# Patient Record
Sex: MALE | Race: BLACK OR AFRICAN AMERICAN | NOT HISPANIC OR LATINO | Employment: UNEMPLOYED | URBAN - METROPOLITAN AREA
[De-identification: names, ages, dates, MRNs, and addresses within clinical notes are randomized per-mention and may not be internally consistent; named-entity substitution may affect disease eponyms.]

---

## 2017-05-01 ENCOUNTER — HOSPITAL ENCOUNTER (EMERGENCY)
Facility: HOSPITAL | Age: 27
Discharge: HOME | End: 2017-05-01
Attending: FAMILY MEDICINE
Payer: MEDICAID

## 2017-05-01 VITALS
SYSTOLIC BLOOD PRESSURE: 135 MMHG | WEIGHT: 150 LBS | TEMPERATURE: 98 F | HEIGHT: 71 IN | RESPIRATION RATE: 18 BRPM | BODY MASS INDEX: 21 KG/M2 | DIASTOLIC BLOOD PRESSURE: 59 MMHG | HEART RATE: 71 BPM

## 2017-05-01 DIAGNOSIS — K64.5 HEMORRHOID THROMBOSIS: Primary | ICD-10-CM

## 2017-05-01 PROCEDURE — 99283 EMERGENCY DEPT VISIT LOW MDM: CPT

## 2017-05-01 PROCEDURE — 25000003 PHARM REV CODE 250: Performed by: FAMILY MEDICINE

## 2017-05-01 RX ORDER — DOCUSATE SODIUM 100 MG/1
100 CAPSULE, LIQUID FILLED ORAL 2 TIMES DAILY
Qty: 60 CAPSULE | Refills: 1 | Status: ON HOLD | OUTPATIENT
Start: 2017-05-01 | End: 2020-10-29 | Stop reason: HOSPADM

## 2017-05-01 RX ORDER — NAPROXEN 500 MG/1
500 TABLET ORAL 2 TIMES DAILY
Qty: 60 TABLET | Refills: 0 | Status: ON HOLD | OUTPATIENT
Start: 2017-05-01 | End: 2020-10-29 | Stop reason: HOSPADM

## 2017-05-01 RX ORDER — KETOROLAC TROMETHAMINE 10 MG/1
10 TABLET, FILM COATED ORAL
Status: COMPLETED | OUTPATIENT
Start: 2017-05-01 | End: 2017-05-01

## 2017-05-01 RX ADMIN — KETOROLAC TROMETHAMINE 10 MG: 10 TABLET, FILM COATED ORAL at 04:05

## 2017-05-01 NOTE — ED PROVIDER NOTES
"Encounter Date: 5/1/2017       History     Chief Complaint   Patient presents with    Hemorrhoids     pt reports painful bowel movements x 1 week. Pt states "I was using the bathroom like a week ago and there was regular looking blood."     Review of patient's allergies indicates:  No Known Allergies  HPI Comments: Patient complains of painful bowel movements since about a week.  He says he could have had a hemorrhoid.  And also noticed blood.  There is no fever.  Occasional constipation.  No nausea no vomiting no abdominal pain.    The history is provided by the patient.     History reviewed. No pertinent past medical history.  History reviewed. No pertinent surgical history.  No family history on file.  Social History   Substance Use Topics    Smoking status: Light Tobacco Smoker    Smokeless tobacco: None    Alcohol use No     Review of Systems   Constitutional: Negative for activity change, appetite change, chills, diaphoresis, fatigue and fever.   HENT: Negative for congestion, ear pain and hearing loss.    Eyes: Negative for photophobia, pain, discharge, redness and itching.   Respiratory: Negative for cough, choking, chest tightness, shortness of breath, wheezing and stridor.    Cardiovascular: Negative for chest pain.   Gastrointestinal: Positive for blood in stool and constipation. Negative for abdominal distention, abdominal pain, diarrhea, nausea and vomiting.   Endocrine: Negative for polydipsia, polyphagia and polyuria.   Genitourinary: Negative for dysuria and frequency.   Musculoskeletal: Negative for back pain, gait problem, neck pain and neck stiffness.   Skin: Negative for rash.   Neurological: Negative for dizziness, tremors, facial asymmetry, light-headedness, numbness and headaches.   Psychiatric/Behavioral: Negative for behavioral problems and confusion. The patient is not nervous/anxious.    All other systems reviewed and are negative.      Physical Exam   Initial Vitals   BP Pulse Resp " Temp SpO2   05/01/17 1547 05/01/17 1547 05/01/17 1547 05/01/17 1547 --   135/59 71 18 97.8 °F (36.6 °C)      Physical Exam    Nursing note and vitals reviewed.  Constitutional: He appears well-developed and well-nourished.   HENT:   Head: Normocephalic.   Right Ear: External ear normal.   Left Ear: External ear normal.   Nose: Nose normal.   Mouth/Throat: Oropharynx is clear and moist.   Eyes: Conjunctivae and EOM are normal. Pupils are equal, round, and reactive to light.   Cardiovascular: Normal rate, regular rhythm, normal heart sounds and intact distal pulses. Exam reveals no gallop and no friction rub.    No murmur heard.  Pulmonary/Chest: Breath sounds normal. No respiratory distress. He has no wheezes. He has no rhonchi.   Abdominal: Soft. Bowel sounds are normal. He exhibits no distension and no mass. There is no tenderness. There is no guarding.   Genitourinary: Rectal exam shows external hemorrhoid.         Musculoskeletal: Normal range of motion.   Neurological: He is alert and oriented to person, place, and time. He has normal strength and normal reflexes. He displays normal reflexes. No cranial nerve deficit or sensory deficit.         ED Course   Procedures  Labs Reviewed - No data to display                            ED Course     Clinical Impression:   The encounter diagnosis was Hemorrhoid thrombosis.    Disposition:   Disposition: Discharged  Condition: Fair  As to follow up primary care physician if symptoms persist.       Fabian Daley MD  05/02/17 4152

## 2017-05-01 NOTE — ED AVS SNAPSHOT
[facility]  [address]  [place] [address]               Chon Parham   2017  3:42 PM   ED    Description:  Male : 1990   Department:  [facility]           Your Care was Coordinated By:     Provider Role From To    Fabian Daley MD Attending Provider 17 1558 --      Reason for Visit     Hemorrhoids           Diagnoses this Visit        Comments    Hemorrhoid thrombosis    -  Primary       ED Disposition     None           To Do List           Follow-up Information     Follow up In 1 week.    Contact information:    physician       These Medications        Disp Refills Start End    hydrocortisone-pramoxine (PROCTOFOAM-HS) rectal foam 10 g 2 2017     Place 1 applicator rectally 2 (two) times daily. For 4 weeks - Rectal    docusate sodium (COLACE) 100 MG capsule 60 capsule 1 2017     Take 1 capsule (100 mg total) by mouth 2 (two) times daily. - Oral    naproxen (NAPROSYN) 500 MG tablet 60 tablet 0 2017     Take 1 tablet (500 mg total) by mouth 2 (two) times daily. - Oral      [facility]     [facility] Care [facility] -  Assistance  Unless otherwise directed by your provider, please contact [facility], our nurse care line that is available for  assistance.     Registered nurses in the [facility] provide: appointment scheduling, clinical advisement, health education, and other advisory services.  Call: [phone] (toll free)               Medications           Message regarding Medications     Verify the changes and/or additions to your medication regime listed below are the same as discussed with your clinician today.  If any of these changes or additions are incorrect, please notify your healthcare provider.        START taking these NEW medications        Refills    hydrocortisone-pramoxine (PROCTOFOAM-HS) rectal foam 2    Sig: Place 1 applicator rectally 2 (two) times daily. For  "4 weeks    Class: Print    Route: Rectal    docusate sodium (COLACE) 100 MG capsule 1    Sig: Take 1 capsule (100 mg total) by mouth 2 (two) times daily.    Class: Print    Route: Oral    naproxen (NAPROSYN) 500 MG tablet 0    Sig: Take 1 tablet (500 mg total) by mouth 2 (two) times daily.    Class: Print    Route: Oral      These medications were administered today        Dose Freq    ketorolac tablet 10 mg 10 mg ED 1 Time    Sig: Take 1 tablet (10 mg total) by mouth ED 1 Time.    Class: Normal    Route: Oral           Verify that the below list of medications is an accurate representation of the medications you are currently taking.  If none reported, the list may be blank. If incorrect, please contact your healthcare provider. Carry this list with you in case of emergency.           Current Medications     docusate sodium (COLACE) 100 MG capsule Take 1 capsule (100 mg total) by mouth 2 (two) times daily.    hydrocortisone-pramoxine (PROCTOFOAM-HS) rectal foam Place 1 applicator rectally 2 (two) times daily. For 4 weeks    naproxen (NAPROSYN) 500 MG tablet Take 1 tablet (500 mg total) by mouth 2 (two) times daily.           Clinical Reference Information           Your Vitals Were     BP Pulse Temp Resp Height Weight    135/59 71 97.8 °F (36.6 °C) (Oral) 18 5' 11" (1.803 m) 68 kg (150 lb)    BMI                20.92 kg/m2          Allergies as of 5/1/2017     No Known Allergies      Immunizations Administered on Date of Encounter - 5/1/2017     None      ED Micro, Lab, POCT     None      ED Imaging Orders     None        Discharge Instructions         Thrombosed Hemorrhoids    Hemorrhoids are swollen veins in the lower rectum and anus. They're similar to varicose veins that form in the legs. Hemorrhoids can happen inside the rectum (internal hemorrhoids). Or one may form at the anal opening (external hemorrhoids). Although they may bleed, most hemorrhoids aren't cause for concern. But a small blood clot (thrombus) " can develop in an external hemorrhoid. This may lead to severe pain and sometimes bleeding.  When to go to the emergency room (ER)  If you have severe pain or excessive bleeding, seek immediate medical care.  What to expect in the ER  A healthcare provider is likely to check your anus and rectum using a slender, lighted tube (anoscope or proctoscope). A local anesthetic is given to ease any discomfort.  Treatment  Treatment recommendations include the following:  · If the blood clot has formed within the past 48 to 72 hours, your healthcare provider may remove it from within the hemorrhoid. This is a simple procedure that can relieve pain. You will have a local anesthetic to keep you pain-free during the procedure. A small incision is made in the skin, and the blood clot is removed. Stitches are generally not needed.  · If more than 72 hours have passed, your healthcare provider will suggest home treatments. Simple home treatments can relieve your pain. These may include warm baths, ointments, suppositories, and witch hazel compresses. Many thrombosed hemorrhoids go away on their own in a few weeks.  · If you have persistent bleeding or painful hemorrhoids, talk to your healthcare provider about possible treatment with banding, ligation, or removal (hemorrhoidectomy).  Tips for preventing hemorrhoids  Tips include the following:  · Eat foods that are high in fiber and use fiber supplements to help prevent constipation.  · Drink plenty of liquids.  · Get regular exercise to help prevent constipation and promote good bowel function.   Date Last Reviewed: 6/1/2016  © 6036-6435 [employer]. [address] [place] [address], [place] [address]. All rights reserved. This information is not intended as a substitute for professional medical care. Always follow your healthcare professional's instructions.          [facility] Sign-Up     Activating your [facility] account is as easy as 1-2-3!     1) Visit [email or web address],  select Sign Up Now, enter this activation code and your date of birth, then select Next.  [email or web address]  Expires: 6/15/2017  4:36 PM      2) Create a username and password to use when you visit [facility] in the future and select a security question in case you lose your password and select Next.    3) Enter your e-mail address and click Sign Up!    Additional Information  If you have questions, please e-mail [email or web address] or call [phone] to talk to our [facility] staff. Remember, [facility] is NOT to be used for urgent needs. For medical emergencies, dial 911.         Smoking Cessation     If you would like to quit smoking:   You may be eligible for free services if you are a [place] resident and started smoking cigarettes before September 1, 1988.  Call the Smoking Cessation Trust ([facility]) toll free at [phone] or [phone].   Call [phone]-QUIT-NOW if you do not meet the above criteria.   Contact us via email: [email or web address]   View our website for more information: [email or web address]         [facility] complies with applicable Federal civil rights laws and does not discriminate on the basis of race, color, national origin, age, disability, or sex.        Language Assistance Services     ATTENTION: Language assistance services are available, free of charge. Please call [phone].      ATENCIÓN: Si habla español, tiene a brandon disposición servicios gratuitos de asistencia lingüística. Llame al [phone].     [facility] Ý: N?u b?n nói Ti?ng Vi?t, có các d?ch v? h? tr? ngôn ng? mi?n phí dành cho b?n. G?i s? [phone].

## 2017-05-01 NOTE — DISCHARGE INSTRUCTIONS
Thrombosed Hemorrhoids    Hemorrhoids are swollen veins in the lower rectum and anus. They're similar to varicose veins that form in the legs. Hemorrhoids can happen inside the rectum (internal hemorrhoids). Or one may form at the anal opening (external hemorrhoids). Although they may bleed, most hemorrhoids aren't cause for concern. But a small blood clot (thrombus) can develop in an external hemorrhoid. This may lead to severe pain and sometimes bleeding.  When to go to the emergency room (ER)  If you have severe pain or excessive bleeding, seek immediate medical care.  What to expect in the ER  A healthcare provider is likely to check your anus and rectum using a slender, lighted tube (anoscope or proctoscope). A local anesthetic is given to ease any discomfort.  Treatment  Treatment recommendations include the following:  · If the blood clot has formed within the past 48 to 72 hours, your healthcare provider may remove it from within the hemorrhoid. This is a simple procedure that can relieve pain. You will have a local anesthetic to keep you pain-free during the procedure. A small incision is made in the skin, and the blood clot is removed. Stitches are generally not needed.  · If more than 72 hours have passed, your healthcare provider will suggest home treatments. Simple home treatments can relieve your pain. These may include warm baths, ointments, suppositories, and witch hazel compresses. Many thrombosed hemorrhoids go away on their own in a few weeks.  · If you have persistent bleeding or painful hemorrhoids, talk to your healthcare provider about possible treatment with banding, ligation, or removal (hemorrhoidectomy).  Tips for preventing hemorrhoids  Tips include the following:  · Eat foods that are high in fiber and use fiber supplements to help prevent constipation.  · Drink plenty of liquids.  · Get regular exercise to help prevent constipation and promote good bowel function.   Date Last  Reviewed: 6/1/2016  © 0990-1394 [employer]. [address] [place] [address], [place] [address]. All rights reserved. This information is not intended as a substitute for professional medical care. Always follow your healthcare professional's instructions.

## 2017-05-01 NOTE — ED TRIAGE NOTES
"pt reports painful bowel movements x 1 week. Pt states "I was using the bathroom like a week ago and there was regular looking blood."  "

## 2018-05-14 ENCOUNTER — HOSPITAL ENCOUNTER (INPATIENT)
Facility: HOSPITAL | Age: 28
LOS: 3 days | Discharge: HOME | DRG: 977 | End: 2018-05-17
Attending: EMERGENCY MEDICINE | Admitting: HOSPITALIST
Payer: MEDICAID

## 2018-05-14 DIAGNOSIS — N48.5 PENILE ULCER: ICD-10-CM

## 2018-05-14 DIAGNOSIS — K12.1 ORAL ULCER: ICD-10-CM

## 2018-05-14 DIAGNOSIS — B20 HIV (HUMAN IMMUNODEFICIENCY VIRUS INFECTION): Primary | ICD-10-CM

## 2018-05-14 DIAGNOSIS — D69.6 THROMBOCYTOPENIA: ICD-10-CM

## 2018-05-14 DIAGNOSIS — D70.9 NEUTROPENIA, UNSPECIFIED TYPE: ICD-10-CM

## 2018-05-14 PROBLEM — R50.9 FEVER: Status: ACTIVE | Noted: 2018-05-14

## 2018-05-14 PROBLEM — D61.818 PANCYTOPENIA: Status: ACTIVE | Noted: 2018-05-14

## 2018-05-14 PROBLEM — Z21 HIV (HUMAN IMMUNODEFICIENCY VIRUS INFECTION): Status: ACTIVE | Noted: 2018-05-14

## 2018-05-14 PROBLEM — R50.81 NEUTROPENIC FEVER: Status: ACTIVE | Noted: 2018-05-14

## 2018-05-14 LAB
ALBUMIN SERPL BCP-MCNC: 3.2 G/DL
ALBUMIN SERPL BCP-MCNC: 3.3 G/DL
ALP SERPL-CCNC: 48 U/L
ALP SERPL-CCNC: 48 U/L
ALT SERPL W/O P-5'-P-CCNC: 18 U/L
ALT SERPL W/O P-5'-P-CCNC: 21 U/L
AMPHET+METHAMPHET UR QL: NEGATIVE
ANION GAP SERPL CALC-SCNC: 10 MMOL/L
ANION GAP SERPL CALC-SCNC: 11 MMOL/L
AST SERPL-CCNC: 48 U/L
AST SERPL-CCNC: 49 U/L
BACTERIA #/AREA URNS HPF: NORMAL /HPF
BARBITURATES UR QL SCN>200 NG/ML: NEGATIVE
BASOPHILS # BLD AUTO: 0.01 K/UL
BASOPHILS NFR BLD: 0.4 %
BENZODIAZ UR QL SCN>200 NG/ML: NEGATIVE
BILIRUB SERPL-MCNC: 0.5 MG/DL
BILIRUB SERPL-MCNC: 0.8 MG/DL
BILIRUB UR QL STRIP: NEGATIVE
BUN SERPL-MCNC: 13 MG/DL
BUN SERPL-MCNC: 14 MG/DL
BZE UR QL SCN: NEGATIVE
CALCIUM SERPL-MCNC: 7.8 MG/DL
CALCIUM SERPL-MCNC: 8.1 MG/DL
CANNABINOIDS UR QL SCN: NEGATIVE
CD3+CD4+ CELLS # BLD: 308 CELLS/UL (ref 300–1400)
CD3+CD4+ CELLS NFR BLD: 40.6 % (ref 28–57)
CHLORIDE SERPL-SCNC: 102 MMOL/L
CHLORIDE SERPL-SCNC: 98 MMOL/L
CLARITY UR: CLEAR
CO2 SERPL-SCNC: 21 MMOL/L
CO2 SERPL-SCNC: 27 MMOL/L
COLOR UR: YELLOW
CREAT SERPL-MCNC: 0.9 MG/DL
CREAT SERPL-MCNC: 0.9 MG/DL
CREAT UR-MCNC: 297.7 MG/DL
DIFFERENTIAL METHOD: ABNORMAL
EOSINOPHIL # BLD AUTO: 0 K/UL
EOSINOPHIL NFR BLD: 0 %
ERYTHROCYTE [DISTWIDTH] IN BLOOD BY AUTOMATED COUNT: 12.2 %
EST. GFR  (AFRICAN AMERICAN): >60 ML/MIN/1.73 M^2
EST. GFR  (AFRICAN AMERICAN): >60 ML/MIN/1.73 M^2
EST. GFR  (NON AFRICAN AMERICAN): >60 ML/MIN/1.73 M^2
EST. GFR  (NON AFRICAN AMERICAN): >60 ML/MIN/1.73 M^2
GLUCOSE SERPL-MCNC: 85 MG/DL
GLUCOSE SERPL-MCNC: 93 MG/DL
GLUCOSE UR QL STRIP: NEGATIVE
HAV IGM SERPL QL IA: NEGATIVE
HBV CORE IGM SERPL QL IA: NEGATIVE
HBV SURFACE AG SERPL QL IA: NEGATIVE
HCT VFR BLD AUTO: 44.8 %
HCV AB SERPL QL IA: NEGATIVE
HGB BLD-MCNC: 15.8 G/DL
HGB UR QL STRIP: NEGATIVE
HYALINE CASTS #/AREA URNS LPF: 0 /LPF
INR PPP: 1
KETONES UR QL STRIP: ABNORMAL
LEUKOCYTE ESTERASE UR QL STRIP: NEGATIVE
LYMPHOCYTES # BLD AUTO: 0.8 K/UL
LYMPHOCYTES NFR BLD: 32.4 %
MAGNESIUM SERPL-MCNC: 1.9 MG/DL
MCH RBC QN AUTO: 33.1 PG
MCHC RBC AUTO-ENTMCNC: 35.3 G/DL
MCV RBC AUTO: 94 FL
METHADONE UR QL SCN>300 NG/ML: NEGATIVE
MICROSCOPIC COMMENT: NORMAL
MONOCYTES # BLD AUTO: 0.3 K/UL
MONOCYTES NFR BLD: 12.3 %
NEUTROPHILS # BLD AUTO: 1.3 K/UL
NEUTROPHILS NFR BLD: 54.9 %
NITRITE UR QL STRIP: NEGATIVE
OPIATES UR QL SCN: NORMAL
PCP UR QL SCN>25 NG/ML: NEGATIVE
PH UR STRIP: 6 [PH] (ref 5–8)
PHOSPHATE SERPL-MCNC: 1.6 MG/DL
PLATELET # BLD AUTO: 68 K/UL
PMV BLD AUTO: 11.2 FL
POTASSIUM SERPL-SCNC: 3.7 MMOL/L
POTASSIUM SERPL-SCNC: 3.8 MMOL/L
PROT SERPL-MCNC: 6.3 G/DL
PROT SERPL-MCNC: 6.4 G/DL
PROT UR QL STRIP: ABNORMAL
PROTHROMBIN TIME: 10.6 SEC
RBC # BLD AUTO: 4.77 M/UL
RBC #/AREA URNS HPF: 0 /HPF (ref 0–4)
RPR SER QL: NORMAL
SODIUM SERPL-SCNC: 134 MMOL/L
SODIUM SERPL-SCNC: 135 MMOL/L
SP GR UR STRIP: >=1.03 (ref 1–1.03)
TOXICOLOGY INFORMATION: NORMAL
URN SPEC COLLECT METH UR: ABNORMAL
UROBILINOGEN UR STRIP-ACNC: 1 EU/DL
WBC # BLD AUTO: 2.44 K/UL
WBC #/AREA URNS HPF: 0 /HPF (ref 0–5)

## 2018-05-14 PROCEDURE — 85025 COMPLETE CBC W/AUTO DIFF WBC: CPT

## 2018-05-14 PROCEDURE — 83735 ASSAY OF MAGNESIUM: CPT

## 2018-05-14 PROCEDURE — 84100 ASSAY OF PHOSPHORUS: CPT

## 2018-05-14 PROCEDURE — 80053 COMPREHEN METABOLIC PANEL: CPT

## 2018-05-14 PROCEDURE — 81000 URINALYSIS NONAUTO W/SCOPE: CPT | Mod: 59

## 2018-05-14 PROCEDURE — 11000001 HC ACUTE MED/SURG PRIVATE ROOM

## 2018-05-14 PROCEDURE — 86480 TB TEST CELL IMMUN MEASURE: CPT

## 2018-05-14 PROCEDURE — 99285 EMERGENCY DEPT VISIT HI MDM: CPT | Mod: 25

## 2018-05-14 PROCEDURE — 80074 ACUTE HEPATITIS PANEL: CPT

## 2018-05-14 PROCEDURE — 25000003 PHARM REV CODE 250: Performed by: HOSPITALIST

## 2018-05-14 PROCEDURE — 87040 BLOOD CULTURE FOR BACTERIA: CPT | Mod: 59

## 2018-05-14 PROCEDURE — 87536 HIV-1 QUANT&REVRSE TRNSCRPJ: CPT

## 2018-05-14 PROCEDURE — 80307 DRUG TEST PRSMV CHEM ANLYZR: CPT

## 2018-05-14 PROCEDURE — 36415 COLL VENOUS BLD VENIPUNCTURE: CPT

## 2018-05-14 PROCEDURE — 86592 SYPHILIS TEST NON-TREP QUAL: CPT

## 2018-05-14 PROCEDURE — 86694 HERPES SIMPLEX NES ANTBDY: CPT

## 2018-05-14 PROCEDURE — 86361 T CELL ABSOLUTE COUNT: CPT

## 2018-05-14 PROCEDURE — 85610 PROTHROMBIN TIME: CPT

## 2018-05-14 PROCEDURE — 63600175 PHARM REV CODE 636 W HCPCS: Performed by: HOSPITALIST

## 2018-05-14 PROCEDURE — 86703 HIV-1/HIV-2 1 RESULT ANTBDY: CPT

## 2018-05-14 RX ORDER — SODIUM CHLORIDE 0.9 % (FLUSH) 0.9 %
5 SYRINGE (ML) INJECTION
Status: DISCONTINUED | OUTPATIENT
Start: 2018-05-14 | End: 2018-05-17 | Stop reason: HOSPADM

## 2018-05-14 RX ORDER — IPRATROPIUM BROMIDE AND ALBUTEROL SULFATE 2.5; .5 MG/3ML; MG/3ML
3 SOLUTION RESPIRATORY (INHALATION) EVERY 6 HOURS PRN
Status: DISCONTINUED | OUTPATIENT
Start: 2018-05-14 | End: 2018-05-17 | Stop reason: HOSPADM

## 2018-05-14 RX ORDER — IBUPROFEN 200 MG
24 TABLET ORAL
Status: DISCONTINUED | OUTPATIENT
Start: 2018-05-14 | End: 2018-05-17 | Stop reason: HOSPADM

## 2018-05-14 RX ORDER — HYDROCODONE BITARTRATE AND ACETAMINOPHEN 5; 325 MG/1; MG/1
1 TABLET ORAL EVERY 6 HOURS PRN
Status: DISCONTINUED | OUTPATIENT
Start: 2018-05-14 | End: 2018-05-17 | Stop reason: HOSPADM

## 2018-05-14 RX ORDER — NYSTATIN 100000 [USP'U]/ML
500000 SUSPENSION ORAL 4 TIMES DAILY
Status: DISCONTINUED | OUTPATIENT
Start: 2018-05-14 | End: 2018-05-17 | Stop reason: HOSPADM

## 2018-05-14 RX ORDER — ONDANSETRON 2 MG/ML
4 INJECTION INTRAMUSCULAR; INTRAVENOUS EVERY 8 HOURS PRN
Status: DISCONTINUED | OUTPATIENT
Start: 2018-05-14 | End: 2018-05-17 | Stop reason: HOSPADM

## 2018-05-14 RX ORDER — ACETAMINOPHEN 325 MG/1
650 TABLET ORAL EVERY 4 HOURS PRN
Status: DISCONTINUED | OUTPATIENT
Start: 2018-05-14 | End: 2018-05-17 | Stop reason: HOSPADM

## 2018-05-14 RX ORDER — GLUCAGON 1 MG
1 KIT INJECTION
Status: DISCONTINUED | OUTPATIENT
Start: 2018-05-14 | End: 2018-05-17 | Stop reason: HOSPADM

## 2018-05-14 RX ORDER — IBUPROFEN 200 MG
16 TABLET ORAL
Status: DISCONTINUED | OUTPATIENT
Start: 2018-05-14 | End: 2018-05-17 | Stop reason: HOSPADM

## 2018-05-14 RX ADMIN — HYDROCODONE BITARTRATE AND ACETAMINOPHEN 1 TABLET: 5; 325 TABLET ORAL at 12:05

## 2018-05-14 RX ADMIN — NYSTATIN 500000 UNITS: 100000 SUSPENSION ORAL at 09:05

## 2018-05-14 RX ADMIN — HYDROCODONE BITARTRATE AND ACETAMINOPHEN 1 TABLET: 5; 325 TABLET ORAL at 07:05

## 2018-05-14 RX ADMIN — NYSTATIN 500000 UNITS: 100000 SUSPENSION ORAL at 08:05

## 2018-05-14 RX ADMIN — ONDANSETRON 4 MG: 2 INJECTION INTRAMUSCULAR; INTRAVENOUS at 09:05

## 2018-05-14 RX ADMIN — HYDROCODONE BITARTRATE AND ACETAMINOPHEN 1 TABLET: 5; 325 TABLET ORAL at 05:05

## 2018-05-14 RX ADMIN — NYSTATIN 500000 UNITS: 100000 SUSPENSION ORAL at 12:05

## 2018-05-14 RX ADMIN — NYSTATIN 500000 UNITS: 100000 SUSPENSION ORAL at 04:05

## 2018-05-14 NOTE — HPI
27M with unknown pmh presents with fever, n/v and abdominal pain and throat pain 5 days ago to [facility].  He was discharged home with levaquin  for 10 days.  He had a follow up with family medicine physician on 5/11 who noted patient had unremarkable cbc, HIV, flu and RPR was negative.  ENT exam at that time was normal.  On 5/13, miladys gonsalez called back to show that cbc abnormal and HIV was positive and to have patient transferred to [facility] for further work up of new diagnosis of HIV.   Patient himself reports 10 lb weight loss in the last week.  Reports oral sores onset 2 weeks ago. Penile sore onset 3 months ago. Pt reports that sores are tender. Symptoms are constant and moderate in severity. No mitigating or exacerbating factors reported. Associated sxs include subjective fever and generalized myalgia. Patient denies any dysuria, hematuria, frequency, hesitancy, chills, n/v, and all other sxs at this time. Prior Tx includes levaquin. Pt reports having had 8 sexual partners in the past year, with infrequent use of condoms. Pt has PMHx of chlamydia and gonorrhea. Pt denies IV drug use. Pt reports that he has not seen a PCP recently for sxs, or been prescribed any medications besides the levaquin. Brendenharisgabbi shows: positive HIV, white count of 2.4, platlets at 76, lactose acid of 0.7, and normal CXray. No further complaints or concerns at this time.

## 2018-05-14 NOTE — SUBJECTIVE & OBJECTIVE
No past medical history on file.    No past surgical history on file.    Review of patient's allergies indicates:  No Known Allergies    No current facility-administered medications on file prior to encounter.      Current Outpatient Prescriptions on File Prior to Encounter   Medication Sig    docusate sodium (COLACE) 100 MG capsule Take 1 capsule (100 mg total) by mouth 2 (two) times daily.    hydrocortisone-pramoxine (PROCTOFOAM-HS) rectal foam Place 1 applicator rectally 2 (two) times daily. For 4 weeks    naproxen (NAPROSYN) 500 MG tablet Take 1 tablet (500 mg total) by mouth 2 (two) times daily.     Family History     None        Social History Main Topics    Smoking status: Light Tobacco Smoker    Smokeless tobacco: Not on file    Alcohol use No    Drug use: Yes     Types: Marijuana    Sexual activity: Not on file     Review of Systems   Constitutional: Positive for fever. Negative for activity change, appetite change, chills, diaphoresis and fatigue.   HENT: Negative for facial swelling, sore throat, tinnitus and trouble swallowing.         Oral ulcers   Eyes: Negative for photophobia and visual disturbance.   Respiratory: Negative for apnea, cough, chest tightness, shortness of breath and wheezing.    Cardiovascular: Negative for chest pain, palpitations and leg swelling.   Gastrointestinal: Negative for abdominal distention, abdominal pain, constipation, diarrhea, nausea and vomiting.   Endocrine: Negative for polydipsia, polyphagia and polyuria.   Genitourinary: Positive for penile pain. Negative for decreased urine volume, dysuria, flank pain, frequency and hematuria.        Penile ulcer   Musculoskeletal: Positive for myalgias. Negative for arthralgias, back pain, joint swelling and neck stiffness.   Skin: Negative for pallor and rash.   Allergic/Immunologic: Negative for immunocompromised state.   Neurological: Negative for dizziness, seizures, syncope, weakness, numbness and headaches.    Psychiatric/Behavioral: Negative for confusion, hallucinations and suicidal ideas. The patient is not nervous/anxious.    All other systems reviewed and are negative.    Objective:     Vital Signs (Most Recent):  Temp: 98.9 °F (37.2 °C) (05/14/18 0322)  Pulse: 74 (05/14/18 0322)  Resp: 16 (05/14/18 0322)  BP: (!) 104/54 (05/14/18 0322)  SpO2: 97 % (05/14/18 0322) Vital Signs (24h Range):  Temp:  [98.9 °F (37.2 °C)-101.3 °F (38.5 °C)] 98.9 °F (37.2 °C)  Pulse:  [74-81] 74  Resp:  [16-18] 16  SpO2:  [97 %-98 %] 97 %  BP: (104-127)/(54-63) 104/54     Weight: 67.6 kg (149 lb)  Body mass index is 20.78 kg/m².    Physical Exam   Constitutional: He is oriented to person, place, and time. He appears well-developed and well-nourished. No distress.   HENT:   Head: Normocephalic and atraumatic.   Mouth/Throat: Oropharynx is clear and moist.   Multiple oral and throat ulcers  No leukoplakia   Eyes: Conjunctivae are normal. Pupils are equal, round, and reactive to light. No scleral icterus.   Neck: No JVD present. No thyromegaly present.   Cardiovascular: Regular rhythm.  Exam reveals no gallop and no friction rub.    No murmur heard.  Pulmonary/Chest: Effort normal and breath sounds normal. No respiratory distress. He has no wheezes. He has no rales.   Abdominal: Soft. Bowel sounds are normal. He exhibits no distension. There is no tenderness. There is no guarding.   Genitourinary:   Genitourinary Comments: Penile ulcer   Musculoskeletal: Normal range of motion.   Neurological: He is alert and oriented to person, place, and time. No cranial nerve deficit.   Skin: Skin is warm. Capillary refill takes 2 to 3 seconds. He is not diaphoretic. No erythema.   Psychiatric: He has a normal mood and affect.   Nursing note and vitals reviewed.        CRANIAL NERVES     CN III, IV, VI   Pupils are equal, round, and reactive to light.       Significant Labs: BMP: No results for input(s): GLU, NA, K, CL, CO2, BUN, CREATININE, CALCIUM, MG  in the last 48 hours.  CBC: No results for input(s): WBC, HGB, HCT, PLT in the last 48 hours.  All pertinent labs within the past 24 hours have been reviewed.    Significant Imaging: I have reviewed all pertinent imaging results/findings within the past 24 hours.   Imaging Results    None

## 2018-05-14 NOTE — ASSESSMENT & PLAN NOTE
- again confusing history  - no work up done in Er, reported fevers 101.6 at outside facility, no fever in ER  - check blood cx x 2, UA, CXR, basic labs including CBC and CMP  - start tylenol q4h prn Temp >101

## 2018-05-14 NOTE — H&P
[facility] Hospital Medicine  History & Physical    Patient Name: Chon Parham  MRN: [id number]  Admission Date: 5/14/2018  Attending Physician: Wong Conti MD  Primary Care Provider: Primary Doctor No         Patient information was obtained from patient and ER records.     Subjective:     Principal Problem:HIV (human immunodeficiency virus infection)    Chief Complaint:   Chief Complaint   Patient presents with    Mouth Lesions     pt transfer from [facility], pt cx with HIV, presents with pain with urination        HPI: 27M with unknown pmh presents with fever, n/v and abdominal pain and throat pain 5 days ago to [facility].  He was discharged home with levaquin  for 10 days.  He had a follow up with family medicine physician on 5/11 who noted patient had unremarkable cbc, HIV, flu and RPR was negative.  ENT exam at that time was normal.  On 5/13, miladys gonsalez called back to show that cbc abnormal and HIV was positive and to have patient transferred to [facility] for further work up of new diagnosis of HIV.   Patient himself reports 10 lb weight loss in the last week.  Reports oral sores onset 2 weeks ago. Penile sore onset 3 months ago. Pt reports that sores are tender. Symptoms are constant and moderate in severity. No mitigating or exacerbating factors reported. Associated sxs include subjective fever and generalized myalgia. Patient denies any dysuria, hematuria, frequency, hesitancy, chills, n/v, and all other sxs at this time. Prior Tx includes levaquin. Pt reports having had 8 sexual partners in the past year, with infrequent use of condoms. Pt has PMHx of chlamydia and gonorrhea. Pt denies IV drug use. Pt reports that he has not seen a PCP recently for sxs, or been prescribed any medications besides the levaquin. [facility] positive HIV, white count of 2.4, platlets at 76, lactose acid of 0.7, and normal CXray. No further complaints or concerns at this time.     No past medical  history on file.    No past surgical history on file.    Review of patient's allergies indicates:  No Known Allergies    No current facility-administered medications on file prior to encounter.      Current Outpatient Prescriptions on File Prior to Encounter   Medication Sig    docusate sodium (COLACE) 100 MG capsule Take 1 capsule (100 mg total) by mouth 2 (two) times daily.    hydrocortisone-pramoxine (PROCTOFOAM-HS) rectal foam Place 1 applicator rectally 2 (two) times daily. For 4 weeks    naproxen (NAPROSYN) 500 MG tablet Take 1 tablet (500 mg total) by mouth 2 (two) times daily.     Family History     None        Social History Main Topics    Smoking status: Light Tobacco Smoker    Smokeless tobacco: Not on file    Alcohol use No    Drug use: Yes     Types: Marijuana    Sexual activity: Not on file     Review of Systems   Constitutional: Positive for fever. Negative for activity change, appetite change, chills, diaphoresis and fatigue.   HENT: Negative for facial swelling, sore throat, tinnitus and trouble swallowing.         Oral ulcers   Eyes: Negative for photophobia and visual disturbance.   Respiratory: Negative for apnea, cough, chest tightness, shortness of breath and wheezing.    Cardiovascular: Negative for chest pain, palpitations and leg swelling.   Gastrointestinal: Negative for abdominal distention, abdominal pain, constipation, diarrhea, nausea and vomiting.   Endocrine: Negative for polydipsia, polyphagia and polyuria.   Genitourinary: Positive for penile pain. Negative for decreased urine volume, dysuria, flank pain, frequency and hematuria.        Penile ulcer   Musculoskeletal: Positive for myalgias. Negative for arthralgias, back pain, joint swelling and neck stiffness.   Skin: Negative for pallor and rash.   Allergic/Immunologic: Negative for immunocompromised state.   Neurological: Negative for dizziness, seizures, syncope, weakness, numbness and headaches.    Psychiatric/Behavioral: Negative for confusion, hallucinations and suicidal ideas. The patient is not nervous/anxious.    All other systems reviewed and are negative.    Objective:     Vital Signs (Most Recent):  Temp: 98.9 °F (37.2 °C) (05/14/18 0322)  Pulse: 74 (05/14/18 0322)  Resp: 16 (05/14/18 0322)  BP: (!) 104/54 (05/14/18 0322)  SpO2: 97 % (05/14/18 0322) Vital Signs (24h Range):  Temp:  [98.9 °F (37.2 °C)-101.3 °F (38.5 °C)] 98.9 °F (37.2 °C)  Pulse:  [74-81] 74  Resp:  [16-18] 16  SpO2:  [97 %-98 %] 97 %  BP: (104-127)/(54-63) 104/54     Weight: 67.6 kg (149 lb)  Body mass index is 20.78 kg/m².    Physical Exam   Constitutional: He is oriented to person, place, and time. He appears well-developed and well-nourished. No distress.   HENT:   Head: Normocephalic and atraumatic.   Mouth/Throat: Oropharynx is clear and moist.   Multiple oral and throat ulcers  No leukoplakia   Eyes: Conjunctivae are normal. Pupils are equal, round, and reactive to light. No scleral icterus.   Neck: No JVD present. No thyromegaly present.   Cardiovascular: Regular rhythm.  Exam reveals no gallop and no friction rub.    No murmur heard.  Pulmonary/Chest: Effort normal and breath sounds normal. No respiratory distress. He has no wheezes. He has no rales.   Abdominal: Soft. Bowel sounds are normal. He exhibits no distension. There is no tenderness. There is no guarding.   Genitourinary:   Genitourinary Comments: Penile ulcer   Musculoskeletal: Normal range of motion.   Neurological: He is alert and oriented to person, place, and time. No cranial nerve deficit.   Skin: Skin is warm. Capillary refill takes 2 to 3 seconds. He is not diaphoretic. No erythema.   Psychiatric: He has a normal mood and affect.   Nursing note and vitals reviewed.        CRANIAL NERVES     CN III, IV, VI   Pupils are equal, round, and reactive to light.       Significant Labs: BMP: No results for input(s): GLU, NA, K, CL, CO2, BUN, CREATININE, CALCIUM, MG  in the last 48 hours.  CBC: No results for input(s): WBC, HGB, HCT, PLT in the last 48 hours.  All pertinent labs within the past 24 hours have been reviewed.    Significant Imaging: I have reviewed all pertinent imaging results/findings within the past 24 hours.   Imaging Results    None           Assessment/Plan:     * HIV (human immunodeficiency virus infection)    - unclear history from providers and patient  - check HIV 1/2 Ab, CD4 count, HIV viral load  - will also check acute hep panel, rpr, and gold quantiferon,   - consult ID            Fever    - again confusing history  - no work up done in Er, reported fevers 101.6 at outside facility, no fever in ER  - check blood cx x 2, UA, CXR, basic labs including CBC and CMP  - start tylenol q4h prn Temp >101        Oral ulcer    - start nystatin qid          Penile ulcer    - check chlamydia, gonorrha, hsv, hiv, rpr            VTE Risk Mitigation         Ordered     IP VTE LOW RISK PATIENT  Once      05/14/18 0421     Place FLAVIO hose  Until discontinued      05/14/18 0421             Wong Conti MD  Department of Hospital Medicine   [facility]

## 2018-05-14 NOTE — ASSESSMENT & PLAN NOTE
- unclear history from providers and patient  - check HIV 1/2 Ab, CD4 count, HIV viral load  - will also check acute hep panel, rpr, and gold quantiferon,   - consult ID

## 2018-05-14 NOTE — PLAN OF CARE
Met with patient. Patient denies any post hospital needs or services at this time. Discussed obtaining a pcp , and that his insurance assigns a pcp . Informed him that his pcp should be listed on his insurance card or he could contact his insurance for name of his pcp. Will need follow up appointment close to his home. Will continue to follow.  Transitional Care Folder, Discharge Planning Begins on Admission pamphlet, [facility] Delivery pamphlet, Advance Directive information given to patient along with the contact information given.Instructed patient  to call with any questions or concerns.      No Pharmacies Listed  Primary Doctor No  Payor: MEDICAID / Plan: [employer] [facility] / Product Type: Managed Medicaid /         05/14/18 1033   Discharge Assessment   Assessment Type Discharge Planning Assessment   Confirmed/corrected address and phone number on facesheet? No  (Corrected address : [address] [place] [address]. [id number])   Assessment information obtained from? Patient;Medical Record   Expected Length of Stay (days) (tbd)   Communicated expected length of stay with patient/caregiver no   Prior to hospitilization cognitive status: Alert/Oriented   Prior to hospitalization functional status: Independent   Current cognitive status: Alert/Oriented   Current Functional Status: Independent   Facility Arrived From: home   Lives With friend(s)   Able to Return to Prior Arrangements yes   Is patient able to care for self after discharge? Yes   Who are your caregiver(s) and their phone number(s)? Gini Parham ( mother ) [phone]   Patient's perception of discharge disposition home or selfcare   Readmission Within The Last 30 Days no previous admission in last 30 days   Patient currently being followed by outpatient case management? No   Patient currently receives any other outside agency services? No   Equipment Currently Used at Home none   Do you have any problems affording any of your  prescribed medications? No   Is the patient taking medications as prescribed? (indicated he is not taking routine medications)   Does the patient have transportation home? Yes   Transportation Available family or friend will provide   Does the patient receive services at the [facility]? No   Discharge Plan A Home   Discharge Plan B Home;Home with family   Patient/Family In Agreement With Plan yes

## 2018-05-14 NOTE — ED PROVIDER NOTES
SCRIBE #1 NOTE: I, Jono Jasmine, am scribing for, and in the presence of, Jac Qureshi Jr., MD. I have scribed the entire note.      History      Chief Complaint   Patient presents with    Mouth Lesions     pt transfer from [facility], pt cx with HIV, presents with pain with urination       Review of patient's allergies indicates:  No Known Allergies     HPI   HPI    5/14/2018, 3:38 AM   History obtained from the patient      History of Present Illness: Chon Parham is a 27 y.o. male patient who was transferred  to this Emergency Department from [facility] for thrombocytopenia, neutropenia, mouth/throat ulcers and penile ulcer, and new dx of HIV. Pt reports that oral sores onset 2 weeks ago. Penile sore onset 3 months ago. Pt reports that sores are tender. Symptoms are constant and moderate in severity. No mitigating or exacerbating factors reported. Associated sxs include subjective fever and generalized myalgia. Patient denies any dysuria, hematuria, frequency, hesitancy, chills, n/v, and all other sxs at this time. Prior Tx includes levaquin. Pt reports having had 8 sexual partners in the past year, with infrequent use of condoms. Pt has PMHx of chlamydia and gonorrhea. Pt denies IV drug use. Pt reports that he has not seen a PCP recently for sxs, or been prescribed any medications besides the levaquin. [facility] shows: positive HIV, white count of 2.4, platlets at 76, lactose acid of 0.7, and normal CXray. No further complaints or concerns at this time.     Discussed with Dr. Lane (ID) prior to accepting pt for transfer.  Dr. Lane agrees with transfer and will see pt in hospital room.      Arrival mode: EMS    PCP: None given      Past Medical History:  History reviewed. Nothing pertinent.     Past Surgical History:  History reviewed. Nothing pertinent.      Family History:  History reviewed. Nothing pertinent.     Social History:  Social History     Social History Main Topics    Smoking status: Light  Tobacco Smoker    Smokeless tobacco: Unknown    Alcohol use No    Drug use: Yes     Types: Marijuana    Sexual activity: Unknown       ROS   Review of Systems   Constitutional: Positive for fever. Negative for chills.   HENT: Negative for sore throat.    Respiratory: Negative for shortness of breath.    Cardiovascular: Negative for chest pain.   Gastrointestinal: Negative for nausea and vomiting.   Genitourinary: Negative for dysuria, frequency and hematuria.        (-) Hesitancy   Musculoskeletal: Positive for myalgias (Generalized). Negative for back pain.   Skin: Positive for wound (Mouth/throat/penis). Negative for rash.   Neurological: Negative for weakness.   Hematological: Does not bruise/bleed easily.   All other systems reviewed and are negative.      Physical Exam      Initial Vitals [05/14/18 0322]   BP Pulse Resp Temp SpO2   (!) 104/54 74 16 98.9 °F (37.2 °C) 97 %      MAP       70.67          Physical Exam  Nursing Notes and Vital Signs Reviewed.  Constitutional: Patient is in no acute distress. Well-developed and well-nourished.  Head: Atraumatic. Normocephalic.  Eyes: PERRL. EOM intact. Conjunctivae are not pale. No scleral icterus.  ENT: Mucous membranes are moist. Multiple ulcers to roof of mouth and posterior pharynx.     Neck: Supple. Full ROM. No lymphadenopathy.  Cardiovascular: Regular rate. Regular rhythm. No murmurs, rubs, or gallops. Distal pulses are 2+ and symmetric.  Pulmonary/Chest: No respiratory distress. Clear to auscultation bilaterally. No wheezing or rales.  Abdominal: Soft and non-distended.  There is no tenderness.  No rebound, guarding, or rigidity.  Genitourinary: Ulcer to penis.   Musculoskeletal: Moves all extremities. No obvious deformities. No edema. No calf tenderness.  Skin: Warm and dry.  Neurological:  Alert, awake, and appropriate.  Normal speech.  No acute focal neurological deficits are appreciated.  Psychiatric: Normal affect. Good eye contact. Appropriate in  "content.    ED Course    Procedures  ED Vital Signs:  Vitals:    05/14/18 0322 05/14/18 0440 05/14/18 0700 05/14/18 0745   BP: (!) 104/54 (!) 108/54 (!) 100/54 (!) 113/54   Pulse: 74 70 (!) 57 61   Resp: 16 18 19 18   Temp: 98.9 °F (37.2 °C)   98.7 °F (37.1 °C)   TempSrc: Oral   Oral   SpO2: 97% (!) 93% (!) 94% 97%   Weight: 67.6 kg (149 lb)      Height: 5' 11" (1.803 m)       05/14/18 1213 05/14/18 1608 05/14/18 1947 05/14/18 2041   BP: (!) 108/54 (!) 118/55 (!) 114/56    Pulse: 64 64 67 60   Resp: 18 16 18 18   Temp: 99.7 °F (37.6 °C) 99.5 °F (37.5 °C) (!) 101.1 °F (38.4 °C)    TempSrc: Oral Oral Oral    SpO2: 96% 95% 95% 97%   Weight:       Height:        05/14/18 2059 05/14/18 2340   BP:  (!) 103/56   Pulse:  70   Resp:  18   Temp: (!) 100.4 °F (38 °C) 100.2 °F (37.9 °C)   TempSrc: Oral Oral   SpO2:  98%   Weight:     Height:                  The Emergency Provider reviewed the vital signs and test results, which are outlined above.    ED Discussion     4:00 AM: Discussed case with Julieta Nguyen (Hospital Medicine). Dr. Conti agrees with current care and management of pt and accepts admission.   Admitting Service: Hospital medicine   Admitting Physician: Dr. Conti  Admit to: [facility]      4:15 AM: Re-evaluated pt. I have discussed test results, shared treatment plan, and the need for admission with patient at bedside. Pt expresses understanding at this time and agrees with all information. All questions answered. Pt has no further questions or concerns at this time. Pt is ready for admit.      ED Medication(s):  Medications   sodium chloride 0.9% flush 5 mL (not administered)   glucose chewable tablet 16 g (not administered)   glucose chewable tablet 24 g (not administered)   dextrose 50% injection 12.5 g (not administered)   dextrose 50% injection 25 g (not administered)   glucagon (human recombinant) injection 1 mg (not administered)   acetaminophen tablet 650 mg (not administered)   ondansetron injection 4 mg " (4 mg Intravenous Given 5/14/18 2136)   albuterol-ipratropium 2.5mg-0.5mg/3mL nebulizer solution 3 mL (not administered)   hydrocodone-acetaminophen 5-325mg per tablet 1 tablet (1 tablet Oral Given 5/14/18 1957)   nystatin 100,000 unit/mL suspension 500,000 Units (500,000 Units Oral Given 5/14/18 2000)       Current Discharge Medication List                Medical Decision Making              Scribe Attestation:   Scribe #1: I performed the above scribed service and the documentation accurately describes the services I performed. I attest to the accuracy of the note.    Attending:   Physician Attestation Statement for Scribe #1: I, Jac Qureshi Jr., MD, personally performed the services described in this documentation, as scribed by Jono Jasmine, in my presence, and it is both accurate and complete.          Clinical Impression       ICD-10-CM ICD-9-CM   1. Penile ulcer N48.5 607.89   2. HIV (human immunodeficiency virus infection) B20 V08   3. Neutropenia, unspecified type D70.9 288.00   4. Thrombocytopenia D69.6 287.5   5. Oral ulcer K12.1 528.9       Disposition:   Disposition: Admitted  Condition: Fair         Jac Qureshi Jr., MD  05/15/18 0208       Jac Qureshi Jr., MD  05/15/18 0212

## 2018-05-14 NOTE — ED NOTES
Senait GARCIA from Med-surg called to receive report. Report given asked to bring patient up around 0730.

## 2018-05-14 NOTE — PLAN OF CARE
Problem: Patient Care Overview  Goal: Plan of Care Review  Outcome: Ongoing (interventions implemented as appropriate)  Pt remains free from falls/injury. Fall Precautions in place. PRN pain med given for pain. Meds given as scheduled. Denies nausea. Tolerating regular diet. Dr. Lane made aware of consult. Call light and personal items within reach of pt. Bed in low locked position. Plan of care reviewed with pt. Pt verbalized understanding. VSS. Will cont to monitor.

## 2018-05-15 PROBLEM — B20 THROMBOCYTOPENIA ASSOCIATED WITH AIDS: Status: ACTIVE | Noted: 2018-05-15

## 2018-05-15 PROBLEM — D69.59 THROMBOCYTOPENIA ASSOCIATED WITH AIDS: Status: ACTIVE | Noted: 2018-05-15

## 2018-05-15 LAB
ALBUMIN SERPL BCP-MCNC: 3.3 G/DL
ALP SERPL-CCNC: 45 U/L
ALT SERPL W/O P-5'-P-CCNC: 21 U/L
ANION GAP SERPL CALC-SCNC: 12 MMOL/L
AST SERPL-CCNC: 52 U/L
BASOPHILS # BLD AUTO: 0.05 K/UL
BASOPHILS NFR BLD: 2 %
BILIRUB SERPL-MCNC: 0.7 MG/DL
BUN SERPL-MCNC: 13 MG/DL
CALCIUM SERPL-MCNC: 8.2 MG/DL
CHLORIDE SERPL-SCNC: 99 MMOL/L
CO2 SERPL-SCNC: 25 MMOL/L
CREAT SERPL-MCNC: 1.1 MG/DL
DIFFERENTIAL METHOD: ABNORMAL
EOSINOPHIL # BLD AUTO: 0 K/UL
EOSINOPHIL NFR BLD: 1.6 %
ERYTHROCYTE [DISTWIDTH] IN BLOOD BY AUTOMATED COUNT: 12.2 %
EST. GFR  (AFRICAN AMERICAN): >60 ML/MIN/1.73 M^2
EST. GFR  (NON AFRICAN AMERICAN): >60 ML/MIN/1.73 M^2
GLUCOSE SERPL-MCNC: 80 MG/DL
HCT VFR BLD AUTO: 45.7 %
HGB BLD-MCNC: 15.8 G/DL
HIV 1+2 AB+HIV1 P24 AG SERPL QL IA: ABNORMAL
HIV UQ DATE RECEIVED: ABNORMAL
HIV UQ DATE REPORTED: ABNORMAL
HIV1 RNA # SERPL NAA+PROBE: ABNORMAL COPIES/ML
HIV1 RNA SERPL NAA+PROBE-LOG#: >7 LOG (10) COPIES/ML
HIV1 RNA SERPL QL NAA+PROBE: DETECTED
LYMPHOCYTES # BLD AUTO: 0.8 K/UL
LYMPHOCYTES NFR BLD: 32.4 %
MAGNESIUM SERPL-MCNC: 2.3 MG/DL
MCH RBC QN AUTO: 33.1 PG
MCHC RBC AUTO-ENTMCNC: 34.6 G/DL
MCV RBC AUTO: 96 FL
MONOCYTES # BLD AUTO: 0.4 K/UL
MONOCYTES NFR BLD: 16.2 %
NEUTROPHILS # BLD AUTO: 1.2 K/UL
NEUTROPHILS NFR BLD: 47.8 %
PHOSPHATE SERPL-MCNC: 1.7 MG/DL
PLATELET # BLD AUTO: 74 K/UL
PMV BLD AUTO: 11.2 FL
POTASSIUM SERPL-SCNC: 4 MMOL/L
PROT SERPL-MCNC: 6.5 G/DL
RBC # BLD AUTO: 4.78 M/UL
SODIUM SERPL-SCNC: 136 MMOL/L
WBC # BLD AUTO: 2.53 K/UL

## 2018-05-15 PROCEDURE — 83735 ASSAY OF MAGNESIUM: CPT

## 2018-05-15 PROCEDURE — 25500020 PHARM REV CODE 255: Performed by: INTERNAL MEDICINE

## 2018-05-15 PROCEDURE — 25000003 PHARM REV CODE 250: Performed by: HOSPITALIST

## 2018-05-15 PROCEDURE — 87901 NFCT AGT GNTYP ALYS HIV1 REV: CPT

## 2018-05-15 PROCEDURE — 80053 COMPREHEN METABOLIC PANEL: CPT

## 2018-05-15 PROCEDURE — 25000003 PHARM REV CODE 250: Performed by: NURSE PRACTITIONER

## 2018-05-15 PROCEDURE — 84100 ASSAY OF PHOSPHORUS: CPT

## 2018-05-15 PROCEDURE — 36415 COLL VENOUS BLD VENIPUNCTURE: CPT

## 2018-05-15 PROCEDURE — 11000001 HC ACUTE MED/SURG PRIVATE ROOM

## 2018-05-15 PROCEDURE — 85025 COMPLETE CBC W/AUTO DIFF WBC: CPT

## 2018-05-15 RX ORDER — SODIUM,POTASSIUM PHOSPHATES 280-250MG
1 POWDER IN PACKET (EA) ORAL
Status: DISCONTINUED | OUTPATIENT
Start: 2018-05-15 | End: 2018-05-17 | Stop reason: HOSPADM

## 2018-05-15 RX ORDER — HYDROCORTISONE 1 %
CREAM (GRAM) TOPICAL 2 TIMES DAILY
Status: DISCONTINUED | OUTPATIENT
Start: 2018-05-15 | End: 2018-05-17 | Stop reason: HOSPADM

## 2018-05-15 RX ADMIN — HYDROCODONE BITARTRATE AND ACETAMINOPHEN 1 TABLET: 5; 325 TABLET ORAL at 04:05

## 2018-05-15 RX ADMIN — POTASSIUM & SODIUM PHOSPHATES POWDER PACK 280-160-250 MG 1 PACKET: 280-160-250 PACK at 10:05

## 2018-05-15 RX ADMIN — IOHEXOL 75 ML: 350 INJECTION, SOLUTION INTRAVENOUS at 02:05

## 2018-05-15 RX ADMIN — POTASSIUM & SODIUM PHOSPHATES POWDER PACK 280-160-250 MG 1 PACKET: 280-160-250 PACK at 04:05

## 2018-05-15 RX ADMIN — NYSTATIN 500000 UNITS: 100000 SUSPENSION ORAL at 09:05

## 2018-05-15 RX ADMIN — NYSTATIN 500000 UNITS: 100000 SUSPENSION ORAL at 04:05

## 2018-05-15 RX ADMIN — NYSTATIN 500000 UNITS: 100000 SUSPENSION ORAL at 12:05

## 2018-05-15 RX ADMIN — NYSTATIN 500000 UNITS: 100000 SUSPENSION ORAL at 10:05

## 2018-05-15 RX ADMIN — ACETAMINOPHEN 650 MG: 325 TABLET, FILM COATED ORAL at 04:05

## 2018-05-15 RX ADMIN — HYDROCODONE BITARTRATE AND ACETAMINOPHEN 1 TABLET: 5; 325 TABLET ORAL at 09:05

## 2018-05-15 RX ADMIN — IOHEXOL 30 ML: 350 INJECTION, SOLUTION INTRAVENOUS at 11:05

## 2018-05-15 RX ADMIN — HYDROCORTISONE: 1 CREAM TOPICAL at 10:05

## 2018-05-15 NOTE — ASSESSMENT & PLAN NOTE
- again confusing history  - likely related to HIV diagnosis   - no work up done in Er, reported fevers 101.6 at outside facility, no fever in ER  - ID following with no LP indicated   - blood cx x 2 with no growth to date  - UA negative  - CXR negative  -lab results followed  - start tylenol q4h prn Temp >101

## 2018-05-15 NOTE — ASSESSMENT & PLAN NOTE
The febrile response can be seen with HIV itself,will send HIV genotype,HIV viral load,  Follow blood cultures .  Will do abdominal /pelvis CT scan -he has inguinal nodes.  Will follow results

## 2018-05-15 NOTE — HPI
27 year old man with history of HIV -cd4 count is 308 who was admitted with fever ,abdominal pain  and throat pain . He was transferred  from [facility].HIV was acquired through high risk sexual activity with female partners. He has history of drug use and was on  drug rehab . There is associated history of penile sore-noticed 3 months ago  and fever .  At this time,he appears weak .  Since admission, chest x-ray is negative.RPR is also negative

## 2018-05-15 NOTE — PLAN OF CARE
Problem: Patient Care Overview  Goal: Plan of Care Review  Outcome: Ongoing (interventions implemented as appropriate)  Pt remains free from falls/injury. Fall Precautions in place. PRN pain med given for pain. Meds given as scheduled (Nystatin). Denies nausea. Pt states that its hard for him to eat due to his throat pain.  NPO for CT abd/pelvis. Call light and personal items within reach of pt. Bed in low locked position. Plan of care reviewed with pt. Pt verbalized understanding. VSS. Will cont to monitor.

## 2018-05-15 NOTE — CONSULTS
[facility]  Infectious Disease  Consult Note    Patient Name: Chon Parham  MRN: [id number]  Admission Date: 5/14/2018  Hospital Length of Stay: 1 days  Attending Physician: Cesar Mijares MD;Ha *  Primary Care Provider: Primary Doctor No     Isolation Status: Neutropenic    Patient information was obtained from patient, past medical records and ER records.      Consults  Assessment/Plan:     * HIV (human immunodeficiency virus infection)    The febrile response can be seen with HIV itself,will send HIV genotype,HIV viral load,  Follow blood cultures .  Will do abdominal /pelvis CT scan -he has inguinal nodes.  Will follow results         Thrombocytopenia associated with AIDS    Thrombocytopenia is likely related to HIV-will monitor closely        Fever    Will follow cultures , do abdominal and pelvic CT scan .  Fever can be from HIV itself ,might need LP             Thank you for your consult. I will follow-up with patient. Please contact us if you have any additional questions.    Cheko Lane MD  Infectious Disease  [facility]    Subjective:     Principal Problem: HIV (human immunodeficiency virus infection)    HPI: 27 year old man with history of HIV -cd4 count is 308 who was admitted with fever ,abdominal pain  and throat pain . He was transferred  from [facility].HIV was acquired through high risk sexual activity with female partners. He has history of drug use and was on  drug rehab . There is associated history of penile sore-noticed 3 months ago  and fever .  At this time,he appears weak .  Since admission, chest x-ray is negative.RPR is also negative       History reviewed. No pertinent past medical history.    History reviewed. No pertinent surgical history.    Review of patient's allergies indicates:  No Known Allergies    Medications:  Prescriptions Prior to Admission   Medication Sig    docusate sodium (COLACE) 100 MG capsule Take 1 capsule (100 mg total)  by mouth 2 (two) times daily.    hydrocortisone-pramoxine (PROCTOFOAM-HS) rectal foam Place 1 applicator rectally 2 (two) times daily. For 4 weeks    naproxen (NAPROSYN) 500 MG tablet Take 1 tablet (500 mg total) by mouth 2 (two) times daily.     Antibiotics     None        Antifungals     Start     Stop Route Frequency Ordered    05/14/18 0900  nystatin 100,000 unit/mL suspension 500,000 Units      -- Oral 4 times daily 05/14/18 0439        Antivirals     None             There is no immunization history on file for this patient.    Family History     None        Social History     Social History    Marital status: Single     Spouse name: N/A    Number of children: N/A    Years of education: N/A     Social History Main Topics    Smoking status: Current Every Day Smoker     Packs/day: 1.00    Smokeless tobacco: None    Alcohol use No    Drug use: Yes     Types: Marijuana    Sexual activity: Not Asked     Other Topics Concern    None     Social History Narrative    None     Review of Systems   Constitutional: Positive for fever. Negative for activity change, appetite change, chills, diaphoresis and fatigue.   HENT: Negative for facial swelling, sore throat, tinnitus and trouble swallowing.         Oral ulcers   Eyes: Negative for photophobia and visual disturbance.   Respiratory: Negative for apnea, cough, chest tightness, shortness of breath and wheezing.    Cardiovascular: Negative for chest pain, palpitations and leg swelling.   Gastrointestinal: Negative for abdominal distention, abdominal pain, constipation, diarrhea, nausea and vomiting.   Endocrine: Negative for polydipsia, polyphagia and polyuria.   Genitourinary: Positive for penile pain. Negative for decreased urine volume, dysuria, flank pain, frequency and hematuria.        Penile ulcer   Musculoskeletal: Positive for myalgias. Negative for arthralgias, back pain, joint swelling and neck stiffness.   Skin: Negative for pallor and rash.    Allergic/Immunologic: Negative for immunocompromised state.   Neurological: Negative for dizziness, seizures, syncope, weakness, numbness and headaches.   Psychiatric/Behavioral: Negative for confusion, hallucinations and suicidal ideas. The patient is not nervous/anxious.    All other systems reviewed and are negative.    Objective:     Vital Signs (Most Recent):  Temp: 99.6 °F (37.6 °C) (05/15/18 0532)  Pulse: 65 (05/15/18 0420)  Resp: 18 (05/15/18 0420)  BP: (!) 130/58 (05/15/18 0420)  SpO2: 95 % (05/15/18 0420) Vital Signs (24h Range):  Temp:  [98.7 °F (37.1 °C)-102.5 °F (39.2 °C)] 99.6 °F (37.6 °C)  Pulse:  [57-70] 65  Resp:  [16-19] 18  SpO2:  [94 %-98 %] 95 %  BP: (100-130)/(54-58) 130/58     Weight: 67.6 kg (149 lb)  Body mass index is 20.78 kg/m².    Estimated Creatinine Clearance: 116.8 mL/min (based on SCr of 0.9 mg/dL).    Physical Exam   Constitutional: He is oriented to person, place, and time. He appears well-developed and well-nourished. No distress.   HENT:   Head: Normocephalic and atraumatic.   Mouth/Throat: Oropharynx is clear and moist.   Multiple oral and throat ulcers  No leukoplakia   Eyes: Conjunctivae are normal. Pupils are equal, round, and reactive to light. No scleral icterus.   Neck: No JVD present. No thyromegaly present.   Cardiovascular: Regular rhythm.  Exam reveals no gallop and no friction rub.    No murmur heard.  Pulmonary/Chest: Effort normal and breath sounds normal. No respiratory distress. He has no wheezes. He has no rales.   Abdominal: Soft. Bowel sounds are normal. He exhibits no distension. There is no tenderness. There is no guarding.   Genitourinary:   Genitourinary Comments: Penile ulcer   Musculoskeletal: Normal range of motion.   Neurological: He is alert and oriented to person, place, and time. No cranial nerve deficit.   Skin: Skin is warm. Capillary refill takes 2 to 3 seconds. He is not diaphoretic. No erythema.   Psychiatric: He has a normal mood and affect.    Nursing note and vitals reviewed.      Significant Labs:   Blood Culture:   Recent Labs  Lab 05/14/18  0619 05/14/18  0630   LABBLOO No Growth to date No Growth to date     BMP:   Recent Labs  Lab 05/14/18  2259 05/15/18  0443   GLU 93  --    *  --    K 3.7  --    CL 98  --    CO2 27  --    BUN 13  --    CREATININE 0.9  --    CALCIUM 8.1*  --    MG  --  2.3     CBC:   Recent Labs  Lab 05/14/18 0447 05/15/18  0443   WBC 2.44* 2.53*   HGB 15.8 15.8   HCT 44.8 45.7   PLT 68* 74*     All pertinent labs within the past 24 hours have been reviewed.    Significant Imaging: I have reviewed all pertinent imaging results/findings within the past 24 hours.

## 2018-05-15 NOTE — ASSESSMENT & PLAN NOTE
- unclear history from providers and patient  - HIV 1/2 Ab-positive  -CD4 >300- CD4 [place] T cells-40  - HIV viral load pending  - acute hep panel- negative   - PCR detected   - RPR nonreactive  - gold quantiferon results pending   - consult ID

## 2018-05-15 NOTE — SUBJECTIVE & OBJECTIVE
Interval History: pt stable overnight.  Fever within the last 24 hours noted.  Abdominal CT showed no acute process.  ID following with no LP indicated.  Blood cultures with no growth to date.  Thrombocytopenia noted with upward trend.      Review of Systems   Constitutional: Positive for fever. Negative for activity change, appetite change, chills, diaphoresis and fatigue.   HENT: Negative for facial swelling, sore throat, tinnitus and trouble swallowing.         Oral ulcers   Eyes: Negative for photophobia and visual disturbance.   Respiratory: Negative for apnea, cough, chest tightness, shortness of breath and wheezing.    Cardiovascular: Negative for chest pain, palpitations and leg swelling.   Gastrointestinal: Negative for abdominal distention, abdominal pain, constipation, diarrhea, nausea and vomiting.   Endocrine: Negative for polydipsia, polyphagia and polyuria.   Genitourinary: Positive for penile pain. Negative for decreased urine volume, dysuria, flank pain, frequency and hematuria.        Penile ulcer   Musculoskeletal: Positive for myalgias. Negative for arthralgias, back pain, joint swelling and neck stiffness.   Skin: Negative for pallor and rash.   Allergic/Immunologic: Negative for immunocompromised state.   Neurological: Negative for dizziness, seizures, syncope, weakness, numbness and headaches.   Hematological: Does not bruise/bleed easily.   Psychiatric/Behavioral: Negative for confusion, hallucinations and suicidal ideas. The patient is not nervous/anxious.    All other systems reviewed and are negative.    Objective:     Vital Signs (Most Recent):  Temp: 98.7 °F (37.1 °C) (05/15/18 1200)  Pulse: (!) 54 (05/15/18 1200)  Resp: 18 (05/15/18 1200)  BP: (!) 113/55 (05/15/18 1200)  SpO2: 95 % (05/15/18 1200) Vital Signs (24h Range):  Temp:  [98.7 °F (37.1 °C)-102.5 °F (39.2 °C)] 98.7 °F (37.1 °C)  Pulse:  [54-70] 54  Resp:  [16-18] 18  SpO2:  [94 %-98 %] 95 %  BP: (103-130)/(53-58) 113/55      Weight: 67.6 kg (149 lb)  Body mass index is 20.78 kg/m².    Intake/Output Summary (Last 24 hours) at 05/15/18 1529  Last data filed at 05/15/18 1227   Gross per 24 hour   Intake             1030 ml   Output              500 ml   Net              530 ml      Physical Exam   Constitutional: He is oriented to person, place, and time. He appears well-developed and well-nourished. No distress.   HENT:   Head: Normocephalic and atraumatic.   Mouth/Throat: Oropharynx is clear and moist.   Multiple oral and throat ulcers  No leukoplakia   Eyes: Conjunctivae are normal. Pupils are equal, round, and reactive to light. No scleral icterus.   Neck: No JVD present. No thyromegaly present.   Cardiovascular: Regular rhythm.  Exam reveals no gallop and no friction rub.    No murmur heard.  Pulmonary/Chest: Effort normal and breath sounds normal. No respiratory distress. He has no wheezes. He has no rales.   Abdominal: Soft. Bowel sounds are normal. He exhibits no distension. There is no tenderness. There is no guarding.   Genitourinary:   Genitourinary Comments: Penile ulcer   Musculoskeletal: Normal range of motion.   Neurological: He is alert and oriented to person, place, and time. No cranial nerve deficit.   Skin: Skin is warm. Capillary refill takes 2 to 3 seconds. He is not diaphoretic. No erythema.   Psychiatric: He has a normal mood and affect.   Nursing note and vitals reviewed.      Significant Labs:   Blood Culture:   Recent Labs  Lab 05/14/18  0619 05/14/18  0630   LABBLOO No Growth to date No Growth to date     CBC:   Recent Labs  Lab 05/14/18  0447 05/15/18  0443   WBC 2.44* 2.53*   HGB 15.8 15.8   HCT 44.8 45.7   PLT 68* 74*     CMP:   Recent Labs  Lab 05/14/18  0447 05/14/18  2259 05/15/18  0441   * 135* 136   K 3.8 3.7 4.0    98 99   CO2 21* 27 25   GLU 85 93 80   BUN 14 13 13   CREATININE 0.9 0.9 1.1   CALCIUM 7.8* 8.1* 8.2*   PROT 6.3 6.4 6.5   ALBUMIN 3.2* 3.3* 3.3*   BILITOT 0.5 0.8 0.7    ALKPHOS 48* 48* 45*   AST 48* 49* 52*   ALT 18 21 21   ANIONGAP 11 10 12   EGFRNONAA >60 >60 >60       Significant Imaging:   Imaging Results          X-Ray Chest AP Portable (Final result)  Result time 05/14/18 07:44:01    Final result by Micah Whittington MD (05/14/18 07:44:01)                 Impression:      No acute process seen.      Electronically signed by: Micah Whittington MD  Date:    05/14/2018  Time:    07:44             Narrative:    EXAMINATION:  XR CHEST AP PORTABLE    CLINICAL HISTORY:  fever;    FINDINGS:  Single view of the chest.    Cardiac silhouette is normal.  The lungs demonstrate no evidence of active disease.  No evidence of pleural effusion or pneumothorax.  Bones appear intact.

## 2018-05-15 NOTE — ASSESSMENT & PLAN NOTE
- chlamydia, gonorrhea- results pending   - acute hepatitis panel negative  -hsv  -HIV PCR detected  -HIV 1/2 positive  -RPR nonreactive  -ID following

## 2018-05-15 NOTE — SUBJECTIVE & OBJECTIVE
History reviewed. No pertinent past medical history.    History reviewed. No pertinent surgical history.    Review of patient's allergies indicates:  No Known Allergies    Medications:  Prescriptions Prior to Admission   Medication Sig    docusate sodium (COLACE) 100 MG capsule Take 1 capsule (100 mg total) by mouth 2 (two) times daily.    hydrocortisone-pramoxine (PROCTOFOAM-HS) rectal foam Place 1 applicator rectally 2 (two) times daily. For 4 weeks    naproxen (NAPROSYN) 500 MG tablet Take 1 tablet (500 mg total) by mouth 2 (two) times daily.     Antibiotics     None        Antifungals     Start     Stop Route Frequency Ordered    05/14/18 0900  nystatin 100,000 unit/mL suspension 500,000 Units      -- Oral 4 times daily 05/14/18 0439        Antivirals     None             There is no immunization history on file for this patient.    Family History     None        Social History     Social History    Marital status: Single     Spouse name: N/A    Number of children: N/A    Years of education: N/A     Social History Main Topics    Smoking status: Current Every Day Smoker     Packs/day: 1.00    Smokeless tobacco: None    Alcohol use No    Drug use: Yes     Types: Marijuana    Sexual activity: Not Asked     Other Topics Concern    None     Social History Narrative    None     Review of Systems   Constitutional: Positive for fever. Negative for activity change, appetite change, chills, diaphoresis and fatigue.   HENT: Negative for facial swelling, sore throat, tinnitus and trouble swallowing.         Oral ulcers   Eyes: Negative for photophobia and visual disturbance.   Respiratory: Negative for apnea, cough, chest tightness, shortness of breath and wheezing.    Cardiovascular: Negative for chest pain, palpitations and leg swelling.   Gastrointestinal: Negative for abdominal distention, abdominal pain, constipation, diarrhea, nausea and vomiting.   Endocrine: Negative for polydipsia, polyphagia and  polyuria.   Genitourinary: Positive for penile pain. Negative for decreased urine volume, dysuria, flank pain, frequency and hematuria.        Penile ulcer   Musculoskeletal: Positive for myalgias. Negative for arthralgias, back pain, joint swelling and neck stiffness.   Skin: Negative for pallor and rash.   Allergic/Immunologic: Negative for immunocompromised state.   Neurological: Negative for dizziness, seizures, syncope, weakness, numbness and headaches.   Psychiatric/Behavioral: Negative for confusion, hallucinations and suicidal ideas. The patient is not nervous/anxious.    All other systems reviewed and are negative.    Objective:     Vital Signs (Most Recent):  Temp: 99.6 °F (37.6 °C) (05/15/18 0532)  Pulse: 65 (05/15/18 0420)  Resp: 18 (05/15/18 0420)  BP: (!) 130/58 (05/15/18 0420)  SpO2: 95 % (05/15/18 0420) Vital Signs (24h Range):  Temp:  [98.7 °F (37.1 °C)-102.5 °F (39.2 °C)] 99.6 °F (37.6 °C)  Pulse:  [57-70] 65  Resp:  [16-19] 18  SpO2:  [94 %-98 %] 95 %  BP: (100-130)/(54-58) 130/58     Weight: 67.6 kg (149 lb)  Body mass index is 20.78 kg/m².    Estimated Creatinine Clearance: 116.8 mL/min (based on SCr of 0.9 mg/dL).    Physical Exam   Constitutional: He is oriented to person, place, and time. He appears well-developed and well-nourished. No distress.   HENT:   Head: Normocephalic and atraumatic.   Mouth/Throat: Oropharynx is clear and moist.   Multiple oral and throat ulcers  No leukoplakia   Eyes: Conjunctivae are normal. Pupils are equal, round, and reactive to light. No scleral icterus.   Neck: No JVD present. No thyromegaly present.   Cardiovascular: Regular rhythm.  Exam reveals no gallop and no friction rub.    No murmur heard.  Pulmonary/Chest: Effort normal and breath sounds normal. No respiratory distress. He has no wheezes. He has no rales.   Abdominal: Soft. Bowel sounds are normal. He exhibits no distension. There is no tenderness. There is no guarding.   Genitourinary:    Genitourinary Comments: Penile ulcer   Musculoskeletal: Normal range of motion.   Neurological: He is alert and oriented to person, place, and time. No cranial nerve deficit.   Skin: Skin is warm. Capillary refill takes 2 to 3 seconds. He is not diaphoretic. No erythema.   Psychiatric: He has a normal mood and affect.   Nursing note and vitals reviewed.      Significant Labs:   Blood Culture:   Recent Labs  Lab 05/14/18  0619 05/14/18  0630   LABBLOO No Growth to date No Growth to date     BMP:   Recent Labs  Lab 05/14/18  2259 05/15/18  0443   GLU 93  --    *  --    K 3.7  --    CL 98  --    CO2 27  --    BUN 13  --    CREATININE 0.9  --    CALCIUM 8.1*  --    MG  --  2.3     CBC:   Recent Labs  Lab 05/14/18  0447 05/15/18  0443   WBC 2.44* 2.53*   HGB 15.8 15.8   HCT 44.8 45.7   PLT 68* 74*     All pertinent labs within the past 24 hours have been reviewed.    Significant Imaging: I have reviewed all pertinent imaging results/findings within the past 24 hours.

## 2018-05-15 NOTE — HOSPITAL COURSE
Pt admitted to Medical Surgical Unit for fever likely associated with HIV.  ID consulted.  Chest xray negative.  Blood cultures with no growth to date.  Thrombocytopenia with upward trend noted.  CT of abdomen showed no acute process or appendicitis.  CD4 >300.  CD4 [place] T cell 40.6.  Neutra Phos given for hypophosphatemia.  Hepatitis panel negative.  HIV PCR detected and HIV 1/2 antibodies positive.  RPR nonreactive.  Symptoms likely associated with acute retroviral syndrome and no LP indicated at this time.  HIV Genotype in process.  Quantiferon specimen sent to lab for analysis with recollect required outpatient.  Pt verbalized symptom improvement and vital signs stable.  Pt seen and examined on the date of discharge and deemed suitable for discharge to home.  Current medications resumed with Neutraphos prescribed.  Pt instructed to follow up with Dr. CARLOS Hewitt (Infectious Disease) on 5/23/28 at 1:30 pm for required lab analysis and further evaluation.

## 2018-05-15 NOTE — PROGRESS NOTES
[facility] Hospital Medicine  Progress Note    Patient Name: Chon Parham  MRN: [id number]  Patient Class: IP- Inpatient   Admission Date: 5/14/2018  Length of Stay: 1 days  Attending Physician: Cesar Mijares MD  Primary Care Provider: Primary Doctor No        Subjective:     Principal Problem:HIV (human immunodeficiency virus infection)    HPI:  27M with unknown pmh presents with fever, n/v and abdominal pain and throat pain 5 days ago to [facility].  He was discharged home with levaquin  for 10 days.  He had a follow up with family medicine physician on 5/11 who noted patient had unremarkable cbc, HIV, flu and RPR was negative.  ENT exam at that time was normal.  On 5/13, miladys gonsalez called back to show that cbc abnormal and HIV was positive and to have patient transferred to [facility] for further work up of new diagnosis of HIV.   Patient himself reports 10 lb weight loss in the last week.  Reports oral sores onset 2 weeks ago. Penile sore onset 3 months ago. Pt reports that sores are tender. Symptoms are constant and moderate in severity. No mitigating or exacerbating factors reported. Associated sxs include subjective fever and generalized myalgia. Patient denies any dysuria, hematuria, frequency, hesitancy, chills, n/v, and all other sxs at this time. Prior Tx includes levaquin. Pt reports having had 8 sexual partners in the past year, with infrequent use of condoms. Pt has PMHx of chlamydia and gonorrhea. Pt denies IV drug use. Pt reports that he has not seen a PCP recently for sxs, or been prescribed any medications besides the levaquin. [place]shine shows: positive HIV, white count of 2.4, platlets at 76, lactose acid of 0.7, and normal CXray. No further complaints or concerns at this time.     Hospital Course:  Pt admitted to Medical Surgical Unit for fever likely associated with HIV.  ID consulted.  Chest xray negative.  Blood cultures with no growth to date.  Thrombocytopenia  noted.  CT of abdomen showed no acute process or appendicitis.  CD4 >300.  CD4 [place] T cell 40.6.  Neutra Phos given for hypophosphatemia.  Hepatitis panel negative.  HIV PCR detected and HIV 1/2 antibodies positive.  RPR nonreactive.  Symptoms likely associated with HIV and no LP indicated.      Interval History: pt stable overnight.  Fever within the last 24 hours noted.  Abdominal CT showed no acute process.  ID following with no LP indicated.  Blood cultures with no growth to date.  Thrombocytopenia noted with upward trend.      Review of Systems   Constitutional: Positive for fever. Negative for activity change, appetite change, chills, diaphoresis and fatigue.   HENT: Negative for facial swelling, sore throat, tinnitus and trouble swallowing.         Oral ulcers   Eyes: Negative for photophobia and visual disturbance.   Respiratory: Negative for apnea, cough, chest tightness, shortness of breath and wheezing.    Cardiovascular: Negative for chest pain, palpitations and leg swelling.   Gastrointestinal: Negative for abdominal distention, abdominal pain, constipation, diarrhea, nausea and vomiting.   Endocrine: Negative for polydipsia, polyphagia and polyuria.   Genitourinary: Positive for penile pain. Negative for decreased urine volume, dysuria, flank pain, frequency and hematuria.        Penile ulcer   Musculoskeletal: Positive for myalgias. Negative for arthralgias, back pain, joint swelling and neck stiffness.   Skin: Negative for pallor and rash.   Allergic/Immunologic: Negative for immunocompromised state.   Neurological: Negative for dizziness, seizures, syncope, weakness, numbness and headaches.   Hematological: Does not bruise/bleed easily.   Psychiatric/Behavioral: Negative for confusion, hallucinations and suicidal ideas. The patient is not nervous/anxious.    All other systems reviewed and are negative.    Objective:     Vital Signs (Most Recent):  Temp: 98.7 °F (37.1 °C) (05/15/18 1200)  Pulse:  (!) 54 (05/15/18 1200)  Resp: 18 (05/15/18 1200)  BP: (!) 113/55 (05/15/18 1200)  SpO2: 95 % (05/15/18 1200) Vital Signs (24h Range):  Temp:  [98.7 °F (37.1 °C)-102.5 °F (39.2 °C)] 98.7 °F (37.1 °C)  Pulse:  [54-70] 54  Resp:  [16-18] 18  SpO2:  [94 %-98 %] 95 %  BP: (103-130)/(53-58) 113/55     Weight: 67.6 kg (149 lb)  Body mass index is 20.78 kg/m².    Intake/Output Summary (Last 24 hours) at 05/15/18 1529  Last data filed at 05/15/18 1227   Gross per 24 hour   Intake             1030 ml   Output              500 ml   Net              530 ml      Physical Exam   Constitutional: He is oriented to person, place, and time. He appears well-developed and well-nourished. No distress.   HENT:   Head: Normocephalic and atraumatic.   Mouth/Throat: Oropharynx is clear and moist.   Multiple oral and throat ulcers  No leukoplakia   Eyes: Conjunctivae are normal. Pupils are equal, round, and reactive to light. No scleral icterus.   Neck: No JVD present. No thyromegaly present.   Cardiovascular: Regular rhythm.  Exam reveals no gallop and no friction rub.    No murmur heard.  Pulmonary/Chest: Effort normal and breath sounds normal. No respiratory distress. He has no wheezes. He has no rales.   Abdominal: Soft. Bowel sounds are normal. He exhibits no distension. There is no tenderness. There is no guarding.   Genitourinary:   Genitourinary Comments: Penile ulcer   Musculoskeletal: Normal range of motion.   Neurological: He is alert and oriented to person, place, and time. No cranial nerve deficit.   Skin: Skin is warm. Capillary refill takes 2 to 3 seconds. He is not diaphoretic. No erythema.   Psychiatric: He has a normal mood and affect.   Nursing note and vitals reviewed.      Significant Labs:   Blood Culture:   Recent Labs  Lab 05/14/18  0619 05/14/18  0630   LABBLOO No Growth to date No Growth to date     CBC:   Recent Labs  Lab 05/14/18  0447 05/15/18  0443   WBC 2.44* 2.53*   HGB 15.8 15.8   HCT 44.8 45.7   PLT 68* 74*      CMP:   Recent Labs  Lab 05/14/18  0447 05/14/18  2259 05/15/18  0441   * 135* 136   K 3.8 3.7 4.0    98 99   CO2 21* 27 25   GLU 85 93 80   BUN 14 13 13   CREATININE 0.9 0.9 1.1   CALCIUM 7.8* 8.1* 8.2*   PROT 6.3 6.4 6.5   ALBUMIN 3.2* 3.3* 3.3*   BILITOT 0.5 0.8 0.7   ALKPHOS 48* 48* 45*   AST 48* 49* 52*   ALT 18 21 21   ANIONGAP 11 10 12   EGFRNONAA >60 >60 >60       Significant Imaging:   Imaging Results          X-Ray Chest AP Portable (Final result)  Result time 05/14/18 07:44:01    Final result by Micah Whittington MD (05/14/18 07:44:01)                 Impression:      No acute process seen.      Electronically signed by: Micah Whittington MD  Date:    05/14/2018  Time:    07:44             Narrative:    EXAMINATION:  XR CHEST AP PORTABLE    CLINICAL HISTORY:  fever;    FINDINGS:  Single view of the chest.    Cardiac silhouette is normal.  The lungs demonstrate no evidence of active disease.  No evidence of pleural effusion or pneumothorax.  Bones appear intact.                              Assessment/Plan:      * HIV (human immunodeficiency virus infection)    - unclear history from providers and patient  - HIV 1/2 Ab-positive  -CD4 >300- CD4 [place] T cells-40  - HIV viral load pending  - acute hep panel- negative   - PCR detected   - RPR nonreactive  - gold quantiferon results pending   - consult ID            Thrombocytopenia associated with AIDS    -PLT 68>>74  -upward trend noted  -ID following  -repeat CBC in am   -will monitor           Fever    - again confusing history  - likely related to HIV diagnosis   - no work up done in Er, reported fevers 101.6 at outside facility, no fever in ER  - ID following with no LP indicated   - blood cx x 2 with no growth to date  - UA negative  - CXR negative  -lab results followed  - start tylenol q4h prn Temp >101        Oral ulcer    - start nystatin qid          Penile ulcer    - chlamydia, gonorrhea- results pending   - acute hepatitis panel  negative  -hsv  -HIV PCR detected  -HIV 1/2 positive  -RPR nonreactive  -ID following             VTE Risk Mitigation         Ordered     IP VTE LOW RISK PATIENT  Once      05/14/18 0421     Place FLAVIO hose  Until discontinued      05/14/18 0421              Loren Kitchen NP  Department of Hospital Medicine   [facility]

## 2018-05-16 LAB
ALBUMIN SERPL BCP-MCNC: 3.2 G/DL
ALP SERPL-CCNC: 51 U/L
ALT SERPL W/O P-5'-P-CCNC: 26 U/L
ANION GAP SERPL CALC-SCNC: 8 MMOL/L
AST SERPL-CCNC: 57 U/L
BASOPHILS # BLD AUTO: ABNORMAL K/UL
BASOPHILS NFR BLD: 0 %
BILIRUB SERPL-MCNC: 0.8 MG/DL
BUN SERPL-MCNC: 13 MG/DL
CALCIUM SERPL-MCNC: 8.2 MG/DL
CHLORIDE SERPL-SCNC: 98 MMOL/L
CO2 SERPL-SCNC: 27 MMOL/L
CREAT SERPL-MCNC: 1 MG/DL
DACRYOCYTES BLD QL SMEAR: ABNORMAL
DIFFERENTIAL METHOD: ABNORMAL
EOSINOPHIL # BLD AUTO: ABNORMAL K/UL
EOSINOPHIL NFR BLD: 3 %
ERYTHROCYTE [DISTWIDTH] IN BLOOD BY AUTOMATED COUNT: 12.1 %
EST. GFR  (AFRICAN AMERICAN): >60 ML/MIN/1.73 M^2
EST. GFR  (NON AFRICAN AMERICAN): >60 ML/MIN/1.73 M^2
GLUCOSE SERPL-MCNC: 77 MG/DL
HCT VFR BLD AUTO: 45 %
HGB BLD-MCNC: 15.9 G/DL
HSV AB, IGM BY EIA: NEGATIVE
LYMPHOCYTES # BLD AUTO: ABNORMAL K/UL
LYMPHOCYTES NFR BLD: 50 %
MAGNESIUM SERPL-MCNC: 2.1 MG/DL
MCH RBC QN AUTO: 33.6 PG
MCHC RBC AUTO-ENTMCNC: 35.3 G/DL
MCV RBC AUTO: 95 FL
MONOCYTES # BLD AUTO: ABNORMAL K/UL
MONOCYTES NFR BLD: 21 %
NEUTROPHILS NFR BLD: 26 %
PHOSPHATE SERPL-MCNC: 2.3 MG/DL
PLATELET # BLD AUTO: 93 K/UL
PMV BLD AUTO: 11.5 FL
POIKILOCYTOSIS BLD QL SMEAR: SLIGHT
POTASSIUM SERPL-SCNC: 3.8 MMOL/L
PROT SERPL-MCNC: 6.4 G/DL
RBC # BLD AUTO: 4.73 M/UL
SODIUM SERPL-SCNC: 133 MMOL/L
WBC # BLD AUTO: 1.69 K/UL

## 2018-05-16 PROCEDURE — 83735 ASSAY OF MAGNESIUM: CPT

## 2018-05-16 PROCEDURE — 85027 COMPLETE CBC AUTOMATED: CPT

## 2018-05-16 PROCEDURE — 25000003 PHARM REV CODE 250: Performed by: NURSE PRACTITIONER

## 2018-05-16 PROCEDURE — 11000001 HC ACUTE MED/SURG PRIVATE ROOM

## 2018-05-16 PROCEDURE — 25000003 PHARM REV CODE 250: Performed by: HOSPITALIST

## 2018-05-16 PROCEDURE — 85007 BL SMEAR W/DIFF WBC COUNT: CPT

## 2018-05-16 PROCEDURE — 80053 COMPREHEN METABOLIC PANEL: CPT

## 2018-05-16 PROCEDURE — 84100 ASSAY OF PHOSPHORUS: CPT

## 2018-05-16 RX ORDER — SODIUM,POTASSIUM PHOSPHATES 280-250MG
1 POWDER IN PACKET (EA) ORAL
Qty: 20 PACKET | Refills: 0 | COMMUNITY
Start: 2018-05-16 | End: 2018-05-21

## 2018-05-16 RX ADMIN — NYSTATIN 500000 UNITS: 100000 SUSPENSION ORAL at 08:05

## 2018-05-16 RX ADMIN — POTASSIUM & SODIUM PHOSPHATES POWDER PACK 280-160-250 MG 1 PACKET: 280-160-250 PACK at 11:05

## 2018-05-16 RX ADMIN — NYSTATIN 500000 UNITS: 100000 SUSPENSION ORAL at 05:05

## 2018-05-16 RX ADMIN — HYDROCORTISONE: 1 CREAM TOPICAL at 08:05

## 2018-05-16 RX ADMIN — ACETAMINOPHEN 650 MG: 325 TABLET, FILM COATED ORAL at 08:05

## 2018-05-16 RX ADMIN — HYDROCORTISONE: 1 CREAM TOPICAL at 09:05

## 2018-05-16 RX ADMIN — NYSTATIN 500000 UNITS: 100000 SUSPENSION ORAL at 01:05

## 2018-05-16 RX ADMIN — LIDOCAINE HYDROCHLORIDE 15 ML: 20 SOLUTION ORAL; TOPICAL at 08:05

## 2018-05-16 RX ADMIN — HYDROCODONE BITARTRATE AND ACETAMINOPHEN 1 TABLET: 5; 325 TABLET ORAL at 12:05

## 2018-05-16 RX ADMIN — ACETAMINOPHEN 650 MG: 325 TABLET, FILM COATED ORAL at 07:05

## 2018-05-16 RX ADMIN — LIDOCAINE HYDROCHLORIDE 15 ML: 20 SOLUTION ORAL; TOPICAL at 05:05

## 2018-05-16 RX ADMIN — POTASSIUM & SODIUM PHOSPHATES POWDER PACK 280-160-250 MG 1 PACKET: 280-160-250 PACK at 10:05

## 2018-05-16 RX ADMIN — NYSTATIN 500000 UNITS: 100000 SUSPENSION ORAL at 09:05

## 2018-05-16 RX ADMIN — POTASSIUM & SODIUM PHOSPHATES POWDER PACK 280-160-250 MG 1 PACKET: 280-160-250 PACK at 06:05

## 2018-05-16 NOTE — DISCHARGE SUMMARY
[facility] Hospital Medicine  Discharge Summary      Patient Name: Chon Parham  MRN: [id number]  Admission Date: 5/14/2018  Hospital Length of Stay: 2 days  Discharge Date and Time:   Attending Physician: Cesar Mijares MD   Discharging Provider: Loren Kitchen NP  Primary Care Provider: Primary Doctor No      HPI:   27M with unknown pmh presents with fever, n/v and abdominal pain and throat pain 5 days ago to [facility].  He was discharged home with levaquin  for 10 days.  He had a follow up with family medicine physician on 5/11 who noted patient had unremarkable cbc, HIV, flu and RPR was negative.  ENT exam at that time was normal.  On 5/13, miladys gonsalez called back to show that cbc abnormal and HIV was positive and to have patient transferred to [facility] for further work up of new diagnosis of HIV.   Patient himself reports 10 lb weight loss in the last week.  Reports oral sores onset 2 weeks ago. Penile sore onset 3 months ago. Pt reports that sores are tender. Symptoms are constant and moderate in severity. No mitigating or exacerbating factors reported. Associated sxs include subjective fever and generalized myalgia. Patient denies any dysuria, hematuria, frequency, hesitancy, chills, n/v, and all other sxs at this time. Prior Tx includes levaquin. Pt reports having had 8 sexual partners in the past year, with infrequent use of condoms. Pt has PMHx of chlamydia and gonorrhea. Pt denies IV drug use. Pt reports that he has not seen a PCP recently for sxs, or been prescribed any medications besides the levaquin. [place] shows: positive HIV, white count of 2.4, platlets at 76, lactose acid of 0.7, and normal CXray. No further complaints or concerns at this time.     * No surgery found *      Hospital Course:   Pt admitted to Medical Surgical Unit for fever likely associated with HIV.  ID consulted.  Chest xray negative.  Blood cultures with no growth to date.  Thrombocytopenia with  upward trend noted.  CT of abdomen showed no acute process or appendicitis.  CD4 >300.  CD4 [place] T cell 40.6.  Neutra Phos given for hypophosphatemia.  Hepatitis panel negative.  HIV PCR detected and HIV 1/2 antibodies positive.  RPR nonreactive.  Symptoms likely associated with acute retroviral syndrome and no LP indicated at this time.  HIV Genotype in process.  Quantiferon specimen sent to lab for analysis with recollect required outpatient.  Pt verbalized symptom improvement and vital signs stable.  Pt seen and examined on the date of discharge and deemed suitable for discharge to home.  Current medications resumed with Neutraphos prescribed.  Pt instructed to follow up with Dr. CARLOS Hewitt (Infectious Disease) on 5/23/28 at 1:30 pm for required lab analysis and further evaluation.       Consults:   Consults         Status Ordering Provider     Inpatient consult to Infectious Diseases  Once     Provider:  Cheko Lane MD    Acknowledged CECELIA SOUZA JR          Final Active Diagnoses:    Diagnosis Date Noted POA    PRINCIPAL PROBLEM:  HIV (human immunodeficiency virus infection) [B20] 05/14/2018 Yes    Thrombocytopenia associated with AIDS [B20, D69.59] 05/15/2018 Unknown    Penile ulcer [N48.5] 05/14/2018 Yes    Oral ulcer [K12.1] 05/14/2018 Yes    Fever [R50.9] 05/14/2018 Yes      Problems Resolved During this Admission:    Diagnosis Date Noted Date Resolved POA       Discharged Condition: stable    Disposition: Home or Self Care    Follow Up:  Follow-up Information     Dr Palmer Bruno. Go on 5/23/2018.    Why:  @ 1:30 for your hospital follow up appointment. Repeat CBC and Quantiferon   Contact information:  [address] [place] [address] [phone]               Patient Instructions:     C. trachomatis/N. gonorrhoeae by AMP DNA Urine   Standing Status: Future  Standing Exp. Date: 05/14/19   Order Specific Question Answer Comments   Specimen Source Urine      Diet Adult  Regular     Activity as tolerated     Notify your health care provider if you experience any of the following:  temperature >100.4     Notify your health care provider if you experience any of the following:  persistent nausea and vomiting or diarrhea     Notify your health care provider if you experience any of the following:  difficulty breathing or increased cough     Notify your health care provider if you experience any of the following:  severe persistent headache     Notify your health care provider if you experience any of the following:  persistent dizziness, light-headedness, or visual disturbances     Notify your health care provider if you experience any of the following:  increased confusion or weakness         Significant Diagnostic Studies: Labs:   CMP   Recent Labs  Lab 05/14/18  2259 05/15/18  0441 05/16/18 0445   * 136 133*   K 3.7 4.0 3.8   CL 98 99 98   CO2 27 25 27   GLU 93 80 77   BUN 13 13 13   CREATININE 0.9 1.1 1.0   CALCIUM 8.1* 8.2* 8.2*   PROT 6.4 6.5 6.4   ALBUMIN 3.3* 3.3* 3.2*   BILITOT 0.8 0.7 0.8   ALKPHOS 48* 45* 51*   AST 49* 52* 57*   ALT 21 21 26   ANIONGAP 10 12 8   ESTGFRAFRICA >60 >60 >60   EGFRNONAA >60 >60 >60    and CBC   Recent Labs  Lab 05/15/18  0443 05/16/18 0445   WBC 2.53* 1.69*   HGB 15.8 15.9   HCT 45.7 45.0   PLT 74* 93*     Microbiology:   Blood Culture   Lab Results   Component Value Date    LABBLOO No Growth to date 05/14/2018    LABBLOO No Growth to date 05/14/2018       Pending Diagnostic Studies:     Procedure Component Value Units Date/Time    HIV-1 GenotypR PLUS [[id number]] Collected:  05/15/18 0804    Order Status:  Sent Lab Status:  In process Updated:  05/15/18 1555    Specimen:  Blood from Blood          Medications:  Reconciled Home Medications:      Medication List      START taking these medications    potassium, sodium phosphates 280-160-250 mg Pwpk  Commonly known as:  PHOS-NAK  Take 1 packet by mouth 4 (four) times daily before meals and  nightly.        CONTINUE taking these medications    docusate sodium 100 MG capsule  Commonly known as:  COLACE  Take 1 capsule (100 mg total) by mouth 2 (two) times daily.     hydrocortisone-pramoxine rectal foam  Commonly known as:  PROCTOFOAM-HS  Place 1 applicator rectally 2 (two) times daily. For 4 weeks     naproxen 500 MG tablet  Commonly known as:  NAPROSYN  Take 1 tablet (500 mg total) by mouth 2 (two) times daily.            Indwelling Lines/Drains at time of discharge:   Lines/Drains/Airways          No matching active lines, drains, or airways          Time spent on the discharge of patient: 50 minutes  Patient was seen and examined on the date of discharge and determined to be suitable for discharge.         Loren Kitchen NP  Department of Hospital Medicine  [facility]

## 2018-05-16 NOTE — NURSING
Main campus lab called and stated that Quantiferon lab was overfilled and could not be tested, also asked if the lab could be redrawn.  Spoke with provider.  Lab re-ordered stat.  Downstairs lab called and stated Quantiferon is only drawn Monday through Thursday before 1pm and that lab would have to be cancelled.  Notified provider.  Orders will be put on discharge orders.

## 2018-05-16 NOTE — PLAN OF CARE
Problem: Patient Care Overview  Goal: Plan of Care Review  Outcome: Outcome(s) achieved Date Met: 05/16/18  Goals adequately met for discharge.

## 2018-05-16 NOTE — PLAN OF CARE
Request made by nursing to provide transportation back to [place]. Patient was a transfer from [facility]. Left vm for Iram Almazan Director of Case Mgt to review for approval. Spoke with Hannah Hernandez  Case Mgt.[employer] for patient back to [place] approved.    Faxed [employer] Request.

## 2018-05-16 NOTE — PROGRESS NOTES
[facility]  Infectious Disease  Progress Note    Patient Name: Chon Parham  MRN: [id number]  Admission Date: 5/14/2018  Length of Stay: 2 days  Attending Physician: Cesar Mijares MD  Primary Care Provider: Primary Doctor No    Isolation Status: Neutropenic  Assessment/Plan:      * HIV (human immunodeficiency virus infection)    The febrile response can be seen with HIV itself,will follow HIV genotype,    The constellation of symptoms is highly suggestive of acute retroviral syndrome-he will need to start  HAART after HIV genotype results are known        Fever    The fever is likely related to acute retroviral syndrome /HIV .  Will plan to do LP if headache persists .    Cd4 is 308-cryptotoccal meningitis  Is unlikely         Oral ulcer    Could be related to acute HIV            Anticipated Disposition:     Thank you for your consult. I will follow-up with patient. Please contact us if you have any additional questions.    Cheko Lane MD  Infectious Disease  [facility]    Subjective:     Principal Problem:HIV (human immunodeficiency virus infection)    HPI: 27 year old man with history of HIV -cd4 count is 308 who was admitted with fever ,abdominal pain  and throat pain . He was transferred  from [facility].HIV was acquired through high risk sexual activity with female partners. He has history of drug use and was on  drug rehab . There is associated history of penile sore-noticed 3 months ago  and fever .  At this time,he appears weak .  Since admission, chest x-ray is negative.RPR is also negative     Interval History:  27 year old man with newly diagnosed HIV . HIV viral load is more than 10 million -this is likely recent infection.  Fever persists     Review of Systems   Constitutional: Positive for fever. Negative for activity change, appetite change, chills, diaphoresis and fatigue.   HENT: Negative for facial swelling, sore throat, tinnitus and trouble  swallowing.         Oral ulcers   Eyes: Negative for photophobia and visual disturbance.   Respiratory: Negative for apnea, cough, chest tightness, shortness of breath and wheezing.    Cardiovascular: Negative for chest pain, palpitations and leg swelling.   Gastrointestinal: Negative for abdominal distention, abdominal pain, constipation, diarrhea, nausea and vomiting.   Endocrine: Negative for polydipsia, polyphagia and polyuria.   Genitourinary: Positive for penile pain. Negative for decreased urine volume, dysuria, flank pain, frequency and hematuria.        Penile ulcer   Musculoskeletal: Positive for myalgias. Negative for arthralgias, back pain, joint swelling and neck stiffness.   Skin: Negative for pallor and rash.   Allergic/Immunologic: Negative for immunocompromised state.   Neurological: Negative for dizziness, seizures, syncope, weakness, numbness and headaches.   Psychiatric/Behavioral: Negative for confusion, hallucinations and suicidal ideas. The patient is not nervous/anxious.    All other systems reviewed and are negative.    Objective:     Vital Signs (Most Recent):  Temp: 99 °F (37.2 °C) (05/16/18 0108)  Pulse: 62 (05/15/18 2353)  Resp: 16 (05/15/18 2353)  BP: (!) 116/56 (05/15/18 2353)  SpO2: 95 % (05/15/18 2353) Vital Signs (24h Range):  Temp:  [98.5 °F (36.9 °C)-102.5 °F (39.2 °C)] 99 °F (37.2 °C)  Pulse:  [54-65] 62  Resp:  [16-18] 16  SpO2:  [94 %-98 %] 95 %  BP: (105-130)/(49-58) 116/56     Weight: 67.6 kg (149 lb)  Body mass index is 20.78 kg/m².    Estimated Creatinine Clearance: 95.6 mL/min (based on SCr of 1.1 mg/dL).    Physical Exam   Constitutional: He is oriented to person, place, and time. He appears well-developed and well-nourished. No distress.   HENT:   Head: Normocephalic and atraumatic.   Mouth/Throat: Oropharynx is clear and moist.   Multiple oral and throat ulcers  No leukoplakia   Eyes: Conjunctivae are normal. Pupils are equal, round, and reactive to light. No scleral  icterus.   Neck: No JVD present. No thyromegaly present.   Cardiovascular: Regular rhythm.  Exam reveals no gallop and no friction rub.    No murmur heard.  Pulmonary/Chest: Effort normal and breath sounds normal. No respiratory distress. He has no wheezes. He has no rales.   Abdominal: Soft. Bowel sounds are normal. He exhibits no distension. There is no tenderness. There is no guarding.   Genitourinary:   Genitourinary Comments: Penile ulcer   Musculoskeletal: Normal range of motion.   Neurological: He is alert and oriented to person, place, and time. No cranial nerve deficit.   Skin: Skin is warm. Capillary refill takes 2 to 3 seconds. He is not diaphoretic. No erythema.   Psychiatric: He has a normal mood and affect.   Nursing note and vitals reviewed.      Significant Labs:   Blood Culture:   Recent Labs  Lab 05/14/18  0619 05/14/18  0630   LABBLOO No Growth to date  No Growth to date No Growth to date  No Growth to date     BMP:   Recent Labs  Lab 05/15/18  0441 05/15/18  0443   GLU 80  --      --    K 4.0  --    CL 99  --    CO2 25  --    BUN 13  --    CREATININE 1.1  --    CALCIUM 8.2*  --    MG  --  2.3     HIV RNA, quantitative, PCR   Order: [id number]   Status:  Final result   Visible to patient:  No (Not Released) Next appt:  None    Ref Range & Units 2d ago   HIV-1 RNA, Qual Not detected DETECTED     HIV 1 RNA Ultra <40 Copies/mL >10,000,000     Log HIV Copies/mL <1.60 Log (10) Copies/mL >7.00                   All pertinent labs within the past 24 hours have been reviewed.    Significant Imaging: I have reviewed all pertinent imaging results/findings within the past 24 hours.

## 2018-05-16 NOTE — SUBJECTIVE & OBJECTIVE
Interval History:  27 year old man with newly diagnosed HIV . HIV viral load is more than 10 million -this is likely recent infection.  Fever persists     Review of Systems   Constitutional: Positive for fever. Negative for activity change, appetite change, chills, diaphoresis and fatigue.   HENT: Negative for facial swelling, sore throat, tinnitus and trouble swallowing.         Oral ulcers   Eyes: Negative for photophobia and visual disturbance.   Respiratory: Negative for apnea, cough, chest tightness, shortness of breath and wheezing.    Cardiovascular: Negative for chest pain, palpitations and leg swelling.   Gastrointestinal: Negative for abdominal distention, abdominal pain, constipation, diarrhea, nausea and vomiting.   Endocrine: Negative for polydipsia, polyphagia and polyuria.   Genitourinary: Positive for penile pain. Negative for decreased urine volume, dysuria, flank pain, frequency and hematuria.        Penile ulcer   Musculoskeletal: Positive for myalgias. Negative for arthralgias, back pain, joint swelling and neck stiffness.   Skin: Negative for pallor and rash.   Allergic/Immunologic: Negative for immunocompromised state.   Neurological: Negative for dizziness, seizures, syncope, weakness, numbness and headaches.   Psychiatric/Behavioral: Negative for confusion, hallucinations and suicidal ideas. The patient is not nervous/anxious.    All other systems reviewed and are negative.    Objective:     Vital Signs (Most Recent):  Temp: 99 °F (37.2 °C) (05/16/18 0108)  Pulse: 62 (05/15/18 2353)  Resp: 16 (05/15/18 2353)  BP: (!) 116/56 (05/15/18 2353)  SpO2: 95 % (05/15/18 2353) Vital Signs (24h Range):  Temp:  [98.5 °F (36.9 °C)-102.5 °F (39.2 °C)] 99 °F (37.2 °C)  Pulse:  [54-65] 62  Resp:  [16-18] 16  SpO2:  [94 %-98 %] 95 %  BP: (105-130)/(49-58) 116/56     Weight: 67.6 kg (149 lb)  Body mass index is 20.78 kg/m².    Estimated Creatinine Clearance: 95.6 mL/min (based on SCr of 1.1 mg/dL).    Physical  Exam   Constitutional: He is oriented to person, place, and time. He appears well-developed and well-nourished. No distress.   HENT:   Head: Normocephalic and atraumatic.   Mouth/Throat: Oropharynx is clear and moist.   Multiple oral and throat ulcers  No leukoplakia   Eyes: Conjunctivae are normal. Pupils are equal, round, and reactive to light. No scleral icterus.   Neck: No JVD present. No thyromegaly present.   Cardiovascular: Regular rhythm.  Exam reveals no gallop and no friction rub.    No murmur heard.  Pulmonary/Chest: Effort normal and breath sounds normal. No respiratory distress. He has no wheezes. He has no rales.   Abdominal: Soft. Bowel sounds are normal. He exhibits no distension. There is no tenderness. There is no guarding.   Genitourinary:   Genitourinary Comments: Penile ulcer   Musculoskeletal: Normal range of motion.   Neurological: He is alert and oriented to person, place, and time. No cranial nerve deficit.   Skin: Skin is warm. Capillary refill takes 2 to 3 seconds. He is not diaphoretic. No erythema.   Psychiatric: He has a normal mood and affect.   Nursing note and vitals reviewed.      Significant Labs:   Blood Culture:   Recent Labs  Lab 05/14/18  0619 05/14/18  0630   LABBLOO No Growth to date  No Growth to date No Growth to date  No Growth to date     BMP:   Recent Labs  Lab 05/15/18  0441 05/15/18  0443   GLU 80  --      --    K 4.0  --    CL 99  --    CO2 25  --    BUN 13  --    CREATININE 1.1  --    CALCIUM 8.2*  --    MG  --  2.3     HIV RNA, quantitative, PCR   Order: [id number]   Status:  Final result   Visible to patient:  No (Not Released) Next appt:  None    Ref Range & Units 2d ago   HIV-1 RNA, Qual Not detected DETECTED     HIV 1 RNA Ultra <40 Copies/mL >10,000,000     Log HIV Copies/mL <1.60 Log (10) Copies/mL >7.00                   All pertinent labs within the past 24 hours have been reviewed.    Significant Imaging: I have reviewed all pertinent imaging  results/findings within the past 24 hours.

## 2018-05-16 NOTE — PLAN OF CARE
Problem: Patient Care Overview  Goal: Plan of Care Review  Outcome: Ongoing (interventions implemented as appropriate)  Respirations even and unlabored, no distress noted on assessment, pain, intermittent nausea, no shortness of breath, refused FLAVIO hose and bed alarm, ulcers to penis mouth and open wound left groin

## 2018-05-16 NOTE — ASSESSMENT & PLAN NOTE
The febrile response can be seen with HIV itself,will follow HIV genotype,    The constellation of symptoms is highly suggestive of acute retroviral syndrome-he will need to start  HAART after HIV genotype results are known

## 2018-05-16 NOTE — PLAN OF CARE
"Verbal consult received to arrange follow up with Infectious Disease Specialist . Met with patient discussed the 2 clinics in [place]. Patient indicated that he has transportation but he is a part of "[employer] " and may not be able to leave the [facility]. Obtained # for [employer] [phone]. Discussed attempting to make an appointment in or around the [facility]. Contacted Dr Palmer Hewitt at the [facility] in [place]. Spoke with Rosita Encinas in Intake. Scheduled appointment for first available,  Wednesday May 23rd at 1:30. The address is [address] [place] [address]. Contact # is [phone].  Discussed appointment with patient. Patient is agreeable and verbalized understanding. Appointment discussed with patient, Minerva zabala nurse and Fidelina SCHRADER provider  "

## 2018-05-16 NOTE — ASSESSMENT & PLAN NOTE
The fever is likely related to acute retroviral syndrome /HIV .  Will plan to do LP if headache persists .    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

## 2018-05-16 NOTE — NURSING
Verbalized understanding of discharge paperwork, follow up care, prescriptions, etc.  Patient does not have ride.  Will notify charge nurse.  Will continue to monitor until discharge.

## 2018-05-17 VITALS
SYSTOLIC BLOOD PRESSURE: 106 MMHG | DIASTOLIC BLOOD PRESSURE: 62 MMHG | BODY MASS INDEX: 20.86 KG/M2 | WEIGHT: 149 LBS | HEART RATE: 62 BPM | RESPIRATION RATE: 16 BRPM | HEIGHT: 71 IN | OXYGEN SATURATION: 97 % | TEMPERATURE: 99 F

## 2018-05-17 PROCEDURE — 25000003 PHARM REV CODE 250: Performed by: NURSE PRACTITIONER

## 2018-05-17 RX ADMIN — LIDOCAINE HYDROCHLORIDE 15 ML: 20 SOLUTION ORAL; TOPICAL at 01:05

## 2018-05-17 NOTE — PLAN OF CARE
Problem: Patient Care Overview  Goal: Plan of Care Review  Outcome: Ongoing (interventions implemented as appropriate)  POC reviewed, including indications and possible side effects of administered medications. Patient verbalized understanding and teach back. No adverse reactions noted. Patient denies pain. Patient febrile but relieved with medications, decreasing temperature, light clothing and bedding. Administered medications per order. Neutropenic precautions maintained. Patient remains free of falls and injuries during shift. Will continue to monitor.     12 hour chart check complete.

## 2018-05-17 NOTE — PLAN OF CARE
05/17/18 0943   Final Note   Assessment Type Final Discharge Note   Discharge Disposition Home   Right Care Referral Info   Post Acute Recommendation No Care

## 2018-05-19 LAB
BACTERIA BLD CULT: NORMAL
BACTERIA BLD CULT: NORMAL

## 2018-05-21 ENCOUNTER — PATIENT OUTREACH (OUTPATIENT)
Dept: ADMINISTRATIVE | Facility: CLINIC | Age: 28
End: 2018-05-21

## 2018-05-21 NOTE — PATIENT INSTRUCTIONS

## 2018-05-24 LAB — HIV GENTYP ISLT: DETECTED

## 2018-06-03 ENCOUNTER — HOSPITAL ENCOUNTER (INPATIENT)
Facility: HOSPITAL | Age: 28
LOS: 2 days | Discharge: HOME | DRG: 974 | End: 2018-06-05
Attending: INTERNAL MEDICINE | Admitting: EMERGENCY MEDICINE
Payer: MEDICAID

## 2018-06-03 DIAGNOSIS — J18.9 RIGHT LOWER LOBE PNEUMONIA: Primary | ICD-10-CM

## 2018-06-03 DIAGNOSIS — J18.9 PNEUMONIA: ICD-10-CM

## 2018-06-03 LAB
ALBUMIN SERPL BCP-MCNC: 2.9 G/DL
ALP SERPL-CCNC: 58 U/L
ALT SERPL W/O P-5'-P-CCNC: 42 U/L
ANION GAP SERPL CALC-SCNC: 9 MMOL/L
AST SERPL-CCNC: 70 U/L
BASOPHILS # BLD AUTO: 0.09 K/UL
BASOPHILS NFR BLD: 1.7 %
BILIRUB SERPL-MCNC: 0.7 MG/DL
BILIRUB UR QL STRIP: NEGATIVE
BUN SERPL-MCNC: 8 MG/DL
CALCIUM SERPL-MCNC: 9.4 MG/DL
CHLORIDE SERPL-SCNC: 101 MMOL/L
CLARITY UR: CLEAR
CO2 SERPL-SCNC: 25 MMOL/L
COLOR UR: YELLOW
CREAT SERPL-MCNC: 1 MG/DL
DIFFERENTIAL METHOD: ABNORMAL
EOSINOPHIL # BLD AUTO: 0.2 K/UL
EOSINOPHIL NFR BLD: 3.1 %
ERYTHROCYTE [DISTWIDTH] IN BLOOD BY AUTOMATED COUNT: 12.4 %
EST. GFR  (AFRICAN AMERICAN): >60 ML/MIN/1.73 M^2
EST. GFR  (NON AFRICAN AMERICAN): >60 ML/MIN/1.73 M^2
GLUCOSE SERPL-MCNC: 81 MG/DL
GLUCOSE UR QL STRIP: NEGATIVE
HCT VFR BLD AUTO: 39.5 %
HGB BLD-MCNC: 14.2 G/DL
HGB UR QL STRIP: NEGATIVE
KETONES UR QL STRIP: NEGATIVE
LACTATE SERPL-SCNC: 0.7 MMOL/L
LEUKOCYTE ESTERASE UR QL STRIP: NEGATIVE
LYMPHOCYTES # BLD AUTO: 2.3 K/UL
LYMPHOCYTES NFR BLD: 44.2 %
MAGNESIUM SERPL-MCNC: 1.8 MG/DL
MAGNESIUM SERPL-MCNC: 1.9 MG/DL
MCH RBC QN AUTO: 34.8 PG
MCHC RBC AUTO-ENTMCNC: 35.9 G/DL
MCV RBC AUTO: 97 FL
MONOCYTES # BLD AUTO: 0.7 K/UL
MONOCYTES NFR BLD: 12.4 %
NEUTROPHILS # BLD AUTO: 2 K/UL
NEUTROPHILS NFR BLD: 38.8 %
NITRITE UR QL STRIP: NEGATIVE
PH UR STRIP: 6 [PH] (ref 5–8)
PHOSPHATE SERPL-MCNC: 3 MG/DL
PLATELET # BLD AUTO: 203 K/UL
PMV BLD AUTO: 10.4 FL
POTASSIUM SERPL-SCNC: 4.5 MMOL/L
PROCALCITONIN SERPL IA-MCNC: 0.24 NG/ML
PROT SERPL-MCNC: 8 G/DL
PROT UR QL STRIP: NEGATIVE
RBC # BLD AUTO: 4.08 M/UL
SODIUM SERPL-SCNC: 135 MMOL/L
SP GR UR STRIP: <=1.005 (ref 1–1.03)
URN SPEC COLLECT METH UR: ABNORMAL
UROBILINOGEN UR STRIP-ACNC: NEGATIVE EU/DL
VANCOMYCIN SERPL-MCNC: 17.2 UG/ML
WBC # BLD AUTO: 5.23 K/UL

## 2018-06-03 PROCEDURE — 25000003 PHARM REV CODE 250: Performed by: PHYSICIAN ASSISTANT

## 2018-06-03 PROCEDURE — 83735 ASSAY OF MAGNESIUM: CPT | Mod: 91

## 2018-06-03 PROCEDURE — 25500020 PHARM REV CODE 255: Performed by: INTERNAL MEDICINE

## 2018-06-03 PROCEDURE — 80202 ASSAY OF VANCOMYCIN: CPT

## 2018-06-03 PROCEDURE — 83605 ASSAY OF LACTIC ACID: CPT

## 2018-06-03 PROCEDURE — 85025 COMPLETE CBC W/AUTO DIFF WBC: CPT

## 2018-06-03 PROCEDURE — 81003 URINALYSIS AUTO W/O SCOPE: CPT

## 2018-06-03 PROCEDURE — 21400001 HC TELEMETRY ROOM

## 2018-06-03 PROCEDURE — 84145 PROCALCITONIN (PCT): CPT

## 2018-06-03 PROCEDURE — 83735 ASSAY OF MAGNESIUM: CPT

## 2018-06-03 PROCEDURE — 63600175 PHARM REV CODE 636 W HCPCS: Performed by: PHYSICIAN ASSISTANT

## 2018-06-03 PROCEDURE — 80053 COMPREHEN METABOLIC PANEL: CPT

## 2018-06-03 PROCEDURE — 87040 BLOOD CULTURE FOR BACTERIA: CPT

## 2018-06-03 PROCEDURE — 11000001 HC ACUTE MED/SURG PRIVATE ROOM

## 2018-06-03 PROCEDURE — 36415 COLL VENOUS BLD VENIPUNCTURE: CPT

## 2018-06-03 PROCEDURE — 84100 ASSAY OF PHOSPHORUS: CPT

## 2018-06-03 RX ORDER — DOCUSATE SODIUM 100 MG/1
100 CAPSULE, LIQUID FILLED ORAL 2 TIMES DAILY
Status: DISCONTINUED | OUTPATIENT
Start: 2018-06-04 | End: 2018-06-05 | Stop reason: HOSPADM

## 2018-06-03 RX ORDER — ENOXAPARIN SODIUM 100 MG/ML
40 INJECTION SUBCUTANEOUS EVERY 24 HOURS
Status: DISCONTINUED | OUTPATIENT
Start: 2018-06-04 | End: 2018-06-05 | Stop reason: HOSPADM

## 2018-06-03 RX ORDER — TRAZODONE HYDROCHLORIDE 50 MG/1
TABLET ORAL
Status: ON HOLD | COMMUNITY
End: 2020-10-29 | Stop reason: HOSPADM

## 2018-06-03 RX ORDER — IPRATROPIUM BROMIDE AND ALBUTEROL SULFATE 2.5; .5 MG/3ML; MG/3ML
3 SOLUTION RESPIRATORY (INHALATION) EVERY 4 HOURS PRN
Status: DISCONTINUED | OUTPATIENT
Start: 2018-06-04 | End: 2018-06-05 | Stop reason: HOSPADM

## 2018-06-03 RX ORDER — PANTOPRAZOLE SODIUM 40 MG/1
40 TABLET, DELAYED RELEASE ORAL DAILY
Status: DISCONTINUED | OUTPATIENT
Start: 2018-06-04 | End: 2018-06-05 | Stop reason: HOSPADM

## 2018-06-03 RX ORDER — GUAIFENESIN 600 MG/1
600 TABLET, EXTENDED RELEASE ORAL 2 TIMES DAILY
Status: DISCONTINUED | OUTPATIENT
Start: 2018-06-04 | End: 2018-06-05 | Stop reason: HOSPADM

## 2018-06-03 RX ORDER — ONDANSETRON 2 MG/ML
4 INJECTION INTRAMUSCULAR; INTRAVENOUS EVERY 6 HOURS PRN
Status: DISCONTINUED | OUTPATIENT
Start: 2018-06-04 | End: 2018-06-05 | Stop reason: HOSPADM

## 2018-06-03 RX ORDER — ACETAMINOPHEN 325 MG/1
650 TABLET ORAL EVERY 6 HOURS PRN
Status: DISCONTINUED | OUTPATIENT
Start: 2018-06-04 | End: 2018-06-05 | Stop reason: HOSPADM

## 2018-06-03 RX ORDER — TRAZODONE HYDROCHLORIDE 50 MG/1
50 TABLET ORAL NIGHTLY PRN
Status: DISCONTINUED | OUTPATIENT
Start: 2018-06-04 | End: 2018-06-05 | Stop reason: HOSPADM

## 2018-06-03 RX ORDER — SODIUM CHLORIDE 9 MG/ML
INJECTION, SOLUTION INTRAVENOUS CONTINUOUS
Status: DISCONTINUED | OUTPATIENT
Start: 2018-06-04 | End: 2018-06-05 | Stop reason: HOSPADM

## 2018-06-03 RX ORDER — ONDANSETRON 8 MG/1
TABLET, ORALLY DISINTEGRATING ORAL
Status: ON HOLD | COMMUNITY
End: 2020-10-29 | Stop reason: HOSPADM

## 2018-06-03 RX ADMIN — IOHEXOL 100 ML: 350 INJECTION, SOLUTION INTRAVENOUS at 10:06

## 2018-06-03 RX ADMIN — PIPERACILLIN AND TAZOBACTAM 4.5 G: 4; .5 INJECTION, POWDER, LYOPHILIZED, FOR SOLUTION INTRAVENOUS; PARENTERAL at 11:06

## 2018-06-04 PROBLEM — Z21 HIV (HUMAN IMMUNODEFICIENCY VIRUS INFECTION): Chronic | Status: ACTIVE | Noted: 2018-05-14

## 2018-06-04 PROBLEM — Z72.0 TOBACCO USE: Chronic | Status: ACTIVE | Noted: 2018-06-04

## 2018-06-04 PROBLEM — B20 HIV (HUMAN IMMUNODEFICIENCY VIRUS INFECTION): Chronic | Status: ACTIVE | Noted: 2018-05-14

## 2018-06-04 PROBLEM — E43 SEVERE MALNUTRITION: Status: ACTIVE | Noted: 2018-06-04

## 2018-06-04 LAB
ANION GAP SERPL CALC-SCNC: 8 MMOL/L
BASOPHILS # BLD AUTO: 0.08 K/UL
BASOPHILS NFR BLD: 1.6 %
BUN SERPL-MCNC: 8 MG/DL
CALCIUM SERPL-MCNC: 9.1 MG/DL
CD3+CD4+ CELLS # BLD: 299 CELLS/UL (ref 300–1400)
CD3+CD4+ CELLS NFR BLD: 13.7 % (ref 28–57)
CHLORIDE SERPL-SCNC: 103 MMOL/L
CO2 SERPL-SCNC: 25 MMOL/L
CREAT SERPL-MCNC: 1 MG/DL
DIFFERENTIAL METHOD: ABNORMAL
EOSINOPHIL # BLD AUTO: 0.2 K/UL
EOSINOPHIL NFR BLD: 4.7 %
ERYTHROCYTE [DISTWIDTH] IN BLOOD BY AUTOMATED COUNT: 12.3 %
EST. GFR  (AFRICAN AMERICAN): >60 ML/MIN/1.73 M^2
EST. GFR  (NON AFRICAN AMERICAN): >60 ML/MIN/1.73 M^2
GLUCOSE SERPL-MCNC: 75 MG/DL
HCT VFR BLD AUTO: 37.3 %
HGB BLD-MCNC: 12.8 G/DL
LYMPHOCYTES # BLD AUTO: 2 K/UL
LYMPHOCYTES NFR BLD: 41.5 %
MCH RBC QN AUTO: 32.4 PG
MCHC RBC AUTO-ENTMCNC: 34.3 G/DL
MCV RBC AUTO: 94 FL
MONOCYTES # BLD AUTO: 0.8 K/UL
MONOCYTES NFR BLD: 15.6 %
NEUTROPHILS # BLD AUTO: 1.8 K/UL
NEUTROPHILS NFR BLD: 36.6 %
PLATELET # BLD AUTO: 232 K/UL
PMV BLD AUTO: 9.8 FL
POTASSIUM SERPL-SCNC: 4 MMOL/L
RBC # BLD AUTO: 3.95 M/UL
SODIUM SERPL-SCNC: 136 MMOL/L
VANCOMYCIN TROUGH SERPL-MCNC: 11 UG/ML
WBC # BLD AUTO: 4.87 K/UL

## 2018-06-04 PROCEDURE — 21400001 HC TELEMETRY ROOM

## 2018-06-04 PROCEDURE — 63600175 PHARM REV CODE 636 W HCPCS: Performed by: NURSE PRACTITIONER

## 2018-06-04 PROCEDURE — 86361 T CELL ABSOLUTE COUNT: CPT

## 2018-06-04 PROCEDURE — 25000003 PHARM REV CODE 250: Performed by: NURSE PRACTITIONER

## 2018-06-04 PROCEDURE — 25000003 PHARM REV CODE 250: Performed by: INTERNAL MEDICINE

## 2018-06-04 PROCEDURE — 36415 COLL VENOUS BLD VENIPUNCTURE: CPT

## 2018-06-04 PROCEDURE — 25000003 PHARM REV CODE 250: Performed by: PHYSICIAN ASSISTANT

## 2018-06-04 PROCEDURE — 63600175 PHARM REV CODE 636 W HCPCS: Performed by: PHYSICIAN ASSISTANT

## 2018-06-04 PROCEDURE — 80048 BASIC METABOLIC PNL TOTAL CA: CPT

## 2018-06-04 PROCEDURE — 85025 COMPLETE CBC W/AUTO DIFF WBC: CPT

## 2018-06-04 PROCEDURE — 11000001 HC ACUTE MED/SURG PRIVATE ROOM

## 2018-06-04 PROCEDURE — 97802 MEDICAL NUTRITION INDIV IN: CPT

## 2018-06-04 PROCEDURE — 96372 THER/PROPH/DIAG INJ SC/IM: CPT

## 2018-06-04 PROCEDURE — 87536 HIV-1 QUANT&REVRSE TRNSCRPJ: CPT

## 2018-06-04 PROCEDURE — 80202 ASSAY OF VANCOMYCIN: CPT

## 2018-06-04 RX ORDER — HYDROCORTISONE ACETATE PRAMOXINE HCL 1; 1 G/100G; G/100G
CREAM TOPICAL 2 TIMES DAILY PRN
Status: DISCONTINUED | OUTPATIENT
Start: 2018-06-04 | End: 2018-06-05 | Stop reason: HOSPADM

## 2018-06-04 RX ADMIN — GUAIFENESIN 600 MG: 600 TABLET, EXTENDED RELEASE ORAL at 09:06

## 2018-06-04 RX ADMIN — SODIUM CHLORIDE: 0.9 INJECTION, SOLUTION INTRAVENOUS at 03:06

## 2018-06-04 RX ADMIN — DOCUSATE SODIUM 100 MG: 100 CAPSULE, LIQUID FILLED ORAL at 08:06

## 2018-06-04 RX ADMIN — ENOXAPARIN SODIUM 40 MG: 100 INJECTION SUBCUTANEOUS at 04:06

## 2018-06-04 RX ADMIN — GUAIFENESIN 600 MG: 600 TABLET, EXTENDED RELEASE ORAL at 08:06

## 2018-06-04 RX ADMIN — HYDROCORTISONE ACETATE PRAMOXINE HCL: 1; 1 CREAM TOPICAL at 09:06

## 2018-06-04 RX ADMIN — PIPERACILLIN AND TAZOBACTAM 4.5 G: 4; .5 INJECTION, POWDER, LYOPHILIZED, FOR SOLUTION INTRAVENOUS; PARENTERAL at 07:06

## 2018-06-04 RX ADMIN — PIPERACILLIN AND TAZOBACTAM 4.5 G: 4; .5 INJECTION, POWDER, LYOPHILIZED, FOR SOLUTION INTRAVENOUS; PARENTERAL at 10:06

## 2018-06-04 RX ADMIN — HYDROCORTISONE ACETATE PRAMOXINE HCL: 1; 1 CREAM TOPICAL at 10:06

## 2018-06-04 RX ADMIN — PIPERACILLIN AND TAZOBACTAM 4.5 G: 4; .5 INJECTION, POWDER, LYOPHILIZED, FOR SOLUTION INTRAVENOUS; PARENTERAL at 02:06

## 2018-06-04 RX ADMIN — VANCOMYCIN HYDROCHLORIDE 1500 MG: 1 INJECTION, POWDER, LYOPHILIZED, FOR SOLUTION INTRAVENOUS at 03:06

## 2018-06-04 RX ADMIN — VANCOMYCIN HYDROCHLORIDE 1500 MG: 1 INJECTION, POWDER, LYOPHILIZED, FOR SOLUTION INTRAVENOUS at 06:06

## 2018-06-04 RX ADMIN — PANTOPRAZOLE SODIUM 40 MG: 40 TABLET, DELAYED RELEASE ORAL at 09:06

## 2018-06-04 RX ADMIN — DOCUSATE SODIUM 100 MG: 100 CAPSULE, LIQUID FILLED ORAL at 09:06

## 2018-06-04 NOTE — HOSPITAL COURSE
Patient admitted for right lower lobe pneumonia. Patient being treated with Duoneb tx, IV zosyn and vancomycin, and supplemental oxygen. ID consult, recommends po Augmentin for 7 days. Patient was provided with copies of latest HIV labs done here and HIv genotype so he can initiate HAART with a local HIv provider in [place]. Cd4 percent is 13.7.  Patient started on Bactrim DS for PCP prophylaxis. Current medications resumed with Augmentin and Bactrim prescribed. Pt instructed to follow up with Dr. CARLOS Hewitt (Infectious Disease) as scehuled for further evaluation. Pt seen and examined on the date of discharge and found suitable for discharge to home.

## 2018-06-04 NOTE — CONSULTS
Chon Parham [id number] is a 27 y.o. male who has been consulted for vancomycin dosing.  Consult ordered by RADHA Garcia    Dx: PNA  Limited chart notes at the time of this consult. Pt transferred from [facility] and was previously on Vancomycin and Cefepime according to JOSE Piper on 5th floor. A Vancomycin random level was drawn tonight and returned as 17.2 mcg/mL @ 2144 on 6/3. I was told that he was on Vancomycin 1300 mg every 8 hours.   Our Goal trough is 15-20 mcg/mL for PNA    Tmax: 98.8 F  Cultures: collected. Waiting on results from [facility] also.     The patient has the following labs:     Date Creatinine (mg/dl)    BUN WBC Count   6/4/2018 Estimated Creatinine Clearance: 104.5 mL/min (based on SCr of 1 mg/dL). Lab Results   Component Value Date    BUN 8 06/03/2018     Lab Results   Component Value Date    WBC 5.23 06/03/2018      which calculates to an Estimated Creatinine Clearance: 104.5 mL/min (based on SCr of 1 mg/dL)..       Current weight is 67.2 kg (148 lb 2.4 oz)    The patient will be started on vancomycin at a dose of 1500 mg every 12 hours. Patient will be followed by pharmacy for changes in renal function, toxicity, and efficacy.      The vancomycin trough has been ordered for 6/4 at 1400.      Thank you for allowing us to participate in this patient's care.     Micah Padgett

## 2018-06-04 NOTE — PROGRESS NOTES
[facility] Hospital Medicine  Progress Note    Patient Name: Chon Parham  MRN: [id number]  Patient Class: IP- Inpatient   Admission Date: 6/3/2018  Length of Stay: 1 days  Attending Physician: Cesar Mijares MD  Primary Care Provider: Primary Doctor No        Subjective:     Principal Problem:Right lower lobe pneumonia    HPI:  Mr. Parham is a 28yo male with a PMHx of recently diagnosed HIV last month (not on HAART therapy), who originally presented to [facility] in [place], [place] on 5/29 with c/o SOB, cough, pleuritic right-sided CP, and fever.  CXR & CT showed RLL PNA with parapneumonic effusion.  He was admitted and started on empiric IV Rocephin, azithromycin, and Levaquin.  Per [facility] records, patient remained febrile over past 5 days with TMax 102.3 on 6/1.  Blood and urine cultures showed NGTD.  On 6/2, abx broadened to IV Vanc and cefepime.  Given patient's persistent fevers and recently diagnosed HIV, patient was transferred to [facility] for ID services.  Transfer records reviewed, and patient last febrile ~40 hours ago.  Currently, patient appears comfortable in NAD.  He denies any complaints.  Initial temp 98.1.  Repeat CTA chest tonight showed heterogeneous alveolar consolidations in the right lower lobe consistent with pneumonia, and small right and left pleural effusions with associated atelectasis.    Hospital Course:  Patient admitted for right lower lobe pneumonia. Patient being treated with Duoneb tx, IV zosyn and vancomycin, and supplemental oxygen. ID consult.     Interval History: No acute events overnight. WBCs remain normal, afebrile.     Review of Systems   Constitutional: Positive for chills and fever. Negative for activity change, diaphoresis, fatigue and unexpected weight change.   HENT: Negative for congestion, postnasal drip, rhinorrhea, sinus pressure, sore throat and trouble swallowing.    Eyes: Negative for photophobia and visual disturbance.   Respiratory:  Negative for apnea, cough, chest tightness, shortness of breath and wheezing.    Cardiovascular: Negative for chest pain, palpitations and leg swelling.   Gastrointestinal: Negative for abdominal distention, abdominal pain, blood in stool, constipation, diarrhea, nausea and vomiting.   Endocrine: Negative for polydipsia, polyphagia and polyuria.   Genitourinary: Negative for decreased urine volume, difficulty urinating, dysuria, frequency, hematuria and urgency.   Musculoskeletal: Negative for arthralgias, back pain, gait problem, joint swelling and myalgias.   Skin: Negative for pallor, rash and wound.   Neurological: Negative for dizziness, seizures, syncope, facial asymmetry, speech difficulty, weakness, light-headedness, numbness and headaches.   Psychiatric/Behavioral: Negative for confusion. The patient is not nervous/anxious.    All other systems reviewed and are negative.    Objective:     Vital Signs (Most Recent):  Temp: 98.3 °F (36.8 °C) (06/04/18 1209)  Pulse: 64 (06/04/18 1209)  Resp: 18 (06/04/18 1209)  BP: 108/63 (06/04/18 1209)  SpO2: 96 % (06/04/18 1209) Vital Signs (24h Range):  Temp:  [98.1 °F (36.7 °C)-99.2 °F (37.3 °C)] 98.3 °F (36.8 °C)  Pulse:  [61-72] 64  Resp:  [16-20] 18  SpO2:  [96 %-97 %] 96 %  BP: (103-126)/(56-69) 108/63     Weight: 67.2 kg (148 lb 2.4 oz)  Body mass index is 20.66 kg/m².    Intake/Output Summary (Last 24 hours) at 06/04/18 1406  Last data filed at 06/04/18 1100   Gross per 24 hour   Intake           756.67 ml   Output                0 ml   Net           756.67 ml      Physical Exam   Constitutional: He is oriented to person, place, and time. He appears well-developed and well-nourished. No distress.   HENT:   Head: Normocephalic and atraumatic.   Eyes: Conjunctivae are normal.   Neck: Normal range of motion. Neck supple. No JVD present.   Cardiovascular: Normal rate, regular rhythm, S1 normal, S2 normal and intact distal pulses.   No extrasystoles are present. Exam  reveals no gallop.    No murmur heard.  Pulses:       Radial pulses are 2+ on the right side, and 2+ on the left side.        Dorsalis pedis pulses are 2+ on the right side, and 2+ on the left side.        Posterior tibial pulses are 2+ on the right side, and 2+ on the left side.   Pulmonary/Chest: Effort normal and breath sounds normal. No accessory muscle usage. No tachypnea. No respiratory distress. He has no wheezes. He has no rhonchi. He has no rales.   Abdominal: Soft. Bowel sounds are normal. He exhibits no distension. There is no tenderness. There is no rebound, no guarding and no CVA tenderness.   Musculoskeletal: Normal range of motion. He exhibits no edema, tenderness or deformity.   Neurological: He is alert and oriented to person, place, and time. No cranial nerve deficit or sensory deficit.   Skin: Skin is warm, dry and intact. No rash noted. He is not diaphoretic. No cyanosis or erythema.   Psychiatric: He has a normal mood and affect. His speech is normal and behavior is normal. Cognition and memory are normal.   Nursing note and vitals reviewed.      Significant Labs:   Blood Culture:   Recent Labs  Lab 06/03/18 2152   LABBLOO No Growth to date     BMP:   Recent Labs  Lab 06/03/18 2144 06/04/18  0431   GLU 81 75   * 136   K 4.5 4.0    103   CO2 25 25   BUN 8 8   CREATININE 1.0 1.0   CALCIUM 9.4 9.1   MG 1.8  1.9  --      CBC:   Recent Labs  Lab 06/03/18 2144 06/04/18  0431   WBC 5.23 4.87   HGB 14.2 12.8*   HCT 39.5* 37.3*    232     CMP:   Recent Labs  Lab 06/03/18 2144 06/04/18  0431   * 136   K 4.5 4.0    103   CO2 25 25   GLU 81 75   BUN 8 8   CREATININE 1.0 1.0   CALCIUM 9.4 9.1   PROT 8.0  --    ALBUMIN 2.9*  --    BILITOT 0.7  --    ALKPHOS 58  --    AST 70*  --    ALT 42  --    ANIONGAP 9 8   EGFRNONAA >60 >60     All pertinent labs within the past 24 hours have been reviewed.    Significant Imaging:   Imaging Results    None       Assessment/Plan:      *  Right lower lobe pneumonia in immunocompromised patient    - CTA chest showed heterogeneous alveolar consolidations in the right lower lobe consistent with pneumonia, and small right and left pleural effusions with associated atelectasis.  - Oxygenating well on RA.  Supplemental O2 and Duonebs PRN.  - Sputum cx  - Blood culture remains negative   - Empiric IV Vanc and Zosyn, pharmacy consult for Vanc dosing.        Severe malnutrition    Dietary consult           HIV (human immunodeficiency virus infection)    - Recently diagnosed with HIV last month, not on HAART therapy.    - CD4 and viral load pending  - ID consult await further recommendations           VTE Risk Mitigation         Ordered     enoxaparin injection 40 mg  Daily      06/03/18 2336     Place sequential compression device  Until discontinued      06/03/18 2336              Nancy Mckeon NP  Department of Hospital Medicine   [facility]

## 2018-06-04 NOTE — PLAN OF CARE
Problem: Patient Care Overview  Goal: Plan of Care Review  Outcome: Ongoing (interventions implemented as appropriate)  Respirations even and unlabored, no distress noted on assessment,pain, no nausea or shortness of breath, refused bed alarm and scd's

## 2018-06-04 NOTE — ASSESSMENT & PLAN NOTE
- Recently diagnosed with HIV last month, not on HAART therapy.  Check CD4 and viral load.  - ID consult in AM.

## 2018-06-04 NOTE — PLAN OF CARE
CM met with pt. He lives independently with a friend. Denies needs. Plan to move to [place] soon. [employer] packet enclosed with discharge planning pamphlet, advance directive and [facility] given to pt. CM name and contact number written on pt's white board. Payor: Medicaid.     06/04/18 1521   Discharge Assessment   Assessment Type Discharge Planning Assessment   Confirmed/corrected address and phone number on facesheet? Yes   Assessment information obtained from? Patient   Expected Length of Stay (days) (Possibly discharge home tomorrow 6/5/2018)   Communicated expected length of stay with patient/caregiver yes   Prior to hospitilization cognitive status: Alert/Oriented   Prior to hospitalization functional status: Independent   Current cognitive status: Alert/Oriented   Current Functional Status: Independent   Facility Arrived From: (Home)   Lives With friend(s)   Able to Return to Prior Arrangements yes   Is patient able to care for self after discharge? Yes   Who are your caregiver(s) and their phone number(s)? (Gini Parham Mother [phone])   Patient's perception of discharge disposition home or selfcare   Readmission Within The Last 30 Days (Yes. 5/14/2018)   Patient currently being followed by outpatient case management? Yes   If yes, name of outpatient case management following: (two  via [facility]. 1  with [facility].)   Patient currently receives any other outside agency services? No   Equipment Currently Used at Home none   Do you have any problems affording any of your prescribed medications? No   Is the patient taking medications as prescribed? yes   Does the patient have transportation home? Yes   Transportation Available public transportation  (Pt stated he uses the bus system for transportation)   Dialysis Name and Scheduled days (n/a)   Does the patient receive services at the [facility] Clinic? No   Discharge Plan A Home   Discharge Plan B Home with  family   Patient/Family In Agreement With Plan yes

## 2018-06-04 NOTE — PLAN OF CARE
Problem: Patient Care Overview  Goal: Plan of Care Review  Outcome: Ongoing (interventions implemented as appropriate)  Remained free from injury. Ambulating independently. Continuing iv abx. Hemorrhoid pain relieved by prn cream. 12 hour chart check complete.

## 2018-06-04 NOTE — PROGRESS NOTES
" [facility]   Adult Nutrition  Progress Note    SUMMARY       Recommendations    Recommendation/Intervention:   1. Recommend ONS (Boost Plus) w/ all meals to meet calorie/protein needs, prevent further wt loss.   2. Severe malnutrition diagnosed.   3. Will continue to monitor.    Goals: PO intake >75% by next RD visit  Nutrition Goal Status: new  Communication of RD Recs:  (POC review, sticky note)    Reason for Assessment    Reason for Assessment:  (MST score >= 3)\  Dx:  1. Pneumonia      Past Medical History:   Diagnosis Date    HIV (human immunodeficiency virus infection)        General Information Comments: Pt is admitted w/ PNE, immunocompromised 2/2 recent HIV dx. Pt reports poor PO (eating <75% EEN) for approximately 1 month. Pt reports weighing 180# ~2 mos ago (-17.8% body wt in 2 mos). Appetite is improving. RD encouraged PO intake, high protein foods to prevent further wt loss.  Nutrition Discharge Planning: Regular diet    Nutrition Risk Screen    Nutrition Risk Screen: no indicators present    Nutrition/Diet History    Do you have any cultural, spiritual, Bahai conflicts, given your current situation?: no  Food Allergies: NKFA    Anthropometrics    Temp: 98.4 °F (36.9 °C)  Height Method: Stated  Height: 5' 11" (180.3 cm)  Height (inches): 71 in  Weight Method: Bed Scale  Weight: 67.2 kg (148 lb 2.4 oz)  Weight (lb): 148.15 lb  Ideal Body Weight (IBW), Male: 172 lb  % Ideal Body Weight, Male (lb): 86.13 lb  BMI (Calculated): 20.7  BMI Grade: 18.5-24.9 - normal       Lab/Procedures/Meds    Pertinent Labs Reviewed: reviewed  Pertinent Medications Reviewed: reviewed    Lab Results   Component Value Date    HGB 12.8 (L) 06/04/2018     Lab Results   Component Value Date    HCT 37.3 (L) 06/04/2018     BMP  Lab Results   Component Value Date     06/04/2018    K 4.0 06/04/2018     06/04/2018    CO2 25 06/04/2018    BUN 8 06/04/2018    CREATININE 1.0 06/04/2018    CALCIUM 9.1 " 06/04/2018    ANIONGAP 8 06/04/2018    ESTGFRAFRICA >60 06/04/2018    EGFRNONAA >60 06/04/2018     Lab Results   Component Value Date    CALCIUM 9.1 06/04/2018    PHOS 3.0 06/03/2018     Lab Results   Component Value Date    ALBUMIN 2.9 (L) 06/03/2018       Physical Findings/Assessment    Overall Physical Appearance:  (Normal wt per BMI)  Oral/Mouth Cavity: tooth/teeth missing, decay/carries  Skin:  (Armond=21)    Estimated/Assessed Needs    Weight Used For Calorie Calculations: 67.2 kg (148 lb 2.4 oz)  Energy Calorie Requirements (kcal): 0887-3139 kcal/day  Energy Need Method: Washburn-St Jeor (x1.2-1.3)  Protein Requirements: 54-67 gm/day  Weight Used For Protein Calculations: 67.2 kg (148 lb 2.4 oz) (x0.8-1 gm/kg)     Fluid Need Method: RDA Method (or per MD)  RDA Method (mL): 1997         Nutrition Prescription Ordered    Current Diet Order: Regular     Evaluation of Received Nutrient/Fluid Intake       % Intake of Estimated Energy Needs: 25 - 50 %  % Meal Intake: 25 - 50 %    Nutrition Risk    Level of Risk/Frequency of Follow-up:  (FU x2 weekly)     Assessment and Plan    Severe malnutrition    Malnutrition in the context of Chronic Illness/Injury    Related to (etiology):  Increased metabolic rate 2/2 HIV    Signs and Symptoms (as evidenced by):  Energy Intake: <75% of estimated energy requirement for 1 month  Body Fat Depletion: mild depletion of triceps   Muscle Mass Depletion: mild depletion of clavicle region   Weight Loss: -17.8% x 2 months  Fluid Accumulation: mild    Interventions/Recommendations (treatment strategy):  -Recommend ONS w/ all meals to increase PO intake  -Encouraged PO intake, protein foods @ home    Nutrition Diagnosis Status:  New               Monitor and Evaluation    Food and Nutrient Intake: energy intake, food and beverage intake  Food and Nutrient Adminstration: diet order  Knowledge/Beliefs/Attitudes: food and nutrition knowledge/skill  Physical Activity and Function:  nutrition-related ADLs and IADLs  Anthropometric Measurements: weight, weight change  Biochemical Data, Medical Tests and Procedures: electrolyte and renal panel, gastrointestinal profile, inflammatory profile  Nutrition-Focused Physical Findings: overall appearance, skin, extremities, muscles and bones     Nutrition Follow-Up    RD Follow-up?: Yes

## 2018-06-04 NOTE — ASSESSMENT & PLAN NOTE
- CTA chest showed heterogeneous alveolar consolidations in the right lower lobe consistent with pneumonia, and small right and left pleural effusions with associated atelectasis.  - Oxygenating well on RA.  Supplemental O2 and Duonebs PRN.  - Sputum cx  - Blood culture remains negative   - Empiric IV Vanc and Zosyn, pharmacy consult for Vanc dosing.

## 2018-06-04 NOTE — SUBJECTIVE & OBJECTIVE
Interval History: No acute events overnight. WBCs remain normal, afebrile.     Review of Systems   Constitutional: Positive for chills and fever. Negative for activity change, diaphoresis, fatigue and unexpected weight change.   HENT: Negative for congestion, postnasal drip, rhinorrhea, sinus pressure, sore throat and trouble swallowing.    Eyes: Negative for photophobia and visual disturbance.   Respiratory: Negative for apnea, cough, chest tightness, shortness of breath and wheezing.    Cardiovascular: Negative for chest pain, palpitations and leg swelling.   Gastrointestinal: Negative for abdominal distention, abdominal pain, blood in stool, constipation, diarrhea, nausea and vomiting.   Endocrine: Negative for polydipsia, polyphagia and polyuria.   Genitourinary: Negative for decreased urine volume, difficulty urinating, dysuria, frequency, hematuria and urgency.   Musculoskeletal: Negative for arthralgias, back pain, gait problem, joint swelling and myalgias.   Skin: Negative for pallor, rash and wound.   Neurological: Negative for dizziness, seizures, syncope, facial asymmetry, speech difficulty, weakness, light-headedness, numbness and headaches.   Psychiatric/Behavioral: Negative for confusion. The patient is not nervous/anxious.    All other systems reviewed and are negative.    Objective:     Vital Signs (Most Recent):  Temp: 98.3 °F (36.8 °C) (06/04/18 1209)  Pulse: 64 (06/04/18 1209)  Resp: 18 (06/04/18 1209)  BP: 108/63 (06/04/18 1209)  SpO2: 96 % (06/04/18 1209) Vital Signs (24h Range):  Temp:  [98.1 °F (36.7 °C)-99.2 °F (37.3 °C)] 98.3 °F (36.8 °C)  Pulse:  [61-72] 64  Resp:  [16-20] 18  SpO2:  [96 %-97 %] 96 %  BP: (103-126)/(56-69) 108/63     Weight: 67.2 kg (148 lb 2.4 oz)  Body mass index is 20.66 kg/m².    Intake/Output Summary (Last 24 hours) at 06/04/18 1406  Last data filed at 06/04/18 1100   Gross per 24 hour   Intake           756.67 ml   Output                0 ml   Net           756.67 ml       Physical Exam   Constitutional: He is oriented to person, place, and time. He appears well-developed and well-nourished. No distress.   HENT:   Head: Normocephalic and atraumatic.   Eyes: Conjunctivae are normal.   Neck: Normal range of motion. Neck supple. No JVD present.   Cardiovascular: Normal rate, regular rhythm, S1 normal, S2 normal and intact distal pulses.   No extrasystoles are present. Exam reveals no gallop.    No murmur heard.  Pulses:       Radial pulses are 2+ on the right side, and 2+ on the left side.        Dorsalis pedis pulses are 2+ on the right side, and 2+ on the left side.        Posterior tibial pulses are 2+ on the right side, and 2+ on the left side.   Pulmonary/Chest: Effort normal and breath sounds normal. No accessory muscle usage. No tachypnea. No respiratory distress. He has no wheezes. He has no rhonchi. He has no rales.   Abdominal: Soft. Bowel sounds are normal. He exhibits no distension. There is no tenderness. There is no rebound, no guarding and no CVA tenderness.   Musculoskeletal: Normal range of motion. He exhibits no edema, tenderness or deformity.   Neurological: He is alert and oriented to person, place, and time. No cranial nerve deficit or sensory deficit.   Skin: Skin is warm, dry and intact. No rash noted. He is not diaphoretic. No cyanosis or erythema.   Psychiatric: He has a normal mood and affect. His speech is normal and behavior is normal. Cognition and memory are normal.   Nursing note and vitals reviewed.      Significant Labs:   Blood Culture:   Recent Labs  Lab 06/03/18 2152   LABBLOO No Growth to date     BMP:   Recent Labs  Lab 06/03/18 2144 06/04/18  0431   GLU 81 75   * 136   K 4.5 4.0    103   CO2 25 25   BUN 8 8   CREATININE 1.0 1.0   CALCIUM 9.4 9.1   MG 1.8  1.9  --      CBC:   Recent Labs  Lab 06/03/18 2144 06/04/18  0431   WBC 5.23 4.87   HGB 14.2 12.8*   HCT 39.5* 37.3*    232     CMP:   Recent Labs  Lab 06/03/18 2144  06/04/18  0431   * 136   K 4.5 4.0    103   CO2 25 25   GLU 81 75   BUN 8 8   CREATININE 1.0 1.0   CALCIUM 9.4 9.1   PROT 8.0  --    ALBUMIN 2.9*  --    BILITOT 0.7  --    ALKPHOS 58  --    AST 70*  --    ALT 42  --    ANIONGAP 9 8   EGFRNONAA >60 >60     All pertinent labs within the past 24 hours have been reviewed.    Significant Imaging:   Imaging Results    None

## 2018-06-04 NOTE — ASSESSMENT & PLAN NOTE
Malnutrition in the context of Chronic Illness/Injury    Related to (etiology):  Increased metabolic rate 2/2 HIV    Signs and Symptoms (as evidenced by):  Energy Intake: <75% of estimated energy requirement for 1 month  Body Fat Depletion: mild depletion of triceps   Muscle Mass Depletion: mild depletion of clavicle region   Weight Loss: -17.8% x 2 months  Fluid Accumulation: mild    Interventions/Recommendations (treatment strategy):  -Recommend ONS w/ all meals to increase PO intake  -Encouraged PO intake, protein foods @ home    Nutrition Diagnosis Status:  New

## 2018-06-04 NOTE — PLAN OF CARE
06/04/18 1534   Readmission Questionnaire   At the time of your discharge, did someone talk to you about what your health problems were? Yes   At the time of discharge, did someone talk to you about what to watch out for regarding worsening of your health problem? Yes   At the time of discharge, did someone talk to you about what to do if you experienced worsening of your health problem? Yes   At the time of discharge, did someone talk to you about which medication to take when you left the hospital and which ones to stop taking? Yes   At the time of discharge, did someone talk to you about when and where to follow up with a doctor after you left the hospital? Yes   What do you believe caused you to be sick enough to be re-admitted? (Pneumonia)   How often do you need to have someone help you when you read instructions, pamphlets, or other written material from your doctor or pharmacy? Never   Do you have problems taking your medications as prescribed? No   Do you have any problems affording any of  your prescribed medications? No   Do you have problems obtaining/receiving your medications? No   Does the patient have transportation to healthcare appointments? Yes   Lives With friend(s)   Living Arrangements house   Does the patient have family/friends to help with healtcare needs after discharge? yes   Does your caregiver provide all the help you need? Yes   Are you currently feeling confused? No   Are you currently having problems thinking? No   Are you currently having memory problems? No   Have you felt down, depressed, or hopeless? 0  (Pt stated he doesnt let himself get down.)   Have you felt little interest or pleasure in doing things? 0   In the last 7 days, my sleep quality was: poor

## 2018-06-04 NOTE — ASSESSMENT & PLAN NOTE
- Recently diagnosed with HIV last month, not on HAART therapy.    - CD4 and viral load pending  - ID consult await further recommendations

## 2018-06-04 NOTE — H&P
[facility] Hospital Medicine  History & Physical    Patient Name: Chon Parham  MRN: [id number]  Admission Date: 6/3/2018  Attending Physician: Otoniel Mccord MD  Primary Care Provider: Primary Doctor No         Patient information was obtained from patient, past medical records and transferring facility records.     Subjective:     Principal Problem:Right lower lobe pneumonia    Chief Complaint: Persistent fever       HPI: Mr. Parham is a 26yo male with a PMHx of recently diagnosed HIV last month (not on HAART therapy), who originally presented to [facility] in [place], [place] on 5/29 with c/o SOB, cough, pleuritic right-sided CP, and fever.  CXR & CT showed RLL PNA with parapneumonic effusion.  He was admitted and started on empiric IV Rocephin, azithromycin, and Levaquin.  Per [facility] records, patient remained febrile over past 5 days with TMax 102.3 on 6/1.  Blood and urine cultures showed NGTD.  On 6/2, abx broadened to IV Vanc and cefepime.  Given patient's persistent fevers and recently diagnosed HIV, patient was transferred to [facility] for ID services.  Transfer records reviewed, and patient last febrile ~40 hours ago.  Currently, patient appears comfortable in NAD.  He denies any complaints.  Initial temp 98.1.  Repeat CTA chest tonight showed heterogeneous alveolar consolidations in the right lower lobe consistent with pneumonia, and small right and left pleural effusions with associated atelectasis.      Past Medical History:   Diagnosis Date    HIV (human immunodeficiency virus infection)        History reviewed. No pertinent surgical history.    Review of patient's allergies indicates:  No Known Allergies    No current facility-administered medications on file prior to encounter.      Current Outpatient Prescriptions on File Prior to Encounter   Medication Sig    docusate sodium (COLACE) 100 MG capsule Take 1 capsule (100 mg total) by mouth 2 (two) times daily.     hydrocortisone-pramoxine (PROCTOFOAM-HS) rectal foam Place 1 applicator rectally 2 (two) times daily. For 4 weeks    naproxen (NAPROSYN) 500 MG tablet Take 1 tablet (500 mg total) by mouth 2 (two) times daily.     Family History     Reviewed and not pertinent.         Social History Main Topics    Smoking status: Former Smoker     Types: Cigarettes    Smokeless tobacco: Never Used    Alcohol use No    Drug use: Yes     Types: Marijuana    Sexual activity: Not on file     Review of Systems   Constitutional: Positive for chills and fever. Negative for activity change, diaphoresis, fatigue and unexpected weight change.   HENT: Negative for congestion, postnasal drip, rhinorrhea, sinus pressure, sore throat and trouble swallowing.    Eyes: Negative for photophobia and visual disturbance.   Respiratory: Negative for apnea, cough, chest tightness, shortness of breath and wheezing.    Cardiovascular: Negative for chest pain, palpitations and leg swelling.   Gastrointestinal: Negative for abdominal distention, abdominal pain, blood in stool, constipation, diarrhea, nausea and vomiting.   Endocrine: Negative for polydipsia, polyphagia and polyuria.   Genitourinary: Negative for decreased urine volume, difficulty urinating, dysuria, frequency, hematuria and urgency.   Musculoskeletal: Negative for arthralgias, back pain, gait problem, joint swelling and myalgias.   Skin: Negative for pallor, rash and wound.   Neurological: Negative for dizziness, seizures, syncope, facial asymmetry, speech difficulty, weakness, light-headedness, numbness and headaches.   Psychiatric/Behavioral: Negative for confusion. The patient is not nervous/anxious.    All other systems reviewed and are negative.    Objective:     Vital Signs (Most Recent):  Temp: 98.1 °F (36.7 °C) (06/03/18 2139)  Pulse: 66 (06/03/18 2139)  Resp: 20 (06/03/18 2139)  BP: 126/65 (06/03/18 2139)  SpO2: 97 % (06/03/18 2139) Vital Signs (24h Range):  Temp:  [98.1 °F  (36.7 °C)-98.8 °F (37.1 °C)] 98.1 °F (36.7 °C)  Pulse:  [66-83] 66  Resp:  [17-20] 20  SpO2:  [97 %-98 %] 97 %  BP: (126-131)/(54-65) 126/65     Weight: 67.2 kg (148 lb 2.4 oz)  Body mass index is 20.66 kg/m².    Physical Exam   Constitutional: He is oriented to person, place, and time. He appears well-developed and well-nourished. No distress.   HENT:   Head: Normocephalic and atraumatic.   Eyes: Conjunctivae are normal.   PERRL; EOM intact.   Neck: Normal range of motion. Neck supple. No JVD present.   Cardiovascular: Normal rate, regular rhythm, S1 normal, S2 normal and intact distal pulses.   No extrasystoles are present. Exam reveals no gallop.    No murmur heard.  Pulses:       Radial pulses are 2+ on the right side, and 2+ on the left side.        Dorsalis pedis pulses are 2+ on the right side, and 2+ on the left side.        Posterior tibial pulses are 2+ on the right side, and 2+ on the left side.   Pulmonary/Chest: Effort normal. No accessory muscle usage. No tachypnea. No respiratory distress. Lung sounds diminished to RLL. He has fine rales to RLL. He has no wheezes. He has no rhonchi.    Abdominal: Soft. Bowel sounds are normal. He exhibits no distension. There is no tenderness. There is no rebound, no guarding and no CVA tenderness.   Musculoskeletal: Normal range of motion. He exhibits no edema, tenderness or deformity.   Neurological: He is alert and oriented to person, place, and time. No cranial nerve deficit or sensory deficit.   Skin: Skin is warm, dry and intact. No rash noted. He is not diaphoretic. No cyanosis or erythema.   Psychiatric: He has a normal mood and affect. His speech is normal and behavior is normal. Cognition and memory are normal.   Nursing note and vitals reviewed.          Significant Labs:   Results for orders placed or performed during the hospital encounter of 06/03/18   CBC auto differential   Result Value Ref Range    WBC 5.23 3.90 - 12.70 K/uL    RBC 4.08 (L) 4.60 -  6.20 M/uL    Hemoglobin 14.2 14.0 - 18.0 g/dL    Hematocrit 39.5 (L) 40.0 - 54.0 %    MCV 97 82 - 98 fL    MCH 34.8 (H) 27.0 - 31.0 pg    MCHC 35.9 32.0 - 36.0 g/dL    RDW 12.4 11.5 - 14.5 %    Platelets 203 150 - 350 K/uL    MPV 10.4 9.2 - 12.9 fL    Gran # (ANC) 2.0 1.8 - 7.7 K/uL    Lymph # 2.3 1.0 - 4.8 K/uL    Mono # 0.7 0.3 - 1.0 K/uL    Eos # 0.2 0.0 - 0.5 K/uL    Baso # 0.09 0.00 - 0.20 K/uL    Gran% 38.8 38.0 - 73.0 %    Lymph% 44.2 18.0 - 48.0 %    Mono% 12.4 4.0 - 15.0 %    Eosinophil% 3.1 0.0 - 8.0 %    Basophil% 1.7 0.0 - 1.9 %    Differential Method Automated    Comprehensive metabolic panel   Result Value Ref Range    Sodium 135 (L) 136 - 145 mmol/L    Potassium 4.5 3.5 - 5.1 mmol/L    Chloride 101 95 - 110 mmol/L    CO2 25 23 - 29 mmol/L    Glucose 81 70 - 110 mg/dL    BUN, Bld 8 6 - 20 mg/dL    Creatinine 1.0 0.5 - 1.4 mg/dL    Calcium 9.4 8.7 - 10.5 mg/dL    Total Protein 8.0 6.0 - 8.4 g/dL    Albumin 2.9 (L) 3.5 - 5.2 g/dL    Total Bilirubin 0.7 0.1 - 1.0 mg/dL    Alkaline Phosphatase 58 55 - 135 U/L    AST 70 (H) 10 - 40 U/L    ALT 42 10 - 44 U/L    Anion Gap 9 8 - 16 mmol/L    eGFR if African American >60 >60 mL/min/1.73 m^2    eGFR if non African American >60 >60 mL/min/1.73 m^2   Lactic acid, plasma   Result Value Ref Range    Lactate (Lactic Acid) 0.7 0.5 - 2.2 mmol/L   Phosphorus   Result Value Ref Range    Phosphorus 3.0 2.7 - 4.5 mg/dL   Procalcitonin   Result Value Ref Range    Procalcitonin 0.24 <0.25 ng/mL   Magnesium   Result Value Ref Range    Magnesium 1.8 1.6 - 2.6 mg/dL   Magnesium   Result Value Ref Range    Magnesium 1.9 1.6 - 2.6 mg/dL   Vancomycin, random   Result Value Ref Range    Vancomycin, Random 17.2 Not established ug/mL      All pertinent labs within the past 24 hours have been reviewed.  All labs from transferring facility have been reviewed.     Significant Imaging:   Imaging Results    None        I have reviewed all pertinent imaging results/findings within the past  24 hours.  All imaging reports from transferring facility have been reviewed.         Assessment/Plan:     * Right lower lobe pneumonia in immunocompromised patient    - CTA chest showed heterogeneous alveolar consolidations in the right lower lobe consistent with pneumonia, and small right and left pleural effusions with associated atelectasis.  - Oxygenating well on RA.  Supplemental O2 and Duonebs PRN.  - Will obtain sputum and blood cultures.  - Empiric IV Vanc and Zosyn, pharmacy consult for Vanc dosing.        HIV (human immunodeficiency virus infection)    - Recently diagnosed with HIV last month, not on HAART therapy.  Check CD4 and viral load.  - ID consult in AM.          VTE Risk Mitigation         Ordered     enoxaparin injection 40 mg  Daily      06/03/18 2336     Place sequential compression device  Until discontinued      06/03/18 2336             RADHA Mariscal  Department of Hospital Medicine   [facility]

## 2018-06-04 NOTE — ASSESSMENT & PLAN NOTE
- CTA chest showed heterogeneous alveolar consolidations in the right lower lobe consistent with pneumonia, and small right and left pleural effusions with associated atelectasis.  - Oxygenating well on RA.  Supplemental O2 and Duonebs PRN.  - Will obtain sputum and blood cultures.  - Empiric IV Vanc and Zosyn, pharmacy consult for Vanc dosing.

## 2018-06-04 NOTE — HPI
Mr. Parham is a 28yo male with a PMHx of recently diagnosed HIV last month (not on HAART therapy), who originally presented to [facility] in [place], [place] on 5/29 with c/o SOB, cough, pleuritic right-sided CP, and fever.  CXR & CT showed RLL PNA with parapneumonic effusion.  He was admitted and started on empiric IV Rocephin, azithromycin, and Levaquin.  Per [facility] records, patient remained febrile over past 5 days with TMax 102.3 on 6/1.  Blood and urine cultures showed NGTD.  On 6/2, abx broadened to IV Vanc and cefepime.  Given patient's persistent fevers and recently diagnosed HIV, patient was transferred to [facility] for ID services.  Transfer records reviewed, and patient last febrile ~40 hours ago.  Currently, patient appears comfortable in NAD.  He denies any complaints.  Initial temp 98.1.  Repeat CTA chest tonight showed heterogeneous alveolar consolidations in the right lower lobe consistent with pneumonia, and small right and left pleural effusions with associated atelectasis.

## 2018-06-04 NOTE — SUBJECTIVE & OBJECTIVE
Past Medical History:   Diagnosis Date    HIV (human immunodeficiency virus infection)        History reviewed. No pertinent surgical history.    Review of patient's allergies indicates:  No Known Allergies    No current facility-administered medications on file prior to encounter.      Current Outpatient Prescriptions on File Prior to Encounter   Medication Sig    docusate sodium (COLACE) 100 MG capsule Take 1 capsule (100 mg total) by mouth 2 (two) times daily.    hydrocortisone-pramoxine (PROCTOFOAM-HS) rectal foam Place 1 applicator rectally 2 (two) times daily. For 4 weeks    naproxen (NAPROSYN) 500 MG tablet Take 1 tablet (500 mg total) by mouth 2 (two) times daily.     Family History     Reviewed and not pertinent.         Social History Main Topics    Smoking status: Former Smoker     Types: Cigarettes    Smokeless tobacco: Never Used    Alcohol use No    Drug use: Yes     Types: Marijuana    Sexual activity: Not on file     Review of Systems   Constitutional: Positive for chills and fever. Negative for activity change, diaphoresis, fatigue and unexpected weight change.   HENT: Negative for congestion, postnasal drip, rhinorrhea, sinus pressure, sore throat and trouble swallowing.    Eyes: Negative for photophobia and visual disturbance.   Respiratory: Negative for apnea, cough, chest tightness, shortness of breath and wheezing.    Cardiovascular: Negative for chest pain, palpitations and leg swelling.   Gastrointestinal: Negative for abdominal distention, abdominal pain, blood in stool, constipation, diarrhea, nausea and vomiting.   Endocrine: Negative for polydipsia, polyphagia and polyuria.   Genitourinary: Negative for decreased urine volume, difficulty urinating, dysuria, frequency, hematuria and urgency.   Musculoskeletal: Negative for arthralgias, back pain, gait problem, joint swelling and myalgias.   Skin: Negative for pallor, rash and wound.   Neurological: Negative for dizziness,  seizures, syncope, facial asymmetry, speech difficulty, weakness, light-headedness, numbness and headaches.   Psychiatric/Behavioral: Negative for confusion. The patient is not nervous/anxious.    All other systems reviewed and are negative.    Objective:     Vital Signs (Most Recent):  Temp: 98.1 °F (36.7 °C) (06/03/18 2139)  Pulse: 66 (06/03/18 2139)  Resp: 20 (06/03/18 2139)  BP: 126/65 (06/03/18 2139)  SpO2: 97 % (06/03/18 2139) Vital Signs (24h Range):  Temp:  [98.1 °F (36.7 °C)-98.8 °F (37.1 °C)] 98.1 °F (36.7 °C)  Pulse:  [66-83] 66  Resp:  [17-20] 20  SpO2:  [97 %-98 %] 97 %  BP: (126-131)/(54-65) 126/65     Weight: 67.2 kg (148 lb 2.4 oz)  Body mass index is 20.66 kg/m².    Physical Exam   Constitutional: He is oriented to person, place, and time. He appears well-developed and well-nourished. No distress.   HENT:   Head: Normocephalic and atraumatic.   Eyes: Conjunctivae are normal.   PERRL; EOM intact.   Neck: Normal range of motion. Neck supple. No JVD present.   Cardiovascular: Normal rate, regular rhythm, S1 normal, S2 normal and intact distal pulses.   No extrasystoles are present. Exam reveals no gallop.    No murmur heard.  Pulses:       Radial pulses are 2+ on the right side, and 2+ on the left side.        Dorsalis pedis pulses are 2+ on the right side, and 2+ on the left side.        Posterior tibial pulses are 2+ on the right side, and 2+ on the left side.   Pulmonary/Chest: Effort normal and breath sounds normal. No accessory muscle usage. No tachypnea. No respiratory distress. He has no wheezes. He has no rhonchi. He has no rales.   Abdominal: Soft. Bowel sounds are normal. He exhibits no distension. There is no tenderness. There is no rebound, no guarding and no CVA tenderness.   Musculoskeletal: Normal range of motion. He exhibits no edema, tenderness or deformity.   Neurological: He is alert and oriented to person, place, and time. No cranial nerve deficit or sensory deficit.   Skin: Skin is  warm, dry and intact. No rash noted. He is not diaphoretic. No cyanosis or erythema.   Psychiatric: He has a normal mood and affect. His speech is normal and behavior is normal. Cognition and memory are normal.   Nursing note and vitals reviewed.          Significant Labs:   Results for orders placed or performed during the hospital encounter of 06/03/18   CBC auto differential   Result Value Ref Range    WBC 5.23 3.90 - 12.70 K/uL    RBC 4.08 (L) 4.60 - 6.20 M/uL    Hemoglobin 14.2 14.0 - 18.0 g/dL    Hematocrit 39.5 (L) 40.0 - 54.0 %    MCV 97 82 - 98 fL    MCH 34.8 (H) 27.0 - 31.0 pg    MCHC 35.9 32.0 - 36.0 g/dL    RDW 12.4 11.5 - 14.5 %    Platelets 203 150 - 350 K/uL    MPV 10.4 9.2 - 12.9 fL    Gran # (ANC) 2.0 1.8 - 7.7 K/uL    Lymph # 2.3 1.0 - 4.8 K/uL    Mono # 0.7 0.3 - 1.0 K/uL    Eos # 0.2 0.0 - 0.5 K/uL    Baso # 0.09 0.00 - 0.20 K/uL    Gran% 38.8 38.0 - 73.0 %    Lymph% 44.2 18.0 - 48.0 %    Mono% 12.4 4.0 - 15.0 %    Eosinophil% 3.1 0.0 - 8.0 %    Basophil% 1.7 0.0 - 1.9 %    Differential Method Automated    Comprehensive metabolic panel   Result Value Ref Range    Sodium 135 (L) 136 - 145 mmol/L    Potassium 4.5 3.5 - 5.1 mmol/L    Chloride 101 95 - 110 mmol/L    CO2 25 23 - 29 mmol/L    Glucose 81 70 - 110 mg/dL    BUN, Bld 8 6 - 20 mg/dL    Creatinine 1.0 0.5 - 1.4 mg/dL    Calcium 9.4 8.7 - 10.5 mg/dL    Total Protein 8.0 6.0 - 8.4 g/dL    Albumin 2.9 (L) 3.5 - 5.2 g/dL    Total Bilirubin 0.7 0.1 - 1.0 mg/dL    Alkaline Phosphatase 58 55 - 135 U/L    AST 70 (H) 10 - 40 U/L    ALT 42 10 - 44 U/L    Anion Gap 9 8 - 16 mmol/L    eGFR if African American >60 >60 mL/min/1.73 m^2    eGFR if non African American >60 >60 mL/min/1.73 m^2   Lactic acid, plasma   Result Value Ref Range    Lactate (Lactic Acid) 0.7 0.5 - 2.2 mmol/L   Phosphorus   Result Value Ref Range    Phosphorus 3.0 2.7 - 4.5 mg/dL   Procalcitonin   Result Value Ref Range    Procalcitonin 0.24 <0.25 ng/mL   Magnesium   Result Value Ref  Range    Magnesium 1.8 1.6 - 2.6 mg/dL   Magnesium   Result Value Ref Range    Magnesium 1.9 1.6 - 2.6 mg/dL   Vancomycin, random   Result Value Ref Range    Vancomycin, Random 17.2 Not established ug/mL      All pertinent labs within the past 24 hours have been reviewed.  All labs from transferring facility have been reviewed.     Significant Imaging:   Imaging Results    None        I have reviewed all pertinent imaging results/findings within the past 24 hours.  All imaging reports from transferring facility have been reviewed.

## 2018-06-04 NOTE — PLAN OF CARE
Problem: Patient Care Overview  Goal: Plan of Care Review  Outcome: Ongoing (interventions implemented as appropriate)     Recommendations     Recommendation/Intervention:   1. Recommend ONS (Boost Plus) w/ all meals to meet calorie/protein needs, prevent further wt loss.   2. Severe malnutrition diagnosed.   3. Will continue to monitor.     Goals: PO intake >75% by next RD visit  Nutrition Goal Status: new  Communication of RD Recs:  (POC review, sticky note)

## 2018-06-05 VITALS
HEIGHT: 71 IN | TEMPERATURE: 99 F | BODY MASS INDEX: 20.74 KG/M2 | DIASTOLIC BLOOD PRESSURE: 57 MMHG | HEART RATE: 67 BPM | OXYGEN SATURATION: 98 % | RESPIRATION RATE: 19 BRPM | SYSTOLIC BLOOD PRESSURE: 117 MMHG | WEIGHT: 148.13 LBS

## 2018-06-05 PROBLEM — K64.9 HEMORRHOID: Status: ACTIVE | Noted: 2018-06-05

## 2018-06-05 LAB
ANION GAP SERPL CALC-SCNC: 7 MMOL/L
BASOPHILS # BLD AUTO: 0.11 K/UL
BASOPHILS NFR BLD: 2 %
BUN SERPL-MCNC: 8 MG/DL
CALCIUM SERPL-MCNC: 8.3 MG/DL
CHLORIDE SERPL-SCNC: 105 MMOL/L
CO2 SERPL-SCNC: 24 MMOL/L
CREAT SERPL-MCNC: 1.1 MG/DL
DIFFERENTIAL METHOD: ABNORMAL
EOSINOPHIL # BLD AUTO: 0.3 K/UL
EOSINOPHIL NFR BLD: 5.5 %
ERYTHROCYTE [DISTWIDTH] IN BLOOD BY AUTOMATED COUNT: 12.4 %
EST. GFR  (AFRICAN AMERICAN): >60 ML/MIN/1.73 M^2
EST. GFR  (NON AFRICAN AMERICAN): >60 ML/MIN/1.73 M^2
GLUCOSE SERPL-MCNC: 75 MG/DL
HCT VFR BLD AUTO: 38.5 %
HGB BLD-MCNC: 13.1 G/DL
LYMPHOCYTES # BLD AUTO: 2.8 K/UL
LYMPHOCYTES NFR BLD: 51.2 %
MCH RBC QN AUTO: 32.3 PG
MCHC RBC AUTO-ENTMCNC: 34 G/DL
MCV RBC AUTO: 95 FL
MONOCYTES # BLD AUTO: 0.9 K/UL
MONOCYTES NFR BLD: 15.5 %
NEUTROPHILS # BLD AUTO: 1.4 K/UL
NEUTROPHILS NFR BLD: 25.8 %
PLATELET # BLD AUTO: 252 K/UL
PMV BLD AUTO: 9.7 FL
POTASSIUM SERPL-SCNC: 3.7 MMOL/L
RBC # BLD AUTO: 4.05 M/UL
SODIUM SERPL-SCNC: 136 MMOL/L
WBC # BLD AUTO: 5.47 K/UL

## 2018-06-05 PROCEDURE — 85025 COMPLETE CBC W/AUTO DIFF WBC: CPT

## 2018-06-05 PROCEDURE — 25000003 PHARM REV CODE 250: Performed by: PHYSICIAN ASSISTANT

## 2018-06-05 PROCEDURE — 25000003 PHARM REV CODE 250: Performed by: INTERNAL MEDICINE

## 2018-06-05 PROCEDURE — 36415 COLL VENOUS BLD VENIPUNCTURE: CPT

## 2018-06-05 PROCEDURE — 80048 BASIC METABOLIC PNL TOTAL CA: CPT

## 2018-06-05 PROCEDURE — 25000003 PHARM REV CODE 250: Performed by: NURSE PRACTITIONER

## 2018-06-05 PROCEDURE — 63600175 PHARM REV CODE 636 W HCPCS: Performed by: PHYSICIAN ASSISTANT

## 2018-06-05 RX ORDER — AMOXICILLIN AND CLAVULANATE POTASSIUM 875; 125 MG/1; MG/1
1 TABLET, FILM COATED ORAL EVERY 12 HOURS
Qty: 14 TABLET | Refills: 0 | Status: SHIPPED | OUTPATIENT
Start: 2018-06-05 | End: 2018-06-12

## 2018-06-05 RX ORDER — SULFAMETHOXAZOLE AND TRIMETHOPRIM 800; 160 MG/1; MG/1
1 TABLET ORAL DAILY
Status: DISCONTINUED | OUTPATIENT
Start: 2018-06-05 | End: 2018-06-05 | Stop reason: HOSPADM

## 2018-06-05 RX ORDER — AMOXICILLIN AND CLAVULANATE POTASSIUM 875; 125 MG/1; MG/1
1 TABLET, FILM COATED ORAL EVERY 12 HOURS
Status: DISCONTINUED | OUTPATIENT
Start: 2018-06-05 | End: 2018-06-05 | Stop reason: HOSPADM

## 2018-06-05 RX ORDER — SULFAMETHOXAZOLE AND TRIMETHOPRIM 800; 160 MG/1; MG/1
1 TABLET ORAL DAILY
Qty: 30 TABLET | Refills: 1 | Status: SHIPPED | OUTPATIENT
Start: 2018-06-06 | End: 2018-07-06

## 2018-06-05 RX ADMIN — DOCUSATE SODIUM 100 MG: 100 CAPSULE, LIQUID FILLED ORAL at 09:06

## 2018-06-05 RX ADMIN — VANCOMYCIN HYDROCHLORIDE 1500 MG: 1 INJECTION, POWDER, LYOPHILIZED, FOR SOLUTION INTRAVENOUS at 05:06

## 2018-06-05 RX ADMIN — GUAIFENESIN 600 MG: 600 TABLET, EXTENDED RELEASE ORAL at 09:06

## 2018-06-05 RX ADMIN — AMOXICILLIN AND CLAVULANATE POTASSIUM 1 TABLET: 875; 125 TABLET, FILM COATED ORAL at 09:06

## 2018-06-05 RX ADMIN — PANTOPRAZOLE SODIUM 40 MG: 40 TABLET, DELAYED RELEASE ORAL at 09:06

## 2018-06-05 RX ADMIN — SULFAMETHOXAZOLE AND TRIMETHOPRIM 1 TABLET: 800; 160 TABLET ORAL at 09:06

## 2018-06-05 RX ADMIN — SODIUM CHLORIDE: 0.9 INJECTION, SOLUTION INTRAVENOUS at 04:06

## 2018-06-05 NOTE — PLAN OF CARE
Problem: Patient Care Overview  Goal: Plan of Care Review  Outcome: Ongoing (interventions implemented as appropriate)  Respirations even and unlabored, no distress noted on assessment, pain to rectal area, no nausea or shortness of breath, refused bed alarm and scd's

## 2018-06-05 NOTE — SUBJECTIVE & OBJECTIVE
Past Medical History:   Diagnosis Date    HIV (human immunodeficiency virus infection)        History reviewed. No pertinent surgical history.    Review of patient's allergies indicates:  No Known Allergies    Medications:  Prescriptions Prior to Admission   Medication Sig    docusate sodium (COLACE) 100 MG capsule Take 1 capsule (100 mg total) by mouth 2 (two) times daily.    hydrocortisone-pramoxine (PROCTOFOAM-HS) rectal foam Place 1 applicator rectally 2 (two) times daily. For 4 weeks    naproxen (NAPROSYN) 500 MG tablet Take 1 tablet (500 mg total) by mouth 2 (two) times daily.    ondansetron (ZOFRAN ODT) 8 MG TbDL Zofran ODT 8 mg disintegrating tablet   Place 1 tablet every 8 hours by translingual route as needed for 7 days.    traZODone (DESYREL) 50 MG tablet trazodone 50 mg tablet   TAKE ONE TABLET BY MOUTH AT BEDTIME     Antibiotics     Start     Stop Route Frequency Ordered    06/04/18 0300  vancomycin (VANCOCIN) 1,500 mg in sodium chloride 0.9% 250 mL IVPB      -- IV Every 12 hours (non-standard times) 06/03/18 2125    06/03/18 2300  piperacillin-tazobactam 4.5 g in dextrose 5 % 100 mL IVPB (ready to mix system)      -- IV Every 8 hours (non-standard times) 06/03/18 2125        Antifungals     None        Antivirals     None           There is no immunization history for the selected administration types on file for this patient.    Family History     None        Social History     Social History    Marital status: Single     Spouse name: N/A    Number of children: N/A    Years of education: N/A     Social History Main Topics    Smoking status: Former Smoker     Types: Cigarettes    Smokeless tobacco: Never Used    Alcohol use No    Drug use: Yes     Types: Marijuana    Sexual activity: Not Asked     Other Topics Concern    None     Social History Narrative    None     Review of Systems   Constitutional: Positive for chills and fever. Negative for activity change, diaphoresis, fatigue and  unexpected weight change.   HENT: Negative for congestion, postnasal drip, rhinorrhea, sinus pressure, sore throat and trouble swallowing.    Eyes: Negative for photophobia and visual disturbance.   Respiratory: Negative for apnea, cough, chest tightness, shortness of breath and wheezing.    Cardiovascular: Negative for chest pain, palpitations and leg swelling.   Gastrointestinal: Negative for abdominal distention, abdominal pain, blood in stool, constipation, diarrhea, nausea and vomiting.   Endocrine: Negative for polydipsia, polyphagia and polyuria.   Genitourinary: Negative for decreased urine volume, difficulty urinating, dysuria, frequency, hematuria and urgency.   Musculoskeletal: Negative for arthralgias, back pain, gait problem, joint swelling and myalgias.   Skin: Negative for pallor, rash and wound.   Neurological: Negative for dizziness, seizures, syncope, facial asymmetry, speech difficulty, weakness, light-headedness, numbness and headaches.   Psychiatric/Behavioral: Negative for confusion. The patient is not nervous/anxious.    All other systems reviewed and are negative.    Objective:     Vital Signs (Most Recent):  Temp: 99.2 °F (37.3 °C) (06/04/18 2005)  Pulse: 62 (06/04/18 2005)  Resp: 20 (06/04/18 2005)  BP: (!) 112/52 (06/04/18 2005)  SpO2: 97 % (06/04/18 2005) Vital Signs (24h Range):  Temp:  [98.1 °F (36.7 °C)-99.6 °F (37.6 °C)] 99.2 °F (37.3 °C)  Pulse:  [61-72] 62  Resp:  [16-20] 20  SpO2:  [96 %-98 %] 97 %  BP: (103-126)/(52-69) 112/52     Weight: 67.2 kg (148 lb 2.4 oz)  Body mass index is 20.66 kg/m².    Estimated Creatinine Clearance: 104.5 mL/min (based on SCr of 1 mg/dL).    Physical Exam   Constitutional: He is oriented to person, place, and time. He appears well-developed and well-nourished. No distress.   HENT:   Head: Normocephalic and atraumatic.   Eyes: Conjunctivae are normal.   Neck: Normal range of motion. Neck supple. No JVD present.   Cardiovascular: Normal rate, regular  rhythm, S1 normal, S2 normal and intact distal pulses.   No extrasystoles are present. Exam reveals no gallop.    No murmur heard.  Pulses:       Radial pulses are 2+ on the right side, and 2+ on the left side.        Dorsalis pedis pulses are 2+ on the right side, and 2+ on the left side.        Posterior tibial pulses are 2+ on the right side, and 2+ on the left side.   Pulmonary/Chest: Effort normal and breath sounds normal. No accessory muscle usage. No tachypnea. No respiratory distress. He has no wheezes. He has no rhonchi. He has no rales.   Abdominal: Soft. Bowel sounds are normal. He exhibits no distension. There is no tenderness. There is no rebound, no guarding and no CVA tenderness.   Musculoskeletal: Normal range of motion. He exhibits no edema, tenderness or deformity.   Neurological: He is alert and oriented to person, place, and time. No cranial nerve deficit or sensory deficit.   Skin: Skin is warm, dry and intact. No rash noted. He is not diaphoretic. No cyanosis or erythema.   Psychiatric: He has a normal mood and affect. His speech is normal and behavior is normal. Cognition and memory are normal.   Nursing note and vitals reviewed.      Significant Labs:   Blood Culture:   Recent Labs  Lab 05/14/18  0619 05/14/18  0630 06/03/18  2152   LABBLOO No growth after 5 days. No growth after 5 days. No Growth to date     BMP:   Recent Labs  Lab 06/03/18  2144 06/04/18  0431   GLU 81 75   * 136   K 4.5 4.0    103   CO2 25 25   BUN 8 8   CREATININE 1.0 1.0   CALCIUM 9.4 9.1   MG 1.8  1.9  --      CBC:   Recent Labs  Lab 06/03/18  2144 06/04/18  0431   WBC 5.23 4.87   HGB 14.2 12.8*   HCT 39.5* 37.3*    232     All pertinent labs within the past 24 hours have been reviewed.    Significant Imaging: I have reviewed all pertinent imaging results/findings within the past 24 hours.

## 2018-06-05 NOTE — PROGRESS NOTES
Vancomycin Progress Note    Indication: RLL pneumonia in newly-diagnosed HIV patient (not on HAART)  WBC = 4.87  Tmax = 99.6 (but apparently was febrile x 5 days at other hospital)  SCr = 1.0    Level @ 1532 = 11.0 mcg/ml however this was only after 1 dose given here @ 0330  Pt transferred here from outside hospital  Random level was collected @ 2144 last night & was 17.2 mcg/ml but then dose wasn't given until 0330 this morning  Therefore, will continue with 1500 mg every 12 hours & check trough before 4th dose  2nd dose was given 3 hrs late @ 1830 instead of 1530  Trough due tomorrow 06/05 @ 1730 before 4th dose  Goal trough: 15-20 mcg/ml    Thank you for allowing us to participate in this patient's care.   Katherine McArdle, Pharm.D. 6/4/2018 7:56 PM

## 2018-06-05 NOTE — HPI
28 year old man with HIV and pneumonia . CTA chest showed heterogeneous alveolar consolidations in the right lower lobe consistent with pneumonia, and small right and left pleural effusions with associated atelectasis.  At this time, he is afebrile.    He has not yet started HAART as he is waiting to go to [facility] in [place] to start HAART.  Latest lab data showed   HIV genotype-RT GENE MUTATIONS: R211A   NC GENE MUTATIONS: E35D,L63P,I64L,A71T,V77I   CD4 count is 299,cd4% is 13.7   HIv viral load is pending

## 2018-06-05 NOTE — DISCHARGE SUMMARY
[facility] Hospital Medicine  Discharge Summary      Patient Name: Chon Parham  MRN: [id number]  Admission Date: 6/3/2018  Hospital Length of Stay: 2 days  Discharge Date and Time:  06/05/2018 10:43 AM  Attending Physician: Cesar Mijares MD   Discharging Provider: Nancy Mckeon NP  Primary Care Provider: Primary Doctor No      HPI:   Mr. Parham is a 26yo male with a PMHx of recently diagnosed HIV last month (not on HAART therapy), who originally presented to [facility] in [place], [place] on 5/29 with c/o SOB, cough, pleuritic right-sided CP, and fever.  CXR & CT showed RLL PNA with parapneumonic effusion.  He was admitted and started on empiric IV Rocephin, azithromycin, and Levaquin.  Per [facility] records, patient remained febrile over past 5 days with TMax 102.3 on 6/1.  Blood and urine cultures showed NGTD.  On 6/2, abx broadened to IV Vanc and cefepime.  Given patient's persistent fevers and recently diagnosed HIV, patient was transferred to [facility] for ID services.  Transfer records reviewed, and patient last febrile ~40 hours ago.  Currently, patient appears comfortable in NAD.  He denies any complaints.  Initial temp 98.1.  Repeat CTA chest tonight showed heterogeneous alveolar consolidations in the right lower lobe consistent with pneumonia, and small right and left pleural effusions with associated atelectasis.    * No surgery found *      Hospital Course:   Patient admitted for right lower lobe pneumonia. Patient being treated with Duoneb tx, IV zosyn and vancomycin, and supplemental oxygen. ID consult, recommends po Augmentin for 7 days. Patient was provided with copies of latest HIV labs done here and HIv genotype so he can initiate HAART with a local HIv provider in [place]. Cd4 percent is 13.7.  Patient started on Bactrim DS for PCP prophylaxis. Current medications resumed with Augmentin and Bactrim prescribed. Pt instructed to follow up with Dr. CARLOS Hewitt  (Infectious Disease) as scehuled for further evaluation. Pt seen and examined on the date of discharge and found suitable for discharge to home.        Consults:   Consults         Status Ordering Provider     Inpatient consult to Infectious Diseases  Once     Provider:  Cheko Lane MD    Acknowledged ELISHA ACOSTA     Pharmacy to dose Vancomycin consult  Once     Provider:  (Not yet assigned)    Completed ELISHA ACOSTA          No new Assessment & Plan notes have been filed under this hospital service since the last note was generated.  Service: Hospital Medicine    Final Active Diagnoses:    Diagnosis Date Noted POA    PRINCIPAL PROBLEM:  Right lower lobe pneumonia in immunocompromised patient [J18.1] 06/03/2018 Yes    Hemorrhoid [K64.9] 06/05/2018 Yes    Severe malnutrition [E43] 06/04/2018 Yes    HIV (human immunodeficiency virus infection) [B20] 05/14/2018 Yes     Chronic      Problems Resolved During this Admission:    Diagnosis Date Noted Date Resolved POA       Discharged Condition: stable    Disposition: Home or Self Care    Follow Up:  Follow-up Information     Palmer Bruno MD In 3 days.    Why:  hospital follow-up   Contact information:  [address] [place] [id number] [phone]                 Patient Instructions:     Activity as tolerated     Notify your health care provider if you experience any of the following:  temperature >100.4     Notify your health care provider if you experience any of the following:  persistent nausea and vomiting or diarrhea     Notify your health care provider if you experience any of the following:  difficulty breathing or increased cough         Significant Diagnostic Studies: Labs:   BMP:   Recent Labs  Lab 06/03/18  2144 06/04/18  0431 06/05/18  0428   GLU 81 75 75   * 136 136   K 4.5 4.0 3.7    103 105   CO2 25 25 24   BUN 8 8 8   CREATININE 1.0 1.0 1.1   CALCIUM 9.4 9.1 8.3*   MG 1.8  1.9  --   --    , CMP   Recent  Labs  Lab 06/03/18 2144 06/04/18 0431 06/05/18 0428   * 136 136   K 4.5 4.0 3.7    103 105   CO2 25 25 24   GLU 81 75 75   BUN 8 8 8   CREATININE 1.0 1.0 1.1   CALCIUM 9.4 9.1 8.3*   PROT 8.0  --   --    ALBUMIN 2.9*  --   --    BILITOT 0.7  --   --    ALKPHOS 58  --   --    AST 70*  --   --    ALT 42  --   --    ANIONGAP 9 8 7*   ESTGFRAFRICA >60 >60 >60   EGFRNONAA >60 >60 >60   , CBC   Recent Labs  Lab 06/03/18 2144 06/04/18 0431 06/05/18 0428   WBC 5.23 4.87 5.47   HGB 14.2 12.8* 13.1*   HCT 39.5* 37.3* 38.5*    232 252    and All labs within the past 24 hours have been reviewed    Pending Diagnostic Studies:     Procedure Component Value Units Date/Time    HIV RNA, quantitative, PCR [[id number]] Collected:  06/04/18 0025    Order Status:  Sent Lab Status:  In process Updated:  06/04/18 2004    Specimen:  Blood from Blood     Urinalysis [[id number]] Collected:  06/03/18 2342    Order Status:  Sent Lab Status:  In process Updated:  06/03/18 2342    Specimen:  Urine          Medications:  Reconciled Home Medications:      Medication List      START taking these medications    amoxicillin-clavulanate 875-125mg 875-125 mg per tablet  Commonly known as:  AUGMENTIN  Take 1 tablet by mouth every 12 (twelve) hours.     sulfamethoxazole-trimethoprim 800-160mg 800-160 mg Tab  Commonly known as:  BACTRIM DS  Take 1 tablet by mouth once daily.  Start taking on:  6/6/2018        CONTINUE taking these medications    docusate sodium 100 MG capsule  Commonly known as:  COLACE  Take 1 capsule (100 mg total) by mouth 2 (two) times daily.     hydrocortisone-pramoxine rectal foam  Commonly known as:  PROCTOFOAM-HS  Place 1 applicator rectally 2 (two) times daily. For 4 weeks     naproxen 500 MG tablet  Commonly known as:  NAPROSYN  Take 1 tablet (500 mg total) by mouth 2 (two) times daily.     traZODone 50 MG tablet  Commonly known as:  DESYREL  trazodone 50 mg tablet  TAKE ONE TABLET BY MOUTH AT BEDTIME      ZOFRAN ODT 8 MG Tbdl  Generic drug:  ondansetron  Zofran ODT 8 mg disintegrating tablet  Place 1 tablet every 8 hours by translingual route as needed for 7 days.            Indwelling Lines/Drains at time of discharge:   Lines/Drains/Airways          No matching active lines, drains, or airways          Time spent on the discharge of patient: 30 minutes  Patient was seen and examined on the date of discharge and determined to be suitable for discharge.         Nancy Mckeon NP  Department of Hospital Medicine  [facility]

## 2018-06-05 NOTE — CONSULTS
[facility]  Infectious Disease  Consult Note    Patient Name: Chon Parham  MRN: [id number]  Admission Date: 6/3/2018  Hospital Length of Stay: 2 days  Attending Physician: Cesar Mijares MD  Primary Care Provider: Primary Doctor No     Isolation Status: No active isolations    Patient information was obtained from patient, past medical records and ER records.      Consults  Assessment/Plan:     * Right lower lobe pneumonia in immunocompromised patient    Will plan  to use either PO Levaquin or PO Augumentin on discharge.        Hemorrhoid    Anusol as per primary team        HIV (human immunodeficiency virus infection)    He will be given copies of latest HIV labs done here and HIv genotype so he can initiate HAART with a local HIv provider in [place].  Cd4 percent is 13.7.  The indications for PCP prophylasix includes any one of the following -   ?CD4 count <200 cells/microL  ?CD4 count percentage <14 percent  ?CD4 cell count between 200 and 250 cells/microL when frequent monitoring (eg, every three months) of CD4 cell counts is not possible    Will start Bactrim one tablet once daily               Thank you for your consult. I will follow-up with patient. Please contact us if you have any additional questions.    Cheko Lane MD  Infectious Disease  [facility]    Subjective:     Principal Problem: Right lower lobe pneumonia    HPI: 28 year old man with HIV and pneumonia . CTA chest showed heterogeneous alveolar consolidations in the right lower lobe consistent with pneumonia, and small right and left pleural effusions with associated atelectasis.  At this time, he is afebrile.    He has not yet started HAART as he is waiting to go to [facility] in [place] to start HAART.  Latest lab data showed   HIV genotype-RT GENE MUTATIONS: R211A   CO GENE MUTATIONS: E35D,L63P,I64L,A71T,V77I   CD4 count is 299,cd4% is 13.7   HIv viral load is pending     Past Medical History:    Diagnosis Date    HIV (human immunodeficiency virus infection)        History reviewed. No pertinent surgical history.    Review of patient's allergies indicates:  No Known Allergies    Medications:  Prescriptions Prior to Admission   Medication Sig    docusate sodium (COLACE) 100 MG capsule Take 1 capsule (100 mg total) by mouth 2 (two) times daily.    hydrocortisone-pramoxine (PROCTOFOAM-HS) rectal foam Place 1 applicator rectally 2 (two) times daily. For 4 weeks    naproxen (NAPROSYN) 500 MG tablet Take 1 tablet (500 mg total) by mouth 2 (two) times daily.    ondansetron (ZOFRAN ODT) 8 MG TbDL Zofran ODT 8 mg disintegrating tablet   Place 1 tablet every 8 hours by translingual route as needed for 7 days.    traZODone (DESYREL) 50 MG tablet trazodone 50 mg tablet   TAKE ONE TABLET BY MOUTH AT BEDTIME     Antibiotics     Start     Stop Route Frequency Ordered    06/04/18 0300  vancomycin (VANCOCIN) 1,500 mg in sodium chloride 0.9% 250 mL IVPB      -- IV Every 12 hours (non-standard times) 06/03/18 2125    06/03/18 2300  piperacillin-tazobactam 4.5 g in dextrose 5 % 100 mL IVPB (ready to mix system)      -- IV Every 8 hours (non-standard times) 06/03/18 2125        Antifungals     None        Antivirals     None           There is no immunization history for the selected administration types on file for this patient.    Family History     None        Social History     Social History    Marital status: Single     Spouse name: N/A    Number of children: N/A    Years of education: N/A     Social History Main Topics    Smoking status: Former Smoker     Types: Cigarettes    Smokeless tobacco: Never Used    Alcohol use No    Drug use: Yes     Types: Marijuana    Sexual activity: Not Asked     Other Topics Concern    None     Social History Narrative    None     Review of Systems   Constitutional: Positive for chills and fever. Negative for activity change, diaphoresis, fatigue and unexpected weight  change.   HENT: Negative for congestion, postnasal drip, rhinorrhea, sinus pressure, sore throat and trouble swallowing.    Eyes: Negative for photophobia and visual disturbance.   Respiratory: Negative for apnea, cough, chest tightness, shortness of breath and wheezing.    Cardiovascular: Negative for chest pain, palpitations and leg swelling.   Gastrointestinal: Negative for abdominal distention, abdominal pain, blood in stool, constipation, diarrhea, nausea and vomiting.   Endocrine: Negative for polydipsia, polyphagia and polyuria.   Genitourinary: Negative for decreased urine volume, difficulty urinating, dysuria, frequency, hematuria and urgency.   Musculoskeletal: Negative for arthralgias, back pain, gait problem, joint swelling and myalgias.   Skin: Negative for pallor, rash and wound.   Neurological: Negative for dizziness, seizures, syncope, facial asymmetry, speech difficulty, weakness, light-headedness, numbness and headaches.   Psychiatric/Behavioral: Negative for confusion. The patient is not nervous/anxious.    All other systems reviewed and are negative.    Objective:     Vital Signs (Most Recent):  Temp: 99.2 °F (37.3 °C) (06/04/18 2005)  Pulse: 62 (06/04/18 2005)  Resp: 20 (06/04/18 2005)  BP: (!) 112/52 (06/04/18 2005)  SpO2: 97 % (06/04/18 2005) Vital Signs (24h Range):  Temp:  [98.1 °F (36.7 °C)-99.6 °F (37.6 °C)] 99.2 °F (37.3 °C)  Pulse:  [61-72] 62  Resp:  [16-20] 20  SpO2:  [96 %-98 %] 97 %  BP: (103-126)/(52-69) 112/52     Weight: 67.2 kg (148 lb 2.4 oz)  Body mass index is 20.66 kg/m².    Estimated Creatinine Clearance: 104.5 mL/min (based on SCr of 1 mg/dL).    Physical Exam   Constitutional: He is oriented to person, place, and time. He appears well-developed and well-nourished. No distress.   HENT:   Head: Normocephalic and atraumatic.   Eyes: Conjunctivae are normal.   Neck: Normal range of motion. Neck supple. No JVD present.   Cardiovascular: Normal rate, regular rhythm, S1 normal,  S2 normal and intact distal pulses.   No extrasystoles are present. Exam reveals no gallop.    No murmur heard.  Pulses:       Radial pulses are 2+ on the right side, and 2+ on the left side.        Dorsalis pedis pulses are 2+ on the right side, and 2+ on the left side.        Posterior tibial pulses are 2+ on the right side, and 2+ on the left side.   Pulmonary/Chest: Effort normal and breath sounds normal. No accessory muscle usage. No tachypnea. No respiratory distress. He has no wheezes. He has no rhonchi. He has no rales.   Abdominal: Soft. Bowel sounds are normal. He exhibits no distension. There is no tenderness. There is no rebound, no guarding and no CVA tenderness.   Musculoskeletal: Normal range of motion. He exhibits no edema, tenderness or deformity.   Neurological: He is alert and oriented to person, place, and time. No cranial nerve deficit or sensory deficit.   Skin: Skin is warm, dry and intact. No rash noted. He is not diaphoretic. No cyanosis or erythema.   Psychiatric: He has a normal mood and affect. His speech is normal and behavior is normal. Cognition and memory are normal.   Nursing note and vitals reviewed.      Significant Labs:   Blood Culture:   Recent Labs  Lab 05/14/18  0619 05/14/18  0630 06/03/18  2152   LABBLOO No growth after 5 days. No growth after 5 days. No Growth to date     BMP:   Recent Labs  Lab 06/03/18 2144 06/04/18  0431   GLU 81 75   * 136   K 4.5 4.0    103   CO2 25 25   BUN 8 8   CREATININE 1.0 1.0   CALCIUM 9.4 9.1   MG 1.8  1.9  --      CBC:   Recent Labs  Lab 06/03/18 2144 06/04/18  0431   WBC 5.23 4.87   HGB 14.2 12.8*   HCT 39.5* 37.3*    232     All pertinent labs within the past 24 hours have been reviewed.    Significant Imaging: I have reviewed all pertinent imaging results/findings within the past 24 hours.

## 2018-06-05 NOTE — PLAN OF CARE
Pt had flu appt 5/28/2018 and 6/4/2018 with Dr Bruno but ws unable to make the appts due to hospital admission. Pt scheduled for 6/11/2018 at 8 am. Follow-up charted in AVS.

## 2018-06-05 NOTE — ASSESSMENT & PLAN NOTE
He will be given copies of latest HIV labs done here and HIv genotype so he can initiate HAART with a local HIv provider in [place].  Cd4 percent is 13.7.  The indications for PCP prophylasix includes any one of the following -   ?CD4 count <200 cells/microL  ?CD4 count percentage <14 percent  ?CD4 cell count between 200 and 250 cells/microL when frequent monitoring (eg, every three months) of CD4 cell counts is not possible    Will start Bactrim one tablet once daily

## 2018-06-05 NOTE — DISCHARGE INSTRUCTIONS
Sulfamethoxazole; Trimethoprim, SMX-TMP tablets  What is this medicine?  SULFAMETHOXAZOLE; TRIMETHOPRIM or SMX-TMP (suhl fuh meth OK nydia zohl; trye METH oh prim) is a combination of a sulfonamide antibiotic and a second antibiotic, trimethoprim. It is used to treat or prevent certain kinds of bacterial infections. It will not work for colds, flu, or other viral infections.  How should I use this medicine?  Take this medicine by mouth with a full glass of water. Follow the directions on the prescription label. Take your medicine at regular intervals. Do not take it more often than directed. Do not skip doses or stop your medicine early.  Talk to your pediatrician regarding the use of this medicine in children. Special care may be needed. This medicine has been used in children as young as 2 months of age.  What side effects may I notice from receiving this medicine?  Side effects that you should report to your doctor or health care professional as soon as possible:  · allergic reactions like skin rash or hives, swelling of the face, lips, or tongue  · breathing problems  · fever or chills, sore throat  · irregular heartbeat, chest pain  · joint or muscle pain  · pain or difficulty passing urine  · red pinpoint spots on skin  · redness, blistering, peeling or loosening of the skin, including inside the mouth  · unusual bleeding or bruising  · unusually weak or tired  · yellowing of the eyes or skin  Side effects that usually do not require medical attention (report to your doctor or health care professional if they continue or are bothersome):  · diarrhea  · dizziness  · headache  · loss of appetite  · nausea, vomiting  · nervousness  What may interact with this medicine?  Do not take this medicine with any of the following medications:  · aminobenzoate potassium  · dofetilide  · metronidazole  This medicine may also interact with the following medications:  · ACE inhibitors like benazepril, enalapril, lisinopril,  and ramipril  · birth control pills  · cyclosporine  · digoxin  · diuretics  · indomethacin  · medicines for diabetes  · methenamine  · methotrexate  · phenytoin  · potassium supplements  · pyrimethamine  · sulfinpyrazone  · tricyclic antidepressants  · warfarin  What if I miss a dose?  If you miss a dose, take it as soon as you can. If it is almost time for your next dose, take only that dose. Do not take double or extra doses.  Where should I keep my medicine?  Keep out of the reach of children.  Store at room temperature between 20 to 25 degrees C (68 to 77 degrees F). Protect from light. Throw away any unused medicine after the expiration date.  What should I tell my health care provider before I take this medicine?  They need to know if you have any of these conditions:  · anemia  · asthma  · being treated with anticonvulsants  · if you frequently drink alcohol containing drinks  · kidney disease  · liver disease  · low level of folic acid or glucose-6-phosphate dehydrogenase  · poor nutrition or malabsorption  · porphyria  · severe allergies  · thyroid disorder  · an unusual or allergic reaction to sulfamethoxazole, trimethoprim, sulfa drugs, other medicines, foods, dyes, or preservatives  · pregnant or trying to get pregnant  · breast-feeding  What should I watch for while using this medicine?  Tell your doctor or health care professional if your symptoms do not improve. Drink several glasses of water a day to reduce the risk of kidney problems.  Do not treat diarrhea with over the counter products. Contact your doctor if you have diarrhea that lasts more than 2 days or if it is severe and watery.  This medicine can make you more sensitive to the sun. Keep out of the sun. If you cannot avoid being in the sun, wear protective clothing and use a sunscreen. Do not use sun lamps or tanning beds/booths.  NOTE:This sheet is a summary. It may not cover all possible information. If you have questions about this  medicine, talk to your doctor, pharmacist, or health care provider. Copyright© 2017 [employer]        Amoxicillin; Clavulanic Acid extended-release tablets  What is this medicine?  AMOXICILLIN; CLAVULANIC ACID (a mox i SILL in; PALOMO mck[facility] trejo AS id) is a penicillin antibiotic. It is used to treat certain kinds of bacterial infections. It will not work for colds, flu, or other viral infections.  How should I use this medicine?  Take this medicine by mouth with a full glass of water. Follow the directions on the prescription label. Take at the start of a meal. Do not crush or chew. You may cut this medicine in half at the score line for easier swallowing. Take your medicine at regular intervals. Do not take your medicine more often than directed. Take all of your medicine as directed even if you think you are better. Do not skip doses or stop your medicine early.  Contact your pediatrician or health care professional regarding the use of this medicine in children. This medicine has been used in children as young as 16 years of age.  What side effects may I notice from receiving this medicine?  Side effects that you should report to your doctor or health care professional as soon as possible:  · allergic reactions like skin rash, itching or hives, swelling of the face, lips, or tongue  · breathing problems  · dark urine  · fever or chills, sore throat  · redness, blistering, peeling or loosening of the skin, including inside the mouth  · seizures  · trouble passing urine or change in the amount of urine  · unusual bleeding, bruising  · unusually weak or tired  · white patches or sores in the mouth or throat  Side effects that usually do not require medical attention (report to your doctor or health care professional if they continue or are bothersome):  · diarrhea  · dizziness  · headache  · nausea, vomiting  · stomach upset  · vaginal or anal irritation  What may interact with this  medicine?  · allopurinol  · anticoagulants  · birth control pills  · methotrexate  · probenecid  What if I miss a dose?  If you miss a dose, take it as soon as you can. If it is almost time for your next dose, take only that dose. Do not take double or extra doses.  Where should I keep my medicine?  Keep out of the reach of children.  Store at room temperature below 25 degrees C (77 degrees F). Keep container tightly closed. Throw away any unused medicine after the expiration date.  What should I tell my health care provider before I take this medicine?  They need to know if you have any of these conditions:  · bowel disease, like colitis  · kidney disease  · liver disease  · mononucleosis  · an unusual or allergic reaction to amoxicillin, penicillin, cephalosporin, other antibiotics, clavulanic acid, other medicines, foods, dyes, or preservatives  · pregnant or trying to get pregnant  · breast-feeding  What should I watch for while using this medicine?  Tell your doctor or health care professional if your symptoms do not improve.  Do not treat diarrhea with over the counter products. Contact your doctor if you have diarrhea that lasts more than 2 days or if it is severe and watery.  If you have diabetes, you may get a false-positive result for sugar in your urine. Check with your doctor or health care professional.  Birth control pills may not work properly while you are taking this medicine. Talk to your doctor about using an extra method of birth control.  NOTE:This sheet is a summary. It may not cover all possible information. If you have questions about this medicine, talk to your doctor, pharmacist, or health care provider. Copyright© 2017 [employer]        Pneumonia (Adult)  Pneumonia is an infection deep within the lungs. It is in the small air sacs (alveoli). Pneumonia may be caused by a virus or bacteria. Pneumonia caused by bacteria is usually treated with an antibiotic. Severe cases may need to be  treated in the hospital. Milder cases can be treated at home. Symptoms usually start to get better during the first 2 days of treatment.    Home care  Follow these guidelines when caring for yourself at home:  · Rest at home for the first 2 to 3 days, or until you feel stronger. Dont let yourself get overly tired when you go back to your activities.  · Stay away from cigarette smoke - yours or other peoples.  · You may use acetaminophen or ibuprofen to control fever or pain, unless another medicine was prescribed. If you have chronic liver or kidney disease, talk with your healthcare provider before using these medicines. Also talk with your provider if youve had a stomach ulcer or gastrointestinal bleeding. Dont give aspirin to anyone younger than 18 years of age who is ill with a fever. It may cause severe liver damage.  · Your appetite may be poor, so a light diet is fine.  · Drink 6 to 8 glasses of fluids every day to make sure you are getting enough fluids. Beverages can include water, sport drinks, sodas without caffeine, juices, tea, or soup. Fluids will help loosen secretions in the lung. This will make it easier for you to cough up the phlegm (sputum). If you also have heart or kidney disease, check with your healthcare provider before you drink extra fluids.  · Take antibiotic medicine prescribed until it is all gone, even if you are feeling better after a few days.  Follow-up care  Follow up with your healthcare provider in the next 2 to 3 days, or as advised. This is to be sure the medicine is helping you get better.  If you are 65 or older, you should get a pneumococcal vaccine and a yearly flu (influenza) shot. You should also get these vaccines if you have chronic lung disease like asthma, emphysema, or COPD. Recently, a second type of pneumonia vaccine has become available for everyone over 65 years old. This is in addition to the previous vaccine. Ask your provider about this.  When to seek  medical advice  Call your healthcare provider right away if any of these occur:  · You dont get better within the first 48 hours of treatment  · Shortness of breath gets worse  · Rapid breathing (more than 25 breaths per minute)  · Coughing up blood  · Chest pain gets worse with breathing  · Fever of 100.4°F (38°C) or higher that doesnt get better with fever medicine  · Weakness, dizziness, or fainting that gets worse  · Thirst or dry mouth that gets worse  · Sinus pain, headache, or a stiff neck  · Chest pain not caused by coughing  Date Last Reviewed: 1/1/2017  © 0724-0139 [employer]. [address] [place] [address], [place] [address]. All rights reserved. This information is not intended as a substitute for professional medical care. Always follow your healthcare professional's instructions.

## 2018-06-05 NOTE — PLAN OF CARE
Patient requested transportation home.  Contacted patient's insurance transportation provided ( [employer] @ [phone] ) Spoke with Roberta from [facility]. She stated that they have same day hospital discharge with a window of up to 3 hrs.  Reservation # for trip back home to [place] # [phone].Discussed with patient, Shawanda, primary nurse and Cami Chg nurse.

## 2018-06-06 LAB
HIV UQ DATE RECEIVED: ABNORMAL
HIV UQ DATE REPORTED: ABNORMAL
HIV1 RNA # SERPL NAA+PROBE: ABNORMAL COPIES/ML
HIV1 RNA SERPL NAA+PROBE-LOG#: 4.81 LOG (10) COPIES/ML
HIV1 RNA SERPL QL NAA+PROBE: DETECTED

## 2018-06-06 NOTE — PLAN OF CARE
06/06/18 0809   Final Note   Assessment Type Final Discharge Note   Discharge Disposition Home  (Medicaid transportation brought pt home.  )   What phone number can be called within the next 1-3 days to see how you are doing after discharge? [phone]   Hospital Follow Up  Appt(s) scheduled? Yes   Discharge plans and expectations educations in teach back method with documentation complete? Yes   Right Care Referral Info   Post Acute Recommendation No Care   Referral Type (Dr Bruno, Infectious disease in [place], [place]   Facility Name ([facility])

## 2018-06-07 ENCOUNTER — PATIENT OUTREACH (OUTPATIENT)
Dept: ADMINISTRATIVE | Facility: CLINIC | Age: 28
End: 2018-06-07

## 2018-06-07 NOTE — PHYSICIAN QUERY
PT Name: Chon Parham  MR #: [id number]    Physician Query Form - HIV Clarification     CDS/: Kari Bob               Contact information: [email or web address]    This form is a permanent document in the medical record.     Query Date: June 7, 2018      By submitting this query, we are merely seeking further clarification of documentation. Please utilize your independent clinical judgment when addressing the question(s) below.    The Medical record contains the following:   Indicators   Supporting Clinical Findings Location in Medical Record   x HIV or AIDS recently diagnosed HIV last month (not on HAART therapy)   DS 6/5   x CD4 Count          CD4%        Viral Load CD4 = 299    CD4% [place] T Cell = 13.7    HIV RNA = 65, 198     Labs 6/4    History of Opportunistic Infection      Cancer  Pneumocystis Pneumonia (PCP)  Cytomegalovirus (CMV)  Kaposi Sarcoma      HIV-Related Conditions (e.g. Dementia, Encephalopathy) documented      Medications     x Other Patient started on Bactrim DS for PCP prophylaxis    Repeat CTA chest tonight showed heterogeneous alveolar consolidations in the right lower lobe consistent with pneumonia, and small right and left pleural effusions with associated atelectasis     DS 6/5     Please clarify the patients HIV status  Per CDC publication Vol 60 RR-17 https://www.cdc.gov/mmwr/preview/mmwrhtml/fa4753e5.htmRelating to the classification HIV Infection, once a patient is diagnosed with AIDS the diagnosis still stands even if, after treatment, the CD4+ T cell count rises to above 200 per ìL of blood or other AIDS-defining illnesses are cured.       The following are AIDS-Defining Illnesses or HIV-Related Diseases.  Bacterial infections, multiple or recurrent only in children aged <6 years Kaposi sarcoma   Candidiasis of bronchi, trachea, or lungs Lymphoma, Burkitt (or equivalent term)   Candidiasis of esophagus Lymphoma, immunoblastic (or equivalent term)   Cervical  cancer, invasive Only among adults, adolescents, and children aged > 6 years. Lymphoma, primary, of brain   Coccidioidomycosis, disseminated or extrapulmonary Mycobacterium avium complex or Mycobacterium kansasii, disseminated or extrapulmonary   Cryptococcosis, extrapulmonary Mycobacterium tuberculosis of any site, pulmonary, disseminated, or extrapulmonary   Cryptosporidiosis, chronic intestinal (>1 months duration) Mycobacterium, other species or unidentified species, disseminated or extrapulmonary   Cytomegalovirus disease (other than liver, spleen, or nodes), onset at age >1 month Pneumocystis jirovecii (previously known as Pneumocystis carinii) pneumonia   Cytomegalovirus retinitis (with loss of vision) Pneumonia, recurrent Only among adults, adolescents, and children aged .6 years.   Encephalopathy attributed to HIV Progressive multifocal leukoencephalopathy   Herpes simplex: chronic ulcers (>1 months duration) or bronchitis, pneumonitis, or esophagitis (onset at age >1 month) Salmonella septicemia, recurrent   Histoplasmosis, disseminated or extrapulmonary Toxoplasmosis of brain, onset at age >1 month   Isosporiasis, chronic intestinal (>1 months duration) Wasting syndrome attributed to HIV     [ ] Asymptomatic HIV Infection - Positive Status Only - without any history of (or current) AIDS-Defining Illness or HIV-Related Illness    [x ] HIV Disease / AIDS - Meets the current CDC Definition of AIDS: HIV-infected persons who have less than 200 CD4+ T-lymphocytes/uL, or CD4+ T-lymphocyte percentage of total lymphocytes of less than 14, and/or an AIDS-Defining Illness or HIV-Related Disease (see above).    [ ] Other (please specify): ____________________________________  [ ] Clinically Undetermined    Please document in your progress notes daily for the duration of treatment until resolved and include in your discharge summary.

## 2018-06-07 NOTE — PATIENT INSTRUCTIONS
Treating Pneumonia  Pneumonia is an infection of one or both of the lungs. Pneumonia:  · Is usually caused by either a virus or a bacteria  · Can be very serious, especially in infants, young children, and older adults. Its also serious for those with other long-term health problems or weakened immune systems.  · Is sometimes treated at home and sometimes in the hospital    Antibiotic medicines  Antibiotics may be prescribed for pneumonia caused by bacteria. They may be pills (oral medicines), or shots (injections). Or they may be given by IV (intravenously) into a vein. If you are taking oral medicines at home:  · Fill your prescription and start taking your medicine as soon as you can.  · You will likely start to feel better in a day or 2, but dont stop taking the antibiotic.  · Use a pill organizer to help you remember to take your medicine.  · Let your healthcare provider know if you have side effects.  · Take your medicine exactly as directed on the label. Talk to your provider or pharmacist if you have any questions.  Antiviral medicines  Antiviral medicine may be prescribed for pneumonia caused by a virus. For example, antiviral medicine may be prescribed for pneumonia caused by the flu virus. Antibiotics do not work against viruses. If you are taking antiviral medicine at home:  · Fill your prescription and start taking your medicine as soon as you can.  · Talk with your provider or pharmacist about possible side effects.  · Take the medicine exactly as instructed.  To relieve symptoms  There are many medicines that can help relieve symptoms of pneumonia. Some are prescription and some are over-the-counter.  Your healthcare provider may recommend:  · Acetaminophen or ibuprofen to lower your fever and to lessen headache or other pain  · Cough medicine to loosen mucus or to reduce coughing  Make sure you check with your healthcare provider or pharmacist before taking any over-the-counter medicines.  Special  treatments  If you are hospitalized for pneumonia, you may have other therapies, including:  · Inhaled medicines to help with breathing or chest congestion  · Supplemental oxygen to increase low oxygen levels  Drink fluids and eat healthy  You should eat healthy to help your body fight the infection. Drinking a lot of fluids helps to replace fluids lost from fever and to loosen mucus in your chest.  · Diet. Make healthy food choices, including fruits and vegetables, lean meats and other proteins, 100% whole grain and low- or no-fat dairy products.  · Fluids. Drink at least 6 to 8 tall glasses a day. Water and 100% fruit or vegetable juice are best.  Get plenty of rest and sleep  You may be more tired than usual for a while. It is important to get enough sleep at night. Its also important to rest during the day. Talk with your healthcare provider if coughing or other symptoms are interfering with your sleep.  Preventing the spread of germs  The best thing you can do to prevent spreading germs is to wash your hands often. You should:  · Rub your hand with soap and water for 20 to 30 seconds.  · Clean in between your fingers, the backs of your hands, and around your nails.  · Dry your hands on a separate towel or use paper towels.  You should also:  · Keep alcohol-based hand  nearby.  · Make sure you also clean surfaces that you touch. Use a product that kills all types of germs.  · Stay away from others until you are feeling better.  When to call your healthcare provider  Call your healthcare provider if you have any of the following:  · Symptoms get worse  · Fever continues  · Shortness of breath gets worse  · Increased mucus or mucus that is darker in color  · Coughing gets worse  · Lips or fingers are bluish in color  · Side effects from your medicine   Date Last Reviewed: 12/1/2016  © 1152-5688 [employer]. [address] [place] [address], [place] [address]. All rights reserved. This information is  not intended as a substitute for professional medical care. Always follow your healthcare professional's instructions.

## 2018-06-09 LAB — BACTERIA BLD CULT: NORMAL

## 2018-07-16 NOTE — ASSESSMENT & PLAN NOTE
Random UDS and pill count:    Pill Count  Bottle #1  [facility] Phone: [phone]  [address] [place] [address]  DATE: 6/26/18  Maxine Jewell  Take one tablet by mouth every 8 hours as needed for severe pain  Hydrocodone-acetaminophen    QTY: 90  Count:39    Bottle #2  [facility] [phone]  Date: 06/26/2018  Oxycodone 80 mg tablet  Take two tablets by mouth every 12 hours  QTY: 120  Count:42     Will follow cultures , do abdominal and pelvic CT scan .  Fever can be from HIV itself ,might need LP

## 2020-10-23 ENCOUNTER — HOSPITAL ENCOUNTER (EMERGENCY)
Facility: HOSPITAL | Age: 30
Discharge: PSYCHIATRIC OR SUBSTANCE USE FACILITY | End: 2020-10-23
Attending: EMERGENCY MEDICINE
Payer: MEDICAID

## 2020-10-23 VITALS
BODY MASS INDEX: 23.38 KG/M2 | DIASTOLIC BLOOD PRESSURE: 56 MMHG | HEART RATE: 79 BPM | SYSTOLIC BLOOD PRESSURE: 122 MMHG | RESPIRATION RATE: 18 BRPM | WEIGHT: 167 LBS | TEMPERATURE: 98 F | OXYGEN SATURATION: 98 % | HEIGHT: 71 IN

## 2020-10-23 DIAGNOSIS — F12.20 ADDICTION, MARIJUANA: ICD-10-CM

## 2020-10-23 DIAGNOSIS — R45.851 SUICIDAL IDEATION: Primary | ICD-10-CM

## 2020-10-23 LAB
ALBUMIN SERPL BCP-MCNC: 4.1 G/DL (ref 3.5–5.2)
ALP SERPL-CCNC: 70 U/L (ref 38–126)
ALT SERPL W/O P-5'-P-CCNC: 14 U/L (ref 10–44)
AMPHET+METHAMPHET UR QL: NORMAL
ANION GAP SERPL CALC-SCNC: 4 MMOL/L (ref 8–16)
APAP SERPL-MCNC: <10 UG/ML (ref 10–20)
AST SERPL-CCNC: 31 U/L (ref 15–46)
BARBITURATES UR QL SCN>200 NG/ML: NEGATIVE
BASOPHILS # BLD AUTO: 0.04 K/UL (ref 0–0.2)
BASOPHILS NFR BLD: 1 % (ref 0–1.9)
BENZODIAZ UR QL SCN>200 NG/ML: NEGATIVE
BILIRUB SERPL-MCNC: 0.9 MG/DL (ref 0.1–1)
BILIRUB UR QL STRIP: NEGATIVE
BUN SERPL-MCNC: 21 MG/DL (ref 2–20)
BZE UR QL SCN: NEGATIVE
CALCIUM SERPL-MCNC: 8.7 MG/DL (ref 8.7–10.5)
CANNABINOIDS UR QL SCN: NORMAL
CHLORIDE SERPL-SCNC: 111 MMOL/L (ref 95–110)
CLARITY UR REFRACT.AUTO: CLEAR
CO2 SERPL-SCNC: 25 MMOL/L (ref 23–29)
COLOR UR AUTO: ABNORMAL
CREAT SERPL-MCNC: 1.25 MG/DL (ref 0.5–1.4)
CREAT UR-MCNC: 310.2 MG/DL (ref 23–375)
DIFFERENTIAL METHOD: ABNORMAL
EOSINOPHIL # BLD AUTO: 0.3 K/UL (ref 0–0.5)
EOSINOPHIL NFR BLD: 6.5 % (ref 0–8)
ERYTHROCYTE [DISTWIDTH] IN BLOOD BY AUTOMATED COUNT: 12.8 % (ref 11.5–14.5)
EST. GFR  (AFRICAN AMERICAN): >60 ML/MIN/1.73 M^2
EST. GFR  (NON AFRICAN AMERICAN): >60 ML/MIN/1.73 M^2
ETHANOL SERPL-MCNC: <10 MG/DL
GLUCOSE SERPL-MCNC: 83 MG/DL (ref 70–110)
GLUCOSE UR QL STRIP: NEGATIVE
HCT VFR BLD AUTO: 45.6 % (ref 40–54)
HGB BLD-MCNC: 15.1 G/DL (ref 14–18)
HGB UR QL STRIP: NEGATIVE
IMM GRANULOCYTES # BLD AUTO: 0 K/UL (ref 0–0.04)
IMM GRANULOCYTES NFR BLD AUTO: 0 % (ref 0–0.5)
KETONES UR QL STRIP: NEGATIVE
LEUKOCYTE ESTERASE UR QL STRIP: ABNORMAL
LYMPHOCYTES # BLD AUTO: 2.1 K/UL (ref 1–4.8)
LYMPHOCYTES NFR BLD: 51.7 % (ref 18–48)
MCH RBC QN AUTO: 32.8 PG (ref 27–31)
MCHC RBC AUTO-ENTMCNC: 33.1 G/DL (ref 32–36)
MCV RBC AUTO: 99 FL (ref 82–98)
METHADONE UR QL SCN>300 NG/ML: NEGATIVE
MICROSCOPIC COMMENT: NORMAL
MONOCYTES # BLD AUTO: 0.5 K/UL (ref 0.3–1)
MONOCYTES NFR BLD: 10.9 % (ref 4–15)
NEUTROPHILS # BLD AUTO: 1.2 K/UL (ref 1.8–7.7)
NEUTROPHILS NFR BLD: 29.9 % (ref 38–73)
NITRITE UR QL STRIP: NEGATIVE
NRBC BLD-RTO: 0 /100 WBC
OPIATES UR QL SCN: NEGATIVE
PCP UR QL SCN>25 NG/ML: NEGATIVE
PH UR STRIP: 5 [PH] (ref 5–8)
PLATELET # BLD AUTO: 261 K/UL (ref 150–350)
PMV BLD AUTO: 9.7 FL (ref 9.2–12.9)
POTASSIUM SERPL-SCNC: 4.2 MMOL/L (ref 3.5–5.1)
PROT SERPL-MCNC: 7.8 G/DL (ref 6–8.4)
PROT UR QL STRIP: ABNORMAL
RBC # BLD AUTO: 4.6 M/UL (ref 4.6–6.2)
SARS-COV-2 RDRP RESP QL NAA+PROBE: NEGATIVE
SODIUM SERPL-SCNC: 140 MMOL/L (ref 136–145)
SP GR UR STRIP: 1.02 (ref 1–1.03)
TOXICOLOGY INFORMATION: NORMAL
URN SPEC COLLECT METH UR: ABNORMAL
UROBILINOGEN UR STRIP-ACNC: NEGATIVE EU/DL
WBC # BLD AUTO: 4.14 K/UL (ref 3.9–12.7)
WBC #/AREA URNS AUTO: 1 /HPF (ref 0–5)

## 2020-10-23 PROCEDURE — 81000 URINALYSIS NONAUTO W/SCOPE: CPT | Mod: ER,59

## 2020-10-23 PROCEDURE — U0002 COVID-19 LAB TEST NON-CDC: HCPCS | Mod: ER

## 2020-10-23 PROCEDURE — 85025 COMPLETE CBC W/AUTO DIFF WBC: CPT | Mod: ER

## 2020-10-23 PROCEDURE — 80307 DRUG TEST PRSMV CHEM ANLYZR: CPT | Mod: ER

## 2020-10-23 PROCEDURE — 80329 ANALGESICS NON-OPIOID 1 OR 2: CPT | Mod: ER

## 2020-10-23 PROCEDURE — 80320 DRUG SCREEN QUANTALCOHOLS: CPT | Mod: ER

## 2020-10-23 PROCEDURE — 99285 EMERGENCY DEPT VISIT HI MDM: CPT | Mod: ER

## 2020-10-23 PROCEDURE — 80053 COMPREHEN METABOLIC PANEL: CPT | Mod: ER

## 2020-10-23 RX ORDER — CLONAZEPAM 1 MG/1
1 TABLET ORAL DAILY
Status: ON HOLD | COMMUNITY
End: 2020-10-29 | Stop reason: HOSPADM

## 2020-10-23 NOTE — ED NOTES
"RN reassessed patient at this time. Asked about SI. Patient states " after talking to the DR. I think If I went home and wasn't here I would start having those thoughts."    "
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
## 2020-10-23 NOTE — ED PROVIDER NOTES
"Encounter Date: 10/23/2020       History     Chief Complaint   Patient presents with    Addiction Problem     patient presents to ED requesting to " get back into my treatment program at [facility]." denies SI or HI last smoked Marijuanna 1 week ago denies alcohol use. calm and cooperative.      Patient is a 30y AAM hx HIV, schizophrenia presenting with request of addiction assistance.  He endorses MJ usage.  Denies HI or AVH.  Does not know inciting events for relapse.  Upon further interview patient endorsed suicidal ideation without a plan.  States thought of hurting self have been increasing in the past 2 -3 days and have worsened today.        Review of patient's allergies indicates:  No Known Allergies  Past Medical History:   Diagnosis Date    Bipolar affective     Depression     HIV (human immunodeficiency virus infection)     Schizophrenia      History reviewed. No pertinent surgical history.  History reviewed. No pertinent family history.  Social History     Tobacco Use    Smoking status: Current Every Day Smoker     Packs/day: 0.50     Types: Cigarettes    Smokeless tobacco: Never Used   Substance Use Topics    Alcohol use: No    Drug use: Yes     Types: Marijuana     Comment: last use one week ago      Review of Systems   Psychiatric/Behavioral: Positive for behavioral problems, dysphoric mood and self-injury.   All other systems reviewed and are negative.      Physical Exam     Initial Vitals [10/23/20 0940]   BP Pulse Resp Temp SpO2   (!) 121/58 72 16 97.9 °F (36.6 °C) 96 %      MAP       --         Physical Exam    Nursing note and vitals reviewed.  Constitutional: He appears well-developed and well-nourished.   HENT:   Head: Normocephalic.   Mouth/Throat: Oropharynx is clear and moist.   Eyes: EOM are normal. Pupils are equal, round, and reactive to light.   Neck: Normal range of motion. No JVD present.   Cardiovascular: Normal rate and intact distal pulses.   Pulmonary/Chest: Breath sounds " normal. No stridor.   Abdominal: Soft.   Musculoskeletal: Normal range of motion.   Neurological: He is alert and oriented to person, place, and time.         ED Course   Procedures  Labs Reviewed   CBC W/ AUTO DIFFERENTIAL - Abnormal; Notable for the following components:       Result Value    Mean Corpuscular Volume 99 (*)     Mean Corpuscular Hemoglobin 32.8 (*)     Gran # (ANC) 1.2 (*)     Gran% 29.9 (*)     Lymph% 51.7 (*)     All other components within normal limits   COMPREHENSIVE METABOLIC PANEL - Abnormal; Notable for the following components:    Chloride 111 (*)     BUN, Bld 21 (*)     Anion Gap 4 (*)     All other components within normal limits   URINALYSIS, REFLEX TO URINE CULTURE - Abnormal; Notable for the following components:    Protein, UA Trace (*)     Leukocytes, UA 1+ (*)     All other components within normal limits    Narrative:     Specimen Source->Urine   DRUG SCREEN PANEL, URINE EMERGENCY    Narrative:     Specimen Source->Urine   ALCOHOL,MEDICAL (ETHANOL)   ACETAMINOPHEN LEVEL   SARS-COV-2 RNA AMPLIFICATION, QUAL   URINALYSIS MICROSCOPIC    Narrative:     Specimen Source->Urine          Imaging Results    None          Medical Decision Making:   ED Management:  This emergent evaluation of a 30-year-old  male past medical history of HIV and schizophrenia presenting with complaint of suicidal ideation in eating detox.  Physical exam remarkable for nontoxic afebrile compliant young man with no signs of trauma.  Labs remarkable for negative COVID and  Tox + THC.  Alcohol negative.  Patient medically cleared for psychiatric placement.                        Medically cleared for psychiatry placement: 10/23/2020 12:08 PM                Clinical Impression:     ICD-10-CM ICD-9-CM   1. Suicidal ideation  R45.851 V62.84   2. Addiction, marijuana  F12.20 304.30                          ED Disposition Condition    Transfer to Psych Facility         ED Prescriptions     None        Follow-up  Information    None                                      Trini Sanford MD  10/23/20 2911

## 2020-10-23 NOTE — ED NOTES
Patient states he takes HIV medications everyday as prescribed; unsure of daily medication names and dosages.

## 2020-10-23 NOTE — ED NOTES
Patient left [facility] about 4-5 weeks ago; [facility] is located in [place] phone number [phone].

## 2021-08-16 ENCOUNTER — HISTORICAL (OUTPATIENT)
Dept: ADMINISTRATIVE | Facility: HOSPITAL | Age: 31
End: 2021-08-16

## 2021-08-16 LAB
T3RU NFR SERPL: 38 % (ref 31–39)
T4 FREE SERPL-MCNC: 0.77 NG/DL (ref 0.7–1.48)
TSH SERPL-ACNC: 0.2 UIU/ML (ref 0.35–4.94)

## 2022-04-10 ENCOUNTER — HISTORICAL (OUTPATIENT)
Dept: ADMINISTRATIVE | Facility: HOSPITAL | Age: 32
End: 2022-04-10
Payer: MEDICAID

## 2022-04-25 VITALS
SYSTOLIC BLOOD PRESSURE: 132 MMHG | HEIGHT: 71 IN | WEIGHT: 167.31 LBS | BODY MASS INDEX: 23.42 KG/M2 | OXYGEN SATURATION: 97 % | DIASTOLIC BLOOD PRESSURE: 74 MMHG

## 2022-05-10 ENCOUNTER — HOSPITAL ENCOUNTER (EMERGENCY)
Facility: HOSPITAL | Age: 32
Discharge: LEFT BEFORE DISCHARGE | End: 2022-05-10
Attending: INTERNAL MEDICINE
Payer: MEDICAID

## 2022-05-10 ENCOUNTER — HOSPITAL ENCOUNTER (EMERGENCY)
Facility: HOSPITAL | Age: 32
Discharge: PSYCHIATRIC OR SUBSTANCE USE FACILITY | End: 2022-05-11
Attending: STUDENT IN AN ORGANIZED HEALTH CARE EDUCATION/TRAINING PROGRAM
Payer: MEDICAID

## 2022-05-10 VITALS
BODY MASS INDEX: 26.82 KG/M2 | WEIGHT: 187.38 LBS | TEMPERATURE: 98 F | OXYGEN SATURATION: 100 % | DIASTOLIC BLOOD PRESSURE: 62 MMHG | SYSTOLIC BLOOD PRESSURE: 104 MMHG | RESPIRATION RATE: 16 BRPM | HEIGHT: 70 IN | HEART RATE: 90 BPM

## 2022-05-10 DIAGNOSIS — R45.851 SUICIDAL IDEATION: Primary | ICD-10-CM

## 2022-05-10 DIAGNOSIS — J06.9 UPPER RESPIRATORY TRACT INFECTION, UNSPECIFIED TYPE: Primary | ICD-10-CM

## 2022-05-10 LAB
ALBUMIN SERPL-MCNC: 3.9 GM/DL (ref 3.5–5)
ALBUMIN/GLOB SERPL: 1.1 RATIO (ref 1.1–2)
ALP SERPL-CCNC: 74 UNIT/L (ref 40–150)
ALT SERPL-CCNC: 19 UNIT/L (ref 0–55)
AMPHET UR QL SCN: NEGATIVE
APAP SERPL-MCNC: <17.4 UG/ML (ref 17.4–30)
APPEARANCE UR: CLEAR
AST SERPL-CCNC: 27 UNIT/L (ref 5–34)
BACTERIA #/AREA URNS AUTO: ABNORMAL /HPF
BARBITURATE SCN PRESENT UR: NEGATIVE
BASOPHILS # BLD AUTO: 0.04 X10(3)/MCL (ref 0–0.2)
BASOPHILS NFR BLD AUTO: 0.7 %
BENZODIAZ UR QL SCN: NEGATIVE
BILIRUB UR QL STRIP.AUTO: NEGATIVE MG/DL
BILIRUBIN DIRECT+TOT PNL SERPL-MCNC: 0.1 MG/DL (ref 0–0.5)
BILIRUBIN DIRECT+TOT PNL SERPL-MCNC: 0.1 MG/DL (ref 0–0.8)
BILIRUBIN DIRECT+TOT PNL SERPL-MCNC: 0.2 MG/DL
BUN SERPL-MCNC: 16 MG/DL (ref 8.9–20.6)
CALCIUM SERPL-MCNC: 9.2 MG/DL (ref 8.4–10.2)
CANNABINOIDS UR QL SCN: POSITIVE
CHLORIDE SERPL-SCNC: 108 MMOL/L (ref 98–107)
CO2 SERPL-SCNC: 24 MMOL/L (ref 22–29)
COCAINE UR QL SCN: NEGATIVE
COLOR UR AUTO: ABNORMAL
CREAT SERPL-MCNC: 1.16 MG/DL (ref 0.73–1.18)
EOSINOPHIL # BLD AUTO: 0.68 X10(3)/MCL (ref 0–0.9)
EOSINOPHIL NFR BLD AUTO: 11.1 %
ERYTHROCYTE [DISTWIDTH] IN BLOOD BY AUTOMATED COUNT: 13 % (ref 11.5–17)
ETHANOL SERPL-MCNC: <10 MG/DL
FENTANYL UR QL SCN: NEGATIVE
FLUAV AG UPPER RESP QL IA.RAPID: NOT DETECTED
FLUBV AG UPPER RESP QL IA.RAPID: NOT DETECTED
GLOBULIN SER-MCNC: 3.5 GM/DL (ref 2.4–3.5)
GLUCOSE SERPL-MCNC: 101 MG/DL (ref 74–100)
GLUCOSE UR QL STRIP.AUTO: NORMAL MG/DL
HCT VFR BLD AUTO: 43.4 % (ref 42–52)
HGB BLD-MCNC: 14.1 GM/DL (ref 14–18)
HYALINE CASTS #/AREA URNS LPF: ABNORMAL /LPF
IMM GRANULOCYTES # BLD AUTO: 0.01 X10(3)/MCL (ref 0–0.02)
IMM GRANULOCYTES NFR BLD AUTO: 0.2 % (ref 0–0.43)
KETONES UR QL STRIP.AUTO: NEGATIVE MG/DL
LEUKOCYTE ESTERASE UR QL STRIP.AUTO: NEGATIVE UNIT/L
LYMPHOCYTES # BLD AUTO: 2.37 X10(3)/MCL (ref 0.6–4.6)
LYMPHOCYTES NFR BLD AUTO: 38.7 %
MCH RBC QN AUTO: 31.8 PG (ref 27–31)
MCHC RBC AUTO-ENTMCNC: 32.5 MG/DL (ref 33–36)
MCV RBC AUTO: 97.7 FL (ref 80–94)
MDMA UR QL SCN: NEGATIVE
MONOCYTES # BLD AUTO: 0.76 X10(3)/MCL (ref 0.1–1.3)
MONOCYTES NFR BLD AUTO: 12.4 %
MUCOUS THREADS URNS QL MICRO: ABNORMAL /LPF
NEUTROPHILS # BLD AUTO: 2.3 X10(3)/MCL (ref 2.1–9.2)
NEUTROPHILS NFR BLD AUTO: 36.9 %
NITRITE UR QL STRIP.AUTO: NEGATIVE
NRBC BLD AUTO-RTO: 0 %
OPIATES UR QL SCN: NEGATIVE
PCP UR QL: NEGATIVE
PH UR STRIP.AUTO: 6 [PH]
PH UR: 6 [PH] (ref 3–11)
PLATELET # BLD AUTO: 223 X10(3)/MCL (ref 130–400)
PMV BLD AUTO: 9.7 FL (ref 9.4–12.4)
POTASSIUM SERPL-SCNC: 4.2 MMOL/L (ref 3.5–5.1)
PROT SERPL-MCNC: 7.4 GM/DL (ref 6.4–8.3)
PROT UR QL STRIP.AUTO: NEGATIVE MG/DL
RBC # BLD AUTO: 4.44 X10(6)/MCL (ref 4.7–6.1)
RBC #/AREA URNS AUTO: ABNORMAL /HPF
RBC UR QL AUTO: NEGATIVE UNIT/L
SALICYLATES SERPL-MCNC: <5 MG/DL
SARS-COV-2 RNA RESP QL NAA+PROBE: NOT DETECTED
SODIUM SERPL-SCNC: 139 MMOL/L (ref 136–145)
SP GR UR STRIP.AUTO: 1.03
SPECIFIC GRAVITY, URINE AUTO (.000) (OHS): 1.03 (ref 1–1.03)
SQUAMOUS #/AREA URNS LPF: ABNORMAL /HPF
STREP A PCR (OHS): NOT DETECTED
TSH SERPL-ACNC: 0.54 UIU/ML (ref 0.35–4.94)
UROBILINOGEN UR STRIP-ACNC: NORMAL MG/DL
WBC # SPEC AUTO: 6.1 X10(3)/MCL (ref 4.5–11.5)
WBC #/AREA URNS AUTO: ABNORMAL /HPF

## 2022-05-10 PROCEDURE — 82077 ASSAY SPEC XCP UR&BREATH IA: CPT | Performed by: STUDENT IN AN ORGANIZED HEALTH CARE EDUCATION/TRAINING PROGRAM

## 2022-05-10 PROCEDURE — 85025 COMPLETE CBC W/AUTO DIFF WBC: CPT | Performed by: STUDENT IN AN ORGANIZED HEALTH CARE EDUCATION/TRAINING PROGRAM

## 2022-05-10 PROCEDURE — 80179 DRUG ASSAY SALICYLATE: CPT | Performed by: STUDENT IN AN ORGANIZED HEALTH CARE EDUCATION/TRAINING PROGRAM

## 2022-05-10 PROCEDURE — 87636 SARSCOV2 & INF A&B AMP PRB: CPT | Mod: 59 | Performed by: NURSE PRACTITIONER

## 2022-05-10 PROCEDURE — 80307 DRUG TEST PRSMV CHEM ANLYZR: CPT | Performed by: STUDENT IN AN ORGANIZED HEALTH CARE EDUCATION/TRAINING PROGRAM

## 2022-05-10 PROCEDURE — 36415 COLL VENOUS BLD VENIPUNCTURE: CPT | Performed by: STUDENT IN AN ORGANIZED HEALTH CARE EDUCATION/TRAINING PROGRAM

## 2022-05-10 PROCEDURE — 87631 RESP VIRUS 3-5 TARGETS: CPT | Performed by: NURSE PRACTITIONER

## 2022-05-10 PROCEDURE — 99282 EMERGENCY DEPT VISIT SF MDM: CPT | Mod: 27

## 2022-05-10 PROCEDURE — 84443 ASSAY THYROID STIM HORMONE: CPT | Performed by: STUDENT IN AN ORGANIZED HEALTH CARE EDUCATION/TRAINING PROGRAM

## 2022-05-10 PROCEDURE — 81001 URINALYSIS AUTO W/SCOPE: CPT | Performed by: STUDENT IN AN ORGANIZED HEALTH CARE EDUCATION/TRAINING PROGRAM

## 2022-05-10 PROCEDURE — 80143 DRUG ASSAY ACETAMINOPHEN: CPT | Performed by: STUDENT IN AN ORGANIZED HEALTH CARE EDUCATION/TRAINING PROGRAM

## 2022-05-10 PROCEDURE — 80053 COMPREHEN METABOLIC PANEL: CPT | Performed by: STUDENT IN AN ORGANIZED HEALTH CARE EDUCATION/TRAINING PROGRAM

## 2022-05-10 PROCEDURE — 99285 EMERGENCY DEPT VISIT HI MDM: CPT | Mod: 25

## 2022-05-10 NOTE — ED PROVIDER NOTES
Encounter Date: 5/10/2022       History     Chief Complaint   Patient presents with    Nasal Congestion     Patient reports sinus congestion with productive cough x 1 week.      Upper Respiratory Infection  Patient complains of symptoms of a URI, rare nonprod cough, nasal congestion, subjective fever. Onset of symptoms was 1 week ago, and has been stable since that time. Treatment to date: none. Denies cp and sob. No known sick exposure.                      Review of patient's allergies indicates:  No Known Allergies  Past Medical History:   Diagnosis Date    Bipolar affective     Depression     HIV (human immunodeficiency virus infection)     Schizophrenia      History reviewed. No pertinent surgical history.  History reviewed. No pertinent family history.  Social History     Tobacco Use    Smoking status: Current Every Day Smoker     Packs/day: 0.50     Types: Cigarettes    Smokeless tobacco: Never Used   Substance Use Topics    Alcohol use: No    Drug use: Yes     Types: Marijuana     Comment: last use one week ago      Review of Systems   Constitutional: Negative for fever.   HENT: Positive for congestion. Negative for sore throat.    Respiratory: Negative for shortness of breath.    Cardiovascular: Negative for chest pain.   Gastrointestinal: Negative for nausea.   Genitourinary: Negative for dysuria.   Musculoskeletal: Negative for back pain.   Skin: Negative for rash.   Neurological: Negative for weakness.   Hematological: Does not bruise/bleed easily.   All other systems reviewed and are negative.      Physical Exam     Initial Vitals [05/10/22 1330]   BP Pulse Resp Temp SpO2   104/62 90 16 98.2 °F (36.8 °C) 100 %      MAP       --         Physical Exam    Nursing note and vitals reviewed.  Constitutional: He appears well-developed and well-nourished.   HENT:   Head: Normocephalic and atraumatic.   Right Ear: Tympanic membrane and ear canal normal.   Left Ear: Tympanic membrane and ear canal  normal.   Nose: Mucosal edema present. No rhinorrhea.   Mouth/Throat: Uvula is midline, oropharynx is clear and moist and mucous membranes are normal. No trismus in the jaw. No uvula swelling.   Eyes: Conjunctivae are normal.   Neck: Neck supple.   Normal range of motion.  Cardiovascular: Normal rate, regular rhythm, normal heart sounds and intact distal pulses.   Pulmonary/Chest: Breath sounds normal.   Abdominal: Abdomen is soft. Bowel sounds are normal.   Musculoskeletal:         General: Normal range of motion.      Cervical back: Normal range of motion and neck supple.     Neurological: He is alert and oriented to person, place, and time. He has normal strength.   Skin: Skin is warm and dry.   Psychiatric: He has a normal mood and affect.         ED Course   Procedures  Labs Reviewed   COVID/FLU A&B PCR - Normal   STREP GROUP A BY PCR - Normal          Imaging Results    None          Medications - No data to display  Medical Decision Making:   History:   Old Records Summarized: records from clinic visits and records from previous admission(s).                      Clinical Impression:   Final diagnoses:  [J06.9] Upper respiratory tract infection, unspecified type (Primary)          ED Disposition Condition    Eloped               DEUCE Yi  05/10/22 8745

## 2022-05-11 VITALS
TEMPERATURE: 98 F | DIASTOLIC BLOOD PRESSURE: 89 MMHG | SYSTOLIC BLOOD PRESSURE: 128 MMHG | WEIGHT: 188 LBS | HEART RATE: 81 BPM | HEIGHT: 70 IN | RESPIRATION RATE: 18 BRPM | OXYGEN SATURATION: 99 % | BODY MASS INDEX: 26.92 KG/M2

## 2022-05-11 LAB
FLUAV AG UPPER RESP QL IA.RAPID: NOT DETECTED
FLUBV AG UPPER RESP QL IA.RAPID: NOT DETECTED
SARS-COV-2 RNA RESP QL NAA+PROBE: NOT DETECTED

## 2022-05-11 PROCEDURE — 87636 SARSCOV2 & INF A&B AMP PRB: CPT | Performed by: STUDENT IN AN ORGANIZED HEALTH CARE EDUCATION/TRAINING PROGRAM

## 2022-05-11 PROCEDURE — 25000003 PHARM REV CODE 250: Performed by: STUDENT IN AN ORGANIZED HEALTH CARE EDUCATION/TRAINING PROGRAM

## 2022-05-11 RX ORDER — ACETAMINOPHEN 500 MG
1000 TABLET ORAL
Status: COMPLETED | OUTPATIENT
Start: 2022-05-11 | End: 2022-05-11

## 2022-05-11 RX ADMIN — ACETAMINOPHEN 1000 MG: 500 TABLET ORAL at 01:05

## 2022-05-11 NOTE — ED PROVIDER NOTES
Encounter Date: 5/10/2022       History     Chief Complaint   Patient presents with    Psychiatric Evaluation     Si, wants to OD. Denies HI, AV hallucinations      31-year-old male with no significant past medical history other than depression presenting today with suicidal ideation.  She was recently seen in the emergency department he had nasal congestion.  He was discharged home and that he felt depressed wanted to kill himself by overdosing on his pills.  He denies any homicidal ideation.  He denies any medical complaints other than the sniffles at this time.  I    The history is provided by the patient.   Mental Health Problem  The primary symptoms include suicidal ideas and suicidal threats. The primary symptoms do not include aggression, disorganized thinking, homicidal ideas, negative symptoms, self-injury or suicide attempt. The current episode started just prior to arrival. This is a chronic problem.   The degree of incapacity that he is experiencing as a consequence of his illness is mild. Additional symptoms of the illness do not include anhedonia, appetite change, psychomotor retardation, decreased need for sleep, visual change, headaches or abdominal pain. He admits to suicidal ideas. He does have a plan to attempt suicide. He contemplates harming himself. He has not already injured self. He does not contemplate injuring another person. He has not already  injured another person.     Review of patient's allergies indicates:  No Known Allergies  Past Medical History:   Diagnosis Date    Bipolar affective     Depression     HIV (human immunodeficiency virus infection)     Schizophrenia      No past surgical history on file.  No family history on file.  Social History     Tobacco Use    Smoking status: Current Every Day Smoker     Packs/day: 0.50     Types: Cigarettes    Smokeless tobacco: Never Used   Substance Use Topics    Alcohol use: No    Drug use: Yes     Types: Marijuana     Comment:  last use one week ago      Review of Systems   Constitutional: Negative for activity change, appetite change, chills and fever.   HENT: Negative for congestion and facial swelling.    Eyes: Negative for discharge and redness.   Respiratory: Negative for cough and shortness of breath.    Cardiovascular: Negative for chest pain and leg swelling.   Gastrointestinal: Negative for abdominal distention, abdominal pain, nausea and vomiting.   Genitourinary: Negative for dysuria and hematuria.   Musculoskeletal: Negative for gait problem and neck stiffness.   Skin: Negative for color change and pallor.   Neurological: Negative for dizziness and headaches.   Psychiatric/Behavioral: Positive for suicidal ideas. Negative for homicidal ideas and self-injury.       Physical Exam     Initial Vitals [05/10/22 2147]   BP Pulse Resp Temp SpO2   135/81 84 20 97.2 °F (36.2 °C) 98 %      MAP       --         Physical Exam    Nursing note and vitals reviewed.  Constitutional: He appears well-developed. He is not diaphoretic. No distress.   HENT:   Head: Normocephalic.   Eyes: Right eye exhibits no discharge. Left eye exhibits no discharge.   Neck: Neck supple.   Normal range of motion.  Cardiovascular: Normal rate and regular rhythm.   Pulmonary/Chest: No respiratory distress. He has no wheezes.   Abdominal: Abdomen is soft. Bowel sounds are normal. He exhibits no distension. There is no abdominal tenderness. There is no rebound.   Musculoskeletal:         General: No tenderness. Normal range of motion.      Cervical back: Normal range of motion and neck supple.     Neurological: He is alert and oriented to person, place, and time.   Skin: Skin is warm and dry.         ED Course   Procedures  Labs Reviewed   URINALYSIS, REFLEX TO URINE CULTURE - Abnormal; Notable for the following components:       Result Value    Color, UA Light-Yellow (*)     Mucous, UA Trace (*)     All other components within normal limits   ACETAMINOPHEN LEVEL -  Abnormal; Notable for the following components:    Acetaminophen Level <17.4 (*)     All other components within normal limits   COMPREHENSIVE METABOLIC PANEL - Abnormal; Notable for the following components:    Chloride 108 (*)     Glucose Level 101 (*)     All other components within normal limits   DRUG SCREEN PANEL, URINE EMERGENCY - Abnormal; Notable for the following components:    Cannabinoids, Urine Positive (*)     All other components within normal limits   CBC WITH DIFFERENTIAL - Abnormal; Notable for the following components:    RBC 4.44 (*)     MCV 97.7 (*)     MCH 31.8 (*)     MCHC 32.5 (*)     All other components within normal limits   ALCOHOL,MEDICAL (ETHANOL) - Normal   TSH - Normal   CBC W/ AUTO DIFFERENTIAL    Narrative:     The following orders were created for panel order CBC auto differential.  Procedure                               Abnormality         Status                     ---------                               -----------         ------                     CBC with Differential[[id number]]        Abnormal            Final result                 Please view results for these tests on the individual orders.   SALICYLATE LEVEL   EXTRA TUBES    Narrative:     The following orders were created for panel order EXTRA TUBES.  Procedure                               Abnormality         Status                     ---------                               -----------         ------                     Light Blue Top Hold[[id number]]                              In process                 Gold Top Hold[[id number]]                                    In process                   Please view results for these tests on the individual orders.   LIGHT BLUE TOP HOLD   GOLD TOP HOLD   COVID/FLU A&B PCR          Imaging Results    None          Medications - No data to display  Medical Decision Making:   ED Management:  Pt presents to the ED with complaints of psychiatric illness    Differential considerations  include: Tylenol OD, Salicylate OD, acute psychosis, suicidal ideation   Evaluated the patient emergency department.  He was stable well-appearing. He stated he wanted to kill himself by overdosing on the pills so he was placed on an involuntary psychiatric he was on hold.  She had no medical complaints at this time.  His blood work was unremarkable.  His urine was unremarkable.  He is medically cleared for transfer to psych facility at this time.                      Clinical Impression:   Final diagnoses:  [R41.620] Suicidal ideation (Primary)          ED Disposition Condition    Transfer to Psych Facility         ED Prescriptions     None        Follow-up Information    None          Flip Stephen MD  05/10/22 4263

## 2022-09-20 ENCOUNTER — HOSPITAL ENCOUNTER (EMERGENCY)
Facility: HOSPITAL | Age: 32
Discharge: PSYCHIATRIC OR SUBSTANCE USE FACILITY | End: 2022-09-20
Attending: INTERNAL MEDICINE
Payer: MEDICAID

## 2022-09-20 VITALS
HEIGHT: 71 IN | TEMPERATURE: 98 F | BODY MASS INDEX: 23.15 KG/M2 | OXYGEN SATURATION: 100 % | HEART RATE: 72 BPM | SYSTOLIC BLOOD PRESSURE: 145 MMHG | RESPIRATION RATE: 18 BRPM | DIASTOLIC BLOOD PRESSURE: 82 MMHG | WEIGHT: 165.38 LBS

## 2022-09-20 DIAGNOSIS — F29 PSYCHOSIS, UNSPECIFIED PSYCHOSIS TYPE: ICD-10-CM

## 2022-09-20 DIAGNOSIS — Z00.8 MEDICAL CLEARANCE FOR PSYCHIATRIC ADMISSION: ICD-10-CM

## 2022-09-20 DIAGNOSIS — R45.851 SUICIDAL THOUGHTS: Primary | ICD-10-CM

## 2022-09-20 LAB
ALBUMIN SERPL-MCNC: 4.6 GM/DL (ref 3.5–5)
ALBUMIN/GLOB SERPL: 1.2 RATIO (ref 1.1–2)
ALP SERPL-CCNC: 90 UNIT/L (ref 40–150)
ALT SERPL-CCNC: 16 UNIT/L (ref 0–55)
AST SERPL-CCNC: 27 UNIT/L (ref 5–34)
BASOPHILS # BLD AUTO: 0.03 X10(3)/MCL (ref 0–0.2)
BASOPHILS NFR BLD AUTO: 0.6 %
BILIRUBIN DIRECT+TOT PNL SERPL-MCNC: 0.4 MG/DL
BUN SERPL-MCNC: 12.9 MG/DL (ref 8.9–20.6)
CALCIUM SERPL-MCNC: 9.7 MG/DL (ref 8.4–10.2)
CHLORIDE SERPL-SCNC: 108 MMOL/L (ref 98–107)
CO2 SERPL-SCNC: 26 MMOL/L (ref 22–29)
CREAT SERPL-MCNC: 1.46 MG/DL (ref 0.73–1.18)
EOSINOPHIL # BLD AUTO: 0.09 X10(3)/MCL (ref 0–0.9)
EOSINOPHIL NFR BLD AUTO: 1.7 %
ERYTHROCYTE [DISTWIDTH] IN BLOOD BY AUTOMATED COUNT: 12.9 % (ref 11.5–17)
ETHANOL SERPL-MCNC: <10 MG/DL
GFR SERPLBLD CREATININE-BSD FMLA CKD-EPI: >60 MLS/MIN/1.73/M2
GLOBULIN SER-MCNC: 3.8 GM/DL (ref 2.4–3.5)
GLUCOSE SERPL-MCNC: 104 MG/DL (ref 74–100)
HCT VFR BLD AUTO: 45.1 % (ref 42–52)
HGB BLD-MCNC: 15.5 GM/DL (ref 14–18)
IMM GRANULOCYTES # BLD AUTO: 0 X10(3)/MCL (ref 0–0.04)
IMM GRANULOCYTES NFR BLD AUTO: 0 %
LYMPHOCYTES # BLD AUTO: 2.3 X10(3)/MCL (ref 0.6–4.6)
LYMPHOCYTES NFR BLD AUTO: 43.3 %
MCH RBC QN AUTO: 32.8 PG (ref 27–31)
MCHC RBC AUTO-ENTMCNC: 34.4 MG/DL (ref 33–36)
MCV RBC AUTO: 95.3 FL (ref 80–94)
MONOCYTES # BLD AUTO: 0.81 X10(3)/MCL (ref 0.1–1.3)
MONOCYTES NFR BLD AUTO: 15.3 %
NEUTROPHILS # BLD AUTO: 2.1 X10(3)/MCL (ref 2.1–9.2)
NEUTROPHILS NFR BLD AUTO: 39.1 %
NRBC BLD AUTO-RTO: 0 %
PLATELET # BLD AUTO: 294 X10(3)/MCL (ref 130–400)
PMV BLD AUTO: 9.4 FL (ref 7.4–10.4)
POTASSIUM SERPL-SCNC: 3.1 MMOL/L (ref 3.5–5.1)
PROT SERPL-MCNC: 8.4 GM/DL (ref 6.4–8.3)
RBC # BLD AUTO: 4.73 X10(6)/MCL (ref 4.7–6.1)
SARS-COV-2 RDRP RESP QL NAA+PROBE: NEGATIVE
SODIUM SERPL-SCNC: 144 MMOL/L (ref 136–145)
TSH SERPL-ACNC: 1.1 UIU/ML (ref 0.35–4.94)
WBC # SPEC AUTO: 5.3 X10(3)/MCL (ref 4.5–11.5)

## 2022-09-20 PROCEDURE — 82077 ASSAY SPEC XCP UR&BREATH IA: CPT | Performed by: INTERNAL MEDICINE

## 2022-09-20 PROCEDURE — 25000003 PHARM REV CODE 250: Performed by: INTERNAL MEDICINE

## 2022-09-20 PROCEDURE — 99285 EMERGENCY DEPT VISIT HI MDM: CPT

## 2022-09-20 PROCEDURE — 36415 COLL VENOUS BLD VENIPUNCTURE: CPT | Performed by: INTERNAL MEDICINE

## 2022-09-20 PROCEDURE — 80053 COMPREHEN METABOLIC PANEL: CPT | Performed by: INTERNAL MEDICINE

## 2022-09-20 PROCEDURE — 84443 ASSAY THYROID STIM HORMONE: CPT | Performed by: INTERNAL MEDICINE

## 2022-09-20 PROCEDURE — 85025 COMPLETE CBC W/AUTO DIFF WBC: CPT | Performed by: INTERNAL MEDICINE

## 2022-09-20 PROCEDURE — 87635 SARS-COV-2 COVID-19 AMP PRB: CPT | Performed by: INTERNAL MEDICINE

## 2022-09-20 RX ORDER — OLANZAPINE 20 MG/1
20 TABLET, ORALLY DISINTEGRATING ORAL ONCE
Status: COMPLETED | OUTPATIENT
Start: 2022-09-20 | End: 2022-09-20

## 2022-09-20 RX ORDER — LORAZEPAM 1 MG/1
1 TABLET ORAL
Status: COMPLETED | OUTPATIENT
Start: 2022-09-20 | End: 2022-09-20

## 2022-09-20 RX ORDER — IBUPROFEN 200 MG
1 TABLET ORAL DAILY
Status: DISCONTINUED | OUTPATIENT
Start: 2022-09-20 | End: 2022-09-20 | Stop reason: HOSPADM

## 2022-09-20 RX ADMIN — OLANZAPINE 20 MG: 20 TABLET, ORALLY DISINTEGRATING ORAL at 01:09

## 2022-09-20 RX ADMIN — LORAZEPAM 1 MG: 1 TABLET ORAL at 01:09

## 2022-09-20 RX ADMIN — POTASSIUM BICARBONATE 25 MEQ: 978 TABLET, EFFERVESCENT ORAL at 02:09

## 2022-09-20 NOTE — ED NOTES
SPD here to transport the pt the [facility]. All personal items and original PEC sent with the pt.

## 2022-09-20 NOTE — ED PROVIDER NOTES
Encounter Date: 9/20/2022       History     Chief Complaint   Patient presents with    Suicidal     PT REPORTS SI W PLAN TO JUMP OFF BRIDGE. - HI  +AH +VH REPORTED.  PT RAMBLING AND MANIC IN TRIAGE.  WANDED.      Presents with suicidal ideations. Pt states he was in [facility] and send to the ED. He exhibits fly of ideas and tangential conversation    The history is provided by the patient.   Mental Health Problem  The primary symptoms include bizarre behavior, disorganized speech and suicidal ideas. The current episode started just prior to arrival. This is a recurrent problem.   The degree of incapacity that he is experiencing as a consequence of his illness is moderate. Sequelae of the illness include an inability to work, harmed interpersonal relations and an inability to care for self. Additional symptoms of the illness include euphoric mood, flight of ideas, distractible and poor judgment. Additional symptoms of the illness do not include abdominal pain. He admits to suicidal ideas. He does not have a plan to attempt suicide. He does not contemplate harming himself. He has not already injured self. He does not contemplate injuring another person. He has not already  injured another person. Risk factors that are present for mental illness include substance abuse.   Review of patient's allergies indicates:  No Known Allergies  Past Medical History:   Diagnosis Date    Bipolar affective     Depression     HIV (human immunodeficiency virus infection)     Schizophrenia      No past surgical history on file.  No family history on file.  Social History     Tobacco Use    Smoking status: Every Day     Packs/day: 0.50     Types: Cigarettes    Smokeless tobacco: Never   Substance Use Topics    Alcohol use: No    Drug use: Yes     Types: Marijuana     Comment: last use one week ago      Review of Systems   Unable to perform ROS: Psychiatric disorder   Constitutional:  Negative for fever.   Respiratory:  Negative for  shortness of breath.    Cardiovascular:  Negative for chest pain.   Gastrointestinal:  Negative for abdominal pain.   Psychiatric/Behavioral:  Positive for suicidal ideas. The patient is nervous/anxious and is hyperactive.    All other systems reviewed and are negative.    Physical Exam     Initial Vitals [09/20/22 1123]   BP Pulse Resp Temp SpO2   (!) 157/87 76 18 97.9 °F (36.6 °C) 100 %      MAP       --         Physical Exam    Nursing note and vitals reviewed.  Constitutional: He appears well-developed and well-nourished.   HENT:   Head: Normocephalic and atraumatic.   Eyes: Conjunctivae are normal. Pupils are equal, round, and reactive to light.   Neck: Neck supple.   Normal range of motion.  Cardiovascular:  Regular rhythm, normal heart sounds and intact distal pulses.           Pulmonary/Chest: Breath sounds normal. No respiratory distress.   Abdominal: Abdomen is soft. Bowel sounds are normal. He exhibits no distension. There is no abdominal tenderness. There is no rebound and no guarding.   Musculoskeletal:         General: No edema. Normal range of motion.      Cervical back: Normal range of motion and neck supple.     Neurological: He is alert and oriented to person, place, and time. He has normal strength. GCS score is 15. GCS eye subscore is 4. GCS verbal subscore is 5. GCS motor subscore is 6.   Skin: Skin is warm and dry. No rash noted.   Psychiatric: His mood appears anxious. His affect is inappropriate. His speech is rapid and/or pressured and tangential. He is hyperactive. He is not agitated, not aggressive, not actively hallucinating and not combative. Thought content is not paranoid and not delusional. Cognition and memory are impaired. He expresses inappropriate judgment. He expresses suicidal ideation. He expresses no homicidal ideation. He expresses no suicidal plans and no homicidal plans. He is inattentive.       ED Course   Procedures  Labs Reviewed   CBC W/ AUTO DIFFERENTIAL    Narrative:      The following orders were created for panel order CBC auto differential.  Procedure                               Abnormality         Status                     ---------                               -----------         ------                     CBC with Differential[[id number]]                                                         Please view results for these tests on the individual orders.   COMPREHENSIVE METABOLIC PANEL   TSH   URINALYSIS, REFLEX TO URINE CULTURE   DRUG SCREEN, URINE (BEAKER)   ALCOHOL,MEDICAL (ETHANOL)   SARS-COV-2 RNA AMPLIFICATION, QUAL   CBC WITH DIFFERENTIAL          Imaging Results    None          Medications   nicotine 14 mg/24 hr 1 patch (has no administration in time range)   olanzapine zydis disintegrating tablet 20 mg (has no administration in time range)   LORazepam tablet 1 mg (has no administration in time range)                    Medically cleared for psychiatry placement: 9/20/2022 12:35 PM         Clinical Impression:   Final diagnoses:  [Z00.8] Medical clearance for psychiatric admission  [R45.851] Suicidal thoughts (Primary)  [F29] Psychosis, unspecified psychosis type        ED Disposition Condition    Transfer to Psych Facility Stable          ED Prescriptions    None       Follow-up Information    None          Christian Lynn MD  09/20/22 7174

## 2022-10-11 ENCOUNTER — HOSPITAL ENCOUNTER (EMERGENCY)
Facility: HOSPITAL | Age: 32
Discharge: PSYCHIATRIC OR SUBSTANCE USE FACILITY | End: 2022-10-11
Attending: INTERNAL MEDICINE
Payer: MEDICAID

## 2022-10-11 VITALS
SYSTOLIC BLOOD PRESSURE: 121 MMHG | BODY MASS INDEX: 23.02 KG/M2 | TEMPERATURE: 98 F | HEART RATE: 85 BPM | HEIGHT: 71 IN | DIASTOLIC BLOOD PRESSURE: 76 MMHG | RESPIRATION RATE: 20 BRPM | OXYGEN SATURATION: 100 % | WEIGHT: 164.44 LBS

## 2022-10-11 DIAGNOSIS — F19.10 POLYSUBSTANCE ABUSE: Primary | ICD-10-CM

## 2022-10-11 DIAGNOSIS — F99 PSYCHIATRIC PROBLEM: ICD-10-CM

## 2022-10-11 DIAGNOSIS — F30.2 BIPOLAR I DISORDER, SINGLE MANIC EPISODE, SEVERE, WITH PSYCHOSIS: ICD-10-CM

## 2022-10-11 LAB
ALBUMIN SERPL-MCNC: 4.4 GM/DL (ref 3.5–5)
ALBUMIN/GLOB SERPL: 1.3 RATIO (ref 1.1–2)
ALP SERPL-CCNC: 89 UNIT/L (ref 40–150)
ALT SERPL-CCNC: 16 UNIT/L (ref 0–55)
AMPHET UR QL SCN: POSITIVE
APPEARANCE UR: CLEAR
AST SERPL-CCNC: 26 UNIT/L (ref 5–34)
BACTERIA #/AREA URNS AUTO: ABNORMAL /HPF
BARBITURATE SCN PRESENT UR: NEGATIVE
BASOPHILS # BLD AUTO: 0.04 X10(3)/MCL (ref 0–0.2)
BASOPHILS NFR BLD AUTO: 0.8 %
BENZODIAZ UR QL SCN: NEGATIVE
BILIRUB UR QL STRIP.AUTO: NEGATIVE MG/DL
BILIRUBIN DIRECT+TOT PNL SERPL-MCNC: 0.7 MG/DL
BUN SERPL-MCNC: 11.9 MG/DL (ref 8.9–20.6)
CALCIUM SERPL-MCNC: 9.6 MG/DL (ref 8.4–10.2)
CANNABINOIDS UR QL SCN: POSITIVE
CHLORIDE SERPL-SCNC: 105 MMOL/L (ref 98–107)
CO2 SERPL-SCNC: 25 MMOL/L (ref 22–29)
COCAINE UR QL SCN: NEGATIVE
COLOR UR AUTO: YELLOW
CREAT SERPL-MCNC: 1.29 MG/DL (ref 0.73–1.18)
EOSINOPHIL # BLD AUTO: 0.15 X10(3)/MCL (ref 0–0.9)
EOSINOPHIL NFR BLD AUTO: 3 %
ERYTHROCYTE [DISTWIDTH] IN BLOOD BY AUTOMATED COUNT: 12.2 % (ref 11.5–17)
ETHANOL SERPL-MCNC: <10 MG/DL
FENTANYL UR QL SCN: NEGATIVE
GFR SERPLBLD CREATININE-BSD FMLA CKD-EPI: >60 MLS/MIN/1.73/M2
GLOBULIN SER-MCNC: 3.4 GM/DL (ref 2.4–3.5)
GLUCOSE SERPL-MCNC: 76 MG/DL (ref 74–100)
GLUCOSE UR QL STRIP.AUTO: NORMAL MG/DL
HCT VFR BLD AUTO: 44.2 % (ref 42–52)
HGB BLD-MCNC: 15.6 GM/DL (ref 14–18)
HYALINE CASTS #/AREA URNS LPF: ABNORMAL /LPF
IMM GRANULOCYTES # BLD AUTO: 0 X10(3)/MCL (ref 0–0.04)
IMM GRANULOCYTES NFR BLD AUTO: 0 %
KETONES UR QL STRIP.AUTO: ABNORMAL MG/DL
LEUKOCYTE ESTERASE UR QL STRIP.AUTO: NEGATIVE UNIT/L
LYMPHOCYTES # BLD AUTO: 2.22 X10(3)/MCL (ref 0.6–4.6)
LYMPHOCYTES NFR BLD AUTO: 44.8 %
MCH RBC QN AUTO: 33.3 PG (ref 27–31)
MCHC RBC AUTO-ENTMCNC: 35.3 MG/DL (ref 33–36)
MCV RBC AUTO: 94.4 FL (ref 80–94)
MDMA UR QL SCN: NEGATIVE
MONOCYTES # BLD AUTO: 0.54 X10(3)/MCL (ref 0.1–1.3)
MONOCYTES NFR BLD AUTO: 10.9 %
MUCOUS THREADS URNS QL MICRO: ABNORMAL /LPF
NEUTROPHILS # BLD AUTO: 2 X10(3)/MCL (ref 2.1–9.2)
NEUTROPHILS NFR BLD AUTO: 40.5 %
NITRITE UR QL STRIP.AUTO: NEGATIVE
NRBC BLD AUTO-RTO: 0 %
OPIATES UR QL SCN: NEGATIVE
PCP UR QL: NEGATIVE
PH UR STRIP.AUTO: 6 [PH]
PH UR: 6 [PH] (ref 3–11)
PLATELET # BLD AUTO: 300 X10(3)/MCL (ref 130–400)
PMV BLD AUTO: 9.9 FL (ref 7.4–10.4)
POTASSIUM SERPL-SCNC: 3.6 MMOL/L (ref 3.5–5.1)
PROT SERPL-MCNC: 7.8 GM/DL (ref 6.4–8.3)
PROT UR QL STRIP.AUTO: ABNORMAL MG/DL
RBC # BLD AUTO: 4.68 X10(6)/MCL (ref 4.7–6.1)
RBC #/AREA URNS AUTO: ABNORMAL /HPF
RBC UR QL AUTO: NEGATIVE UNIT/L
SARS-COV-2 RDRP RESP QL NAA+PROBE: NEGATIVE
SODIUM SERPL-SCNC: 141 MMOL/L (ref 136–145)
SP GR UR STRIP.AUTO: 1.03
SPECIFIC GRAVITY, URINE AUTO (.000) (OHS): 1.03 (ref 1–1.03)
SQUAMOUS #/AREA URNS LPF: ABNORMAL /HPF
TSH SERPL-ACNC: 0.58 UIU/ML (ref 0.35–4.94)
UROBILINOGEN UR STRIP-ACNC: ABNORMAL MG/DL
WBC # SPEC AUTO: 5 X10(3)/MCL (ref 4.5–11.5)
WBC #/AREA URNS AUTO: ABNORMAL /HPF

## 2022-10-11 PROCEDURE — 80053 COMPREHEN METABOLIC PANEL: CPT | Performed by: INTERNAL MEDICINE

## 2022-10-11 PROCEDURE — 93005 ELECTROCARDIOGRAM TRACING: CPT

## 2022-10-11 PROCEDURE — 25000003 PHARM REV CODE 250: Performed by: INTERNAL MEDICINE

## 2022-10-11 PROCEDURE — 81001 URINALYSIS AUTO W/SCOPE: CPT | Performed by: INTERNAL MEDICINE

## 2022-10-11 PROCEDURE — 87635 SARS-COV-2 COVID-19 AMP PRB: CPT | Performed by: INTERNAL MEDICINE

## 2022-10-11 PROCEDURE — 84443 ASSAY THYROID STIM HORMONE: CPT | Performed by: INTERNAL MEDICINE

## 2022-10-11 PROCEDURE — 80307 DRUG TEST PRSMV CHEM ANLYZR: CPT | Performed by: INTERNAL MEDICINE

## 2022-10-11 PROCEDURE — S4991 NICOTINE PATCH NONLEGEND: HCPCS | Performed by: INTERNAL MEDICINE

## 2022-10-11 PROCEDURE — 82077 ASSAY SPEC XCP UR&BREATH IA: CPT | Performed by: INTERNAL MEDICINE

## 2022-10-11 PROCEDURE — 36415 COLL VENOUS BLD VENIPUNCTURE: CPT | Performed by: INTERNAL MEDICINE

## 2022-10-11 PROCEDURE — 99285 EMERGENCY DEPT VISIT HI MDM: CPT | Mod: 25

## 2022-10-11 PROCEDURE — 85025 COMPLETE CBC W/AUTO DIFF WBC: CPT | Performed by: INTERNAL MEDICINE

## 2022-10-11 RX ORDER — DIVALPROEX SODIUM 250 MG/1
250 TABLET, DELAYED RELEASE ORAL
Status: COMPLETED | OUTPATIENT
Start: 2022-10-11 | End: 2022-10-11

## 2022-10-11 RX ORDER — IBUPROFEN 200 MG
1 TABLET ORAL DAILY
Status: DISCONTINUED | OUTPATIENT
Start: 2022-10-11 | End: 2022-10-11 | Stop reason: HOSPADM

## 2022-10-11 RX ORDER — OLANZAPINE 10 MG/1
10 TABLET ORAL DAILY
Status: DISCONTINUED | OUTPATIENT
Start: 2022-10-11 | End: 2022-10-11 | Stop reason: HOSPADM

## 2022-10-11 RX ADMIN — DIVALPROEX SODIUM 250 MG: 250 TABLET, DELAYED RELEASE ORAL at 02:10

## 2022-10-11 RX ADMIN — OLANZAPINE 10 MG: 10 TABLET, FILM COATED ORAL at 02:10

## 2022-10-11 RX ADMIN — NICOTINE 1 PATCH: 14 PATCH TRANSDERMAL at 02:10

## 2022-10-11 NOTE — ED PROVIDER NOTES
Encounter Date: 10/11/2022       History     Chief Complaint   Patient presents with    Psychiatric Evaluation     States ran out of meds a few days ago. States is suicidal but was told he needed to come for help by his family.      Presents due to hallucinations, admits using ectasy and not taking his mental health medications     The history is provided by the patient.   Review of patient's allergies indicates:  No Known Allergies  Past Medical History:   Diagnosis Date    Bipolar affective     Depression     HIV (human immunodeficiency virus infection)     Schizophrenia      History reviewed. No pertinent surgical history.  History reviewed. No pertinent family history.  Social History     Tobacco Use    Smoking status: Every Day     Packs/day: 0.50     Types: Cigarettes    Smokeless tobacco: Never   Substance Use Topics    Alcohol use: No    Drug use: Yes     Types: Marijuana     Comment: last use one week ago      Review of Systems   Constitutional:  Negative for fever.   HENT:  Negative for sore throat.    Respiratory:  Negative for shortness of breath.    Cardiovascular:  Negative for chest pain.   Gastrointestinal:  Negative for nausea.   Genitourinary:  Negative for dysuria.   Musculoskeletal:  Negative for back pain.   Skin:  Negative for rash.   Neurological:  Negative for weakness.   Hematological:  Does not bruise/bleed easily.   Psychiatric/Behavioral:  Positive for hallucinations and suicidal ideas.    All other systems reviewed and are negative.    Physical Exam     Initial Vitals [10/11/22 1326]   BP Pulse Resp Temp SpO2   121/76 85 20 98.4 °F (36.9 °C) 100 %      MAP       --         Physical Exam    Nursing note and vitals reviewed.  Constitutional: He appears well-developed and well-nourished. No distress.   HENT:   Head: Normocephalic and atraumatic.   Eyes: Conjunctivae and EOM are normal. Pupils are equal, round, and reactive to light.   Neck: Neck supple.   Normal range of  motion.  Cardiovascular:  Regular rhythm, normal heart sounds and intact distal pulses.           Pulmonary/Chest: Breath sounds normal. No respiratory distress.   Abdominal: Abdomen is soft. Bowel sounds are normal. He exhibits no distension. There is no abdominal tenderness. There is no rebound and no guarding.   Musculoskeletal:         General: No edema. Normal range of motion.      Cervical back: Normal range of motion and neck supple.     Neurological: He is alert and oriented to person, place, and time. He has normal strength.   Skin: Skin is warm and dry. No rash noted.   Psychiatric: His affect is blunt. His speech is rapid and/or pressured and tangential. He is hyperactive and actively hallucinating. Thought content is delusional. Cognition and memory are normal. He expresses impulsivity. He expresses suicidal ideation.       ED Course   Procedures  Labs Reviewed   CBC W/ AUTO DIFFERENTIAL    Narrative:     The following orders were created for panel order CBC auto differential.  Procedure                               Abnormality         Status                     ---------                               -----------         ------                     CBC with Differential[[id number]]                                                         Please view results for these tests on the individual orders.   COMPREHENSIVE METABOLIC PANEL   TSH   URINALYSIS, REFLEX TO URINE CULTURE   DRUG SCREEN, URINE (BEAKER)   ALCOHOL,MEDICAL (ETHANOL)   SARS-COV-2 RNA AMPLIFICATION, QUAL   CBC WITH DIFFERENTIAL   SARS CORONAVIRUS 2 ANTIGEN POCT, MANUAL READ     EKG Readings: (Independently Interpreted)   Initial Reading: No STEMI. Rhythm: Normal Sinus Rhythm. Heart Rate: 66. Ectopy: No Ectopy. Conduction: Normal. ST Segments: Normal ST Segments. T Waves: Normal. Clinical Impression: Normal Sinus Rhythm   ECG Results              EKG 12-lead (In process)  Result time 10/11/22 14:14:35      In process by Interface, Lab In [facility]  (10/11/22 14:14:35)                   Narrative:    Test Reason : F99,    Vent. Rate : 067 BPM     Atrial Rate : 067 BPM     P-R Int : 124 ms          QRS Dur : 086 ms      QT Int : 406 ms       P-R-T Axes : 068 087 028 degrees     QTc Int : 429 ms    Normal sinus rhythm with sinus arrhythmia  Minimal voltage criteria for LVH, may be normal variant  Borderline Abnormal ECG  No previous ECGs available    Referred By: [email or web address]   SELF           Confirmed By:                                   Imaging Results    None          Medications   nicotine 14 mg/24 hr 1 patch (1 patch Transdermal Patch Applied 10/11/22 1404)   OLANZapine tablet 10 mg (10 mg Oral Given 10/11/22 1404)   divalproex EC tablet 250 mg (250 mg Oral Given 10/11/22 1404)                    Medically cleared for psychiatry placement: 10/11/2022  2:16 PM         Clinical Impression:   Final diagnoses:  [F99] Psychiatric problem  [F19.10] Polysubstance abuse (Primary)  [F30.2] Bipolar I disorder, single manic episode, severe, with psychosis      ED Disposition Condition    Transfer to Psych Facility Stable          ED Prescriptions    None       Follow-up Information    None          Christian Lynn MD  10/11/22 0281

## 2022-10-11 NOTE — ED NOTES
SPD here to transport pt to [facility] in [place]. All personal items and original PEC sent with pt.

## 2022-11-04 ENCOUNTER — HOSPITAL ENCOUNTER (EMERGENCY)
Facility: HOSPITAL | Age: 32
Discharge: PSYCHIATRIC OR SUBSTANCE USE FACILITY | End: 2022-11-04
Attending: FAMILY MEDICINE
Payer: MEDICAID

## 2022-11-04 VITALS
WEIGHT: 167.56 LBS | OXYGEN SATURATION: 99 % | RESPIRATION RATE: 16 BRPM | DIASTOLIC BLOOD PRESSURE: 74 MMHG | HEIGHT: 71 IN | HEART RATE: 92 BPM | BODY MASS INDEX: 23.46 KG/M2 | SYSTOLIC BLOOD PRESSURE: 128 MMHG | TEMPERATURE: 98 F

## 2022-11-04 DIAGNOSIS — R45.851 SUICIDAL IDEATION: Primary | ICD-10-CM

## 2022-11-04 DIAGNOSIS — F23 ACUTE PSYCHOSIS: ICD-10-CM

## 2022-11-04 LAB
ALBUMIN SERPL-MCNC: 4.7 GM/DL (ref 3.5–5)
ALBUMIN/GLOB SERPL: 1.2 RATIO (ref 1.1–2)
ALP SERPL-CCNC: 109 UNIT/L (ref 40–150)
ALT SERPL-CCNC: 11 UNIT/L (ref 0–55)
AST SERPL-CCNC: 21 UNIT/L (ref 5–34)
BASOPHILS # BLD AUTO: 0.03 X10(3)/MCL (ref 0–0.2)
BASOPHILS NFR BLD AUTO: 0.3 %
BILIRUBIN DIRECT+TOT PNL SERPL-MCNC: 0.8 MG/DL
BUN SERPL-MCNC: 14.9 MG/DL (ref 8.9–20.6)
CALCIUM SERPL-MCNC: 9.9 MG/DL (ref 8.4–10.2)
CHLORIDE SERPL-SCNC: 105 MMOL/L (ref 98–107)
CO2 SERPL-SCNC: 23 MMOL/L (ref 22–29)
CREAT SERPL-MCNC: 1.36 MG/DL (ref 0.73–1.18)
EOSINOPHIL # BLD AUTO: 0.07 X10(3)/MCL (ref 0–0.9)
EOSINOPHIL NFR BLD AUTO: 0.7 %
ERYTHROCYTE [DISTWIDTH] IN BLOOD BY AUTOMATED COUNT: 12.3 % (ref 11.5–17)
ETHANOL SERPL-MCNC: <10 MG/DL
GFR SERPLBLD CREATININE-BSD FMLA CKD-EPI: >60 MLS/MIN/1.73/M2
GLOBULIN SER-MCNC: 3.8 GM/DL (ref 2.4–3.5)
GLUCOSE SERPL-MCNC: 131 MG/DL (ref 74–100)
HCT VFR BLD AUTO: 47.6 % (ref 42–52)
HGB BLD-MCNC: 16.4 GM/DL (ref 14–18)
IMM GRANULOCYTES # BLD AUTO: 0.01 X10(3)/MCL (ref 0–0.04)
IMM GRANULOCYTES NFR BLD AUTO: 0.1 %
LYMPHOCYTES # BLD AUTO: 2.49 X10(3)/MCL (ref 0.6–4.6)
LYMPHOCYTES NFR BLD AUTO: 25.6 %
MCH RBC QN AUTO: 32.8 PG (ref 27–31)
MCHC RBC AUTO-ENTMCNC: 34.5 MG/DL (ref 33–36)
MCV RBC AUTO: 95.2 FL (ref 80–94)
MONOCYTES # BLD AUTO: 0.7 X10(3)/MCL (ref 0.1–1.3)
MONOCYTES NFR BLD AUTO: 7.2 %
NEUTROPHILS # BLD AUTO: 6.4 X10(3)/MCL (ref 2.1–9.2)
NEUTROPHILS NFR BLD AUTO: 66.1 %
NRBC BLD AUTO-RTO: 0 %
PLATELET # BLD AUTO: 273 X10(3)/MCL (ref 130–400)
PMV BLD AUTO: 9.5 FL (ref 7.4–10.4)
POTASSIUM SERPL-SCNC: 3.8 MMOL/L (ref 3.5–5.1)
PROT SERPL-MCNC: 8.5 GM/DL (ref 6.4–8.3)
RBC # BLD AUTO: 5 X10(6)/MCL (ref 4.7–6.1)
SARS-COV-2 RDRP RESP QL NAA+PROBE: NEGATIVE
SODIUM SERPL-SCNC: 140 MMOL/L (ref 136–145)
T4 FREE SERPL-MCNC: 1.1 NG/DL (ref 0.7–1.48)
TSH SERPL-ACNC: 0.18 UIU/ML (ref 0.35–4.94)
WBC # SPEC AUTO: 9.7 X10(3)/MCL (ref 4.5–11.5)

## 2022-11-04 PROCEDURE — 85025 COMPLETE CBC W/AUTO DIFF WBC: CPT | Performed by: FAMILY MEDICINE

## 2022-11-04 PROCEDURE — 84439 ASSAY OF FREE THYROXINE: CPT | Performed by: FAMILY MEDICINE

## 2022-11-04 PROCEDURE — 84443 ASSAY THYROID STIM HORMONE: CPT | Performed by: FAMILY MEDICINE

## 2022-11-04 PROCEDURE — 82077 ASSAY SPEC XCP UR&BREATH IA: CPT | Performed by: FAMILY MEDICINE

## 2022-11-04 PROCEDURE — 99285 EMERGENCY DEPT VISIT HI MDM: CPT | Mod: 25

## 2022-11-04 PROCEDURE — 87635 SARS-COV-2 COVID-19 AMP PRB: CPT | Performed by: FAMILY MEDICINE

## 2022-11-04 PROCEDURE — 80053 COMPREHEN METABOLIC PANEL: CPT | Performed by: FAMILY MEDICINE

## 2022-11-04 NOTE — ED PROVIDER NOTES
Encounter Date: 11/4/2022       History     Chief Complaint   Patient presents with    Suicidal     States has history bipolar and schizophrenia not taking meds, feeling like wants to hurt self , hearing voices      32-year-old gentleman presents emergency room complaints of auditory hallucinations telling him to kill himself.  Currently calm cooperative.  Symptoms currently moderate, worsened over the last 2-3 days.  Currently out of his psychiatric medications.    The history is provided by the patient.   Review of patient's allergies indicates:  No Known Allergies  Past Medical History:   Diagnosis Date    Bipolar affective     Depression     HIV (human immunodeficiency virus infection)     Schizophrenia      History reviewed. No pertinent surgical history.  History reviewed. No pertinent family history.  Social History     Tobacco Use    Smoking status: Every Day     Packs/day: 0.50     Types: Cigarettes    Smokeless tobacco: Never   Substance Use Topics    Alcohol use: No    Drug use: Yes     Types: Marijuana     Comment: last use one week ago      Review of Systems   Constitutional:  Negative for chills, fatigue and fever.   HENT:  Negative for ear pain, rhinorrhea and sore throat.    Eyes:  Negative for photophobia and pain.   Respiratory:  Negative for cough, shortness of breath and wheezing.    Cardiovascular:  Negative for chest pain.   Gastrointestinal:  Negative for abdominal pain, diarrhea, nausea and vomiting.   Genitourinary:  Negative for dysuria.   Neurological:  Negative for dizziness, weakness and headaches.   All other systems reviewed and are negative.    Physical Exam     Initial Vitals [11/04/22 0919]   BP Pulse Resp Temp SpO2   135/78 98 20 99.3 °F (37.4 °C) 99 %      MAP       --         Physical Exam    Nursing note and vitals reviewed.  Constitutional: He appears well-developed and well-nourished.   HENT:   Head: Normocephalic and atraumatic.   Eyes: EOM are normal. Pupils are equal,  round, and reactive to light.   Neck: Neck supple.   Normal range of motion.  Cardiovascular:  Normal rate, regular rhythm and normal heart sounds.     Exam reveals no gallop and no friction rub.       No murmur heard.  Pulmonary/Chest: Breath sounds normal. No respiratory distress.   Abdominal: Abdomen is soft. Bowel sounds are normal. He exhibits no distension. There is no abdominal tenderness.   Musculoskeletal:         General: Normal range of motion.      Cervical back: Normal range of motion and neck supple.     Neurological: He is alert and oriented to person, place, and time. He has normal strength.   Skin: Skin is warm and dry.   Psychiatric: Judgment normal. His affect is labile and inappropriate. His speech is tangential. He is hyperactive and actively hallucinating. He is not aggressive and not combative. Thought content is delusional. He expresses suicidal ideation.   Laughing inappropriately       ED Course   Procedures  Labs Reviewed   COMPREHENSIVE METABOLIC PANEL - Abnormal; Notable for the following components:       Result Value    Glucose Level 131 (*)     Creatinine 1.36 (*)     Protein Total 8.5 (*)     Globulin 3.8 (*)     All other components within normal limits   TSH - Abnormal; Notable for the following components:    Thyroid Stimulating Hormone 0.1790 (*)     All other components within normal limits   CBC WITH DIFFERENTIAL - Abnormal; Notable for the following components:    MCV 95.2 (*)     MCH 32.8 (*)     All other components within normal limits   ALCOHOL,MEDICAL (ETHANOL) - Normal   SARS-COV-2 RNA AMPLIFICATION, QUAL - Normal    Narrative:     The IDNOW COVID-19 assay is a rapid molecular in vitro diagnostic test utilizing an isothermal nucleic acid amplification technology intended for the qualitative detection of nucleic acid from the SARS-CoV-2 viral RNA in direct nasal, nasopharyngeal or throat swabs from individuals who are suspected of COVID-19 by their healthcare provider.    T4, FREE - Normal   CBC W/ AUTO DIFFERENTIAL    Narrative:     The following orders were created for panel order CBC auto differential.  Procedure                               Abnormality         Status                     ---------                               -----------         ------                     CBC with Differential[[id number]]        Abnormal            Final result                 Please view results for these tests on the individual orders.   URINALYSIS, REFLEX TO URINE CULTURE   DRUG SCREEN, URINE (BEAKER)          Imaging Results    None          Medications - No data to display                 Medically cleared for psychiatry placement: 11/4/2022 11:00 AM         Clinical Impression:   Final diagnoses:  [R45.851] Suicidal ideation (Primary)  [F23] Acute psychosis      ED Disposition Condition    Transfer to Psych Facility Stable          ED Prescriptions    None       Follow-up Information    None          Vish Murdock MD  11/06/22 3029

## 2022-12-24 ENCOUNTER — HOSPITAL ENCOUNTER (EMERGENCY)
Facility: HOSPITAL | Age: 32
Discharge: PSYCHIATRIC OR SUBSTANCE USE FACILITY | End: 2022-12-24
Attending: EMERGENCY MEDICINE
Payer: MEDICAID

## 2022-12-24 ENCOUNTER — HOSPITAL ENCOUNTER (INPATIENT)
Facility: HOSPITAL | Age: 32
LOS: 4 days | Discharge: HOME | DRG: 885 | End: 2022-12-28
Attending: PSYCHIATRY & NEUROLOGY | Admitting: PSYCHIATRY & NEUROLOGY
Payer: MEDICAID

## 2022-12-24 VITALS
RESPIRATION RATE: 18 BRPM | OXYGEN SATURATION: 100 % | HEART RATE: 69 BPM | TEMPERATURE: 98 F | WEIGHT: 170 LBS | DIASTOLIC BLOOD PRESSURE: 73 MMHG | BODY MASS INDEX: 24.34 KG/M2 | SYSTOLIC BLOOD PRESSURE: 136 MMHG | HEIGHT: 70 IN

## 2022-12-24 DIAGNOSIS — F33.3 SEVERE RECURRENT MAJOR DEPRESSION WITH PSYCHOTIC FEATURES: Primary | ICD-10-CM

## 2022-12-24 DIAGNOSIS — F31.30: ICD-10-CM

## 2022-12-24 PROBLEM — J18.9 RIGHT LOWER LOBE PNEUMONIA: Status: RESOLVED | Noted: 2018-06-03 | Resolved: 2022-12-24

## 2022-12-24 PROBLEM — E43 SEVERE MALNUTRITION: Status: RESOLVED | Noted: 2018-06-04 | Resolved: 2022-12-24

## 2022-12-24 PROBLEM — K64.9 HEMORRHOID: Status: RESOLVED | Noted: 2018-06-05 | Resolved: 2022-12-24

## 2022-12-24 PROBLEM — F30.2 BIPOLAR I DISORDER, SINGLE MANIC EPISODE, SEVERE, WITH PSYCHOSIS: Status: ACTIVE | Noted: 2022-12-24

## 2022-12-24 PROBLEM — R45.851 SUICIDE IDEATION: Status: RESOLVED | Noted: 2020-10-23 | Resolved: 2022-12-24

## 2022-12-24 PROBLEM — N48.5 PENILE ULCER: Status: RESOLVED | Noted: 2018-05-14 | Resolved: 2022-12-24

## 2022-12-24 PROBLEM — R50.9 FEVER: Status: RESOLVED | Noted: 2018-05-14 | Resolved: 2022-12-24

## 2022-12-24 PROBLEM — K12.1 ORAL ULCER: Status: RESOLVED | Noted: 2018-05-14 | Resolved: 2022-12-24

## 2022-12-24 LAB
ALBUMIN SERPL-MCNC: 4.8 G/DL (ref 3.5–5)
ALBUMIN/GLOB SERPL: 1.1 RATIO (ref 1.1–2)
ALP SERPL-CCNC: 88 UNIT/L (ref 40–150)
ALT SERPL-CCNC: 22 UNIT/L (ref 0–55)
AMPHET UR QL SCN: POSITIVE
APAP SERPL-MCNC: <17.4 UG/ML (ref 17.4–30)
APPEARANCE UR: CLEAR
AST SERPL-CCNC: 30 UNIT/L (ref 5–34)
BACTERIA #/AREA URNS AUTO: ABNORMAL /HPF
BARBITURATE SCN PRESENT UR: NEGATIVE
BASOPHILS # BLD AUTO: 0.04 X10(3)/MCL (ref 0–0.2)
BASOPHILS NFR BLD AUTO: 0.5 %
BENZODIAZ UR QL SCN: NEGATIVE
BILIRUB UR QL STRIP.AUTO: NEGATIVE MG/DL
BILIRUBIN DIRECT+TOT PNL SERPL-MCNC: 0.7 MG/DL
BUN SERPL-MCNC: 22.5 MG/DL (ref 8.9–20.6)
CALCIUM SERPL-MCNC: 10.1 MG/DL (ref 8.4–10.2)
CANNABINOIDS UR QL SCN: POSITIVE
CHLORIDE SERPL-SCNC: 105 MMOL/L (ref 98–107)
CO2 SERPL-SCNC: 27 MMOL/L (ref 22–29)
COCAINE UR QL SCN: NEGATIVE
COLOR UR AUTO: YELLOW
CREAT SERPL-MCNC: 1.47 MG/DL (ref 0.73–1.18)
EOSINOPHIL # BLD AUTO: 0.16 X10(3)/MCL (ref 0–0.9)
EOSINOPHIL NFR BLD AUTO: 2.2 %
ERYTHROCYTE [DISTWIDTH] IN BLOOD BY AUTOMATED COUNT: 12.5 % (ref 11.6–14.4)
ETHANOL SERPL-MCNC: <10 MG/DL
FLUAV AG UPPER RESP QL IA.RAPID: NOT DETECTED
FLUBV AG UPPER RESP QL IA.RAPID: NOT DETECTED
GFR SERPLBLD CREATININE-BSD FMLA CKD-EPI: >60 MLS/MIN/1.73/M2
GLOBULIN SER-MCNC: 4.2 GM/DL (ref 2.4–3.5)
GLUCOSE SERPL-MCNC: 103 MG/DL (ref 74–100)
GLUCOSE UR QL STRIP.AUTO: NEGATIVE MG/DL
HCT VFR BLD AUTO: 51 % (ref 42–52)
HGB BLD-MCNC: 17.2 GM/DL (ref 14–18)
IMM GRANULOCYTES # BLD AUTO: 0.01 X10(3)/MCL (ref 0–0.04)
IMM GRANULOCYTES NFR BLD AUTO: 0.1 %
KETONES UR QL STRIP.AUTO: NEGATIVE MG/DL
LEUKOCYTE ESTERASE UR QL STRIP.AUTO: NEGATIVE UNIT/L
LYMPHOCYTES # BLD AUTO: 3.48 X10(3)/MCL (ref 0.6–4.6)
LYMPHOCYTES NFR BLD AUTO: 47.2 %
MCH RBC QN AUTO: 32.4 PG
MCHC RBC AUTO-ENTMCNC: 33.7 MG/DL (ref 33–36)
MCV RBC AUTO: 96 FL (ref 80–94)
MDMA UR QL SCN: NEGATIVE
MONOCYTES # BLD AUTO: 0.65 X10(3)/MCL (ref 0.1–1.3)
MONOCYTES NFR BLD AUTO: 8.8 %
MUCOUS THREADS URNS QL MICRO: ABNORMAL /LPF
NEUTROPHILS # BLD AUTO: 3.04 X10(3)/MCL (ref 2.1–9.2)
NEUTROPHILS NFR BLD AUTO: 41.2 %
NITRITE UR QL STRIP.AUTO: NEGATIVE
NRBC BLD AUTO-RTO: 0 % (ref 0–1)
OPIATES UR QL SCN: NEGATIVE
PCP UR QL: NEGATIVE
PH UR STRIP.AUTO: 6.5 [PH]
PH UR: 6.5 [PH] (ref 3–11)
PLATELET # BLD AUTO: 299 X10(3)/MCL (ref 140–371)
PMV BLD AUTO: 9.5 FL (ref 9.4–12.4)
POTASSIUM SERPL-SCNC: 4.1 MMOL/L (ref 3.5–5.1)
PROT SERPL-MCNC: 9 GM/DL (ref 6.4–8.3)
PROT UR QL STRIP.AUTO: ABNORMAL MG/DL
RBC # BLD AUTO: 5.31 X10(6)/MCL (ref 4.7–6.1)
RBC #/AREA URNS AUTO: ABNORMAL /HPF
RBC UR QL AUTO: NEGATIVE UNIT/L
SARS-COV-2 RNA RESP QL NAA+PROBE: NOT DETECTED
SODIUM SERPL-SCNC: 144 MMOL/L (ref 136–145)
SP GR UR STRIP.AUTO: >=1.03
SPERM URNS QL MICRO: ABNORMAL /HPF
SQUAMOUS #/AREA URNS AUTO: ABNORMAL /HPF
TSH SERPL-ACNC: 0.32 UIU/ML (ref 0.35–4.94)
UROBILINOGEN UR STRIP-ACNC: 0.2 MG/DL
WBC # SPEC AUTO: 7.4 X10(3)/MCL (ref 4.5–11.5)
WBC #/AREA URNS AUTO: ABNORMAL /HPF

## 2022-12-24 PROCEDURE — 0240U COVID/FLU A&B PCR: CPT | Performed by: EMERGENCY MEDICINE

## 2022-12-24 PROCEDURE — 82077 ASSAY SPEC XCP UR&BREATH IA: CPT | Performed by: EMERGENCY MEDICINE

## 2022-12-24 PROCEDURE — 80143 DRUG ASSAY ACETAMINOPHEN: CPT | Performed by: EMERGENCY MEDICINE

## 2022-12-24 PROCEDURE — 25000003 PHARM REV CODE 250: Performed by: NURSE PRACTITIONER

## 2022-12-24 PROCEDURE — 11400000 HC PSYCH PRIVATE ROOM

## 2022-12-24 PROCEDURE — 81001 URINALYSIS AUTO W/SCOPE: CPT | Performed by: EMERGENCY MEDICINE

## 2022-12-24 PROCEDURE — 81003 URINALYSIS AUTO W/O SCOPE: CPT | Mod: 59 | Performed by: EMERGENCY MEDICINE

## 2022-12-24 PROCEDURE — 85025 COMPLETE CBC W/AUTO DIFF WBC: CPT | Performed by: EMERGENCY MEDICINE

## 2022-12-24 PROCEDURE — 84443 ASSAY THYROID STIM HORMONE: CPT | Performed by: EMERGENCY MEDICINE

## 2022-12-24 PROCEDURE — 99285 EMERGENCY DEPT VISIT HI MDM: CPT

## 2022-12-24 PROCEDURE — 80053 COMPREHEN METABOLIC PANEL: CPT | Performed by: EMERGENCY MEDICINE

## 2022-12-24 PROCEDURE — 63600175 PHARM REV CODE 636 W HCPCS

## 2022-12-24 PROCEDURE — 25000003 PHARM REV CODE 250: Performed by: EMERGENCY MEDICINE

## 2022-12-24 PROCEDURE — 80307 DRUG TEST PRSMV CHEM ANLYZR: CPT | Performed by: EMERGENCY MEDICINE

## 2022-12-24 RX ORDER — OLANZAPINE 10 MG/1
10 TABLET, ORALLY DISINTEGRATING ORAL NIGHTLY
Status: DISCONTINUED | OUTPATIENT
Start: 2022-12-24 | End: 2022-12-25

## 2022-12-24 RX ORDER — ADHESIVE BANDAGE
30 BANDAGE TOPICAL DAILY PRN
Status: DISCONTINUED | OUTPATIENT
Start: 2022-12-24 | End: 2022-12-28 | Stop reason: HOSPADM

## 2022-12-24 RX ORDER — ACETAMINOPHEN 325 MG/1
650 TABLET ORAL EVERY 6 HOURS PRN
Status: DISCONTINUED | OUTPATIENT
Start: 2022-12-24 | End: 2022-12-28 | Stop reason: HOSPADM

## 2022-12-24 RX ORDER — IBUPROFEN 400 MG/1
400 TABLET ORAL EVERY 6 HOURS PRN
Status: DISCONTINUED | OUTPATIENT
Start: 2022-12-24 | End: 2022-12-28 | Stop reason: HOSPADM

## 2022-12-24 RX ORDER — HYDROXYZINE PAMOATE 50 MG/1
50 CAPSULE ORAL EVERY 6 HOURS PRN
Status: DISCONTINUED | OUTPATIENT
Start: 2022-12-24 | End: 2022-12-28 | Stop reason: HOSPADM

## 2022-12-24 RX ORDER — IBUPROFEN 200 MG
1 TABLET ORAL DAILY
Status: DISCONTINUED | OUTPATIENT
Start: 2022-12-25 | End: 2022-12-28 | Stop reason: HOSPADM

## 2022-12-24 RX ORDER — LORAZEPAM 2 MG/ML
INJECTION INTRAMUSCULAR
Status: COMPLETED
Start: 2022-12-24 | End: 2022-12-24

## 2022-12-24 RX ORDER — LORAZEPAM 1 MG/1
2 TABLET ORAL
Status: COMPLETED | OUTPATIENT
Start: 2022-12-24 | End: 2022-12-24

## 2022-12-24 RX ORDER — LORAZEPAM 2 MG/ML
2 INJECTION INTRAMUSCULAR ONCE
Status: COMPLETED | OUTPATIENT
Start: 2022-12-24 | End: 2022-12-24

## 2022-12-24 RX ORDER — ALUMINUM HYDROXIDE, MAGNESIUM HYDROXIDE, AND SIMETHICONE 2400; 240; 2400 MG/30ML; MG/30ML; MG/30ML
30 SUSPENSION ORAL EVERY 6 HOURS PRN
Status: DISCONTINUED | OUTPATIENT
Start: 2022-12-24 | End: 2022-12-28 | Stop reason: HOSPADM

## 2022-12-24 RX ADMIN — LORAZEPAM 2 MG: 2 INJECTION INTRAMUSCULAR; INTRAVENOUS at 10:12

## 2022-12-24 RX ADMIN — OLANZAPINE 10 MG: 10 TABLET, ORALLY DISINTEGRATING ORAL at 11:12

## 2022-12-24 RX ADMIN — LORAZEPAM 2 MG: 2 INJECTION INTRAMUSCULAR at 10:12

## 2022-12-24 RX ADMIN — LORAZEPAM 2 MG: 1 TABLET ORAL at 12:12

## 2022-12-24 NOTE — ED TRIAGE NOTES
"Pt brought to ed per friend and pt states that he needs his meds and is "suicidal to self" and present with rambling speech  "

## 2022-12-24 NOTE — ED PROVIDER NOTES
"Encounter Date: 12/24/2022       History     Chief Complaint   Patient presents with    Psychiatric Evaluation     Pt brought to ed per friend and pt states that he needs his meds and is "suicidal to self" and present with rambling speech     The history is provided by the patient. No  was used.   Mental Health Problem  The primary symptoms include agitation, bizarre behavior, delusions, disorganized speech, disorganized thinking, hallucinations and suicidal ideas. The current episode started several days ago. This is a recurrent problem.   The degree of incapacity that he is experiencing as a consequence of his illness is severe. Additional symptoms of the illness include agitation, attention impairment, flight of ideas and distractible. He admits to suicidal ideas. He does not have a plan to attempt suicide. He contemplates harming himself. He has not already injured self. He does not contemplate injuring another person. He has not already  injured another person. Risk factors that are present for mental illness include a history of mental illness.   Pt is noncompliant with his meds    Review of patient's allergies indicates:  No Known Allergies  Past Medical History:   Diagnosis Date    Bipolar affective     Depression     HIV (human immunodeficiency virus infection)     Schizophrenia      No past surgical history on file.  No family history on file.  Social History     Tobacco Use    Smoking status: Every Day     Packs/day: 0.50     Types: Cigarettes    Smokeless tobacco: Never   Substance Use Topics    Alcohol use: No    Drug use: Yes     Types: Marijuana     Comment: last use one week ago      Review of Systems   Unable to perform ROS: Psychiatric disorder   Psychiatric/Behavioral:  Positive for agitation, hallucinations and suicidal ideas.      Physical Exam     Initial Vitals [12/24/22 1215]   BP Pulse Resp Temp SpO2   (!) 165/90 98 18 98.6 °F (37 °C) 100 %      MAP       --     "     Physical Exam    Nursing note and vitals reviewed.  Constitutional: He appears well-developed and well-nourished.   HENT:   Head: Normocephalic and atraumatic.   Right Ear: External ear normal.   Left Ear: External ear normal.   Nose: Nose normal.   Eyes: Conjunctivae and EOM are normal. Pupils are equal, round, and reactive to light.   Neck: Neck supple.   Normal range of motion.  Cardiovascular:  Normal rate, regular rhythm, normal heart sounds and intact distal pulses.           Pulmonary/Chest: Breath sounds normal.   Abdominal: Abdomen is soft. Bowel sounds are normal.   Musculoskeletal:         General: Normal range of motion.      Cervical back: Normal range of motion and neck supple.     Neurological: He is alert and oriented to person, place, and time. He has normal strength. GCS score is 15. GCS eye subscore is 4. GCS verbal subscore is 5. GCS motor subscore is 6.   Skin: Skin is warm and dry. Capillary refill takes less than 2 seconds.   Psychiatric: His mood appears anxious. His speech is rapid and/or pressured and tangential. Thought content is delusional. Cognition and memory are impaired. He expresses inappropriate judgment. He exhibits a depressed mood. He expresses suicidal ideation.       ED Course   Procedures  Labs Reviewed   COMPREHENSIVE METABOLIC PANEL - Abnormal; Notable for the following components:       Result Value    Glucose Level 103 (*)     Blood Urea Nitrogen 22.5 (*)     Creatinine 1.47 (*)     Protein Total 9.0 (*)     Globulin 4.2 (*)     All other components within normal limits   TSH - Abnormal; Notable for the following components:    Thyroid Stimulating Hormone 0.316 (*)     All other components within normal limits   ACETAMINOPHEN LEVEL - Abnormal; Notable for the following components:    Acetaminophen Level <17.4 (*)     All other components within normal limits   CBC WITH DIFFERENTIAL - Abnormal; Notable for the following components:    MCV 96.0 (*)     All other  components within normal limits   ALCOHOL,MEDICAL (ETHANOL) - Normal   COVID/FLU A&B PCR - Normal    Narrative:     The Xpert Xpress SARS-CoV-2/FLU/RSV plus is a rapid, multiplexed real-time PCR test intended for the simultaneous qualitative detection and differentiation of SARS-CoV-2, Influenza A, Influenza B, and respiratory syncytial virus (RSV) viral RNA in either nasopharyngeal swab or nasal swab specimens.         CBC W/ AUTO DIFFERENTIAL    Narrative:     The following orders were created for panel order CBC auto differential.  Procedure                               Abnormality         Status                     ---------                               -----------         ------                     CBC with Differential[[id number]]        Abnormal            Final result                 Please view results for these tests on the individual orders.   URINALYSIS, REFLEX TO URINE CULTURE   DRUG SCREEN, URINE (BEAKER)          Imaging Results    None          Medications   LORazepam tablet 2 mg (2 mg Oral Given 12/24/22 1245)                    Medically cleared for psychiatry placement: 12/24/2022  1:59 PM         Clinical Impression:   Final diagnoses:  [F33.3] Severe recurrent major depression with psychotic features (Primary)        ED Disposition Condition    Transfer to Psych Facility Stable          ED Prescriptions    None       Follow-up Information    None          Flip Ritter MD  12/24/22 7407

## 2022-12-25 PROCEDURE — 25000003 PHARM REV CODE 250: Performed by: NURSE PRACTITIONER

## 2022-12-25 PROCEDURE — 11400000 HC PSYCH PRIVATE ROOM

## 2022-12-25 RX ORDER — OLANZAPINE 10 MG/1
10 TABLET, ORALLY DISINTEGRATING ORAL 2 TIMES DAILY
Status: DISCONTINUED | OUTPATIENT
Start: 2022-12-25 | End: 2022-12-26

## 2022-12-25 RX ADMIN — OLANZAPINE 10 MG: 10 TABLET, ORALLY DISINTEGRATING ORAL at 09:12

## 2022-12-25 RX ADMIN — OLANZAPINE 10 MG: 10 TABLET, ORALLY DISINTEGRATING ORAL at 08:12

## 2022-12-25 RX ADMIN — HYDROXYZINE PAMOATE 50 MG: 50 CAPSULE ORAL at 08:12

## 2022-12-25 NOTE — NURSING
"A 32 year old BM admitted to the services of Dr. Khan on a PEC, from [facility], with a provisional Diagnosis of Bipolar D/O Depressed.  Presented with depressed mood, SI, AH. Noncompliant with meds.  Agitation, bizarre behavior, delusions, disorganized speech, disorganized thinking, hallucinations, suicidal ideas.  Talks to himself in ER. Ativan 2 mg po given in ER.    Diagnosis:  Bipolar affective, Depression, HIV, Schizophrenia.     UDS: + Amphetamines, + Cannabis.  ETOH negative.     Upon arrival at [facility] ,patient cooperative, oriented to person only, disorganized thoughts, loose associations, rambles, difficult  to understand,  speech is slurred, tangential thoughts, fidgety in his chair, denies depression,  denies any suicidal or homicidal thoughts, admits to auditory hallucinations, "rapping in my head, can't stop".    Reports being in a psyche facility 2 days ago but can't remember where or the name.  Q 15 minute safety checks initiated.   "

## 2022-12-25 NOTE — NURSING
Patient is manic, psychotic behavior, rambling,  yelling, sexually  inappropriate, Ativan 2mg IM per orders of Keith, NP. Will continue to monitor.

## 2022-12-25 NOTE — PLAN OF CARE
Problem: Sensory Perception Impairment (Psychotic Signs/Symptoms)  Goal: Decreased Sensory Symptoms (Psychotic Signs/Symptoms)  Intervention: Minimize and Manage Sensory Impairment  Flowsheets (Taken 12/24/2022 2311)  Sensory Stimulation Regulation:   auditory stimulation minimized   auditory stimulation provided     Problem: Sleep Disturbance (Psychotic Signs/Symptoms)  Goal: Improved Sleep (Psychotic Signs/Symptoms)  Flowsheets (Taken 12/24/2022 2311)  Mutually Determined Action Steps (Improved Sleep):   sleeps 4-6 hours at night   identifies disturbance factors   completes sleep diary entry   remains out of bed during day   develops sleep plan  Intervention: Promote Healthy Sleep Hygiene  Flowsheets (Taken 12/24/2022 2311)  Sleep Hygiene Promotion:   awakenings minimized   music provided   napping discouraged   room lighting adjusted   reading encouraged   noise level reduced   relaxation techniques promoted   regular sleep pattern promoted   sleep diary encouraged     Problem: Sleep Disturbance (Psychotic Signs/Symptoms)  Goal: Improved Sleep (Psychotic Signs/Symptoms)  Intervention: Promote Healthy Sleep Hygiene  Flowsheets (Taken 12/24/2022 2311)  Sleep Hygiene Promotion:   awakenings minimized   music provided   napping discouraged   room lighting adjusted   reading encouraged   noise level reduced   relaxation techniques promoted   regular sleep pattern promoted   sleep diary encouraged     Problem: Suicide Risk  Goal: Absence of Self-Harm  Intervention: Assess Risk to Self and Maintain Safety  Flowsheets (Taken 12/24/2022 2320)  Behavior Management:   behavioral plan reviewed   boundaries reinforced   impulse control promoted   security enhancements provided

## 2022-12-25 NOTE — GROUP NOTE
Education Group      Group Focus: Communication Skills      Number of patients in attendance: 0    Group Start Time: 1000  Group End Time:  1045  Groups Date: 12/25/2022  Group Topic:  Behavioral Health  Group Department: [facility]  Group Facilitators:  Juju Haas LPN  _____________________________________________________________________    Patient Name: Chon Parham  MRN: [id number]  Patient Class: IP- Psych   Admission Date\Time: 12/24/2022  7:50 PM  Hospital Length of Stay: 1  Primary Care Provider: Primary Doctor No     Referred by: Behavioral Medicine Unit Treatment Team     Target symptoms: na     Patient's response to treatment: Did not attend     Progress toward goals: na     Interval History: na     Diagnosis:      Plan: Continue treatment on BMU

## 2022-12-25 NOTE — NURSING
Administered ordered zyprexa zydis. Patient kept trying to grab me inappropriately. Had to push him off and back into his room and close his door to get away from him. Constant verbal inappropriatness. Will continue to monitor.

## 2022-12-25 NOTE — CONSULTS
Hospital Medicine Consultation Note        Patient Name: Chon Parham  MRN: [id number]  Patient Class: IP- Psych   Admission Date: 12/24/2022  7:50 PM  Length of Stay: 1  Attending Physician: [unfilled]   Primary Care Provider: Primary Doctor No  Face-to-Face encounter date: 12/25/2022 g  Consulting Physician: [facility] - Mary Emery MD  Reason for Consult: medical management   Chief Complaint: No chief complaint on file.        Patient information was obtained from patient, patient's family, past medical records.    HISTORY OF PRESENT ILLNESS:   Chon Parham is a 32 y.o. male with  has a past medical history of Bipolar affective, Depression, HIV (human immunodeficiency virus infection), and Schizophrenia..admitted to [facility] under Dr. gomes on 12/24/2022 with the diagnosis of bipolar, major depression, SI, agitation, delusions and hallucinations.  Uds pos for amphetamines and cannabis and amphetamines.  As reported by nursing staff pt was very agitated and argumentative, inappropriate sexual comments   He was given 2 mg ativan and zyprexa has been resumed  Hospital Medicine team was consulted for medical management   Pt is resting   Arousable  Denies any chest pain, or palpitations, sob   Vss  Labs show mildly elevated cr 1.4 , baseline 1.3     PAST MEDICAL HISTORY:     Past Medical History:   Diagnosis Date    Bipolar affective     Depression     HIV (human immunodeficiency virus infection)     Schizophrenia        PAST SURGICAL HISTORY:   No past surgical history on file.    ALLERGIES:   Patient has no known allergies.    FAMILY HISTORY:   Reviewed and negative    SOCIAL HISTORY:     Social History     Tobacco Use    Smoking status: Every Day     Packs/day: 0.50     Types: Cigarettes    Smokeless tobacco: Never   Substance Use Topics    Alcohol use: No        HOME MEDICATIONS:     Prior to Admission medications    Medication Sig Start Date End Date Taking? Authorizing Provider    kcfgxyrkl-fvqzazmm-pggmzmy ala (BIKTARVY) -25 mg per tablet Take 1 tablet by mouth once daily. 10/30/20  Yes Arthur Etienne MD   gabapentin (NEURONTIN) 300 MG capsule Take 1 capsule (300 mg total) by mouth 3 (three) times daily. 10/29/20 11/28/20  Arthur Etienne MD   OLANZapine (ZYPREXA) 10 MG tablet Take 1 tablet (10 mg total) by mouth 2 (two) times a day. 10/29/20 11/28/20  Arthur Etienne MD       REVIEW OF SYSTEMS:   Except as documented, all other systems reviewed and negative     PHYSICAL EXAM:     VITAL SIGNS: 24 HRS MIN & MAX LAST   Temp  Min: 97.8 °F (36.6 °C)  Max: 98.7 °F (37.1 °C) 97.8 °F (36.6 °C)   BP  Min: 122/72  Max: 157/77 132/80   Pulse  Min: 67  Max: 76  67   Resp  Min: 18  Max: 20 18   SpO2  Min: 100 %  Max: 100 % 100 %       Vitals Reviewed  General appearance: Well-developed, well-nourished male in no apparent distress.  HENT: Atraumatic head. Moist mucous membranes of oral cavity.  Eyes: Normal extraocular movements.   Neck: Supple.   Lungs: Clear to auscultation bilaterally. No wheezing present.   Heart: Regular rate and rhythm. S1 and S2 present with no murmurs/gallop/rub. No pedal edema. No JVD present.   Abdomen: Soft, non-distended, non-tender. No rebound tenderness/guarding. Bowel sounds are normal.   Extremities: No cyanosis, clubbing, or edema.  Skin: No Rash.   Neuro: no focal neuro deficits  Psych/mental status: disorganized thoughts     LABS AND IMAGING:     Recent Labs   Lab 12/24/22  1253   WBC 7.4   RBC 5.31   HGB 17.2   HCT 51.0   MCV 96.0*   MCH 32.4   MCHC 33.7   RDW 12.5      MPV 9.5       Recent Labs   Lab 12/24/22  1253      K 4.1   CO2 27   BUN 22.5*   CREATININE 1.47*   CALCIUM 10.1   ALBUMIN 4.8   ALKPHOS 88   ALT 22   AST 30   BILITOT 0.7        Microbiology Results (last 7 days)       ** No results found for the last 168 hours. **               ASSESSMENT & PLAN:     Schizoaffective disorder  Bipolar  Major depression w  SI  Polysubstance abuse  Ckd 3   hiv    Plan  Stay hydrated  Avoid nephrotoxic drugs  Resume HAART , mom notified to bring meds from home  Counselled on medication adherance  Zyprexa has been started  Prn ativan  And remaining management per primary  Repeat bmp   Thank you for the consult, will be available if needed        __________________________________________________________________________  INPATIENT LIST OF MEDICATIONS     Current Facility-Administered Medications:     acetaminophen tablet 650 mg, 650 mg, Oral, Q6H PRN, ARCADIO HealyP    aluminum & magnesium hydroxide-simethicone 400-400-40 mg/5 mL suspension 30 mL, 30 mL, Oral, Q6H PRN, Chuckie Lord PMHNP    [START ON 12/26/2022] cqeixvgzb-cvklnpwh-zxmsarv ala -25 mg (25 kg or greater) 1 tablet, 1 tablet, Oral, Daily, Chuckie Lord PMHNP    hydrOXYzine pamoate capsule 50 mg, 50 mg, Oral, Q6H PRN, ARCADIO HealyP    ibuprofen tablet 400 mg, 400 mg, Oral, Q6H PRN, Chuckie Lord PMCAMRONP    magnesium hydroxide 400 mg/5 ml suspension 2,400 mg, 30 mL, Oral, Daily PRN, RAMONITA Healy    nicotine 14 mg/24 hr 1 patch, 1 patch, Transdermal, Daily, Chuckie Lord PMHNP    OLANZapine zydis disintegrating tablet 10 mg, 10 mg, Oral, BID, RAMONITA Healy, 10 mg at 12/25/22 0951      Scheduled Meds:   [START ON 12/26/2022] pgluadkam-xvwnckrl-chyqvja ala  1 tablet Oral Daily    nicotine  1 patch Transdermal Daily    OLANZapine zydis  10 mg Oral BID     Continuous Infusions:  PRN Meds:.acetaminophen, aluminum & magnesium hydroxide-simethicone, hydrOXYzine pamoate, ibuprofen, magnesium hydroxide 400 mg/5 ml    RADIOLOGY                                                                                                    X-Ray Forearm Right     CLINICAL:  Swelling.      FINDINGS: Osseous and articular structures are unremarkable. There is  no fracture, subluxation or arthritic change.  Alignment and position  are  satisfactory.  Bones are well mineralized.  No soft tissue  calcifications are noted.       IMPRESSION:     No acute osseous abnormality identified.      Electronically Signed By: Curt Kelley MD  Date/Time Signed: 12/12/2021 16:27      ______________________________________________________________________________        All diagnosis and differential diagnosis have been reviewed; assessment and plan has been documented; I have personally reviewed the labs and test results that are presently available; I have reviewed the patients medication list; I have reviewed the consulting providers response and recommendations. I have reviewed or attempted to review medical records based upon their availability.    All of the patient and family questions have been addressed and answered. Patient's is agreeable to the above stated plan. I will continue to monitor closely and make adjustments to medical management as needed.    Mary Emery MD   [facility] Medicine Team  12/25/2022

## 2022-12-25 NOTE — PLAN OF CARE
Problem: Behavior Regulation Impairment (Psychotic Signs/Symptoms)  Goal: Improved Behavioral Control (Psychotic Signs/Symptoms)  Flowsheets (Taken 12/24/2022 2311)  Mutually Determined Action Steps (Improved Behavioral Control):   identifies symptoms triggers   verbalizes gratifying activity   verbalizes personal treatment goal   other (see comments)   identifies future-oriented goal  Intervention: Manage Behavior  Flowsheets (Taken 12/24/2022 2311)  De-Escalation Techniques:   appropriate behavior reinforced   diversional activity encouraged   medication administered   stimulation decreased   reoriented   quiet time facilitated   increased round frequency   family involvement requested   1:1 observation initiated   physical activity promoted   verbally redirected     Problem: Cognitive Impairment (Psychotic Signs/Symptoms)  Goal: Optimal Cognitive Function (Psychotic Signs/Symptoms)  Flowsheets (Taken 12/24/2022 2311)  Mutually Determined Action Steps (Optimal Cognitive Function):   participates in attention training   remains focused during activity   participates in memory training   follows step-by-step instructions   other (see comments)   participates in problem resolution  Intervention: Support and Promote Cognitive Ability  Flowsheets (Taken 12/24/2022 2311)  Trust Relationship/Rapport:   care explained   questions answered   questions encouraged   choices provided   emotional support provided   reassurance provided   empathic listening provided   thoughts/feelings acknowledged     Problem: Decreased Participation and Engagement (Psychotic Signs/Symptoms)  Goal: Increased Participation and Engagement (Psychotic Signs/Symptoms)  Flowsheets (Taken 12/24/2022 2311)  Mutually Determined Action Steps (Increased Participation and Engagement):   identifies symptoms triggers   identifies future-oriented goal   verbalizes gratifying activity   verbalizes personal treatment goal  Intervention: Facilitate  Participation and Engagement  Flowsheets (Taken 12/24/2022 2311)  Supportive Measures:   active listening utilized   guided imagery facilitated   relaxation techniques promoted   verbalization of feelings encouraged   self-care encouraged   journaling promoted   counseling provided   decision-making supported   positive reinforcement provided   self-reflection promoted   self-responsibility promoted   goal-setting facilitated   problem-solving facilitated     Problem: Mood Impairment (Psychotic Signs/Symptoms)  Goal: Improved Mood Symptoms (Psychotic Signs/Symptoms)  Flowsheets (Taken 12/24/2022 2311)  Mutually Determined Action Steps (Improved Mood Symptoms):   acknowledges progress   verbalizes increased insight   engages in physical activity   identifies personal treatment goal   identifies thought distortion  Intervention: Optimize Emotion and Mood  Flowsheets (Taken 12/24/2022 2311)  Supportive Measures:   active listening utilized   guided imagery facilitated   relaxation techniques promoted   verbalization of feelings encouraged   self-care encouraged   journaling promoted   counseling provided   decision-making supported   positive reinforcement provided   self-reflection promoted   self-responsibility promoted   goal-setting facilitated   problem-solving facilitated     Problem: Psychomotor Impairment (Psychotic Signs/Symptoms)  Goal: Improved Psychomotor Symptoms (Psychotic Signs/Symptoms)  Flowsheets (Taken 12/24/2022 2311)  Mutually Determined Action Steps (Improved Psychomotor Symptoms):   adheres to medication regimen   exhibits decrease in agitation   lab level remains therapeutic   other (see comments)     Problem: Sensory Perception Impairment (Psychotic Signs/Symptoms)  Goal: Decreased Sensory Symptoms (Psychotic Signs/Symptoms)  Intervention: Minimize and Manage Sensory Impairment  Flowsheets (Taken 12/24/2022 2311)  Sensory Stimulation Regulation:   auditory stimulation minimized   auditory  stimulation provided     Problem: Sleep Disturbance (Psychotic Signs/Symptoms)  Goal: Improved Sleep (Psychotic Signs/Symptoms)  Flowsheets (Taken 12/24/2022 2311)  Mutually Determined Action Steps (Improved Sleep):   sleeps 4-6 hours at night   identifies disturbance factors   completes sleep diary entry   remains out of bed during day   develops sleep plan  Intervention: Promote Healthy Sleep Hygiene  Flowsheets (Taken 12/24/2022 2311)  Sleep Hygiene Promotion:   awakenings minimized   music provided   napping discouraged   room lighting adjusted   reading encouraged   noise level reduced   relaxation techniques promoted   regular sleep pattern promoted   sleep diary encouraged     Problem: Behavior Regulation Impairment (Psychotic Signs/Symptoms)  Goal: Improved Behavioral Control (Psychotic Signs/Symptoms)  Flowsheets (Taken 12/24/2022 2311)  Mutually Determined Action Steps (Improved Behavioral Control):   identifies symptoms triggers   verbalizes gratifying activity   verbalizes personal treatment goal   other (see comments)   identifies future-oriented goal  Intervention: Manage Behavior  Flowsheets (Taken 12/24/2022 2311)  De-Escalation Techniques:   appropriate behavior reinforced   diversional activity encouraged   medication administered   stimulation decreased   reoriented   quiet time facilitated   increased round frequency   family involvement requested   1:1 observation initiated   physical activity promoted   verbally redirected     Problem: Behavior Regulation Impairment (Psychotic Signs/Symptoms)  Goal: Improved Behavioral Control (Psychotic Signs/Symptoms)  Intervention: Manage Behavior  Flowsheets (Taken 12/24/2022 2311)  De-Escalation Techniques:   appropriate behavior reinforced   diversional activity encouraged   medication administered   stimulation decreased   reoriented   quiet time facilitated   increased round frequency   family involvement requested   1:1 observation initiated   physical  activity promoted   verbally redirected     Problem: Cognitive Impairment (Psychotic Signs/Symptoms)  Goal: Optimal Cognitive Function (Psychotic Signs/Symptoms)  Flowsheets (Taken 12/24/2022 2311)  Mutually Determined Action Steps (Optimal Cognitive Function):   participates in attention training   remains focused during activity   participates in memory training   follows step-by-step instructions   other (see comments)   participates in problem resolution  Intervention: Support and Promote Cognitive Ability  Flowsheets (Taken 12/24/2022 2311)  Trust Relationship/Rapport:   care explained   questions answered   questions encouraged   choices provided   emotional support provided   reassurance provided   empathic listening provided   thoughts/feelings acknowledged     Problem: Cognitive Impairment (Psychotic Signs/Symptoms)  Goal: Optimal Cognitive Function (Psychotic Signs/Symptoms)  Intervention: Support and Promote Cognitive Ability  Flowsheets (Taken 12/24/2022 2311)  Trust Relationship/Rapport:   care explained   questions answered   questions encouraged   choices provided   emotional support provided   reassurance provided   empathic listening provided   thoughts/feelings acknowledged     Problem: Decreased Participation and Engagement (Psychotic Signs/Symptoms)  Goal: Increased Participation and Engagement (Psychotic Signs/Symptoms)  Flowsheets (Taken 12/24/2022 2311)  Mutually Determined Action Steps (Increased Participation and Engagement):   identifies symptoms triggers   identifies future-oriented goal   verbalizes gratifying activity   verbalizes personal treatment goal  Intervention: Facilitate Participation and Engagement  Flowsheets (Taken 12/24/2022 2311)  Supportive Measures:   active listening utilized   guided imagery facilitated   relaxation techniques promoted   verbalization of feelings encouraged   self-care encouraged   journaling promoted   counseling provided   decision-making supported    positive reinforcement provided   self-reflection promoted   self-responsibility promoted   goal-setting facilitated   problem-solving facilitated     Problem: Decreased Participation and Engagement (Psychotic Signs/Symptoms)  Goal: Increased Participation and Engagement (Psychotic Signs/Symptoms)  Intervention: Facilitate Participation and Engagement  Flowsheets (Taken 12/24/2022 2311)  Supportive Measures:   active listening utilized   guided imagery facilitated   relaxation techniques promoted   verbalization of feelings encouraged   self-care encouraged   journaling promoted   counseling provided   decision-making supported   positive reinforcement provided   self-reflection promoted   self-responsibility promoted   goal-setting facilitated   problem-solving facilitated     Problem: Mood Impairment (Psychotic Signs/Symptoms)  Goal: Improved Mood Symptoms (Psychotic Signs/Symptoms)  Flowsheets (Taken 12/24/2022 2311)  Mutually Determined Action Steps (Improved Mood Symptoms):   acknowledges progress   verbalizes increased insight   engages in physical activity   identifies personal treatment goal   identifies thought distortion  Intervention: Optimize Emotion and Mood  Flowsheets (Taken 12/24/2022 2311)  Supportive Measures:   active listening utilized   guided imagery facilitated   relaxation techniques promoted   verbalization of feelings encouraged   self-care encouraged   journaling promoted   counseling provided   decision-making supported   positive reinforcement provided   self-reflection promoted   self-responsibility promoted   goal-setting facilitated   problem-solving facilitated     Problem: Psychomotor Impairment (Psychotic Signs/Symptoms)  Goal: Improved Psychomotor Symptoms (Psychotic Signs/Symptoms)  Flowsheets (Taken 12/24/2022 2311)  Mutually Determined Action Steps (Improved Psychomotor Symptoms):   adheres to medication regimen   exhibits decrease in agitation   lab level remains therapeutic    other (see comments)     Problem: Sensory Perception Impairment (Psychotic Signs/Symptoms)  Goal: Decreased Sensory Symptoms (Psychotic Signs/Symptoms)  Intervention: Minimize and Manage Sensory Impairment  Flowsheets (Taken 12/24/2022 2311)  Sensory Stimulation Regulation:   auditory stimulation minimized   auditory stimulation provided     Problem: Sensory Perception Impairment (Psychotic Signs/Symptoms)  Goal: Decreased Sensory Symptoms (Psychotic Signs/Symptoms)  Intervention: Minimize and Manage Sensory Impairment  Flowsheets (Taken 12/24/2022 2311)  Sensory Stimulation Regulation:   auditory stimulation minimized   auditory stimulation provided     Problem: Sleep Disturbance (Psychotic Signs/Symptoms)  Goal: Improved Sleep (Psychotic Signs/Symptoms)  Flowsheets (Taken 12/24/2022 2311)  Mutually Determined Action Steps (Improved Sleep):   sleeps 4-6 hours at night   identifies disturbance factors   completes sleep diary entry   remains out of bed during day   develops sleep plan  Intervention: Promote Healthy Sleep Hygiene  Flowsheets (Taken 12/24/2022 2311)  Sleep Hygiene Promotion:   awakenings minimized   music provided   napping discouraged   room lighting adjusted   reading encouraged   noise level reduced   relaxation techniques promoted   regular sleep pattern promoted   sleep diary encouraged     Problem: Sleep Disturbance (Psychotic Signs/Symptoms)  Goal: Improved Sleep (Psychotic Signs/Symptoms)  Intervention: Promote Healthy Sleep Hygiene  Flowsheets (Taken 12/24/2022 2311)  Sleep Hygiene Promotion:   awakenings minimized   music provided   napping discouraged   room lighting adjusted   reading encouraged   noise level reduced   relaxation techniques promoted   regular sleep pattern promoted   sleep diary encouraged

## 2022-12-26 PROCEDURE — S4991 NICOTINE PATCH NONLEGEND: HCPCS | Performed by: NURSE PRACTITIONER

## 2022-12-26 PROCEDURE — 25000003 PHARM REV CODE 250: Performed by: NURSE PRACTITIONER

## 2022-12-26 PROCEDURE — 11400000 HC PSYCH PRIVATE ROOM

## 2022-12-26 RX ORDER — OLANZAPINE 10 MG/1
20 TABLET, ORALLY DISINTEGRATING ORAL NIGHTLY
Status: DISCONTINUED | OUTPATIENT
Start: 2022-12-26 | End: 2022-12-28 | Stop reason: HOSPADM

## 2022-12-26 RX ORDER — BUPROPION HYDROCHLORIDE 150 MG/1
150 TABLET ORAL DAILY
Status: DISCONTINUED | OUTPATIENT
Start: 2022-12-26 | End: 2022-12-28 | Stop reason: HOSPADM

## 2022-12-26 RX ADMIN — BUPROPION HYDROCHLORIDE 150 MG: 150 TABLET, FILM COATED, EXTENDED RELEASE ORAL at 11:12

## 2022-12-26 RX ADMIN — HYDROXYZINE PAMOATE 50 MG: 50 CAPSULE ORAL at 08:12

## 2022-12-26 RX ADMIN — OLANZAPINE 20 MG: 10 TABLET, ORALLY DISINTEGRATING ORAL at 08:12

## 2022-12-26 RX ADMIN — ACETAMINOPHEN 650 MG: 325 TABLET, FILM COATED ORAL at 03:12

## 2022-12-26 RX ADMIN — OLANZAPINE 10 MG: 10 TABLET, ORALLY DISINTEGRATING ORAL at 08:12

## 2022-12-26 RX ADMIN — NICOTINE 1 PATCH: 14 PATCH, EXTENDED RELEASE TRANSDERMAL at 08:12
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
## 2022-12-26 NOTE — NURSING
Awake and alert, speech is clear, denies any suicidal thoughts, denies any hallucinations, no complaints, asked for a shot to help him sleep tonight, received Zyprexa Zydis po for rest, calm and cooperative, no behavior problems this evening, will continue to monitor.

## 2022-12-27 PROCEDURE — 11400000 HC PSYCH PRIVATE ROOM

## 2022-12-27 PROCEDURE — S4991 NICOTINE PATCH NONLEGEND: HCPCS | Performed by: NURSE PRACTITIONER

## 2022-12-27 PROCEDURE — 25000003 PHARM REV CODE 250: Performed by: NURSE PRACTITIONER

## 2022-12-27 RX ORDER — BUPROPION HYDROCHLORIDE 150 MG/1
150 TABLET ORAL DAILY
Qty: 30 TABLET | Refills: 11 | Status: ON HOLD | OUTPATIENT
Start: 2022-12-28 | End: 2023-03-08

## 2022-12-27 RX ORDER — OLANZAPINE 20 MG/1
20 TABLET, ORALLY DISINTEGRATING ORAL NIGHTLY
Qty: 30 TABLET | Refills: 11 | Status: ON HOLD | OUTPATIENT
Start: 2022-12-27 | End: 2023-03-08

## 2022-12-27 RX ADMIN — OLANZAPINE 20 MG: 10 TABLET, ORALLY DISINTEGRATING ORAL at 08:12

## 2022-12-27 RX ADMIN — BUPROPION HYDROCHLORIDE 150 MG: 150 TABLET, FILM COATED, EXTENDED RELEASE ORAL at 08:12

## 2022-12-27 RX ADMIN — NICOTINE 1 PATCH: 14 PATCH, EXTENDED RELEASE TRANSDERMAL at 08:12

## 2022-12-27 NOTE — PROGRESS NOTES
"12/27/2022 9:26 AM   Chon Parham   1990   [id number]        Psychiatry Progress Note     SUBJECTIVE:   Chon Parham is a 32 y.o. male admitted for depression, death wishes, hallucinations, and delusions. He has been inappropriate on the unit. He was found taking the screws off of the refrigerator and eating the food when he wasn't supposed to. When it was addressed with him he told the nurse to "suck my karina and eat my cum". I did speak with him regarding his language and inappropriateness on the unit. He stated that he didn't understand why he couldn't just have a snack or a juice and that some people are "nasty about it". He stated that he wanted to go home and that he didn't need to be here on the unit. He no longer has psychosis and he denies any SI/HI/death wishes. He tolerated the change in medications without any issues. Discharge is scheduled for tomorrow.        Current Medications:   Scheduled Meds:    dyvvctlml-yaltcozc-qldytvd ala  1 tablet Oral Daily    buPROPion  150 mg Oral Daily    nicotine  1 patch Transdermal Daily    OLANZapine zydis  20 mg Oral QHS      PRN Meds: acetaminophen, aluminum & magnesium hydroxide-simethicone, hydrOXYzine pamoate, ibuprofen, magnesium hydroxide 400 mg/5 ml   Psychotherapeutics (From admission, onward)      Start     Stop Route Frequency Ordered    12/26/22 2100  OLANZapine zydis disintegrating tablet 20 mg         -- Oral Nightly 12/26/22 1101    12/26/22 1115  buPROPion TB24 tablet 150 mg         -- Oral Daily 12/26/22 1101            Allergies:   Review of patient's allergies indicates:  No Known Allergies     OBJECTIVE:   Vitals   Vitals:    12/27/22 0609   BP: 127/78   Pulse: 62   Resp: 16   Temp: 98 °F (36.7 °C)        Labs/Imaging/Studies:   No results found for this or any previous visit (from the past 36 hour(s)).       Medical Review Of Systems:  A comprehensive review of systems was negative.      Psychiatric Mental Status Exam:  General Appearance: " dressed in hospital garb  Arousal: alert  Behavior: appropriate eye-contact, redirectable  Movements and Motor Activity: no abnormal involuntary movements noted; no tics, no tremors, no akathisia, no dystonia, no evidence of tardive dyskinesia; no psychomotor agitation or retardation  Orientation: oriented to person, place, and time  Speech: normal rate, rhythm, volume, tone and pitch  Mood: Anxious and Irritable  Affect: mood-congruent  Thought Process: goal-directed  Associations: no loosening of associations  Thought Content and Perceptions: no auditory or visual hallucinations, no paranoid ideation, no ideas of reference, no evidence of delusions or psychosis  Recent and Remote Memory: intact  Attention and Concentration: intact  Fund of Knowledge: vocabulary appropriate  Insight: limited  Judgment: limited    ASSESSMENT/PLAN:   Diagnosis:  MOOD DISORDERS; Bipolar I Disorder, Most Recent Episode Depressed: Severe With Psychotic Features (F31.5)   Methamphetamine Use Disorder severe  Cannabis Use disorder severe  Past Medical History:   Diagnosis Date    Bipolar affective     Depression     HIV (human immunodeficiency virus infection)     Schizophrenia         Plan:  Continue current treatment plan  Continue all medications as ordered    Expected Disposition Plan: Home        Chuckie LOPEZ PMHNP  Psychiatry  [facility]

## 2022-12-27 NOTE — PSYCH
MULTIDISCILINARY TEAM      ______________________________________________________________________  Psychiatrist Signature           Print Name    Credentials    Date/Time               _______________________________________________________________________  Registered Nurse Signature         Print Name    Credentials    Date/Time                  _______________________________________________________________________   Signature           Print Name    Credentials    Date/Time         _______________________________________________________________________   Signature          Print Name    Credentials    Date/Time                  ESTIMATED LOS: __________      I have reviewed my treatment plan with staff and have signed the Patient Responsibilities form.      ______________________________________________________________________  Patient Signature   Print Name   Date/Time

## 2022-12-27 NOTE — PLAN OF CARE
Pt will be discharged to home and follow up with [facility].  Pt did not participate in treatment.  Pt denies Hi/SI on discharge and has been med compliant.  All referrals and d/c orders have been faxed to provider.  Transport is thorough medicaid transport.

## 2022-12-27 NOTE — NURSING
Awake alert and oriented. Thoughts clearer, denies hallucinations. Sleeping and eating well. Compliant with meds, no adverse effects observed. Will d/c home tomorrow and follow up with [facility].

## 2022-12-28 VITALS
HEART RATE: 69 BPM | TEMPERATURE: 98 F | OXYGEN SATURATION: 98 % | SYSTOLIC BLOOD PRESSURE: 117 MMHG | DIASTOLIC BLOOD PRESSURE: 64 MMHG | RESPIRATION RATE: 16 BRPM

## 2022-12-28 PROCEDURE — 25000003 PHARM REV CODE 250: Performed by: NURSE PRACTITIONER

## 2022-12-28 RX ADMIN — BUPROPION HYDROCHLORIDE 150 MG: 150 TABLET, FILM COATED, EXTENDED RELEASE ORAL at 08:12
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
## 2022-12-28 NOTE — HOSPITAL COURSE
"The patient was admitted to the psychiatric unit and monitored for safety. The medications were reconciled. A history and physical was completed by the primary care doctor and and medical issues were addressed. The patient was provided with individual and group therapy.   He was started on Wellbutrin and Zyprexa Zydis.    12/27/22-He has been inappropriate on the unit. He was found taking the screws off of the refrigerator and eating the food when he wasn't supposed to. When it was addressed with him he told the nurse to "suck my karina and eat my cum". I did speak with him regarding his language and inappropriateness on the unit. He stated that he didn't understand why he couldn't just have a snack or a juice and that some people are "nasty about it". He stated that he wanted to go home and that he didn't need to be here on the unit. He no longer has psychosis and he denies any SI/HI/death wishes. He tolerated the change in medications without any issues. Discharge is scheduled for tomorrow.   "

## 2022-12-28 NOTE — NURSING
Chon has displayed improvement. He is apologetic for inappropriate behavior. Thoughts and speech have become more clear. He is cooperative. Compliant with medications. Adequate sleep and appetite. No outbursts or behavioral problems noted during shift as of present. Planning for D/C tomorrow with [facility] follow up. He denied SI and verbally contracted for safety. Education provided. Q15 observations maintained for safety. He is being monitored closely.

## 2022-12-28 NOTE — PROGRESS NOTES
Discharged to home via Medicaid transportation. Denies SI /HI and a/v hallucinations. Improved interaction with peers and staff. Improved sleep cycle and appetite. Med compliant. No c/o side effects

## 2022-12-28 NOTE — GROUP NOTE
Education Group      Group Focus: Offered group on discharge planning.       Number of patients in attendance: 4    Group Start Time: 1000  Group End Time:  1045  Groups Date: 12/28/2022  Group Topic:  Behavioral Health  Group Department: [facility]  Group Facilitators:  Juju Haas LPN  _____________________________________________________________________    Patient Name: Chon Parham  MRN: [id number]  Patient Class: IP- Psych   Admission Date\Time: 12/24/2022  7:50 PM  Hospital Length of Stay: 4  Primary Care Provider: Primary Doctor No     Referred by: Behavioral Medicine Unit Treatment Team     Target symptoms: na     Patient's response to treatment: did not attend     Progress toward goals: na     Interval History: na     Diagnosis:      Plan: Continue treatment on BMU

## 2023-01-16 ENCOUNTER — HOSPITAL ENCOUNTER (EMERGENCY)
Facility: HOSPITAL | Age: 33
Discharge: PSYCHIATRIC OR SUBSTANCE USE FACILITY | End: 2023-01-16
Attending: INTERNAL MEDICINE
Payer: MEDICAID

## 2023-01-16 VITALS
DIASTOLIC BLOOD PRESSURE: 81 MMHG | TEMPERATURE: 98 F | HEART RATE: 84 BPM | SYSTOLIC BLOOD PRESSURE: 148 MMHG | HEIGHT: 71 IN | BODY MASS INDEX: 22.79 KG/M2 | OXYGEN SATURATION: 100 % | RESPIRATION RATE: 18 BRPM | WEIGHT: 162.81 LBS

## 2023-01-16 DIAGNOSIS — F15.10 METHAMPHETAMINE ABUSE: ICD-10-CM

## 2023-01-16 DIAGNOSIS — R45.851 SUICIDAL IDEATIONS: Primary | ICD-10-CM

## 2023-01-16 DIAGNOSIS — F20.9 ACUTE EXACERBATION OF CHRONIC SCHIZOPHRENIA: ICD-10-CM

## 2023-01-16 DIAGNOSIS — Z00.8 MEDICAL CLEARANCE FOR PSYCHIATRIC ADMISSION: ICD-10-CM

## 2023-01-16 LAB
ALBUMIN SERPL-MCNC: 4.7 G/DL (ref 3.5–5)
ALBUMIN/GLOB SERPL: 1.3 RATIO (ref 1.1–2)
ALP SERPL-CCNC: 89 UNIT/L (ref 40–150)
ALT SERPL-CCNC: 18 UNIT/L (ref 0–55)
AMPHET UR QL SCN: POSITIVE
APPEARANCE UR: CLEAR
AST SERPL-CCNC: 27 UNIT/L (ref 5–34)
BACTERIA #/AREA URNS AUTO: ABNORMAL /HPF
BARBITURATE SCN PRESENT UR: NEGATIVE
BASOPHILS # BLD AUTO: 0.03 X10(3)/MCL (ref 0–0.2)
BASOPHILS NFR BLD AUTO: 0.5 %
BENZODIAZ UR QL SCN: NEGATIVE
BILIRUB UR QL STRIP.AUTO: NEGATIVE MG/DL
BILIRUBIN DIRECT+TOT PNL SERPL-MCNC: 0.6 MG/DL
BUN SERPL-MCNC: 16.8 MG/DL (ref 8.9–20.6)
CALCIUM SERPL-MCNC: 10 MG/DL (ref 8.4–10.2)
CANNABINOIDS UR QL SCN: POSITIVE
CHLORIDE SERPL-SCNC: 101 MMOL/L (ref 98–107)
CO2 SERPL-SCNC: 27 MMOL/L (ref 22–29)
COCAINE UR QL SCN: NEGATIVE
COLOR UR AUTO: YELLOW
CREAT SERPL-MCNC: 1.31 MG/DL (ref 0.73–1.18)
EOSINOPHIL # BLD AUTO: 0.14 X10(3)/MCL (ref 0–0.9)
EOSINOPHIL NFR BLD AUTO: 2.2 %
ERYTHROCYTE [DISTWIDTH] IN BLOOD BY AUTOMATED COUNT: 12.4 % (ref 11.5–17)
ETHANOL SERPL-MCNC: <10 MG/DL
FENTANYL UR QL SCN: POSITIVE
GFR SERPLBLD CREATININE-BSD FMLA CKD-EPI: >60 MLS/MIN/1.73/M2
GLOBULIN SER-MCNC: 3.6 GM/DL (ref 2.4–3.5)
GLUCOSE SERPL-MCNC: 82 MG/DL (ref 74–100)
GLUCOSE UR QL STRIP.AUTO: NORMAL MG/DL
HCT VFR BLD AUTO: 46.2 % (ref 42–52)
HGB BLD-MCNC: 15.9 GM/DL (ref 14–18)
HYALINE CASTS #/AREA URNS LPF: ABNORMAL /LPF
IMM GRANULOCYTES # BLD AUTO: 0.01 X10(3)/MCL (ref 0–0.04)
IMM GRANULOCYTES NFR BLD AUTO: 0.2 %
KETONES UR QL STRIP.AUTO: NEGATIVE MG/DL
LEUKOCYTE ESTERASE UR QL STRIP.AUTO: NEGATIVE UNIT/L
LYMPHOCYTES # BLD AUTO: 2.53 X10(3)/MCL (ref 0.6–4.6)
LYMPHOCYTES NFR BLD AUTO: 40.4 %
MCH RBC QN AUTO: 33.1 PG
MCHC RBC AUTO-ENTMCNC: 34.4 MG/DL (ref 33–36)
MCV RBC AUTO: 96 FL (ref 80–94)
MDMA UR QL SCN: NEGATIVE
MONOCYTES # BLD AUTO: 0.55 X10(3)/MCL (ref 0.1–1.3)
MONOCYTES NFR BLD AUTO: 8.8 %
MUCOUS THREADS URNS QL MICRO: ABNORMAL /LPF
NEUTROPHILS # BLD AUTO: 3.01 X10(3)/MCL (ref 2.1–9.2)
NEUTROPHILS NFR BLD AUTO: 47.9 %
NITRITE UR QL STRIP.AUTO: NEGATIVE
NRBC BLD AUTO-RTO: 0 %
OPIATES UR QL SCN: NEGATIVE
PCP UR QL: NEGATIVE
PH UR STRIP.AUTO: 6 [PH]
PH UR: 6 [PH] (ref 3–11)
PLATELET # BLD AUTO: 302 X10(3)/MCL (ref 130–400)
PMV BLD AUTO: 9.8 FL (ref 7.4–10.4)
POTASSIUM SERPL-SCNC: 3.9 MMOL/L (ref 3.5–5.1)
PROT SERPL-MCNC: 8.3 GM/DL (ref 6.4–8.3)
PROT UR QL STRIP.AUTO: ABNORMAL MG/DL
RBC # BLD AUTO: 4.81 X10(6)/MCL (ref 4.7–6.1)
RBC #/AREA URNS AUTO: ABNORMAL /HPF
RBC UR QL AUTO: NEGATIVE UNIT/L
SARS-COV-2 RDRP RESP QL NAA+PROBE: NEGATIVE
SODIUM SERPL-SCNC: 139 MMOL/L (ref 136–145)
SP GR UR STRIP.AUTO: 1.03
SQUAMOUS #/AREA URNS LPF: ABNORMAL /HPF
TSH SERPL-ACNC: 0.65 UIU/ML (ref 0.35–4.94)
UROBILINOGEN UR STRIP-ACNC: NORMAL MG/DL
WBC # SPEC AUTO: 6.3 X10(3)/MCL (ref 4.5–11.5)
WBC #/AREA URNS AUTO: ABNORMAL /HPF

## 2023-01-16 PROCEDURE — 25000003 PHARM REV CODE 250: Performed by: INTERNAL MEDICINE

## 2023-01-16 PROCEDURE — 85025 COMPLETE CBC W/AUTO DIFF WBC: CPT | Performed by: INTERNAL MEDICINE

## 2023-01-16 PROCEDURE — 99285 EMERGENCY DEPT VISIT HI MDM: CPT | Mod: 25

## 2023-01-16 PROCEDURE — 81001 URINALYSIS AUTO W/SCOPE: CPT | Mod: 59 | Performed by: INTERNAL MEDICINE

## 2023-01-16 PROCEDURE — 87635 SARS-COV-2 COVID-19 AMP PRB: CPT | Performed by: INTERNAL MEDICINE

## 2023-01-16 PROCEDURE — 80053 COMPREHEN METABOLIC PANEL: CPT | Performed by: INTERNAL MEDICINE

## 2023-01-16 PROCEDURE — 84443 ASSAY THYROID STIM HORMONE: CPT | Performed by: INTERNAL MEDICINE

## 2023-01-16 PROCEDURE — 93005 ELECTROCARDIOGRAM TRACING: CPT

## 2023-01-16 PROCEDURE — 80307 DRUG TEST PRSMV CHEM ANLYZR: CPT | Performed by: INTERNAL MEDICINE

## 2023-01-16 PROCEDURE — 82077 ASSAY SPEC XCP UR&BREATH IA: CPT | Performed by: INTERNAL MEDICINE

## 2023-01-16 RX ORDER — IBUPROFEN 200 MG
1 TABLET ORAL DAILY
Status: DISCONTINUED | OUTPATIENT
Start: 2023-01-17 | End: 2023-01-16 | Stop reason: HOSPADM

## 2023-01-16 RX ORDER — OLANZAPINE 5 MG/1
10 TABLET, ORALLY DISINTEGRATING ORAL NIGHTLY
Status: DISCONTINUED | OUTPATIENT
Start: 2023-01-16 | End: 2023-01-16

## 2023-01-16 RX ORDER — OLANZAPINE 5 MG/1
10 TABLET, ORALLY DISINTEGRATING ORAL DAILY
Status: DISCONTINUED | OUTPATIENT
Start: 2023-01-16 | End: 2023-01-16 | Stop reason: HOSPADM

## 2023-01-16 RX ADMIN — OLANZAPINE 10 MG: 5 TABLET, ORALLY DISINTEGRATING ORAL at 05:01

## 2023-01-16 NOTE — ED PROVIDER NOTES
"Encounter Date: 1/16/2023       History     Chief Complaint   Patient presents with    Medication Reaction     Pt states he is on seroquel and would like to "change his medications." Pt states he feels "stuck and tired all the time." Pt found outside prior to triage smoking e-cig.     Patient is a 32 year old male who presents to the emergency department stating he would like to change medications because he has been feeling suicidal since they prescribed him Seroquel about 2 months ago.  He states that he needs help because he has been hearing voices telling him to " go go go go.. go jump off of a bridge.. dive in the cannel ".  He states he feels depressed and tired all the time.   He states his muscles get stuck and he can't move.      The history is provided by the patient. No  was used.   Mental Health Problem  The primary symptoms include delusions, hallucinations, homicidal ideas, negative symptoms and suicidal ideas. The current episode started several days ago. This is a recurrent problem.   The onset of the illness is precipitated by drug abuse. The degree of incapacity that he is experiencing as a consequence of his illness is moderate. Sequelae of the illness include harmed interpersonal relations. Additional symptoms of the illness include fatigue. Additional symptoms of the illness do not include headaches or abdominal pain. He admits to suicidal ideas. He does not have a plan to attempt suicide. He contemplates harming himself. He has not already injured self. He contemplates injuring another person. He has not already  injured another person. Risk factors that are present for mental illness include substance abuse.   Review of patient's allergies indicates:  No Known Allergies  Past Medical History:   Diagnosis Date    Bipolar affective     Depression     HIV (human immunodeficiency virus infection)     Schizophrenia      No past surgical history on file.  No family history on " file.  Social History     Tobacco Use    Smoking status: Every Day     Packs/day: 0.50     Types: Cigarettes    Smokeless tobacco: Never   Substance Use Topics    Alcohol use: No    Drug use: Yes     Types: Marijuana     Comment: last use one week ago      Review of Systems   Constitutional:  Positive for fatigue. Negative for chills and fever.   Respiratory:  Negative for cough and shortness of breath.    Cardiovascular:  Negative for chest pain and palpitations.   Gastrointestinal:  Negative for abdominal pain, nausea and vomiting.   Genitourinary:  Negative for dysuria and flank pain.   Musculoskeletal:  Negative for back pain and neck pain.        Generalized muscle stiffness    Skin:  Negative for rash.   Neurological:  Negative for dizziness, light-headedness and headaches.   Hematological:  Negative for adenopathy. Does not bruise/bleed easily.   Psychiatric/Behavioral:  Positive for hallucinations, homicidal ideas and suicidal ideas.      Physical Exam     Initial Vitals [01/16/23 1402]   BP Pulse Resp Temp SpO2   (!) 156/84 (!) 112 16 97.5 °F (36.4 °C) 98 %      MAP       --         Physical Exam    Nursing note and vitals reviewed.  Constitutional: He appears well-developed and well-nourished. No distress.   HENT:   Nose: Nose normal.   Mouth/Throat: Oropharynx is clear and moist.   Eyes: EOM are normal. Pupils are equal, round, and reactive to light.   Neck: Neck supple.   Normal range of motion.  Cardiovascular:  Normal rate, normal heart sounds and intact distal pulses.           Pulmonary/Chest: Breath sounds normal.   Abdominal: Abdomen is soft. Bowel sounds are normal. There is no abdominal tenderness.   Musculoskeletal:         General: Normal range of motion.      Cervical back: Normal range of motion and neck supple.     Neurological: He is alert and oriented to person, place, and time. GCS score is 15. GCS eye subscore is 4. GCS verbal subscore is 5. GCS motor subscore is 6.   Skin: Skin is warm.  Capillary refill takes less than 2 seconds.   Psychiatric: His speech is normal and behavior is normal. He is actively hallucinating. Thought content is paranoid. Cognition and memory are normal. He expresses impulsivity and inappropriate judgment. He exhibits a depressed mood. He expresses suicidal ideation. He is attentive.       ED Course   Procedures  Labs Reviewed - No data to display  EKG Readings: (Independently Interpreted)   Initial Reading: No STEMI. Rhythm: Normal Sinus Rhythm. Heart Rate: 66. Ectopy: No Ectopy. Conduction: Normal. ST Segments: Normal ST Segments. T Waves: Normal. Clinical Impression: Normal Sinus Rhythm Other Impression: lvh     Imaging Results    None          Medications - No data to display  Medical Decision Making:   History:   Old Medical Records: I decided to obtain old medical records.  Old Records Summarized: records from another hospital.       <> Summary of Records: Discharged from [facility] on 12/24/22 after admission with similar symptoms          Attending Attestation:     Physician Attestation Statement for NP/PA:   I have conducted a face to face encounter with this patient in addition to the NP/PA, due to Medical Complexity    Other NP/PA Attestation Additions:    History of Present Illness: Presents requesting psychiatric medications change because of active hallucinations associated to suicidal and homicidal ideations. Recently discharged from behavioral health with similar problem. Admits abusing of methamphetamine and marijuana. States screams in his head, don't go away even before using drugs, make his want to harm people and himself.   Physical Exam: Active hallucinations with SI and HI. No acute traumatic injuries.   Medical Decision Making: Pt with recurrent episodes of psychosis probable secondary to sympathomimetic drug abuse exacerbating his underline schizophrenia. Will PEC for psychiatric management           ED Course as of 01/16/23 1502   Mon Jan 16, 2023    1443 Patient was transitioned to Dr. Etienne due to SI/HI thoughts and auditory hallucinations  [ER]      ED Course User Index  [ER] DIGNA Cooney                 Clinical Impression:   Final diagnoses:  [R41.811] Suicidal ideations (Primary)               DIGNA Cooney  01/16/23 1449       Christian Lynn MD  01/16/23 1505       Christian Lynn MD  01/16/23 1135

## 2023-02-15 ENCOUNTER — HOSPITAL ENCOUNTER (EMERGENCY)
Facility: HOSPITAL | Age: 33
Discharge: HOME | End: 2023-02-15
Attending: EMERGENCY MEDICINE
Payer: MEDICAID

## 2023-02-15 VITALS
TEMPERATURE: 98 F | OXYGEN SATURATION: 98 % | SYSTOLIC BLOOD PRESSURE: 134 MMHG | HEART RATE: 102 BPM | RESPIRATION RATE: 18 BRPM | DIASTOLIC BLOOD PRESSURE: 86 MMHG

## 2023-02-15 DIAGNOSIS — F32.A DEPRESSION, UNSPECIFIED DEPRESSION TYPE: Primary | ICD-10-CM

## 2023-02-15 LAB
ALBUMIN SERPL-MCNC: 4.2 G/DL (ref 3.5–5)
ALBUMIN/GLOB SERPL: 1.1 RATIO (ref 1.1–2)
ALP SERPL-CCNC: 105 UNIT/L (ref 40–150)
ALT SERPL-CCNC: 21 UNIT/L (ref 0–55)
AMPHET UR QL SCN: POSITIVE
APAP SERPL-MCNC: <17.4 UG/ML (ref 17.4–30)
APPEARANCE UR: CLEAR
AST SERPL-CCNC: 22 UNIT/L (ref 5–34)
BACTERIA #/AREA URNS AUTO: ABNORMAL /HPF
BARBITURATE SCN PRESENT UR: NEGATIVE
BASOPHILS # BLD AUTO: 0.03 X10(3)/MCL (ref 0–0.2)
BASOPHILS NFR BLD AUTO: 0.6 %
BENZODIAZ UR QL SCN: NEGATIVE
BILIRUB UR QL STRIP.AUTO: ABNORMAL MG/DL
BILIRUBIN DIRECT+TOT PNL SERPL-MCNC: 0.6 MG/DL
BUN SERPL-MCNC: 13.8 MG/DL (ref 8.9–20.6)
CALCIUM SERPL-MCNC: 9.8 MG/DL (ref 8.4–10.2)
CANNABINOIDS UR QL SCN: POSITIVE
CHLORIDE SERPL-SCNC: 106 MMOL/L (ref 98–107)
CO2 SERPL-SCNC: 23 MMOL/L (ref 22–29)
COCAINE UR QL SCN: NEGATIVE
COLOR UR AUTO: ABNORMAL
CREAT SERPL-MCNC: 1.51 MG/DL (ref 0.73–1.18)
EOSINOPHIL # BLD AUTO: 0.17 X10(3)/MCL (ref 0–0.9)
EOSINOPHIL NFR BLD AUTO: 3.3 %
ERYTHROCYTE [DISTWIDTH] IN BLOOD BY AUTOMATED COUNT: 12.4 % (ref 11.5–17)
ETHANOL SERPL-MCNC: <10 MG/DL
FENTANYL UR QL SCN: POSITIVE
GFR SERPLBLD CREATININE-BSD FMLA CKD-EPI: >60 MLS/MIN/1.73/M2
GLOBULIN SER-MCNC: 4 GM/DL (ref 2.4–3.5)
GLUCOSE SERPL-MCNC: 131 MG/DL (ref 74–100)
GLUCOSE UR QL STRIP.AUTO: NEGATIVE MG/DL
HCT VFR BLD AUTO: 47.1 % (ref 42–52)
HGB BLD-MCNC: 16 GM/DL (ref 14–18)
IMM GRANULOCYTES # BLD AUTO: 0.01 X10(3)/MCL (ref 0–0.04)
IMM GRANULOCYTES NFR BLD AUTO: 0.2 %
KETONES UR QL STRIP.AUTO: ABNORMAL MG/DL
LEUKOCYTE ESTERASE UR QL STRIP.AUTO: NEGATIVE UNIT/L
LYMPHOCYTES # BLD AUTO: 1.81 X10(3)/MCL (ref 0.6–4.6)
LYMPHOCYTES NFR BLD AUTO: 35.6 %
MCH RBC QN AUTO: 32.5 PG
MCHC RBC AUTO-ENTMCNC: 34 MG/DL (ref 33–36)
MCV RBC AUTO: 95.5 FL (ref 80–94)
MDMA UR QL SCN: NEGATIVE
MONOCYTES # BLD AUTO: 0.41 X10(3)/MCL (ref 0.1–1.3)
MONOCYTES NFR BLD AUTO: 8.1 %
MUCOUS THREADS URNS QL MICRO: ABNORMAL /LPF
NEUTROPHILS # BLD AUTO: 2.66 X10(3)/MCL (ref 2.1–9.2)
NEUTROPHILS NFR BLD AUTO: 52.2 %
NITRITE UR QL STRIP.AUTO: NEGATIVE
NRBC BLD AUTO-RTO: 0 %
OPIATES UR QL SCN: NEGATIVE
PCP UR QL: NEGATIVE
PH UR STRIP.AUTO: 5.5 [PH]
PH UR: 5.5 [PH] (ref 3–11)
PLATELET # BLD AUTO: 341 X10(3)/MCL (ref 130–400)
PMV BLD AUTO: 9.5 FL (ref 7.4–10.4)
POTASSIUM SERPL-SCNC: 3.6 MMOL/L (ref 3.5–5.1)
PROT SERPL-MCNC: 8.2 GM/DL (ref 6.4–8.3)
PROT UR QL STRIP.AUTO: ABNORMAL MG/DL
RBC # BLD AUTO: 4.93 X10(6)/MCL (ref 4.7–6.1)
RBC #/AREA URNS AUTO: <5 /HPF
RBC UR QL AUTO: NEGATIVE UNIT/L
SALICYLATES SERPL-MCNC: <5 MG/DL
SODIUM SERPL-SCNC: 137 MMOL/L (ref 136–145)
SP GR UR STRIP.AUTO: 1.03 (ref 1–1.03)
SPECIFIC GRAVITY, URINE AUTO (.000) (OHS): 1.03 (ref 1–1.03)
SPERM URNS QL MICRO: ABNORMAL /HPF
SQUAMOUS #/AREA URNS AUTO: <5 /HPF
TSH SERPL-ACNC: 0.1 UIU/ML (ref 0.35–4.94)
UROBILINOGEN UR STRIP-ACNC: 1 MG/DL
WBC # SPEC AUTO: 5.1 X10(3)/MCL (ref 4.5–11.5)
WBC #/AREA URNS AUTO: <5 /HPF

## 2023-02-15 PROCEDURE — 85025 COMPLETE CBC W/AUTO DIFF WBC: CPT | Performed by: NURSE PRACTITIONER

## 2023-02-15 PROCEDURE — 25000003 PHARM REV CODE 250: Performed by: EMERGENCY MEDICINE

## 2023-02-15 PROCEDURE — 80143 DRUG ASSAY ACETAMINOPHEN: CPT | Performed by: NURSE PRACTITIONER

## 2023-02-15 PROCEDURE — 84443 ASSAY THYROID STIM HORMONE: CPT | Performed by: NURSE PRACTITIONER

## 2023-02-15 PROCEDURE — 81001 URINALYSIS AUTO W/SCOPE: CPT | Mod: 59 | Performed by: NURSE PRACTITIONER

## 2023-02-15 PROCEDURE — 80307 DRUG TEST PRSMV CHEM ANLYZR: CPT | Performed by: NURSE PRACTITIONER

## 2023-02-15 PROCEDURE — 99283 EMERGENCY DEPT VISIT LOW MDM: CPT

## 2023-02-15 PROCEDURE — 80053 COMPREHEN METABOLIC PANEL: CPT | Performed by: NURSE PRACTITIONER

## 2023-02-15 PROCEDURE — 82077 ASSAY SPEC XCP UR&BREATH IA: CPT | Performed by: NURSE PRACTITIONER

## 2023-02-15 PROCEDURE — 80179 DRUG ASSAY SALICYLATE: CPT | Performed by: NURSE PRACTITIONER

## 2023-02-15 RX ORDER — OLANZAPINE 5 MG/1
20 TABLET ORAL
Status: COMPLETED | OUTPATIENT
Start: 2023-02-15 | End: 2023-02-15

## 2023-02-15 RX ADMIN — OLANZAPINE 20 MG: 5 TABLET, FILM COATED ORAL at 05:02

## 2023-02-15 NOTE — FIRST PROVIDER EVALUATION
Medical screening examination initiated.  I have conducted a focused provider triage encounter, findings are as follows:    Brief history of present illness: Patient states suicidal ideation x 2 years. States auditory hallucinations.      There were no vitals filed for this visit.    Pertinent physical exam:  Awake, alert, ambulatory     Brief workup plan:  labs, exam    Preliminary workup initiated; this workup will be continued and followed by the physician or advanced practice provider that is assigned to the patient when roomed.

## 2023-02-15 NOTE — ED PROVIDER NOTES
Encounter Date: 2/15/2023       History     Chief Complaint   Patient presents with    Suicidal     Si x 2 years. Denies plan. Hearing voices. Denies HI. Md made aware     Patient presents stating he has been hearing voices for a couple years.  Patient states he is compliant with his medication last admitted to [facility] 2 months ago.  States he did not take his medicine today states he is not currently used any drugs.  Patient reported suicidal ideation to the triage nurse in triage provider but states he does not want to hurt himself or anybody any does not know why he said that.  No fevers chills headache neck pain no homicidal ideation patient exhibits future thought gives good eye contact and speaks freely about his condition patient states he has been feeling in his mind suicidal for the last 2 years but he feels like sometimes he just done want to wake up no active suicidal ideation no attempt compliant with his medication    The history is provided by the patient.   Review of patient's allergies indicates:  No Known Allergies  Past Medical History:   Diagnosis Date    Bipolar affective     Depression     HIV (human immunodeficiency virus infection)     Schizophrenia      No past surgical history on file.  No family history on file.  Social History     Tobacco Use    Smoking status: Every Day     Packs/day: 0.50     Types: Cigarettes    Smokeless tobacco: Never   Substance Use Topics    Alcohol use: No    Drug use: Yes     Types: Marijuana     Comment: last use one week ago      Review of Systems   Constitutional:  Negative for fever.   HENT:  Negative for sore throat.    Respiratory:  Negative for shortness of breath.    Cardiovascular:  Negative for chest pain.   Gastrointestinal:  Negative for nausea.   Genitourinary:  Negative for dysuria.   Musculoskeletal:  Negative for back pain.   Skin:  Negative for rash.   Neurological:  Negative for weakness.   Hematological:  Does not bruise/bleed  easily.   Psychiatric/Behavioral:  Positive for hallucinations. Negative for agitation, behavioral problems, self-injury, sleep disturbance and suicidal ideas.      Physical Exam     Initial Vitals   BP Pulse Resp Temp SpO2   02/15/23 1554 02/15/23 1554 02/15/23 1554 02/15/23 1639 02/15/23 1554   134/86 102 18 98.1 °F (36.7 °C) 98 %      MAP       --                Physical Exam    Nursing note and vitals reviewed.  Constitutional: He appears well-developed and well-nourished.   HENT:   Head: Normocephalic and atraumatic.   Eyes: EOM are normal. Pupils are equal, round, and reactive to light.   Neck:   Normal range of motion.  Cardiovascular:  Normal rate, regular rhythm, normal heart sounds and intact distal pulses.           Pulmonary/Chest: Breath sounds normal.   Abdominal: Abdomen is soft. Bowel sounds are normal.   Musculoskeletal:         General: Normal range of motion.      Cervical back: Normal range of motion.     Neurological: He is alert and oriented to person, place, and time. He has normal strength. GCS score is 15. GCS eye subscore is 4. GCS verbal subscore is 5. GCS motor subscore is 6.   Skin: Skin is warm and dry. Capillary refill takes less than 2 seconds.   Psychiatric: He has a normal mood and affect. His behavior is normal. Judgment and thought content normal.   Resting comfortably in chair good eye contact speaks freely about his hearing voices without hesitation normal affect currently denies suicidal ideation exhibits future thought       ED Course   Procedures  Labs Reviewed   DRUG SCREEN, URINE (BEAKER) - Abnormal; Notable for the following components:       Result Value    Amphetamines, Urine Positive (*)     Cannabinoids, Urine Positive (*)     Fentanyl, Urine Positive (*)     All other components within normal limits    Narrative:     Cut off concentrations:    Amphetamines - 1000 ng/ml  Barbiturates - 200 ng/ml  Benzodiazepine - 200 ng/ml  Cannabinoids (THC) - 50 ng/ml  Cocaine - 300  ng/ml  Fentanyl - 1.0 ng/ml  MDMA - 500 ng/ml  Opiates - 300 ng/ml   Phencyclidine (PCP) - 25 ng/ml    Specimen submitted for drug analysis and tested for pH and specific gravity in order to evaluate sample integrity. Suspect tampering if specific gravity is <1.003 and/or pH is not within the range of 4.5 - 8.0  False negatives may result form substances such as bleach added to urine.  False positives may result for the presence of a substance with similar chemical structure to the drug or its metabolite.    This test provides only a PRELIMINARY analytical test result. A more specific alternate chemical method must be used in order to obtain a confirmed analytical result. Gas chromatography/mass spectrometry (GC/MS) is the preferred confirmatory method. Other chemical confirmation methods are available. Clinical consideration and professional judgement should be applied to any drug of abuse test result, particularly when preliminary positive results are used.    Positive results will be confirmed only at the physicians request. Unconfirmed screening results are to be used only for medical purposes (treatment).        COMPREHENSIVE METABOLIC PANEL - Abnormal; Notable for the following components:    Glucose Level 131 (*)     Creatinine 1.51 (*)     Globulin 4.0 (*)     All other components within normal limits   ACETAMINOPHEN LEVEL - Abnormal; Notable for the following components:    Acetaminophen Level <17.4 (*)     All other components within normal limits   TSH - Abnormal; Notable for the following components:    Thyroid Stimulating Hormone 0.095 (*)     All other components within normal limits   URINALYSIS, REFLEX TO URINE CULTURE - Abnormal; Notable for the following components:    Specific Gravity, UA 1.035 (*)     Protein, UA 2+ (*)     Ketones, UA Trace (*)     Bilirubin, UA 1+ (*)     All other components within normal limits   CBC WITH DIFFERENTIAL - Abnormal; Notable for the following components:    MCV  95.5 (*)     All other components within normal limits   URINALYSIS, MICROSCOPIC - Abnormal; Notable for the following components:    Mucous, UA Moderate (*)     Sperm, UA Few (*)     All other components within normal limits   ALCOHOL,MEDICAL (ETHANOL) - Normal   CBC W/ AUTO DIFFERENTIAL    Narrative:     The following orders were created for panel order CBC Auto Differential.  Procedure                               Abnormality         Status                     ---------                               -----------         ------                     CBC with Differential[[id number]]        Abnormal            Final result                 Please view results for these tests on the individual orders.   SALICYLATE LEVEL          Imaging Results    None          Medications   OLANZapine tablet 20 mg (20 mg Oral Given 2/15/23 1710)     Medical Decision Making:   Clinical Tests:   Lab Tests: Ordered and Reviewed  ED Management:  Patient examined multiple times patient maintains good eye contact speaks freely about his mental health problems patient exhibits future thought talks about what he is going to do for Darrell Silverio and that he has to get to work.  Patient states that he has been taking his medication did not take it today denies any drug use feel comfortable and discharge of this patient                        Clinical Impression:   Final diagnoses:  [F32.A] Depression, unspecified depression type (Primary)        ED Disposition Condition    Discharge Stable          ED Prescriptions    None       Follow-up Information       Follow up With Specialties Details Why Contact Info    [facility]    [address] [place] [address] [id number] [phone]    [address] [id number] [phone]             Kalyan Reyes III, MD  02/15/23 8496

## 2023-02-16 NOTE — DISCHARGE INSTRUCTIONS
Continue your current medication follow up with [facility] if at any time you feel like you want to harm herself please call 911 immediately and come to this emergency room or if you are not in the [facility] to the nearest local emergency room

## 2023-02-22 ENCOUNTER — HOSPITAL ENCOUNTER (EMERGENCY)
Facility: HOSPITAL | Age: 33
Discharge: HOME | End: 2023-02-22
Attending: INTERNAL MEDICINE
Payer: MEDICAID

## 2023-02-22 VITALS
TEMPERATURE: 98 F | DIASTOLIC BLOOD PRESSURE: 88 MMHG | HEART RATE: 72 BPM | SYSTOLIC BLOOD PRESSURE: 128 MMHG | BODY MASS INDEX: 25.34 KG/M2 | OXYGEN SATURATION: 100 % | RESPIRATION RATE: 18 BRPM | HEIGHT: 71 IN | WEIGHT: 181 LBS

## 2023-02-22 DIAGNOSIS — F19.10 IV DRUG ABUSE: ICD-10-CM

## 2023-02-22 DIAGNOSIS — R52 PAIN: ICD-10-CM

## 2023-02-22 PROCEDURE — 99284 EMERGENCY DEPT VISIT MOD MDM: CPT | Mod: 25

## 2023-02-22 RX ORDER — SULFAMETHOXAZOLE AND TRIMETHOPRIM 800; 160 MG/1; MG/1
1 TABLET ORAL 2 TIMES DAILY
Qty: 14 TABLET | Refills: 0 | Status: SHIPPED | OUTPATIENT
Start: 2023-02-22 | End: 2023-03-01

## 2023-02-22 NOTE — ED PROVIDER NOTES
Encounter Date: 2/22/2023       History     Chief Complaint   Patient presents with    Arm Injury     Pt c/o pain to right forearm. Pt states him was using IV drugs and he believes the needle broke in his arm. Happened 3 days ago.     Patient reports to the ER with complaints of pain to his right forearm after possbily breaking a needle in his arm while doing IV drugs x3 days ago    The history is provided by the patient.   Arm Injury   The incident occurred two days ago. The incident occurred at home. The wounds were self-inflicted. He came to the ER via walk-in. There have been no prior injuries to these areas. He is Right-handed. His tetanus status is UTD. He has been Behaving normally. He has received no recent medical care. Pertinent negatives include no chest pain, no altered mental status, no numbness, no visual disturbance, no abdominal pain, no bowel incontinence, no nausea, no vomiting, no bladder incontinence, no headaches, no hearing loss, no inability to bear weight, no neck pain, no pain when bearing weight, no focal weakness, no decreased responsiveness, no light-headedness, no loss of consciousness, no seizures, no tingling, no weakness, no cough, no difficulty breathing and no memory loss.   Review of patient's allergies indicates:  No Known Allergies  Past Medical History:   Diagnosis Date    Bipolar affective     Depression     HIV (human immunodeficiency virus infection)     Schizophrenia      History reviewed. No pertinent surgical history.  History reviewed. No pertinent family history.  Social History     Tobacco Use    Smoking status: Every Day     Packs/day: 0.50     Types: Cigarettes    Smokeless tobacco: Never   Substance Use Topics    Alcohol use: No    Drug use: Yes     Types: Marijuana     Comment: last use one week ago      Review of Systems   Constitutional:  Negative for decreased responsiveness and fever.   HENT:  Negative for hearing loss and sore throat.    Eyes:  Negative for  visual disturbance.   Respiratory:  Negative for cough and shortness of breath.    Cardiovascular:  Negative for chest pain.   Gastrointestinal:  Negative for abdominal pain, bowel incontinence, nausea and vomiting.   Genitourinary:  Negative for bladder incontinence and dysuria.   Musculoskeletal:  Negative for back pain and neck pain.   Skin:  Negative for rash.   Neurological:  Negative for tingling, focal weakness, seizures, loss of consciousness, weakness, light-headedness, numbness and headaches.   Hematological:  Does not bruise/bleed easily.   Psychiatric/Behavioral: Negative.  Negative for memory loss.      Physical Exam     Initial Vitals [02/22/23 1349]   BP Pulse Resp Temp SpO2   131/70 72 18 98.1 °F (36.7 °C) 99 %      MAP       --         Physical Exam    Vitals reviewed.  Constitutional: He appears well-developed.   HENT:   Head: Normocephalic and atraumatic.   Eyes: Conjunctivae and EOM are normal. Pupils are equal, round, and reactive to light.   Neck:   Normal range of motion.  Cardiovascular:  Normal rate, regular rhythm and normal heart sounds.           Pulmonary/Chest: Breath sounds normal. He exhibits no tenderness.   Abdominal: Abdomen is soft. Bowel sounds are normal. He exhibits no distension. There is no abdominal tenderness.   Musculoskeletal:         General: Normal range of motion.        Arms:       Cervical back: Normal range of motion.      Comments: Small area approx 1cm in diameter of tenderness near reported injection site; no erythema or drainage and no evidence of open skin      Neurological: He is alert and oriented to person, place, and time. He displays normal reflexes. No cranial nerve deficit or sensory deficit. GCS score is 15. GCS eye subscore is 4. GCS verbal subscore is 5. GCS motor subscore is 6.   Skin: Skin is warm. No pallor.   Psychiatric: He has a normal mood and affect. His behavior is normal. Judgment and thought content normal.       ED Course    Procedures  Labs Reviewed - No data to display       Imaging Results              X-Ray Chest PA And Lateral (Final result)  Result time 02/22/23 15:36:37      Final result by Demetrio Salazar MD (02/22/23 15:36:37)                   Impression:      No acute chest disease is identified.      Electronically signed by: Demetrio Salazar  Date:    02/22/2023  Time:    15:36               Narrative:    EXAMINATION:  XR CHEST PA AND LATERAL    CLINICAL HISTORY:  , Other psychoactive substance abuse, uncomplicated.    FINDINGS:  No alveolar consolidation, effusion, or pneumothorax is seen.   The thoracic aorta is normal  cardiac silhouette, central pulmonary vessels and mediastinum are normal in size and are grossly unremarkable.   visualized osseous structures are grossly unremarkable.                                       X-Ray Forearm Right (Final result)  Result time 02/22/23 15:00:43      Final result by Cesar Mariee MD (02/22/23 15:00:43)                   Impression:      No acute findings.      Electronically signed by: Cesar Mariee  Date:    02/22/2023  Time:    15:00               Narrative:    EXAMINATION:  XR FOREARM RIGHT    CLINICAL HISTORY:  Pain, unspecified    COMPARISON:  12 December 2021    FINDINGS:  Two views of the right forearm.  There is no fracture, dislocation or convincing radiopaque foreign body.                                       Medications - No data to display                           Clinical Impression:   Final diagnoses:  [R52] Pain - iv drug user, injected at proximal forearm, undsure if part of needle broke off  [F19.10] IV drug abuse  [F19.10] IV drug abuse - reported iv drug use with injection in the forearm, concerned for possible needle breaking, not visualized on forearm xray        ED Disposition Condition    Discharge Stable          ED Prescriptions       Medication Sig Dispense Start Date End Date Auth. Provider    sulfamethoxazole-trimethoprim 800-160mg (BACTRIM  DS) 800-160 mg Tab Take 1 tablet by mouth 2 (two) times daily. for 7 days 14 tablet 2/22/2023 3/1/2023 DIGNA Issa          Follow-up Information       Follow up With Specialties Details Why Contact Info    discharge followup    If your symptoms become WORSE or you DO NOT IMPROVE and you are unable to reach your health care provider, you should RETURN to the emergency department    discharge info    Discussed all pertinent ED information, results, diagnosis and treatment plan; All questions and concerns were addressed at this time. Patient voices understanding of information and instructions. Patient is comfortable with plan and discharge             DIGNA Issa  02/22/23 5049

## 2023-03-05 ENCOUNTER — HOSPITAL ENCOUNTER (EMERGENCY)
Facility: HOSPITAL | Age: 33
Discharge: PSYCHIATRIC OR SUBSTANCE USE FACILITY | End: 2023-03-06
Attending: EMERGENCY MEDICINE
Payer: MEDICAID

## 2023-03-05 VITALS
SYSTOLIC BLOOD PRESSURE: 147 MMHG | DIASTOLIC BLOOD PRESSURE: 87 MMHG | BODY MASS INDEX: 24.57 KG/M2 | WEIGHT: 175.5 LBS | OXYGEN SATURATION: 99 % | HEIGHT: 71 IN | TEMPERATURE: 98 F | RESPIRATION RATE: 18 BRPM | HEART RATE: 95 BPM

## 2023-03-05 DIAGNOSIS — F29 PSYCHOSIS, UNSPECIFIED PSYCHOSIS TYPE: Primary | ICD-10-CM

## 2023-03-05 LAB
ALBUMIN SERPL-MCNC: 4.7 G/DL (ref 3.5–5)
ALBUMIN/GLOB SERPL: 1 RATIO (ref 1.1–2)
ALP SERPL-CCNC: 110 UNIT/L (ref 40–150)
ALT SERPL-CCNC: 18 UNIT/L (ref 0–55)
AMPHET UR QL SCN: POSITIVE
APAP SERPL-MCNC: <17.4 UG/ML (ref 17.4–30)
APPEARANCE UR: CLEAR
AST SERPL-CCNC: 25 UNIT/L (ref 5–34)
BACTERIA #/AREA URNS AUTO: ABNORMAL /HPF
BARBITURATE SCN PRESENT UR: NEGATIVE
BASOPHILS # BLD AUTO: 0.04 X10(3)/MCL (ref 0–0.2)
BASOPHILS NFR BLD AUTO: 0.6 %
BENZODIAZ UR QL SCN: NEGATIVE
BILIRUB UR QL STRIP.AUTO: NEGATIVE MG/DL
BILIRUBIN DIRECT+TOT PNL SERPL-MCNC: 0.6 MG/DL
BUN SERPL-MCNC: 15.6 MG/DL (ref 8.9–20.6)
CALCIUM SERPL-MCNC: 10 MG/DL (ref 8.4–10.2)
CANNABINOIDS UR QL SCN: POSITIVE
CASTS URNS MICRO: ABNORMAL /LPF
CHLORIDE SERPL-SCNC: 103 MMOL/L (ref 98–107)
CO2 SERPL-SCNC: 26 MMOL/L (ref 22–29)
COCAINE UR QL SCN: NEGATIVE
COLOR UR AUTO: YELLOW
CREAT SERPL-MCNC: 1.32 MG/DL (ref 0.73–1.18)
EOSINOPHIL # BLD AUTO: 0.23 X10(3)/MCL (ref 0–0.9)
EOSINOPHIL NFR BLD AUTO: 3.6 %
ERYTHROCYTE [DISTWIDTH] IN BLOOD BY AUTOMATED COUNT: 11.8 % (ref 11.5–17)
ETHANOL SERPL-MCNC: <10 MG/DL
FENTANYL UR QL SCN: POSITIVE
GFR SERPLBLD CREATININE-BSD FMLA CKD-EPI: >60 MLS/MIN/1.73/M2
GLOBULIN SER-MCNC: 4.5 GM/DL (ref 2.4–3.5)
GLUCOSE SERPL-MCNC: 109 MG/DL (ref 74–100)
GLUCOSE UR QL STRIP.AUTO: NORMAL MG/DL
HCT VFR BLD AUTO: 49.6 % (ref 42–52)
HGB BLD-MCNC: 17.1 G/DL (ref 14–18)
HYALINE CASTS #/AREA URNS LPF: ABNORMAL /LPF
IMM GRANULOCYTES # BLD AUTO: 0.01 X10(3)/MCL (ref 0–0.04)
IMM GRANULOCYTES NFR BLD AUTO: 0.2 %
KETONES UR QL STRIP.AUTO: ABNORMAL MG/DL
LEUKOCYTE ESTERASE UR QL STRIP.AUTO: NEGATIVE UNIT/L
LYMPHOCYTES # BLD AUTO: 3.38 X10(3)/MCL (ref 0.6–4.6)
LYMPHOCYTES NFR BLD AUTO: 52.6 %
MCH RBC QN AUTO: 32.6 PG
MCHC RBC AUTO-ENTMCNC: 34.5 G/DL (ref 33–36)
MCV RBC AUTO: 94.5 FL (ref 80–94)
MDMA UR QL SCN: NEGATIVE
MONOCYTES # BLD AUTO: 0.59 X10(3)/MCL (ref 0.1–1.3)
MONOCYTES NFR BLD AUTO: 9.2 %
MUCOUS THREADS URNS QL MICRO: ABNORMAL /LPF
NEUTROPHILS # BLD AUTO: 2.17 X10(3)/MCL (ref 2.1–9.2)
NEUTROPHILS NFR BLD AUTO: 33.8 %
NITRITE UR QL STRIP.AUTO: NEGATIVE
NRBC BLD AUTO-RTO: 0 %
OPIATES UR QL SCN: NEGATIVE
PCP UR QL: NEGATIVE
PH UR STRIP.AUTO: 6 [PH]
PH UR: 6 [PH] (ref 3–11)
PLATELET # BLD AUTO: 307 X10(3)/MCL (ref 130–400)
PMV BLD AUTO: 9.3 FL (ref 7.4–10.4)
POTASSIUM SERPL-SCNC: 3.6 MMOL/L (ref 3.5–5.1)
PROT SERPL-MCNC: 9.2 GM/DL (ref 6.4–8.3)
PROT UR QL STRIP.AUTO: ABNORMAL MG/DL
RBC # BLD AUTO: 5.25 X10(6)/MCL (ref 4.7–6.1)
RBC #/AREA URNS AUTO: ABNORMAL /HPF
RBC UR QL AUTO: NEGATIVE UNIT/L
SALICYLATES SERPL-MCNC: <5 MG/DL
SARS-COV-2 RDRP RESP QL NAA+PROBE: NEGATIVE
SODIUM SERPL-SCNC: 139 MMOL/L (ref 136–145)
SP GR UR STRIP.AUTO: 1.03
SQUAMOUS #/AREA URNS LPF: ABNORMAL /HPF
UROBILINOGEN UR STRIP-ACNC: NORMAL MG/DL
WBC # SPEC AUTO: 6.4 X10(3)/MCL (ref 4.5–11.5)
WBC #/AREA URNS AUTO: ABNORMAL /HPF

## 2023-03-05 PROCEDURE — 82077 ASSAY SPEC XCP UR&BREATH IA: CPT | Performed by: EMERGENCY MEDICINE

## 2023-03-05 PROCEDURE — 80143 DRUG ASSAY ACETAMINOPHEN: CPT | Performed by: EMERGENCY MEDICINE

## 2023-03-05 PROCEDURE — 80179 DRUG ASSAY SALICYLATE: CPT | Performed by: EMERGENCY MEDICINE

## 2023-03-05 PROCEDURE — 99285 EMERGENCY DEPT VISIT HI MDM: CPT

## 2023-03-05 PROCEDURE — 81001 URINALYSIS AUTO W/SCOPE: CPT | Mod: 59 | Performed by: EMERGENCY MEDICINE

## 2023-03-05 PROCEDURE — 85025 COMPLETE CBC W/AUTO DIFF WBC: CPT | Performed by: EMERGENCY MEDICINE

## 2023-03-05 PROCEDURE — 80053 COMPREHEN METABOLIC PANEL: CPT | Performed by: EMERGENCY MEDICINE

## 2023-03-05 PROCEDURE — 80307 DRUG TEST PRSMV CHEM ANLYZR: CPT | Performed by: EMERGENCY MEDICINE

## 2023-03-05 PROCEDURE — 87635 SARS-COV-2 COVID-19 AMP PRB: CPT | Performed by: EMERGENCY MEDICINE

## 2023-03-05 RX ORDER — OLANZAPINE 5 MG/1
10 TABLET, ORALLY DISINTEGRATING ORAL ONCE
Status: COMPLETED | OUTPATIENT
Start: 2023-03-06 | End: 2023-03-06

## 2023-03-06 ENCOUNTER — HOSPITAL ENCOUNTER (INPATIENT)
Facility: HOSPITAL | Age: 33
LOS: 2 days | Discharge: HOME | DRG: 885 | End: 2023-03-08
Attending: PSYCHIATRY & NEUROLOGY | Admitting: PSYCHIATRY & NEUROLOGY
Payer: MEDICAID

## 2023-03-06 DIAGNOSIS — F32.A DEPRESSION DETERMINED BY EXAMINATION: ICD-10-CM

## 2023-03-06 DIAGNOSIS — F31.64 BIPOLAR AFFECTIVE DISORDER, MIXED, SEVERE, WITH PSYCHOTIC BEHAVIOR: Primary | ICD-10-CM

## 2023-03-06 LAB
ALBUMIN SERPL-MCNC: 4.3 G/DL (ref 3.5–5)
ALBUMIN/GLOB SERPL: 1.2 RATIO (ref 1.1–2)
ALP SERPL-CCNC: 96 UNIT/L (ref 40–150)
ALT SERPL-CCNC: 18 UNIT/L (ref 0–55)
AST SERPL-CCNC: 26 UNIT/L (ref 5–34)
BILIRUBIN DIRECT+TOT PNL SERPL-MCNC: 0.6 MG/DL
BUN SERPL-MCNC: 15.9 MG/DL (ref 8.9–20.6)
CALCIUM SERPL-MCNC: 9.4 MG/DL (ref 8.4–10.2)
CHLORIDE SERPL-SCNC: 104 MMOL/L (ref 98–107)
CO2 SERPL-SCNC: 26 MMOL/L (ref 22–29)
CREAT SERPL-MCNC: 1.15 MG/DL (ref 0.73–1.18)
GFR SERPLBLD CREATININE-BSD FMLA CKD-EPI: >60 MLS/MIN/1.73/M2
GLOBULIN SER-MCNC: 3.6 GM/DL (ref 2.4–3.5)
GLUCOSE SERPL-MCNC: 78 MG/DL (ref 74–100)
POTASSIUM SERPL-SCNC: 4.3 MMOL/L (ref 3.5–5.1)
PROT SERPL-MCNC: 7.9 GM/DL (ref 6.4–8.3)
RPR SER QL: REACTIVE
RPR SER-TITR: ABNORMAL {TITER}
SODIUM SERPL-SCNC: 139 MMOL/L (ref 136–145)
T PALLIDUM AB SER QL: REACTIVE
TSH SERPL-ACNC: 0.66 UIU/ML (ref 0.35–4.94)

## 2023-03-06 PROCEDURE — 12400001 HC PSYCH SEMI-PRIVATE ROOM

## 2023-03-06 PROCEDURE — 86780 TREPONEMA PALLIDUM: CPT | Performed by: PSYCHIATRY & NEUROLOGY

## 2023-03-06 PROCEDURE — 80053 COMPREHEN METABOLIC PANEL: CPT | Performed by: PSYCHIATRY & NEUROLOGY

## 2023-03-06 PROCEDURE — 86592 SYPHILIS TEST NON-TREP QUAL: CPT | Performed by: PSYCHIATRY & NEUROLOGY

## 2023-03-06 PROCEDURE — 84443 ASSAY THYROID STIM HORMONE: CPT | Performed by: PSYCHIATRY & NEUROLOGY

## 2023-03-06 PROCEDURE — 25000003 PHARM REV CODE 250: Performed by: PSYCHIATRY & NEUROLOGY

## 2023-03-06 PROCEDURE — 25000003 PHARM REV CODE 250: Performed by: EMERGENCY MEDICINE

## 2023-03-06 RX ORDER — ADHESIVE BANDAGE
30 BANDAGE TOPICAL DAILY PRN
Status: DISCONTINUED | OUTPATIENT
Start: 2023-03-07 | End: 2023-03-08 | Stop reason: HOSPADM

## 2023-03-06 RX ORDER — LORAZEPAM 1 MG/1
2 TABLET ORAL EVERY 4 HOURS PRN
Status: DISCONTINUED | OUTPATIENT
Start: 2023-03-07 | End: 2023-03-08 | Stop reason: HOSPADM

## 2023-03-06 RX ORDER — PROMETHAZINE HYDROCHLORIDE 25 MG/1
25 TABLET ORAL EVERY 6 HOURS PRN
Status: DISCONTINUED | OUTPATIENT
Start: 2023-03-06 | End: 2023-03-08 | Stop reason: HOSPADM

## 2023-03-06 RX ORDER — ONDANSETRON 4 MG/1
4 TABLET, ORALLY DISINTEGRATING ORAL EVERY 8 HOURS PRN
Status: DISCONTINUED | OUTPATIENT
Start: 2023-03-06 | End: 2023-03-08 | Stop reason: HOSPADM

## 2023-03-06 RX ORDER — DIPHENHYDRAMINE HYDROCHLORIDE 50 MG/ML
50 INJECTION INTRAMUSCULAR; INTRAVENOUS EVERY 4 HOURS PRN
Status: DISCONTINUED | OUTPATIENT
Start: 2023-03-07 | End: 2023-03-08 | Stop reason: HOSPADM

## 2023-03-06 RX ORDER — HYDROXYZINE HYDROCHLORIDE 50 MG/1
50 TABLET, FILM COATED ORAL EVERY 4 HOURS PRN
Status: DISCONTINUED | OUTPATIENT
Start: 2023-03-06 | End: 2023-03-08 | Stop reason: HOSPADM

## 2023-03-06 RX ORDER — IBUPROFEN 200 MG
1 TABLET ORAL DAILY
Status: DISCONTINUED | OUTPATIENT
Start: 2023-03-06 | End: 2023-03-08 | Stop reason: HOSPADM

## 2023-03-06 RX ORDER — HALOPERIDOL 5 MG/ML
10 INJECTION INTRAMUSCULAR EVERY 4 HOURS PRN
Status: DISCONTINUED | OUTPATIENT
Start: 2023-03-07 | End: 2023-03-08 | Stop reason: HOSPADM

## 2023-03-06 RX ORDER — HALOPERIDOL 5 MG/1
10 TABLET ORAL EVERY 4 HOURS PRN
Status: DISCONTINUED | OUTPATIENT
Start: 2023-03-07 | End: 2023-03-08 | Stop reason: HOSPADM

## 2023-03-06 RX ORDER — QUETIAPINE FUMARATE 300 MG/1
300 TABLET, FILM COATED ORAL NIGHTLY
Status: DISCONTINUED | OUTPATIENT
Start: 2023-03-06 | End: 2023-03-08 | Stop reason: HOSPADM

## 2023-03-06 RX ORDER — DIPHENHYDRAMINE HCL 50 MG
50 CAPSULE ORAL EVERY 4 HOURS PRN
Status: DISCONTINUED | OUTPATIENT
Start: 2023-03-07 | End: 2023-03-08 | Stop reason: HOSPADM

## 2023-03-06 RX ORDER — SERTRALINE HYDROCHLORIDE 50 MG/1
50 TABLET, FILM COATED ORAL DAILY
Status: DISCONTINUED | OUTPATIENT
Start: 2023-03-06 | End: 2023-03-08 | Stop reason: HOSPADM

## 2023-03-06 RX ORDER — ACETAMINOPHEN 325 MG/1
650 TABLET ORAL EVERY 6 HOURS PRN
Status: DISCONTINUED | OUTPATIENT
Start: 2023-03-07 | End: 2023-03-08 | Stop reason: HOSPADM

## 2023-03-06 RX ORDER — MAG HYDROX/ALUMINUM HYD/SIMETH 200-200-20
30 SUSPENSION, ORAL (FINAL DOSE FORM) ORAL EVERY 6 HOURS PRN
Status: DISCONTINUED | OUTPATIENT
Start: 2023-03-07 | End: 2023-03-08 | Stop reason: HOSPADM

## 2023-03-06 RX ORDER — TRAZODONE HYDROCHLORIDE 100 MG/1
100 TABLET ORAL NIGHTLY PRN
Status: DISCONTINUED | OUTPATIENT
Start: 2023-03-06 | End: 2023-03-08 | Stop reason: HOSPADM

## 2023-03-06 RX ORDER — LORAZEPAM 2 MG/ML
2 INJECTION INTRAMUSCULAR EVERY 4 HOURS PRN
Status: DISCONTINUED | OUTPATIENT
Start: 2023-03-07 | End: 2023-03-08 | Stop reason: HOSPADM

## 2023-03-06 RX ADMIN — SERTRALINE HYDROCHLORIDE 50 MG: 50 TABLET ORAL at 11:03

## 2023-03-06 RX ADMIN — OLANZAPINE 10 MG: 5 TABLET, ORALLY DISINTEGRATING ORAL at 12:03

## 2023-03-06 RX ADMIN — HYDROXYZINE HYDROCHLORIDE 50 MG: 50 TABLET, FILM COATED ORAL at 06:03

## 2023-03-06 RX ADMIN — QUETIAPINE FUMARATE 300 MG: 300 TABLET ORAL at 08:03
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
## 2023-03-06 NOTE — PROGRESS NOTES
03/06/23 0147   Behavioral   General Appearance [WDL Definition: Well-kept, clean; dress appropriate for weather/appropriate for setting] WDL except   General Appearance bizarre appearance   Emotion Mood WDL   Emotion/Mood/Affect [WDL Definition: Calm; euthymic; affect consistent with mood; facial expression relaxed, appropriate to situation] WDL   Speech WDL   Speech [WDL Definition: Moderate rate and volume; clear, coherent; articulate; effective] WDL except   Speech Symptoms pressured speech   Perceptual State WDL   Perceptual State [WDL Definition: Consistent with reality; denies hallucinations] WDL except   Hallucinations auditory   Thought Process WDL   Thought Process [WDL Definition: Judgment and insight appropriate to situation; logical, relevant, and linear thought process] WDL   Intellectual Performance WDL   Intellectual Performance [WDL Definition: Alert, oriented x 4; immediate, recent and remote memory intact; able to comprehend] WDL   Level of Consciousness (AVPU) alert   [place] Suicide Severity Rating Scale   1. Wish to be Dead: Have you wished you were dead or wished you could go to sleep and not wake up? No   2. Suicidal Thoughts: Have you actually had any thoughts of killing yourself? No   6. Suicide Behavior Question: Have you ever done anything, started to do anything, or prepared to do anything to end your life? No   Suicide Risk No Risk   Violence Risk   Feels Like Hurting Others no   Previous Attempt to Harm Others no   Safety Management    Safety Promotion/Fall Prevention nonskid shoes/socks when out of bed   Gastrointestinal   GI WDL WDL   Genitourinary   Genitourinary WDL WDL   Skin   Skin WDL WDL

## 2023-03-06 NOTE — PLAN OF CARE
Problem: Adult Inpatient Plan of Care  Goal: Plan of Care Review  Outcome: Ongoing, Progressing  Goal: Patient-Specific Goal (Individualized)  Outcome: Ongoing, Progressing  Goal: Absence of Hospital-Acquired Illness or Injury  Outcome: Ongoing, Progressing  Goal: Optimal Comfort and Wellbeing  Outcome: Ongoing, Progressing  Goal: Readiness for Transition of Care  Outcome: Ongoing, Progressing     Problem: Violence Risk or Actual  Goal: Anger and Impulse Control  Outcome: Ongoing, Progressing     Problem: Activity and Energy Impairment (Excessive Substance Use)  Goal: Optimized Energy Level (Excessive Substance Use)  Outcome: Ongoing, Progressing     Problem: Behavior Regulation Impairment (Excessive Substance Use)  Goal: Improved Behavioral Control (Excessive Substance Use)  Outcome: Ongoing, Progressing     Problem: Decreased Participation and Engagement (Excessive Substance Use)  Goal: Increased Participation and Engagement (Excessive Substance Use)  Outcome: Ongoing, Progressing     Problem: Cognitive Impairment (Depressive Signs/Symptoms)  Goal: Optimized Cognitive Function  Outcome: Ongoing, Progressing     Problem: Decreased Participation/Engagement (Depressive Signs/Symptoms)  Goal: Increased Participation and Engagement (Depressive Signs/Symptoms)  Outcome: Ongoing, Progressing     Problem: Feelings of Worthlessness, Hopelessness or Excessive Guilt (Depressive Signs/Symptoms)  Goal: Enhanced Self-Esteem and Confidence (Depressive Signs/Symptoms)  Outcome: Ongoing, Progressing

## 2023-03-06 NOTE — H&P
"3/6/2023 10:50 AM   Chon Parham   1990   [id number]            Psychiatry Inpatient Admission Note    Date of Admission: 3/6/2023  1:04 AM    Current Legal Status: PEC    Chief Complaint: "I told the ED I needed a prescription of my meds"    SUBJECTIVE:   History of Present Illness:   Chon Parham is a 32 y.o. male placed under a PEC at [facility] due to reported suicidal ideations, disorganized, flight of ideas, and grandiose.  Patient states that he went to the ED in order to get refills of prescriptions.  He states that he follows up at [facility].  He was inpatient in [place] in December 2022 and [facility] in January.  Cooperative this morning but his recent stays have reported active hallucinations and delusions.  He states that he has medication at home, but I am unsure why he would present to the ED for medications if he already had them.  Will admit to inpatient unit and f/u with collateral.  Will restart home psychiatric medication.    UDS: (+)amphetamines, cannabinoids, fentanyl  Alcohol: <10        Past Psychiatric History:   Previous Psychiatric Hospitalizations: [place] in December 2022  Previous Medication Trials: Seroquel, Zoloft, Depakote  Previous Suicide Attempts: denies   Outpatient psychiatrist: [facility]    Past Medical/Surgical History:   Past Medical History:   Diagnosis Date    Bipolar affective     Depression     HIV (human immunodeficiency virus infection)     Schizophrenia      No past surgical history on file.  denies    Family Psychiatric History:   Mother and a few cousins: bipolar and substance abuse       Allergies:   Review of patient's allergies indicates:  No Known Allergies    Substance Abuse History:   Tobacco: denies  Alcohol: denies  Illicit Substances: Marijuana since age 15 and methamphetamine since age 29  Treatment: Numerous different rehabs and psychiatric facilities      Current Medications:   Home Psychiatric Meds: Seroquel    Scheduled Meds:    " lgyuaodoj-mxyjqonv-vnaiwgx ala  1 tablet Oral Daily    nicotine  1 patch Transdermal Daily      PRN Meds: [START ON 3/7/2023] acetaminophen, [START ON 3/7/2023] aluminum-magnesium hydroxide-simethicone, [START ON 3/7/2023] haloperidoL **AND** [START ON 3/7/2023] diphenhydrAMINE **AND** [START ON 3/7/2023] LORazepam **AND** [START ON 3/7/2023] haloperidol lactate **AND** [START ON 3/7/2023] diphenhydrAMINE **AND** [START ON 3/7/2023] lorazepam, hydrOXYzine HCL, [START ON 3/7/2023] magnesium hydroxide 400 mg/5 ml, ondansetron, promethazine, trazodone   Psychotherapeutics (From admission, onward)      Start     Stop Route Frequency Ordered    03/07/23 0000  haloperidoL tablet 10 mg  (Med - Acute  Behavioral Management)        See Hyperspace for full Linked Orders Report.    -- Oral Every 4 hours PRN 03/06/23 0105    03/07/23 0000  LORazepam tablet 2 mg  (Med - Acute  Behavioral Management)        See Hyperspace for full Linked Orders Report.    -- Oral Every 4 hours PRN 03/06/23 0105    03/07/23 0000  haloperidol lactate injection 10 mg  (Med - Acute  Behavioral Management)        See Hyperspace for full Linked Orders Report.    -- IM Every 4 hours PRN 03/06/23 0105    03/07/23 0000  LORazepam injection 2 mg  (Med - Acute  Behavioral Management)        See Hyperspace for full Linked Orders Report.    -- IM Every 4 hours PRN 03/06/23 0105    03/06/23 0105  traZODone tablet 100 mg         -- Oral Nightly PRN 03/06/23 0105              Social History:  Housing Status: Lives with his mother in [place]  Relationship Status/Sexual Orientation: single   Children: 1 son  Education: 8th grade   Employment Status/Info: detailing automobiles    history: denies  History of physical/sexual abuse: denies   Access to gun: yes but not his       Legal History:   Past Charges/Incarcerations: denies   Pending charges: denies      OBJECTIVE:   Medical Review Of Systems:  Constitutional: negative  Respiratory:  negative  Cardiovascular: negative  Gastrointestinal: negative  Genitourinary:negative  Musculoskeletal:negative  Neurological: negative      Vitals   Vitals:    03/06/23 0700   BP: 138/89   Pulse: 76   Resp: 18   Temp: 98.2 °F (36.8 °C)        Labs/Imaging/Studies:   Recent Results (from the past 48 hour(s))   Ethanol    Collection Time: 03/05/23  8:05 PM   Result Value Ref Range    Ethanol Level <10.0 <=10.0 mg/dL   Salicylate Level    Collection Time: 03/05/23  8:05 PM   Result Value Ref Range    Salicylate Level <5.0 mg/dL   Acetaminophen Level    Collection Time: 03/05/23  8:05 PM   Result Value Ref Range    Acetaminophen Level <17.4 (L) 17.4 - 30.0 ug/ml   Comprehensive Metabolic Panel    Collection Time: 03/05/23  8:05 PM   Result Value Ref Range    Sodium Level 139 136 - 145 mmol/L    Potassium Level 3.6 3.5 - 5.1 mmol/L    Chloride 103 98 - 107 mmol/L    Carbon Dioxide 26 22 - 29 mmol/L    Glucose Level 109 (H) 74 - 100 mg/dL    Blood Urea Nitrogen 15.6 8.9 - 20.6 mg/dL    Creatinine 1.32 (H) 0.73 - 1.18 mg/dL    Calcium Level Total 10.0 8.4 - 10.2 mg/dL    Protein Total 9.2 (H) 6.4 - 8.3 gm/dL    Albumin Level 4.7 3.5 - 5.0 g/dL    Globulin 4.5 (H) 2.4 - 3.5 gm/dL    Albumin/Globulin Ratio 1.0 (L) 1.1 - 2.0 ratio    Bilirubin Total 0.6 <=1.5 mg/dL    Alkaline Phosphatase 110 40 - 150 unit/L    Alanine Aminotransferase 18 0 - 55 unit/L    Aspartate Aminotransferase 25 5 - 34 unit/L    eGFR >60 mls/min/1.73/m2   CBC with Differential    Collection Time: 03/05/23  8:06 PM   Result Value Ref Range    WBC 6.4 4.5 - 11.5 x10(3)/mcL    RBC 5.25 4.70 - 6.10 x10(6)/mcL    Hgb 17.1 14.0 - 18.0 g/dL    Hct 49.6 42.0 - 52.0 %    MCV 94.5 (H) 80.0 - 94.0 fL    MCH 32.6 pg    MCHC 34.5 33.0 - 36.0 g/dL    RDW 11.8 11.5 - 17.0 %    Platelet 307 130 - 400 x10(3)/mcL    MPV 9.3 7.4 - 10.4 fL    Neut % 33.8 %    Lymph % 52.6 %    Mono % 9.2 %    Eos % 3.6 %    Basophil % 0.6 %    Lymph # 3.38 0.6 - 4.6 x10(3)/mcL    Neut #  2.17 2.1 - 9.2 x10(3)/mcL    Mono # 0.59 0.1 - 1.3 x10(3)/mcL    Eos # 0.23 0 - 0.9 x10(3)/mcL    Baso # 0.04 0 - 0.2 x10(3)/mcL    IG# 0.01 0 - 0.04 x10(3)/mcL    IG% 0.2 %    NRBC% 0.0 %   COVID-19 Rapid Screening    Collection Time: 03/05/23  9:16 PM   Result Value Ref Range    SARS COV-2 MOLECULAR Negative Negative   Drug Screen, Urine    Collection Time: 03/05/23  9:26 PM   Result Value Ref Range    Amphetamines, Urine Positive (A) Negative    Barbituates, Urine Negative Negative    Benzodiazepine, Urine Negative Negative    Cannabinoids, Urine Positive (A) Negative    Cocaine, Urine Negative Negative    Fentanyl, Urine Positive (A) Negative    MDMA, Urine Negative Negative    Opiates, Urine Negative Negative    Phencyclidine, Urine Negative Negative    pH, Urine 6.0 3.0 - 11.0   Urinalysis, Reflex to Urine Culture    Collection Time: 03/05/23  9:26 PM    Specimen: Urine   Result Value Ref Range    Color, UA Yellow Yellow, Light-Yellow, Dark Yellow, Kari, Straw    Appearance, UA Clear Clear    Specific Gravity, UA 1.035     pH, UA 6.0 5.0 - 8.5    Protein, UA 1+ (A) Negative mg/dL    Glucose, UA Normal Negative, Normal mg/dL    Ketones, UA Trace (A) Negative mg/dL    Blood, UA Negative Negative unit/L    Bilirubin, UA Negative Negative mg/dL    Urobilinogen, UA Normal 0.2, 1.0, Normal mg/dL    Nitrites, UA Negative Negative    Leukocyte Esterase, UA Negative Negative unit/L    WBC, UA 0-5 None Seen, 0-2, 3-5, 0-5 /HPF    Bacteria, UA Trace (A) None Seen /HPF    Squamous Epithelial Cells, UA None Seen None Seen /HPF    Mucous, UA Few (A) None Seen /LPF    Unclassified Cast, UA 0-2 (A) None Seen /LPF    Hyaline Casts, UA None Seen None Seen /lpf    RBC, UA 0-5 None Seen, 0-2, 3-5, 0-5 /HPF   Comprehensive metabolic panel    Collection Time: 03/06/23  7:06 AM   Result Value Ref Range    Sodium Level 139 136 - 145 mmol/L    Potassium Level 4.3 3.5 - 5.1 mmol/L    Chloride 104 98 - 107 mmol/L    Carbon Dioxide 26  "22 - 29 mmol/L    Glucose Level 78 74 - 100 mg/dL    Blood Urea Nitrogen 15.9 8.9 - 20.6 mg/dL    Creatinine 1.15 0.73 - 1.18 mg/dL    Calcium Level Total 9.4 8.4 - 10.2 mg/dL    Protein Total 7.9 6.4 - 8.3 gm/dL    Albumin Level 4.3 3.5 - 5.0 g/dL    Globulin 3.6 (H) 2.4 - 3.5 gm/dL    Albumin/Globulin Ratio 1.2 1.1 - 2.0 ratio    Bilirubin Total 0.6 <=1.5 mg/dL    Alkaline Phosphatase 96 40 - 150 unit/L    Alanine Aminotransferase 18 0 - 55 unit/L    Aspartate Aminotransferase 26 5 - 34 unit/L    eGFR >60 mls/min/1.73/m2   TSH    Collection Time: 03/06/23  7:06 AM   Result Value Ref Range    Thyroid Stimulating Hormone 0.663 0.350 - 4.940 uIU/mL      No results found for: PHENYTOIN, PHENOBARB, VALPROATE, CBMZ        Psychiatric Mental Status Exam:  General Appearance: appears stated age, well-developed, well-nourished  Arousal: alert  Behavior: cooperative  Movements and Motor Activity: no abnormal involuntary movements noted  Orientation: oriented to person, place, time, and situation  Speech: normal rate, normal rhythm, normal volume, normal tone  Mood: "All right"  Affect: mood-congruent  Thought Process: linear  Associations: intact  Thought Content and Perceptions: recent suicidal ideation reported, no homicidal ideation, recent delusions reported  Recent and Remote Memory: recent memory intact, remote memory intact; per interview/observation with patient  Attention and Concentration: grossly intact, ; per interview/observation with patient  Fund of Knowledge: intact, aware of current events, vocabulary appropriate; based on history, vocabulary, fund of knowledge, syntax, grammar, and content  Insight: questionable; based on understanding of severity of illness and HPI  Judgment: questionable; based on patient's behavior and HPI        Patient Strengths:  Able to verbalize needs, good support system      Patient Liabilities:  Medication non-compliance, substance abuse, psychosis      Discharge " Criteria:  Improved mood, Improved thought process, Medication compliance, and Improved coping skills      Reason for Admission:  The patient poses a significant and immediate danger to self., To stabilize disabling psychiatric/psychological symptoms of sudden onset., and The patient can demonstrate a reasonable expectation of improvement in his/her disorder or condition as a result of active treatment being provided.    ASSESSMENT/PLAN:   Diagnosis:  Bipolar Disorder, most recent episodes mixed, severe, with psychotic features (F31.64)  Methamphetamine use disorder severe (F15.20)  Cannabis use disorder severe (F12.20)    Past Medical History:   Diagnosis Date    Bipolar affective     Depression     HIV (human immunodeficiency virus infection)     Schizophrenia           Plan:  -Admit to [facility]  -Restart Seroquel 300mg HS  -Restart Sertraline 50mg daily  -Will attempt to obtain outside psychiatric records if available  - to assist with aftercare planning and collateral  -Continue inpatient treatment as evidenced by significant psychotic thought disorder and danger to self      Estimated length of stay: 5-7 days    Estimated Disposition: Home    Estimated Follow-up: Outpatient medication management      On this date, I have reviewed the medical history and Nursing Assessment, as well as records from referral source.  I have evaluated the mental status of the above named person and concur with the findings of all assessments.  I have provided medical direction for the development of the Treatment Plan.    I conclude that this patient meets admission criteria for inpatient treatment.  I certify that this patient poses a danger to self or others, or would otherwise be considered gravely disabled based on this assessment and/or provided collateral information.     I have provided medical direction for the development of the Treatment plan.  These services will be provided while this patient is under my care  and will be based on an individualized plan of care.  The patient can demonstrate a reasonable expectation of improvement in his/her disorder as a result of the active treatment being provided.      Flip Thomas M.D.

## 2023-03-06 NOTE — H&P
[facility] - Behavioral Health Unit  History & Physical    Subjective:      Chief Complaint/Reason for Admission: flight of ideas with pedro luis    Chon Parham is a 32 y.o. male. Bipolar with grandiose ideas and grandiosity     Past Medical History:   Diagnosis Date    Bipolar affective     Depression     HIV (human immunodeficiency virus infection)     Schizophrenia      No past surgical history on file.  No family history on file.  Social History     Tobacco Use    Smoking status: Every Day     Packs/day: 0.50     Types: Cigarettes    Smokeless tobacco: Never   Substance Use Topics    Alcohol use: No    Drug use: Yes     Types: Marijuana     Comment: last use one week ago        PTA Medications   Medication Sig    fnuimonbx-fiuwlzzk-eylkxjo ala (BIKTARVY) -25 mg per tablet Take 1 tablet by mouth once daily.    buPROPion (WELLBUTRIN XL) 150 MG TB24 tablet Take 1 tablet (150 mg total) by mouth once daily.    gabapentin (NEURONTIN) 300 MG capsule Take 1 capsule (300 mg total) by mouth 3 (three) times daily.    OLANZapine zydis (ZYPREXA) 20 MG TbDL Take 1 tablet (20 mg total) by mouth every evening.     Review of patient's allergies indicates:  No Known Allergies     Review of Systems   Constitutional: Negative.    HENT: Negative.     Eyes: Negative.    Respiratory: Negative.     Cardiovascular: Negative.    Gastrointestinal: Negative.    Genitourinary: Negative.    Musculoskeletal: Negative.    Skin: Negative.    Neurological: Negative.    Endo/Heme/Allergies: Negative.    Psychiatric/Behavioral:  Positive for depression. Negative for hallucinations, substance abuse and suicidal ideas. The patient is nervous/anxious.      Objective:      Vital Signs (Most Recent)  Temp: 97.9 °F (36.6 °C) (03/06/23 0149)  Pulse: 81 (03/06/23 0149)  Resp: 18 (03/06/23 0149)  BP: (!) 150/78 (03/06/23 0149)    Vital Signs Range (Last 24H):  Temp:  [97.9 °F (36.6 °C)-98.2 °F (36.8 °C)]   Pulse:  [81-95]   Resp:  [18]    BP: (147-150)/(78-87)   SpO2:  [99 %]     Physical Exam  HENT:      Head: Normocephalic.      Right Ear: Tympanic membrane normal.      Left Ear: Tympanic membrane normal.      Nose: Nose normal.      Mouth/Throat:      Mouth: Mucous membranes are moist.   Eyes:      Extraocular Movements: Extraocular movements intact.      Pupils: Pupils are equal, round, and reactive to light.   Cardiovascular:      Rate and Rhythm: Normal rate and regular rhythm.   Pulmonary:      Effort: Pulmonary effort is normal.   Abdominal:      General: Abdomen is flat.   Musculoskeletal:         General: Normal range of motion.   Skin:     General: Skin is warm.   Neurological:      General: No focal deficit present.      Mental Status: He is alert and oriented to person, place, and time.      Comments: Vision normal   Hearing normal   EOM intact   Face muscles normal  Facial sensation normal   Shrugs shoulders  Tongue midline          Data Review:    Recent Results (from the past 48 hour(s))   Ethanol    Collection Time: 03/05/23  8:05 PM   Result Value Ref Range    Ethanol Level <10.0 <=10.0 mg/dL   Salicylate Level    Collection Time: 03/05/23  8:05 PM   Result Value Ref Range    Salicylate Level <5.0 mg/dL   Acetaminophen Level    Collection Time: 03/05/23  8:05 PM   Result Value Ref Range    Acetaminophen Level <17.4 (L) 17.4 - 30.0 ug/ml   Comprehensive Metabolic Panel    Collection Time: 03/05/23  8:05 PM   Result Value Ref Range    Sodium Level 139 136 - 145 mmol/L    Potassium Level 3.6 3.5 - 5.1 mmol/L    Chloride 103 98 - 107 mmol/L    Carbon Dioxide 26 22 - 29 mmol/L    Glucose Level 109 (H) 74 - 100 mg/dL    Blood Urea Nitrogen 15.6 8.9 - 20.6 mg/dL    Creatinine 1.32 (H) 0.73 - 1.18 mg/dL    Calcium Level Total 10.0 8.4 - 10.2 mg/dL    Protein Total 9.2 (H) 6.4 - 8.3 gm/dL    Albumin Level 4.7 3.5 - 5.0 g/dL    Globulin 4.5 (H) 2.4 - 3.5 gm/dL    Albumin/Globulin Ratio 1.0 (L) 1.1 - 2.0 ratio    Bilirubin Total 0.6 <=1.5  mg/dL    Alkaline Phosphatase 110 40 - 150 unit/L    Alanine Aminotransferase 18 0 - 55 unit/L    Aspartate Aminotransferase 25 5 - 34 unit/L    eGFR >60 mls/min/1.73/m2   CBC with Differential    Collection Time: 03/05/23  8:06 PM   Result Value Ref Range    WBC 6.4 4.5 - 11.5 x10(3)/mcL    RBC 5.25 4.70 - 6.10 x10(6)/mcL    Hgb 17.1 14.0 - 18.0 g/dL    Hct 49.6 42.0 - 52.0 %    MCV 94.5 (H) 80.0 - 94.0 fL    MCH 32.6 pg    MCHC 34.5 33.0 - 36.0 g/dL    RDW 11.8 11.5 - 17.0 %    Platelet 307 130 - 400 x10(3)/mcL    MPV 9.3 7.4 - 10.4 fL    Neut % 33.8 %    Lymph % 52.6 %    Mono % 9.2 %    Eos % 3.6 %    Basophil % 0.6 %    Lymph # 3.38 0.6 - 4.6 x10(3)/mcL    Neut # 2.17 2.1 - 9.2 x10(3)/mcL    Mono # 0.59 0.1 - 1.3 x10(3)/mcL    Eos # 0.23 0 - 0.9 x10(3)/mcL    Baso # 0.04 0 - 0.2 x10(3)/mcL    IG# 0.01 0 - 0.04 x10(3)/mcL    IG% 0.2 %    NRBC% 0.0 %   COVID-19 Rapid Screening    Collection Time: 03/05/23  9:16 PM   Result Value Ref Range    SARS COV-2 MOLECULAR Negative Negative   Drug Screen, Urine    Collection Time: 03/05/23  9:26 PM   Result Value Ref Range    Amphetamines, Urine Positive (A) Negative    Barbituates, Urine Negative Negative    Benzodiazepine, Urine Negative Negative    Cannabinoids, Urine Positive (A) Negative    Cocaine, Urine Negative Negative    Fentanyl, Urine Positive (A) Negative    MDMA, Urine Negative Negative    Opiates, Urine Negative Negative    Phencyclidine, Urine Negative Negative    pH, Urine 6.0 3.0 - 11.0   Urinalysis, Reflex to Urine Culture    Collection Time: 03/05/23  9:26 PM    Specimen: Urine   Result Value Ref Range    Color, UA Yellow Yellow, Light-Yellow, Dark Yellow, Kari, Straw    Appearance, UA Clear Clear    Specific Gravity, UA 1.035     pH, UA 6.0 5.0 - 8.5    Protein, UA 1+ (A) Negative mg/dL    Glucose, UA Normal Negative, Normal mg/dL    Ketones, UA Trace (A) Negative mg/dL    Blood, UA Negative Negative unit/L    Bilirubin, UA Negative Negative mg/dL     Urobilinogen, UA Normal 0.2, 1.0, Normal mg/dL    Nitrites, UA Negative Negative    Leukocyte Esterase, UA Negative Negative unit/L    WBC, UA 0-5 None Seen, 0-2, 3-5, 0-5 /HPF    Bacteria, UA Trace (A) None Seen /HPF    Squamous Epithelial Cells, UA None Seen None Seen /HPF    Mucous, UA Few (A) None Seen /LPF    Unclassified Cast, UA 0-2 (A) None Seen /LPF    Hyaline Casts, UA None Seen None Seen /lpf    RBC, UA 0-5 None Seen, 0-2, 3-5, 0-5 /HPF        No results found.       Assessment and Plan       Bipolar with pedro luis  HIV positive

## 2023-03-06 NOTE — ED PROVIDER NOTES
Encounter Date: 3/5/2023       History     Chief Complaint   Patient presents with    Suicidal     Out of meds      Disorganized, flight of ideas, grandiose      Mental Health Problem  The primary symptoms include agitation, bizarre behavior, delusions, disorganized speech and disorganized thinking. The primary symptoms do not include aggression, depressed mood, dysphoric mood, hallucinations, homicidal ideas, negative symptoms, paranoia, self-injury, somatic symptoms, suicidal ideas, suicidal threats or suicide attempt. The current episode started several days ago. This is a recurrent problem.   The degree of incapacity that he is experiencing as a consequence of his illness is moderate. Sequelae of the illness include an inability to work and harmed interpersonal relations. Additional symptoms of the illness include insomnia, agitation, attention impairment, euphoric mood, increased goal-directed activity, flight of ideas, inflated self-esteem, decreased need for sleep, distractible and poor judgment. Additional symptoms of the illness do not include anhedonia, hypersomnia, appetite change, unexpected weight change, fatigue, psychomotor retardation, feelings of worthlessness, visual change, headaches, abdominal pain or seizures. He does not admit to suicidal ideas. He does not have a plan to attempt suicide. He does not contemplate harming himself. He has not already injured self. He does not contemplate injuring another person. He has not already  injured another person. Risk factors that are present for mental illness include a history of mental illness and substance abuse.   Review of patient's allergies indicates:  No Known Allergies  Past Medical History:   Diagnosis Date    Bipolar affective     Depression     HIV (human immunodeficiency virus infection)     Schizophrenia      No past surgical history on file.  No family history on file.  Social History     Tobacco Use    Smoking status: Every Day      Packs/day: 0.50     Types: Cigarettes    Smokeless tobacco: Never   Substance Use Topics    Alcohol use: No    Drug use: Yes     Types: Marijuana     Comment: last use one week ago      Review of Systems   Constitutional:  Negative for appetite change, fatigue and unexpected weight change.   Gastrointestinal:  Negative for abdominal pain.   Neurological:  Negative for seizures and headaches.   Psychiatric/Behavioral:  Positive for agitation. Negative for dysphoric mood, hallucinations, homicidal ideas, paranoia, self-injury and suicidal ideas. The patient has insomnia.    All other systems reviewed and are negative.    Physical Exam     Initial Vitals [03/05/23 1832]   BP Pulse Resp Temp SpO2   (!) 147/87 95 18 98.2 °F (36.8 °C) 99 %      MAP       --         Physical Exam    Nursing note and vitals reviewed.  Constitutional: He appears well-developed and well-nourished. He is not diaphoretic. No distress.   HENT:   Head: Normocephalic and atraumatic.   Right Ear: External ear normal.   Left Ear: External ear normal.   Mouth/Throat: Oropharynx is clear and moist.   Eyes: EOM are normal. Pupils are equal, round, and reactive to light. Right eye exhibits no discharge. Left eye exhibits no discharge.   Neck: Neck supple. No thyromegaly present. No tracheal deviation present. No JVD present.   Normal range of motion.  Cardiovascular:  Normal rate, regular rhythm, normal heart sounds and intact distal pulses.     Exam reveals no gallop and no friction rub.       No murmur heard.  Pulmonary/Chest: Breath sounds normal. No stridor. No respiratory distress. He has no wheezes. He has no rhonchi. He has no rales.   Abdominal: Abdomen is soft. Bowel sounds are normal. He exhibits no distension. There is no abdominal tenderness. There is no rebound and no guarding.   Musculoskeletal:         General: No tenderness or edema. Normal range of motion.      Cervical back: Normal range of motion and neck supple.     Neurological: He  is alert and oriented to person, place, and time. He has normal strength. No cranial nerve deficit or sensory deficit. GCS score is 15. GCS eye subscore is 4. GCS verbal subscore is 5. GCS motor subscore is 6.   Skin: Skin is warm and dry. Capillary refill takes less than 2 seconds. No abscess noted. No erythema. No pallor.   Psychiatric:   Grandiose ideas, pressured speech, flight of ideas; tangential thinking, responsive to internal stimuli        ED Course   Procedures  Labs Reviewed   DRUG SCREEN, URINE (BEAKER) - Abnormal; Notable for the following components:       Result Value    Amphetamines, Urine Positive (*)     Cannabinoids, Urine Positive (*)     Fentanyl, Urine Positive (*)     All other components within normal limits    Narrative:     Cut off concentrations:    Amphetamines - 1000 ng/ml  Barbiturates - 200 ng/ml  Benzodiazepine - 200 ng/ml  Cannabinoids (THC) - 50 ng/ml  Cocaine - 300 ng/ml  Fentanyl - 1.0 ng/ml  MDMA - 500 ng/ml  Opiates - 300 ng/ml   Phencyclidine (PCP) - 25 ng/ml    Specimen submitted for drug analysis and tested for pH and specific gravity in order to evaluate sample integrity. Suspect tampering if specific gravity is <1.003 and/or pH is not within the range of 4.5 - 8.0  False negatives may result form substances such as bleach added to urine.  False positives may result for the presence of a substance with similar chemical structure to the drug or its metabolite.    This test provides only a PRELIMINARY analytical test result. A more specific alternate chemical method must be used in order to obtain a confirmed analytical result. Gas chromatography/mass spectrometry (GC/MS) is the preferred confirmatory method. Other chemical confirmation methods are available. Clinical consideration and professional judgement should be applied to any drug of abuse test result, particularly when preliminary positive results are used.    Positive results will be confirmed only at the physicians  request. Unconfirmed screening results are to be used only for medical purposes (treatment).        URINALYSIS, REFLEX TO URINE CULTURE - Abnormal; Notable for the following components:    Protein, UA 1+ (*)     Ketones, UA Trace (*)     Bacteria, UA Trace (*)     Mucous, UA Few (*)     Unclassified Cast, UA 0-2 (*)     All other components within normal limits   ACETAMINOPHEN LEVEL - Abnormal; Notable for the following components:    Acetaminophen Level <17.4 (*)     All other components within normal limits   COMPREHENSIVE METABOLIC PANEL - Abnormal; Notable for the following components:    Glucose Level 109 (*)     Creatinine 1.32 (*)     Protein Total 9.2 (*)     Globulin 4.5 (*)     Albumin/Globulin Ratio 1.0 (*)     All other components within normal limits   CBC WITH DIFFERENTIAL - Abnormal; Notable for the following components:    MCV 94.5 (*)     All other components within normal limits   SARS-COV-2 RNA AMPLIFICATION, QUAL - Normal    Narrative:     The IDNOW COVID-19 assay is a rapid molecular in vitro diagnostic test utilizing an isothermal nucleic acid amplification technology intended for the qualitative detection of nucleic acid from the SARS-CoV-2 viral RNA in direct nasal, nasopharyngeal or throat swabs from individuals who are suspected of COVID-19 by their healthcare provider.   ALCOHOL,MEDICAL (ETHANOL) - Normal   SALICYLATE LEVEL   CBC W/ AUTO DIFFERENTIAL    Narrative:     The following orders were created for panel order CBC Auto Differential.  Procedure                               Abnormality         Status                     ---------                               -----------         ------                     CBC with Differential[[id number]]        Abnormal            Final result                 Please view results for these tests on the individual orders.          Imaging Results    None          Medications - No data to display              ED Course as of 03/05/23 2335   [place] Mar 05,  2023 2046 Reassuring hemogram ; [CT]   2046 Reassuring chemistries ; [CT]   2047 Negative salicylates, tylenol ; [CT]   2047 Negative ethanol ; [CT]   2208 Utox positive amphetamines, cannabinoids, fentanyl ; [CT]   2209 Ua resulted compatible with dehydration ; [CT]      ED Course User Index  [CT] Alfonso Lara MD       Medically cleared for psychiatry placement: 3/5/2023  9:15 PM         Clinical Impression:   Final diagnoses:  [F29] Psychosis, unspecified psychosis type (Primary)        ED Disposition Condition    Transfer to Psych Facility Stable          ED Prescriptions    None       Follow-up Information    None          Alfonso Lara MD  03/05/23 3178       Alfonso Lara MD  03/05/23 1985

## 2023-03-07 PROCEDURE — 63600175 PHARM REV CODE 636 W HCPCS: Performed by: PSYCHIATRY & NEUROLOGY

## 2023-03-07 PROCEDURE — 12400001 HC PSYCH SEMI-PRIVATE ROOM

## 2023-03-07 PROCEDURE — 25000003 PHARM REV CODE 250: Performed by: PSYCHIATRY & NEUROLOGY

## 2023-03-07 RX ADMIN — QUETIAPINE FUMARATE 300 MG: 300 TABLET ORAL at 08:03

## 2023-03-07 RX ADMIN — LORAZEPAM 2 MG: 2 INJECTION INTRAMUSCULAR; INTRAVENOUS at 08:03

## 2023-03-07 RX ADMIN — SERTRALINE HYDROCHLORIDE 50 MG: 50 TABLET ORAL at 08:03

## 2023-03-07 RX ADMIN — DIPHENHYDRAMINE HYDROCHLORIDE 50 MG: 50 INJECTION INTRAMUSCULAR; INTRAVENOUS at 08:03

## 2023-03-07 RX ADMIN — BICTEGRAVIR SODIUM, EMTRICITABINE, AND TENOFOVIR ALAFENAMIDE FUMARATE 1 TABLET: 50; 200; 25 TABLET ORAL at 05:03

## 2023-03-07 RX ADMIN — HALOPERIDOL LACTATE 10 MG: 5 INJECTION, SOLUTION INTRAMUSCULAR at 08:03

## 2023-03-07 NOTE — PROGRESS NOTES
03/06/23 2000   Handoff Report   Received From [facility]   Pain/Comfort/Sleep   Preferred Pain Scale number (Numeric Rating Pain Scale)   Comfort/Acceptable Pain Level 0   Pain Rating (0-10): Rest 0   Pain Rating (0-10): Activity 0   Coping/Psychosocial   Verbalized Emotional State anxiety   Plan of Care Reviewed With patient   Patient Agreement with Plan of Care agrees   STAMP Tool (Risk for Violence Assessment)   Staring Absence of behavior   Tone and Volume of Voice Absence of behavior   Anxiety Absence of behavior   Mumbling Absence of behavior   Pacing Absence of behavior   STAMP Score Negative STAMP Screening   HEENT   HEENT WDL WDL   Mouth/Teeth WDL   Mouth/Teeth WDL WDL   Cognitive/Neuro/Behavioral WDL   Cognitive/Neuro/Behavioral WDL ex;mood/behavior   Arousal Level opens eyes spontaneously   Mood/Behavior anxious;cooperative   Orientation oriented x 4   Speech clear/fluent   Cognitive Interventions   Sensory Stimulation Regulation quiet environment promoted   Behavioral   General Appearance [WDL Definition: Well-kept, clean; dress appropriate for weather/appropriate for setting] WDL except;appearance   General Appearance unkempt   Behavior WDL   Behavior [WDL Definition: Appropriate to situation, cooperative, appropriate eye contact; erect posture, head raised, steady gait; no unusual gestures/mannerisms] WDL except;interactions   Behavior Interactions eye contact intermittent;guarded;cooperative   Motor Movement steady gait   Emotion Mood WDL   Emotion/Mood/Affect [WDL Definition: Calm; euthymic; affect consistent with mood; facial expression relaxed, appropriate to situation] WDL except;affect;emotion mood   Affect blunted   Emotion/Mood/Affect Symptoms anxious;distrustful/suspicious   Speech WDL   Speech [WDL Definition: Moderate rate and volume; clear, coherent; articulate; effective] WDL   Speech Symptoms clear, coherent   Perceptual State WDL   Perceptual State [WDL Definition: Consistent with  reality; denies hallucinations] WDL except;perceptual state   Hallucinations denies hallucinations   Perceptual State derealization   Thought Process WDL   Thought Process [WDL Definition: Judgment and insight appropriate to situation; logical, relevant, and linear thought process] WDL except;judgment and insight   Delusions no delusions   Judgment and Insight insight not appropriate to situation;judgment not appropriate to situation   Thought Content preoccupation   Thought Process Symptoms disorganized   Intellectual Performance WDL   Intellectual Performance [WDL Definition: Alert, oriented x 4; immediate, recent and remote memory intact; able to comprehend] WDL   Intellectual Performance Symptoms oriented x 4   Level of Consciousness (AVPU) alert   Respiratory   Respiratory WDL WDL   Cardiac   Cardiac WDL WDL   Peripheral Neurovascular   Peripheral Neurovascular WDL WDL   Gastrointestinal   GI WDL WDL   Genitourinary   Genitourinary WDL WDL   Skin   Skin WDL WDL   Armond Risk Assessment   Sensory Perception 4-->no impairment   Moisture 4-->rarely moist   Activity 4-->walks frequently   Mobility 4-->no limitation   Nutrition 3-->adequate   Friction and Shear 3-->no apparent problem   Armond Score 22   Musculoskeletal   Musculoskeletal WDL WDL   Range of Motion active ROM (range of motion) encouraged   Functional Screen (every 3 days/change)   Ambulation 0 - independent   Transferring 0 - independent   Toileting 0 - independent   Bathing 0 - independent   Dressing 0 - independent   Eating 0 - independent   Communication 0 - understands/communicates without difficulty   Swallowing 0 - swallows foods/liquids without difficulty   Nutrition   Diet/Nutrition Received regular   [place] Psychiatric Fall Risk Tool   Age 8-->Less than 50   Mental Status -4-->Fully alert/oriented at all times   Elimination 8-->Independent with control of bowel/bladder   Medications 8-->Psychotropic medications   Diagnosis 8-->Substance  abuse/alcohol abuse   Ambulation/Balance 7-->Independent/steady gait/immobile   Nutrition 0-->No apparent abnormalities with appetite   Sleep Disturbance 8-->No sleep disturbance   History of Falls 8-->No history of falls   Score ([place] Fall Risk) 51   ABC Risk for Fall with Injury Assessment   A= Age: Is the patient greater than or equal to 85 years old or frail due to clinical condition? No   B=Bones: Does the patient have osteoporosis, previous fracture, prolonged steroid use, or metastatic bone cancer? No   C=Coagulation Disorders: Does the patient have a bleeding disorder, either through anticoagulants or underlying clinical condition? No   S=recent Surgery: Is the patient post-op surgicalwith a recent lower limb amputation or recent major abdominal or thoracic surgery? No   [place] Suicide Severity Rating Scale   1. Wish to be Dead: Have you wished you were dead or wished you could go to sleep and not wake up? No   2. Suicidal Thoughts: Have you actually had any thoughts of killing yourself? No   3. Suicidal Thoughts with Method Without Specific Plan or Intent to Act: Have you been thinking about how you might kill yourself? No   4. Suicidal Intent Without Specific Plan: Have you had these thoughts and had some intention of acting on them? No   5. Suicide Intent with Specific Plan: Have you started to work out or worked out the details of how to kill yourself? Do you intend to carry out this plan? No   6. Suicide Behavior Question: Have you ever done anything, started to do anything, or prepared to do anything to end your life? Yes   How long ago did you do any of these? Within the last three months   Suicide Risk High Risk   Violence Risk   Feels Like Hurting Others no   Previous Attempt to Harm Others no   Violence Threats in Past 6 Months denies   Current Violence Plan or Thoughts denies   Safety Management    Patient Rounds ID band on;visualized patient   Safety Promotion/Fall Prevention nonskid  shoes/socks when out of bed   Daily Care   Activity (Behavioral Health) up ad robby   Mobility   GEMS ([place]vin Early Mobility Scale) Level 4-Independent activities   RN Clinical Review   I have evaluated the data collected on this patient and nursing care provided. Done

## 2023-03-07 NOTE — PLAN OF CARE
Problem: Adult Inpatient Plan of Care  Goal: Plan of Care Review  Outcome: Ongoing, Progressing  Goal: Patient-Specific Goal (Individualized)  Outcome: Ongoing, Progressing  Goal: Absence of Hospital-Acquired Illness or Injury  Outcome: Ongoing, Progressing  Goal: Optimal Comfort and Wellbeing  Outcome: Ongoing, Progressing  Goal: Readiness for Transition of Care  Outcome: Ongoing, Progressing     Problem: Activity and Energy Impairment (Excessive Substance Use)  Goal: Optimized Energy Level (Excessive Substance Use)  Outcome: Ongoing, Progressing     Problem: Behavior Regulation Impairment (Excessive Substance Use)  Goal: Improved Behavioral Control (Excessive Substance Use)  Outcome: Ongoing, Progressing     Problem: Decreased Participation and Engagement (Excessive Substance Use)  Goal: Increased Participation and Engagement (Excessive Substance Use)  Outcome: Ongoing, Progressing     Problem: Cognitive Impairment (Depressive Signs/Symptoms)  Goal: Optimized Cognitive Function  Outcome: Ongoing, Progressing     Problem: Decreased Participation/Engagement (Depressive Signs/Symptoms)  Goal: Increased Participation and Engagement (Depressive Signs/Symptoms)  Outcome: Ongoing, Progressing     Problem: Feelings of Worthlessness, Hopelessness or Excessive Guilt (Depressive Signs/Symptoms)  Goal: Enhanced Self-Esteem and Confidence (Depressive Signs/Symptoms)  Outcome: Ongoing, Progressing     Problem: HIV/AIDS Infection  Goal: HIV/AIDS Symptom Control  Outcome: Ongoing, Progressing     Problem: Violence Risk or Actual  Goal: Anger and Impulse Control  Outcome: Ongoing, Not Progressing

## 2023-03-07 NOTE — PLAN OF CARE
Problem: Adult Inpatient Plan of Care  Goal: Plan of Care Review  Outcome: Ongoing, Progressing  Goal: Patient-Specific Goal (Individualized)  Outcome: Ongoing, Progressing  Goal: Absence of Hospital-Acquired Illness or Injury  Outcome: Ongoing, Progressing  Goal: Optimal Comfort and Wellbeing  Outcome: Ongoing, Progressing  Goal: Readiness for Transition of Care  Outcome: Ongoing, Progressing     Problem: Violence Risk or Actual  Goal: Anger and Impulse Control  Outcome: Ongoing, Progressing     Problem: Activity and Energy Impairment (Excessive Substance Use)  Goal: Optimized Energy Level (Excessive Substance Use)  Outcome: Ongoing, Progressing     Problem: Behavior Regulation Impairment (Excessive Substance Use)  Goal: Improved Behavioral Control (Excessive Substance Use)  Outcome: Ongoing, Progressing     Problem: Decreased Participation and Engagement (Excessive Substance Use)  Goal: Increased Participation and Engagement (Excessive Substance Use)  Outcome: Ongoing, Progressing     Problem: Cognitive Impairment (Depressive Signs/Symptoms)  Goal: Optimized Cognitive Function  Outcome: Ongoing, Progressing     Problem: Decreased Participation/Engagement (Depressive Signs/Symptoms)  Goal: Increased Participation and Engagement (Depressive Signs/Symptoms)  Outcome: Ongoing, Progressing     Problem: Feelings of Worthlessness, Hopelessness or Excessive Guilt (Depressive Signs/Symptoms)  Goal: Enhanced Self-Esteem and Confidence (Depressive Signs/Symptoms)  Outcome: Ongoing, Progressing      yes

## 2023-03-07 NOTE — PLAN OF CARE
Problem: Violence Risk or Actual  Goal: Anger and Impulse Control  Outcome: Ongoing, Progressing  Intervention: Minimize Safety Risk  Flowsheets (Taken 3/7/2023 0856)  Behavior Management:   behavioral plan developed   boundaries reinforced   impulse control promoted   behavioral plan reviewed  Sensory Stimulation Regulation: quiet environment promoted  Intervention: Promote Self-Control  Flowsheets (Taken 3/7/2023 0856)  Supportive Measures:   active listening utilized   decision-making supported   self-responsibility promoted   self-reflection promoted   verbalization of feelings encouraged  Environmental Support: calm environment promoted    Patient complaining about his room being locked.  Educated on unit schedule and activity line up with room times.  Patient threatening to take his clothes off, verbally re-directed without success. Demanded to have the  call and take him to [facility] so he could bond out.  Patient took off his clothes then began shaking his penis at the patients in the dayroom.  Then began verbally threatening staff.  Patient door was opened for his privacy, where he began pacing naked down the curtis.  Verbal redirection failed and the patient continued to scream call the  to come take me to [facility].  He was medicated with Haldol 10mg, Benadryl 50mg, Ativan 2 mg IM for agitated psychosis.

## 2023-03-07 NOTE — PROGRESS NOTES
"3/7/2023 3:59 PM   Chon Parham   1990   [id number]        Psychiatry Progress Note     Chief Complaint: "I told the ED I needed a prescription of my meds"    SUBJECTIVE:   Chon Parham is a 32 y.o. male placed under a PEC at [facility] due to reported suicidal ideations, disorganized, flight of ideas, and grandiose.  Patient required PRN medication this morning and has been in his room most of the day.  He was threatening and staff was unable to redirect him verbally.  This behavior was volitional as he states that he "knew he could cut up to get discharged."  Poor insight.  However, Because he is posing a volitional threat to staff and other patients, will likely plan to discharge tomorrow.     UDS: (+)amphetamines, cannabinoids, fentanyl  Alcohol: <10       Current Medications:   Scheduled Meds:    [START ON 3/8/2023] [id number]-jzlbwemu-qmhstev ala  1 tablet Oral Daily    nicotine  1 patch Transdermal Daily    QUEtiapine  300 mg Oral QHS    sertraline  50 mg Oral Daily      PRN Meds: acetaminophen, aluminum-magnesium hydroxide-simethicone, haloperidoL **AND** diphenhydrAMINE **AND** LORazepam **AND** haloperidol lactate **AND** diphenhydrAMINE **AND** lorazepam, hydrOXYzine HCL, magnesium hydroxide 400 mg/5 ml, ondansetron, promethazine, trazodone   Psychotherapeutics (From admission, onward)      Start     Stop Route Frequency Ordered    03/07/23 0000  haloperidoL tablet 10 mg  (Med - Acute  Behavioral Management)        See Hyperspace for full Linked Orders Report.    -- Oral Every 4 hours PRN 03/06/23 0105    03/07/23 0000  LORazepam tablet 2 mg  (Med - Acute  Behavioral Management)        See Hyperspace for full Linked Orders Report.    -- Oral Every 4 hours PRN 03/06/23 0105    03/07/23 0000  haloperidol lactate injection 10 mg  (Med - Acute  Behavioral Management)        See Hyperspace for full Linked Orders Report.    -- IM Every 4 hours PRN 03/06/23 0105    03/07/23 0000  LORazepam injection 2 mg  (Med - " Acute  Behavioral Management)        See Hyperspace for full Linked Orders Report.    -- IM Every 4 hours PRN 03/06/23 0105    03/06/23 2100  QUEtiapine tablet 300 mg         -- Oral Nightly 03/06/23 1004    03/06/23 1015  sertraline tablet 50 mg         -- Oral Daily 03/06/23 1004    03/06/23 0105  traZODone tablet 100 mg         -- Oral Nightly PRN 03/06/23 0105            Allergies:   Review of patient's allergies indicates:  No Known Allergies     OBJECTIVE:   Vitals   Vitals:    03/07/23 0700   BP: 128/84   Pulse: 73   Resp: 20   Temp: 98.1 °F (36.7 °C)        Labs/Imaging/Studies:   Recent Results (from the past 36 hour(s))   SYPHILIS ANTIBODY (WITH REFLEX RPR)    Collection Time: 03/06/23  7:06 AM   Result Value Ref Range    Syphilis Antibody Reactive (A) Nonreactive, Equivocal   Comprehensive metabolic panel    Collection Time: 03/06/23  7:06 AM   Result Value Ref Range    Sodium Level 139 136 - 145 mmol/L    Potassium Level 4.3 3.5 - 5.1 mmol/L    Chloride 104 98 - 107 mmol/L    Carbon Dioxide 26 22 - 29 mmol/L    Glucose Level 78 74 - 100 mg/dL    Blood Urea Nitrogen 15.9 8.9 - 20.6 mg/dL    Creatinine 1.15 0.73 - 1.18 mg/dL    Calcium Level Total 9.4 8.4 - 10.2 mg/dL    Protein Total 7.9 6.4 - 8.3 gm/dL    Albumin Level 4.3 3.5 - 5.0 g/dL    Globulin 3.6 (H) 2.4 - 3.5 gm/dL    Albumin/Globulin Ratio 1.2 1.1 - 2.0 ratio    Bilirubin Total 0.6 <=1.5 mg/dL    Alkaline Phosphatase 96 40 - 150 unit/L    Alanine Aminotransferase 18 0 - 55 unit/L    Aspartate Aminotransferase 26 5 - 34 unit/L    eGFR >60 mls/min/1.73/m2   TSH    Collection Time: 03/06/23  7:06 AM   Result Value Ref Range    Thyroid Stimulating Hormone 0.663 0.350 - 4.940 uIU/mL   RPR    Collection Time: 03/06/23  7:06 AM   Result Value Ref Range    RPR Reactive (A) Non-Reactive    RPR Titer 1:4 (A) (none)          Medical Review Of Systems:  Constitutional: negative  Respiratory: negative  Cardiovascular: negative  Gastrointestinal:  negative  Genitourinary:negative  Musculoskeletal:negative  Neurological: negative      Psychiatric Mental Status Exam:  General Appearance: unremarkable, appears stated age, well-developed, well-nourished  Arousal:  asleep  Behavior: uncooperative, hostile  Movements and Motor Activity: no tics, no tremors, no akathisia, no dystonia, no evidence of tardive dyskinesia  Orientation: grossly intact  Speech:  has been loud  Mood: Irritable  Affect:  has been labile  Thought Process:  Would not participate  Associations:  Would not participat  Thought Content and Perceptions:  Would not participat  Recent and Remote Memory: grossly intact; per interview/observation with patient  Attention and Concentration: grossly intact; per interview/observation with patient  Fund of Knowledge: grossly intact; based on history, vocabulary, fund of knowledge, syntax, grammar, and content  Insight: questionable; based on understanding of severity of illness and HPI  Judgment: questionable; based on patient's behavior and HPI    ASSESSMENT/PLAN:   Problems Addressed/Diagnoses:  Bipolar Disorder, most recent episodes mixed, severe, with psychotic features (F31.64)  Methamphetamine use disorder severe (F15.20)  Cannabis use disorder severe (F12.20)    Past Medical History:   Diagnosis Date    Bipolar affective     Depression     HIV (human immunodeficiency virus infection)     Schizophrenia         Plan:  -Plan for discharge tomorrow    Expected Disposition Plan: Home        Flip Thomas M.D.

## 2023-03-08 VITALS
DIASTOLIC BLOOD PRESSURE: 79 MMHG | OXYGEN SATURATION: 97 % | HEIGHT: 71 IN | RESPIRATION RATE: 18 BRPM | HEART RATE: 96 BPM | BODY MASS INDEX: 24.07 KG/M2 | SYSTOLIC BLOOD PRESSURE: 125 MMHG | TEMPERATURE: 98 F | WEIGHT: 171.94 LBS

## 2023-03-08 PROBLEM — F12.20 CANNABIS DEPENDENCE, CONTINUOUS: Status: ACTIVE | Noted: 2023-03-08

## 2023-03-08 PROBLEM — F31.64 BIPOLAR AFFECTIVE DISORDER, MIXED, SEVERE, WITH PSYCHOTIC BEHAVIOR: Status: ACTIVE | Noted: 2023-03-08

## 2023-03-08 PROBLEM — F15.20 METHAMPHETAMINE ADDICTION: Status: ACTIVE | Noted: 2023-03-08

## 2023-03-08 PROCEDURE — 25000003 PHARM REV CODE 250: Performed by: PSYCHIATRY & NEUROLOGY

## 2023-03-08 RX ORDER — QUETIAPINE FUMARATE 300 MG/1
300 TABLET, FILM COATED ORAL NIGHTLY
Qty: 30 TABLET | Refills: 0 | Status: ON HOLD | OUTPATIENT
Start: 2023-03-08 | End: 2023-09-06

## 2023-03-08 RX ORDER — QUETIAPINE FUMARATE 300 MG/1
300 TABLET, FILM COATED ORAL NIGHTLY
Qty: 30 TABLET | Refills: 0 | Status: SHIPPED | OUTPATIENT
Start: 2023-03-08 | End: 2023-03-08 | Stop reason: SDUPTHER

## 2023-03-08 RX ORDER — SERTRALINE HYDROCHLORIDE 50 MG/1
50 TABLET, FILM COATED ORAL DAILY
Qty: 30 TABLET | Refills: 0 | Status: SHIPPED | OUTPATIENT
Start: 2023-03-09 | End: 2023-03-08 | Stop reason: SDUPTHER

## 2023-03-08 RX ORDER — SERTRALINE HYDROCHLORIDE 50 MG/1
50 TABLET, FILM COATED ORAL DAILY
Qty: 30 TABLET | Refills: 0 | Status: ON HOLD | OUTPATIENT
Start: 2023-03-09 | End: 2023-09-06

## 2023-03-08 RX ADMIN — SERTRALINE HYDROCHLORIDE 50 MG: 50 TABLET ORAL at 08:03

## 2023-03-08 RX ADMIN — BICTEGRAVIR SODIUM, EMTRICITABINE, AND TENOFOVIR ALAFENAMIDE FUMARATE 1 TABLET: 50; 200; 25 TABLET ORAL at 08:03

## 2023-03-08 NOTE — PROGRESS NOTES
Volitional behaviors continued.  No PRN medication required today.  Will discharge home.    Flip Thomas M.D.

## 2023-03-08 NOTE — PROGRESS NOTES
03/07/23 2001   Handoff Report   Received From [facility]   Pain/Comfort/Sleep   Preferred Pain Scale number (Numeric Rating Pain Scale)   Comfort/Acceptable Pain Level 0   Pain Rating (0-10): Rest 0   Pain Rating (0-10): Activity 0   Coping/Psychosocial   Verbalized Emotional State anxiety   Plan of Care Reviewed With patient   Patient Agreement with Plan of Care refuses to participate   Trust Relationship/Rapport questions encouraged   STAMP Tool (Risk for Violence Assessment)   Staring Absence of behavior   Tone and Volume of Voice Absence of behavior   Anxiety Absence of behavior   Mumbling Absence of behavior   Pacing Absence of behavior   STAMP Score Negative STAMP Screening   Coping/Psychosocial Interventions   Behavior Management impulse control promoted   Supportive Measures self-responsibility promoted   Environmental Support environmental consistency promoted   HEENT   HEENT WDL WDL   Mouth/Teeth WDL   Mouth/Teeth WDL WDL   Cognitive/Neuro/Behavioral WDL   Cognitive/Neuro/Behavioral WDL ex;mood/behavior   Arousal Level opens eyes spontaneously   Mood/Behavior anxious;withdrawn;other (see comments)  (at times uncooperative)   Orientation oriented x 4   Speech clear/fluent   Cognitive Interventions   Sensory Stimulation Regulation quiet environment promoted   Behavioral   General Appearance [WDL Definition: Well-kept, clean; dress appropriate for weather/appropriate for setting] WDL except;appearance   General Appearance unkempt   Behavior WDL   Behavior [WDL Definition: Appropriate to situation, cooperative, appropriate eye contact; erect posture, head raised, steady gait; no unusual gestures/mannerisms] WDL except;interactions   Behavior Interactions guarded;mistrustful;suspicious   Motor Movement no unusual gestures/mannerisms;steady gait   Emotion Mood WDL   Emotion/Mood/Affect [WDL Definition: Calm; euthymic; affect consistent with mood; facial expression relaxed, appropriate to situation] WDL  except;affect;emotion mood   Affect flat   Emotion/Mood/Affect Symptoms anxious;distrustful/suspicious;irritable   Speech WDL   Speech [WDL Definition: Moderate rate and volume; clear, coherent; articulate; effective] WDL   Speech Symptoms clear, coherent   Perceptual State WDL   Perceptual State [WDL Definition: Consistent with reality; denies hallucinations] WDL except;perceptual state   Hallucinations denies hallucinations   Perceptual State derealization   Thought Process WDL   Thought Process [WDL Definition: Judgment and insight appropriate to situation; logical, relevant, and linear thought process] WDL except;judgment and insight;thought content   Delusions no delusions   Judgment and Insight insight not appropriate to situation;judgment not appropriate to situation;inappropriately focused on discharge   Thought Content preoccupation   Thought Process Symptoms circumstantial thought   Intellectual Performance WDL   Intellectual Performance [WDL Definition: Alert, oriented x 4; immediate, recent and remote memory intact; able to comprehend] WDL   Intellectual Performance Symptoms oriented x 4   Level of Consciousness (AVPU) alert   Respiratory   Respiratory WDL WDL   Cardiac   Cardiac WDL WDL   Peripheral Neurovascular   Peripheral Neurovascular WDL WDL   Gastrointestinal   GI WDL WDL   Genitourinary   Genitourinary WDL WDL   Skin   Skin WDL WDL   Armond Risk Assessment   Sensory Perception 4-->no impairment   Moisture 4-->rarely moist   Activity 4-->walks frequently   Mobility 4-->no limitation   Nutrition 3-->adequate   Friction and Shear 3-->no apparent problem   Armond Score 22   Musculoskeletal   Musculoskeletal WDL WDL   Nutrition   Diet/Nutrition Received regular   Safety   Patient Location hallway   Observed Behavior walking   Safety Measures safety rounds completed   [place] Psychiatric Fall Risk Tool   Age 8-->Less than 50   Mental Status -4-->Fully alert/oriented at all times   Elimination  8-->Independent with control of bowel/bladder   Medications 8-->Psychotropic medications   Diagnosis 8-->Substance abuse/alcohol abuse   Ambulation/Balance 7-->Independent/steady gait/immobile   Nutrition 0-->No apparent abnormalities with appetite   Sleep Disturbance 8-->No sleep disturbance   History of Falls 8-->No history of falls   Score ([place] Fall Risk) 51   ABC Risk for Fall with Injury Assessment   A= Age: Is the patient greater than or equal to 85 years old or frail due to clinical condition? No   B=Bones: Does the patient have osteoporosis, previous fracture, prolonged steroid use, or metastatic bone cancer? No   C=Coagulation Disorders: Does the patient have a bleeding disorder, either through anticoagulants or underlying clinical condition? No   S=recent Surgery: Is the patient post-op surgicalwith a recent lower limb amputation or recent major abdominal or thoracic surgery? No   [place] Suicide Severity Rating Scale   1. Wish to be Dead: Have you wished you were dead or wished you could go to sleep and not wake up? No   2. Suicidal Thoughts: Have you actually had any thoughts of killing yourself? No   3. Suicidal Thoughts with Method Without Specific Plan or Intent to Act: Have you been thinking about how you might kill yourself? No   4. Suicidal Intent Without Specific Plan: Have you had these thoughts and had some intention of acting on them? No   5. Suicide Intent with Specific Plan: Have you started to work out or worked out the details of how to kill yourself? Do you intend to carry out this plan? No   6. Suicide Behavior Question: Have you ever done anything, started to do anything, or prepared to do anything to end your life? Yes   How long ago did you do any of these? Within the last three months   Suicide Risk High Risk   Violence Risk   Feels Like Hurting Others no   Previous Attempt to Harm Others no   Violence Threats in Past 6 Months denies   Current Violence Plan or Thoughts denies    Safety Management    Patient Rounds ID band on;visualized patient   Safety Promotion/Fall Prevention nonskid shoes/socks when out of bed   Daily Care   Activity (Behavioral Health) up ad robby   Mobility   GEMS ([place] Early Mobility Scale) Level 4-Independent activities   RN Clinical Review   I have evaluated the data collected on this patient and nursing care provided. Done

## 2023-03-08 NOTE — DISCHARGE SUMMARY
"DISCHARGE SUMMARY  PSYCHIATRY      Admit Date: 3/6/2023  1:04 AM    Discharge Date:  3/8/2023    SITE:   [facility]    Discharge Attending Physician: Flip Thomas MD    History of Present Illness On Admit:   Chon Parham is a 32 y.o. male placed under a PEC at [facility] due to reported suicidal ideations, disorganized, flight of ideas, and grandiose.  Patient states that he went to the ED in order to get refills of prescriptions.  He states that he follows up at [facility].  He was inpatient in [place] in December 2022 and [facility] in January.  Cooperative this morning but his recent stays have reported active hallucinations and delusions.  He states that he has medication at home, but I am unsure why he would present to the ED for medications if he already had them.  Will admit to inpatient unit and f/u with collateral.  Will restart home psychiatric medication.     UDS: (+)amphetamines, cannabinoids, fentanyl  Alcohol: <10    Diagnoses:  PRINCIPAL PROBLEM: Bipolar affective disorder, mixed, severe, with psychotic behavior    PROBLEM LIST    Bipolar affective disorder, mixed, severe, with psychotic behavior    Methamphetamine addiction    Cannabis dependence, continuous      Discharged Condition: Stable from an acute psychiatric standpoint    Hospital Course:   Patient was admitted to [facility] and restarted on Seroquel 300mg HS and Zoloft 50mg daily.    3/7/23  Patient required PRN medication this morning and has been in his room most of the day.  He was threatening and staff was unable to redirect him verbally.  This behavior was volitional as he states that he "knew he could cut up to get discharged."  Poor insight.  However, Because he is posing a volitional threat to staff and other patients, will likely plan to discharge tomorrow.    3/8/23  Volitional behaviors continued.  No PRN medication required today.  Will discharge home.    Disposition: " discharged to home    Follow-up: [facility]      DISCHARGE EXAMINATION    VITALS   Vitals:    03/07/23 0700 03/07/23 1600 03/07/23 1900 03/08/23 0701   BP: 128/84 115/77 138/82 117/80   BP Location: Left arm  Left arm    Patient Position: Sitting  Sitting    Pulse: 73 84 91 80   Resp: 20 19 20 18   Temp: 98.1 °F (36.7 °C) 98.6 °F (37 °C) 98.6 °F (37 °C) 98.6 °F (37 °C)   TempSrc: Oral  Oral    SpO2: 98% 97% 99% 98%   Weight:       Height:             General Appearance: unremarkable, appears stated age, well-developed, well-nourished  Arousal:  asleep  Behavior: uncooperative  Movements and Motor Activity: no tics, no tremors, no akathisia, no dystonia, no evidence of tardive dyskinesia  Orientation: grossly intact  Speech:  has been loud  Mood: Irritable  Affect:  has been labile  Thought Process:  Would not participate  Associations:  Would not participat  Thought Content and Perceptions:  Would not participat  Recent and Remote Memory: grossly intact; per interview/observation with patient  Attention and Concentration: grossly intact; per interview/observation with patient  Fund of Knowledge: grossly intact; based on history, vocabulary, fund of knowledge, syntax, grammar, and content  Insight: questionable; based on understanding of severity of illness and HPI  Judgment: questionable; based on patient's behavior and HPI      Medication Regimen:    Current Facility-Administered Medications:     acetaminophen tablet 650 mg, 650 mg, Oral, Q6H PRN, Flip Thomas MD    aluminum-magnesium hydroxide-simethicone 200-200-20 mg/5 mL suspension 30 mL, 30 mL, Oral, Q6H PRN, Flip Thomas MD    axyohjhmz-wllahjyv-ldjyijl ala -25 mg (25 kg or greater) 1 tablet, 1 tablet, Oral, Daily, Flip Thomas MD, 1 tablet at 03/08/23 0836    haloperidoL tablet 10 mg, 10 mg, Oral, Q4H PRN **AND** diphenhydrAMINE capsule 50 mg, 50 mg, Oral, Q4H PRN **AND** LORazepam tablet 2 mg, 2 mg, Oral, Q4H PRN **AND**  haloperidol lactate injection 10 mg, 10 mg, Intramuscular, Q4H PRN, 10 mg at 03/07/23 0850 **AND** diphenhydrAMINE injection 50 mg, 50 mg, Intramuscular, Q4H PRN, 50 mg at 03/07/23 0851 **AND** LORazepam injection 2 mg, 2 mg, Intramuscular, Q4H PRN, Flip Thomas MD, 2 mg at 03/07/23 0850    hydrOXYzine tablet 50 mg, 50 mg, Oral, Q4H PRN, Flip Thomas MD, 50 mg at 03/06/23 0634    magnesium hydroxide 400 mg/5 ml suspension 2,400 mg, 30 mL, Oral, Daily PRN, Flip Thomas MD    nicotine 21 mg/24 hr 1 patch, 1 patch, Transdermal, Daily, Flip Thomas MD    ondansetron disintegrating tablet 4 mg, 4 mg, Oral, Q8H PRN, Flip Thomas MD    promethazine tablet 25 mg, 25 mg, Oral, Q6H PRN, Flip Thomas MD    QUEtiapine tablet 300 mg, 300 mg, Oral, QHS, Flip Thomas MD, 300 mg at 03/07/23 2022    sertraline tablet 50 mg, 50 mg, Oral, Daily, Flip Thomas MD, 50 mg at 03/08/23 0836    traZODone tablet 100 mg, 100 mg, Oral, Nightly PRN, Flip Thomas MD      Patient Instructions:   Continue medication regimen as prescribed.    Disposition plan per  - see  notes for details.    Patient instructed to call 911 or present to emergency department if any of the following complications develop status post discharge: suicidality, homicidality, or grave disability.     Total time spent discharging patient: <30 minutes      Flip Thomas M.D.

## 2023-03-08 NOTE — PLAN OF CARE
Problem: Adult Inpatient Plan of Care  Goal: Plan of Care Review  Outcome: Ongoing, Progressing  Goal: Patient-Specific Goal (Individualized)  Outcome: Ongoing, Progressing  Goal: Absence of Hospital-Acquired Illness or Injury  Outcome: Ongoing, Progressing  Goal: Optimal Comfort and Wellbeing  Outcome: Ongoing, Progressing  Goal: Readiness for Transition of Care  Outcome: Ongoing, Progressing     Problem: Violence Risk or Actual  Goal: Anger and Impulse Control  Outcome: Ongoing, Progressing     Problem: Activity and Energy Impairment (Excessive Substance Use)  Goal: Optimized Energy Level (Excessive Substance Use)  Outcome: Ongoing, Progressing     Problem: Behavior Regulation Impairment (Excessive Substance Use)  Goal: Improved Behavioral Control (Excessive Substance Use)  Outcome: Ongoing, Progressing     Problem: Decreased Participation and Engagement (Excessive Substance Use)  Goal: Increased Participation and Engagement (Excessive Substance Use)  Outcome: Ongoing, Progressing     Problem: Cognitive Impairment (Depressive Signs/Symptoms)  Goal: Optimized Cognitive Function  Outcome: Ongoing, Progressing     Problem: Decreased Participation/Engagement (Depressive Signs/Symptoms)  Goal: Increased Participation and Engagement (Depressive Signs/Symptoms)  Outcome: Ongoing, Progressing     Problem: Feelings of Worthlessness, Hopelessness or Excessive Guilt (Depressive Signs/Symptoms)  Goal: Enhanced Self-Esteem and Confidence (Depressive Signs/Symptoms)  Outcome: Ongoing, Progressing     Problem: HIV/AIDS Infection  Goal: HIV/AIDS Symptom Control  Outcome: Ongoing, Progressing

## 2023-03-08 NOTE — PLAN OF CARE
Problem: Adult Inpatient Plan of Care  Goal: Plan of Care Review  Outcome: Ongoing, Not Progressing  Goal: Patient-Specific Goal (Individualized)  Outcome: Ongoing, Not Progressing  Goal: Absence of Hospital-Acquired Illness or Injury  Outcome: Ongoing, Not Progressing  Goal: Optimal Comfort and Wellbeing  Outcome: Ongoing, Not Progressing  Goal: Readiness for Transition of Care  Outcome: Ongoing, Not Progressing     Problem: Violence Risk or Actual  Goal: Anger and Impulse Control  Outcome: Ongoing, Not Progressing     Problem: Activity and Energy Impairment (Excessive Substance Use)  Goal: Optimized Energy Level (Excessive Substance Use)  Outcome: Ongoing, Not Progressing     Problem: Behavior Regulation Impairment (Excessive Substance Use)  Goal: Improved Behavioral Control (Excessive Substance Use)  Outcome: Ongoing, Not Progressing     Problem: Decreased Participation and Engagement (Excessive Substance Use)  Goal: Increased Participation and Engagement (Excessive Substance Use)  Outcome: Ongoing, Not Progressing     Problem: Cognitive Impairment (Depressive Signs/Symptoms)  Goal: Optimized Cognitive Function  Outcome: Ongoing, Not Progressing     Problem: Decreased Participation/Engagement (Depressive Signs/Symptoms)  Goal: Increased Participation and Engagement (Depressive Signs/Symptoms)  Outcome: Ongoing, Not Progressing     Problem: Feelings of Worthlessness, Hopelessness or Excessive Guilt (Depressive Signs/Symptoms)  Goal: Enhanced Self-Esteem and Confidence (Depressive Signs/Symptoms)  Outcome: Ongoing, Not Progressing     Problem: HIV/AIDS Infection  Goal: HIV/AIDS Symptom Control  Outcome: Ongoing, Not Progressing   Chon remains isolative, refuses to stay out of bed,  barely comes for meals. Will not socialize with other patients. Is med compliant.

## 2023-03-20 ENCOUNTER — HOSPITAL ENCOUNTER (EMERGENCY)
Facility: HOSPITAL | Age: 33
Discharge: HOME | End: 2023-03-20
Attending: INTERNAL MEDICINE
Payer: MEDICAID

## 2023-03-20 VITALS
SYSTOLIC BLOOD PRESSURE: 133 MMHG | BODY MASS INDEX: 25.22 KG/M2 | WEIGHT: 180.13 LBS | TEMPERATURE: 98 F | DIASTOLIC BLOOD PRESSURE: 59 MMHG | RESPIRATION RATE: 17 BRPM | OXYGEN SATURATION: 98 % | HEIGHT: 71 IN | HEART RATE: 85 BPM

## 2023-03-20 DIAGNOSIS — F15.10 METHAMPHETAMINE ABUSE: Primary | ICD-10-CM

## 2023-03-20 DIAGNOSIS — R53.1 WEAKNESS: ICD-10-CM

## 2023-03-20 LAB
ALBUMIN SERPL-MCNC: 4.5 G/DL (ref 3.5–5)
ALBUMIN/GLOB SERPL: 1.2 RATIO (ref 1.1–2)
ALP SERPL-CCNC: 103 UNIT/L (ref 40–150)
ALT SERPL-CCNC: 39 UNIT/L (ref 0–55)
AST SERPL-CCNC: 45 UNIT/L (ref 5–34)
BASOPHILS # BLD AUTO: 0.04 X10(3)/MCL (ref 0–0.2)
BASOPHILS NFR BLD AUTO: 0.5 %
BILIRUBIN DIRECT+TOT PNL SERPL-MCNC: 0.5 MG/DL
BUN SERPL-MCNC: 17.1 MG/DL (ref 8.9–20.6)
CALCIUM SERPL-MCNC: 9.8 MG/DL (ref 8.4–10.2)
CHLORIDE SERPL-SCNC: 101 MMOL/L (ref 98–107)
CO2 SERPL-SCNC: 24 MMOL/L (ref 22–29)
CREAT SERPL-MCNC: 1 MG/DL (ref 0.73–1.18)
EOSINOPHIL # BLD AUTO: 0.17 X10(3)/MCL (ref 0–0.9)
EOSINOPHIL NFR BLD AUTO: 2.2 %
ERYTHROCYTE [DISTWIDTH] IN BLOOD BY AUTOMATED COUNT: 12 % (ref 11.5–17)
GFR SERPLBLD CREATININE-BSD FMLA CKD-EPI: >60 MLS/MIN/1.73/M2
GLOBULIN SER-MCNC: 3.9 GM/DL (ref 2.4–3.5)
GLUCOSE SERPL-MCNC: 123 MG/DL (ref 74–100)
HCT VFR BLD AUTO: 44.9 % (ref 42–52)
HGB BLD-MCNC: 15.8 G/DL (ref 14–18)
IMM GRANULOCYTES # BLD AUTO: 0.02 X10(3)/MCL (ref 0–0.04)
IMM GRANULOCYTES NFR BLD AUTO: 0.3 %
LYMPHOCYTES # BLD AUTO: 2.12 X10(3)/MCL (ref 0.6–4.6)
LYMPHOCYTES NFR BLD AUTO: 27.6 %
MCH RBC QN AUTO: 32.9 PG
MCHC RBC AUTO-ENTMCNC: 35.2 G/DL (ref 33–36)
MCV RBC AUTO: 93.5 FL (ref 80–94)
MONOCYTES # BLD AUTO: 0.62 X10(3)/MCL (ref 0.1–1.3)
MONOCYTES NFR BLD AUTO: 8.1 %
NEUTROPHILS # BLD AUTO: 4.72 X10(3)/MCL (ref 2.1–9.2)
NEUTROPHILS NFR BLD AUTO: 61.3 %
NRBC BLD AUTO-RTO: 0 %
PLATELET # BLD AUTO: 272 X10(3)/MCL (ref 130–400)
PMV BLD AUTO: 9.8 FL (ref 7.4–10.4)
POTASSIUM SERPL-SCNC: 3.8 MMOL/L (ref 3.5–5.1)
PROT SERPL-MCNC: 8.4 GM/DL (ref 6.4–8.3)
RBC # BLD AUTO: 4.8 X10(6)/MCL (ref 4.7–6.1)
SODIUM SERPL-SCNC: 135 MMOL/L (ref 136–145)
WBC # SPEC AUTO: 7.7 X10(3)/MCL (ref 4.5–11.5)

## 2023-03-20 PROCEDURE — 99284 EMERGENCY DEPT VISIT MOD MDM: CPT

## 2023-03-20 PROCEDURE — 80053 COMPREHEN METABOLIC PANEL: CPT | Performed by: INTERNAL MEDICINE

## 2023-03-20 PROCEDURE — 93005 ELECTROCARDIOGRAM TRACING: CPT

## 2023-03-20 PROCEDURE — 85025 COMPLETE CBC W/AUTO DIFF WBC: CPT | Performed by: INTERNAL MEDICINE

## 2023-03-20 RX ORDER — ONDANSETRON 4 MG/1
4 TABLET, ORALLY DISINTEGRATING ORAL EVERY 6 HOURS PRN
Qty: 10 TABLET | Refills: 0 | Status: ON HOLD | OUTPATIENT
Start: 2023-03-20 | End: 2023-09-06

## 2023-03-21 NOTE — ED PROVIDER NOTES
"Encounter Date: 3/20/2023       History     Chief Complaint   Patient presents with    Fatigue    Dizziness    Addiction Problem     States injected meth several times at a party last night "just to experience life" and now feels weak and dizzy.     Presents with weakness. States did methamphetamine few days ago in a party and now feeling week. Hx of Bipolar disorder. Denies SI, HI or hallucinations. States go to [facility] for his Mental Health    The history is provided by the patient and the EMS personnel.   Review of patient's allergies indicates:  No Known Allergies  Past Medical History:   Diagnosis Date    Bipolar affective     Depression     HIV (human immunodeficiency virus infection)     Schizophrenia      History reviewed. No pertinent surgical history.  History reviewed. No pertinent family history.  Social History     Tobacco Use    Smoking status: Every Day     Packs/day: 0.50     Types: Cigarettes, Vaping with nicotine    Smokeless tobacco: Never   Substance Use Topics    Alcohol use: No    Drug use: Yes     Types: Marijuana, Methamphetamines     Comment: Precription Drugs     Review of Systems   Constitutional:  Negative for fever.   HENT:  Negative for sore throat.    Respiratory:  Negative for shortness of breath.    Cardiovascular:  Negative for chest pain.   Gastrointestinal:  Negative for nausea.   Genitourinary:  Negative for dysuria.   Musculoskeletal:  Negative for back pain.   Skin:  Negative for rash.   Neurological:  Positive for dizziness and weakness.   Hematological:  Does not bruise/bleed easily.   All other systems reviewed and are negative.    Physical Exam     Initial Vitals [03/20/23 2225]   BP Pulse Resp Temp SpO2   131/64 82 17 98.4 °F (36.9 °C) 97 %      MAP       --         Physical Exam    Nursing note and vitals reviewed.  Constitutional: He appears well-developed and well-nourished. No distress.   HENT:   Head: Normocephalic and atraumatic.   Mouth/Throat: " Oropharynx is clear and moist.   Eyes: Conjunctivae and EOM are normal. Pupils are equal, round, and reactive to light.   Neck: Neck supple.   Normal range of motion.  Cardiovascular:  Normal rate, regular rhythm, normal heart sounds and intact distal pulses.           Pulmonary/Chest: Breath sounds normal. No respiratory distress.   Abdominal: Abdomen is soft. Bowel sounds are normal. He exhibits no distension. There is no abdominal tenderness. There is no rebound and no guarding.   Musculoskeletal:         General: No edema. Normal range of motion.      Cervical back: Normal range of motion and neck supple.     Neurological: He is alert and oriented to person, place, and time. He has normal strength. No cranial nerve deficit. GCS score is 15. GCS eye subscore is 4. GCS verbal subscore is 5. GCS motor subscore is 6.   Skin: Skin is warm and dry. No rash noted.   Psychiatric: His behavior is normal.       ED Course   Procedures  Labs Reviewed   COMPREHENSIVE METABOLIC PANEL - Abnormal; Notable for the following components:       Result Value    Sodium Level 135 (*)     Glucose Level 123 (*)     Protein Total 8.4 (*)     Globulin 3.9 (*)     Aspartate Aminotransferase 45 (*)     All other components within normal limits   CBC W/ AUTO DIFFERENTIAL    Narrative:     The following orders were created for panel order CBC auto differential.  Procedure                               Abnormality         Status                     ---------                               -----------         ------                     CBC with Differential[[id number]]                            Final result                 Please view results for these tests on the individual orders.   CBC WITH DIFFERENTIAL     EKG Readings: (Independently Interpreted)   Initial Reading: No STEMI. Rhythm: Normal Sinus Rhythm. Heart Rate: 68. Ectopy: No Ectopy. Conduction: Normal. ST Segments: Normal ST Segments. T Waves: Normal. Clinical Impression: Normal Sinus  Rhythm     Imaging Results    None          Medications - No data to display  Medical Decision Making:   Differential Diagnosis:   Hypothyroidism, medication side effect, orthostatic weakness, kidney failure, stroke, among others    Clinical Tests:   Lab Tests: Ordered                        Clinical Impression:   Final diagnoses:  [R53.1] Weakness  [F15.10] Methamphetamine abuse (Primary)        ED Disposition Condition    Discharge Stable          ED Prescriptions    None       Follow-up Information       Follow up With Specialties Details Why Contact Info    Shavon Rodriguez NP Family Medicine In 1 week  [address] [facility] [address]  [phone]      [facility] Emergency Medicine  If symptoms worsen [address] [facility] [address] [phone]    [facility]al [facility], Psychiatry, Psychology Schedule an appointment as soon as possible for a visit in 1 month  [address]  [place] [id number] [phone]               Christian Lynn MD  03/20/23 2324       Christian Lynn MD  03/20/23 2297

## 2023-04-05 ENCOUNTER — HOSPITAL ENCOUNTER (EMERGENCY)
Facility: HOSPITAL | Age: 33
Discharge: PSYCHIATRIC OR SUBSTANCE USE FACILITY | End: 2023-04-06
Attending: EMERGENCY MEDICINE
Payer: MEDICAID

## 2023-04-05 DIAGNOSIS — F32.A DEPRESSION WITH SUICIDAL IDEATION: Primary | ICD-10-CM

## 2023-04-05 DIAGNOSIS — R45.851 DEPRESSION WITH SUICIDAL IDEATION: Primary | ICD-10-CM

## 2023-04-05 DIAGNOSIS — F19.10 SUBSTANCE ABUSE: ICD-10-CM

## 2023-04-05 LAB
ALBUMIN SERPL-MCNC: 4.5 G/DL (ref 3.5–5)
ALBUMIN/GLOB SERPL: 1.2 RATIO (ref 1.1–2)
ALP SERPL-CCNC: 99 UNIT/L (ref 40–150)
ALT SERPL-CCNC: 17 UNIT/L (ref 0–55)
AMPHET UR QL SCN: POSITIVE
APAP SERPL-MCNC: <17.4 UG/ML (ref 17.4–30)
APPEARANCE UR: CLEAR
AST SERPL-CCNC: 27 UNIT/L (ref 5–34)
BACTERIA #/AREA URNS AUTO: NORMAL /HPF
BARBITURATE SCN PRESENT UR: NEGATIVE
BASOPHILS # BLD AUTO: 0.04 X10(3)/MCL (ref 0–0.2)
BASOPHILS NFR BLD AUTO: 0.7 %
BENZODIAZ UR QL SCN: NEGATIVE
BILIRUB UR QL STRIP.AUTO: ABNORMAL MG/DL
BILIRUBIN DIRECT+TOT PNL SERPL-MCNC: 0.7 MG/DL
BUN SERPL-MCNC: 16.5 MG/DL (ref 8.9–20.6)
CALCIUM SERPL-MCNC: 10 MG/DL (ref 8.4–10.2)
CANNABINOIDS UR QL SCN: POSITIVE
CHLORIDE SERPL-SCNC: 105 MMOL/L (ref 98–107)
CO2 SERPL-SCNC: 27 MMOL/L (ref 22–29)
COCAINE UR QL SCN: NEGATIVE
COLOR UR AUTO: ABNORMAL
CREAT SERPL-MCNC: 1.44 MG/DL (ref 0.73–1.18)
EOSINOPHIL # BLD AUTO: 0.12 X10(3)/MCL (ref 0–0.9)
EOSINOPHIL NFR BLD AUTO: 2.2 %
ERYTHROCYTE [DISTWIDTH] IN BLOOD BY AUTOMATED COUNT: 12.8 % (ref 11.5–17)
ETHANOL SERPL-MCNC: <10 MG/DL
FENTANYL UR QL SCN: NEGATIVE
FLUAV AG UPPER RESP QL IA.RAPID: NOT DETECTED
FLUBV AG UPPER RESP QL IA.RAPID: NOT DETECTED
GFR SERPLBLD CREATININE-BSD FMLA CKD-EPI: >60 MLS/MIN/1.73/M2
GLOBULIN SER-MCNC: 3.8 GM/DL (ref 2.4–3.5)
GLUCOSE SERPL-MCNC: 74 MG/DL (ref 74–100)
GLUCOSE UR QL STRIP.AUTO: NEGATIVE MG/DL
HCT VFR BLD AUTO: 46.8 % (ref 42–52)
HGB BLD-MCNC: 15.9 G/DL (ref 14–18)
IMM GRANULOCYTES # BLD AUTO: 0.01 X10(3)/MCL (ref 0–0.04)
IMM GRANULOCYTES NFR BLD AUTO: 0.2 %
KETONES UR QL STRIP.AUTO: ABNORMAL MG/DL
LEUKOCYTE ESTERASE UR QL STRIP.AUTO: NEGATIVE UNIT/L
LYMPHOCYTES # BLD AUTO: 1.91 X10(3)/MCL (ref 0.6–4.6)
LYMPHOCYTES NFR BLD AUTO: 35.4 %
MCH RBC QN AUTO: 32.5 PG (ref 27–31)
MCHC RBC AUTO-ENTMCNC: 34 G/DL (ref 33–36)
MCV RBC AUTO: 95.7 FL (ref 80–94)
MDMA UR QL SCN: NEGATIVE
MONOCYTES # BLD AUTO: 0.7 X10(3)/MCL (ref 0.1–1.3)
MONOCYTES NFR BLD AUTO: 13 %
NEUTROPHILS # BLD AUTO: 2.62 X10(3)/MCL (ref 2.1–9.2)
NEUTROPHILS NFR BLD AUTO: 48.5 %
NITRITE UR QL STRIP.AUTO: NEGATIVE
NRBC BLD AUTO-RTO: 0 %
OPIATES UR QL SCN: NEGATIVE
PCP UR QL: NEGATIVE
PH UR STRIP.AUTO: 5.5 [PH]
PH UR: 5.5 [PH] (ref 3–11)
PLATELET # BLD AUTO: 253 X10(3)/MCL (ref 130–400)
PMV BLD AUTO: 10.4 FL (ref 7.4–10.4)
POTASSIUM SERPL-SCNC: 3.5 MMOL/L (ref 3.5–5.1)
PROT SERPL-MCNC: 8.3 GM/DL (ref 6.4–8.3)
PROT UR QL STRIP.AUTO: ABNORMAL MG/DL
RBC # BLD AUTO: 4.89 X10(6)/MCL (ref 4.7–6.1)
RBC #/AREA URNS AUTO: <5 /HPF
RBC UR QL AUTO: NEGATIVE UNIT/L
SARS-COV-2 RNA RESP QL NAA+PROBE: NOT DETECTED
SODIUM SERPL-SCNC: 140 MMOL/L (ref 136–145)
SP GR UR STRIP.AUTO: 1.04 (ref 1–1.03)
SQUAMOUS #/AREA URNS AUTO: <5 /HPF
TSH SERPL-ACNC: 0.52 UIU/ML (ref 0.35–4.94)
UROBILINOGEN UR STRIP-ACNC: 1 MG/DL
WBC # SPEC AUTO: 5.4 X10(3)/MCL (ref 4.5–11.5)
WBC #/AREA URNS AUTO: <5 /HPF

## 2023-04-05 PROCEDURE — 81001 URINALYSIS AUTO W/SCOPE: CPT | Mod: 59 | Performed by: EMERGENCY MEDICINE

## 2023-04-05 PROCEDURE — 80053 COMPREHEN METABOLIC PANEL: CPT | Performed by: EMERGENCY MEDICINE

## 2023-04-05 PROCEDURE — 84443 ASSAY THYROID STIM HORMONE: CPT | Performed by: EMERGENCY MEDICINE

## 2023-04-05 PROCEDURE — 80143 DRUG ASSAY ACETAMINOPHEN: CPT | Performed by: EMERGENCY MEDICINE

## 2023-04-05 PROCEDURE — 85025 COMPLETE CBC W/AUTO DIFF WBC: CPT | Performed by: EMERGENCY MEDICINE

## 2023-04-05 PROCEDURE — 80307 DRUG TEST PRSMV CHEM ANLYZR: CPT | Performed by: EMERGENCY MEDICINE

## 2023-04-05 PROCEDURE — 82077 ASSAY SPEC XCP UR&BREATH IA: CPT | Performed by: EMERGENCY MEDICINE

## 2023-04-05 PROCEDURE — 0240U COVID/FLU A&B PCR: CPT | Performed by: EMERGENCY MEDICINE

## 2023-04-05 PROCEDURE — 99285 EMERGENCY DEPT VISIT HI MDM: CPT

## 2023-04-05 RX ORDER — BUPROPION HYDROCHLORIDE 150 MG/1
150 TABLET, EXTENDED RELEASE ORAL 2 TIMES DAILY
Status: ON HOLD | COMMUNITY
End: 2023-09-06 | Stop reason: HOSPADM

## 2023-04-05 RX ORDER — ZINC GLUCONATE 50 MG
50 TABLET ORAL DAILY
Status: ON HOLD | COMMUNITY
End: 2023-09-06

## 2023-04-05 RX ORDER — QUETIAPINE FUMARATE 100 MG/1
100 TABLET, FILM COATED ORAL DAILY
Status: ON HOLD | COMMUNITY
End: 2023-09-06

## 2023-04-05 RX ORDER — LORAZEPAM 2 MG/ML
2 INJECTION INTRAMUSCULAR EVERY 4 HOURS PRN
Status: DISCONTINUED | OUTPATIENT
Start: 2023-04-05 | End: 2023-04-06 | Stop reason: HOSPADM

## 2023-04-05 RX ORDER — ACETAMINOPHEN 325 MG/1
650 TABLET ORAL EVERY 6 HOURS PRN
Status: DISCONTINUED | OUTPATIENT
Start: 2023-04-05 | End: 2023-04-06 | Stop reason: HOSPADM

## 2023-04-05 RX ORDER — HALOPERIDOL 5 MG/1
5 TABLET ORAL EVERY 4 HOURS PRN
Status: DISCONTINUED | OUTPATIENT
Start: 2023-04-05 | End: 2023-04-06 | Stop reason: HOSPADM

## 2023-04-05 RX ORDER — DIVALPROEX SODIUM 500 MG/1
500 TABLET, FILM COATED, EXTENDED RELEASE ORAL DAILY
Status: ON HOLD | COMMUNITY
End: 2023-09-06 | Stop reason: SDUPTHER

## 2023-04-05 RX ORDER — ZOLPIDEM TARTRATE 5 MG/1
10 TABLET ORAL NIGHTLY PRN
Status: DISCONTINUED | OUTPATIENT
Start: 2023-04-05 | End: 2023-04-06 | Stop reason: HOSPADM

## 2023-04-05 RX ORDER — HALOPERIDOL 5 MG/ML
5 INJECTION INTRAMUSCULAR EVERY 4 HOURS PRN
Status: DISCONTINUED | OUTPATIENT
Start: 2023-04-05 | End: 2023-04-06 | Stop reason: HOSPADM

## 2023-04-05 RX ORDER — OLANZAPINE 20 MG/1
20 TABLET ORAL NIGHTLY
Status: ON HOLD | COMMUNITY
End: 2023-09-06 | Stop reason: HOSPADM

## 2023-04-05 RX ORDER — OLANZAPINE 5 MG/1
10 TABLET, ORALLY DISINTEGRATING ORAL EVERY 8 HOURS PRN
Status: DISCONTINUED | OUTPATIENT
Start: 2023-04-05 | End: 2023-04-06 | Stop reason: HOSPADM

## 2023-04-05 RX ORDER — DIPHENHYDRAMINE HYDROCHLORIDE 50 MG/ML
50 INJECTION INTRAMUSCULAR; INTRAVENOUS EVERY 4 HOURS PRN
Status: DISCONTINUED | OUTPATIENT
Start: 2023-04-05 | End: 2023-04-06 | Stop reason: HOSPADM

## 2023-04-05 RX ORDER — ZIPRASIDONE MESYLATE 20 MG/ML
20 INJECTION, POWDER, LYOPHILIZED, FOR SOLUTION INTRAMUSCULAR EVERY 8 HOURS PRN
Status: DISCONTINUED | OUTPATIENT
Start: 2023-04-05 | End: 2023-04-06 | Stop reason: HOSPADM

## 2023-04-05 RX ORDER — MAG HYDROX/ALUMINUM HYD/SIMETH 200-200-20
30 SUSPENSION, ORAL (FINAL DOSE FORM) ORAL EVERY 6 HOURS PRN
Status: DISCONTINUED | OUTPATIENT
Start: 2023-04-05 | End: 2023-04-06 | Stop reason: HOSPADM

## 2023-04-05 NOTE — ED PROVIDER NOTES
Encounter Date: 4/5/2023    SCRIBE #1 NOTE: I, Anni Viera, am scribing for, and in the presence of,  Dori Maya MD. I have scribed the following portions of the note - Other sections scribed: HPI, ROS, PE, MDM.     History     Chief Complaint   Patient presents with    Suicidal     EMS picked up from his apartment with reports of suicidal ideations and plan to OD. Pt has been inpatient under [facility] previously for the same. Uses ecstasy (2 days ago) and marijuana per report. GCS 15.      32 Y.O. male with a history of bipolar affective disorder, depression, schizophrenia, and HIV presents to the ED via EMS for SI. Pt states that he is also seeking detox from multiple substances and is primarily using crystal meth. Denies having plan of suicide due to not wanting to be like his brother who committed suicide previously. Extensive history of psychiatric hospitalizations. Pt's PCP is Shavon Rodriguez NP.    [facility] at 1250.     The history is provided by the patient. No  was used.   Mental Health Problem  The primary symptoms include suicidal ideas. This is a recurrent problem.   The onset of the illness is precipitated by drug abuse. He admits to suicidal ideas. He does not have a plan to attempt suicide. He has not already injured self. He does not contemplate injuring another person. He has not already  injured another person. Risk factors that are present for mental illness include a suicide in the immediate family, a history of mental illness and substance abuse.   Review of patient's allergies indicates:  No Known Allergies  Past Medical History:   Diagnosis Date    Bipolar affective     Depression     HIV (human immunodeficiency virus infection)     Schizophrenia      No past surgical history on file.  No family history on file.  Social History     Tobacco Use    Smoking status: Every Day     Packs/day: 0.50     Types: Cigarettes, Vaping with nicotine    Smokeless tobacco: Never   Substance Use  Topics    Alcohol use: No    Drug use: Yes     Types: Marijuana, Methamphetamines     Comment: Precription Drugs     Review of Systems   Psychiatric/Behavioral:  Positive for suicidal ideas.      Physical Exam     Initial Vitals [04/05/23 1244]   BP Pulse Resp Temp SpO2   (!) 158/79 102 16 98.4 °F (36.9 °C) 95 %      MAP       --         Physical Exam    Nursing note and vitals reviewed.  Constitutional: He appears well-developed and well-nourished. He is not diaphoretic. He does not appear ill. No distress.   HENT:   Head: Normocephalic and atraumatic.   Right Ear: External ear normal.   Left Ear: External ear normal.   Nose: Nose normal.   Mouth/Throat: Oropharynx is clear and moist.   Eyes: Conjunctivae are normal.   Neck: Neck supple. No tracheal deviation present.   Cardiovascular:  Normal rate, regular rhythm and normal heart sounds.           Pulmonary/Chest: Breath sounds normal. No respiratory distress. He has no wheezes. He has no rhonchi. He has no rales.   Abdominal: Abdomen is soft. He exhibits no distension. There is no abdominal tenderness.   No right CVA tenderness.  No left CVA tenderness.   Musculoskeletal:         General: Normal range of motion.      Cervical back: Neck supple.     Neurological: He is alert and oriented to person, place, and time. He has normal strength. No cranial nerve deficit. GCS score is 15. GCS eye subscore is 4. GCS verbal subscore is 5. GCS motor subscore is 6.   Skin: Skin is warm and dry. Capillary refill takes less than 2 seconds. No pallor.   Psychiatric: His speech is rapid and/or pressured. He exhibits a depressed mood. He expresses suicidal ideation. He expresses no suicidal plans.       ED Course   Procedures  Labs Reviewed   COMPREHENSIVE METABOLIC PANEL - Abnormal; Notable for the following components:       Result Value    Creatinine 1.44 (*)     Globulin 3.8 (*)     All other components within normal limits   URINALYSIS, REFLEX TO URINE CULTURE - Abnormal;  Notable for the following components:    Specific Gravity, UA 1.036 (*)     Protein, UA 1+ (*)     Ketones, UA Trace (*)     Bilirubin, UA 1+ (*)     All other components within normal limits   ACETAMINOPHEN LEVEL - Abnormal; Notable for the following components:    Acetaminophen Level <17.4 (*)     All other components within normal limits   CBC WITH DIFFERENTIAL - Abnormal; Notable for the following components:    MCV 95.7 (*)     MCH 32.5 (*)     All other components within normal limits   TSH - Normal   ALCOHOL,MEDICAL (ETHANOL) - Normal   COVID/FLU A&B PCR - Normal    Narrative:     The Xpert Xpress SARS-CoV-2/FLU/RSV plus is a rapid, multiplexed real-time PCR test intended for the simultaneous qualitative detection and differentiation of SARS-CoV-2, Influenza A, Influenza B, and respiratory syncytial virus (RSV) viral RNA in either nasopharyngeal swab or nasal swab specimens.         URINALYSIS, MICROSCOPIC - Normal   CBC W/ AUTO DIFFERENTIAL    Narrative:     The following orders were created for panel order CBC auto differential.  Procedure                               Abnormality         Status                     ---------                               -----------         ------                     CBC with Differential[[id number]]        Abnormal            Final result                 Please view results for these tests on the individual orders.   DRUG SCREEN, URINE (BEAKER)   SARS CORONAVIRUS 2 ANTIGEN POCT, MANUAL READ          Imaging Results    None          Medications   OLANZapine zydis disintegrating tablet 10 mg (has no administration in time range)   acetaminophen tablet 650 mg (has no administration in time range)   aluminum-magnesium hydroxide-simethicone 200-200-20 mg/5 mL suspension 30 mL (has no administration in time range)   zolpidem tablet 10 mg (has no administration in time range)   haloperidoL tablet 5 mg (has no administration in time range)   haloperidol lactate injection 5 mg (has  no administration in time range)   diphenhydrAMINE injection 50 mg (has no administration in time range)   LORazepam injection 2 mg (has no administration in time range)   ziprasidone injection 20 mg (has no administration in time range)           Medical Decision Making  32-year-old male presenting with drug use and suicidal ideations without plan    DDX: suicidal ideations, homicidal ideations, auditory or visual hallucinations, drug/etoh intoxication, bipolar disorder, schizophrenia, schizoaffective, delusional disorder, major depressive disorder, PTSD, substance induced mood disorder, violent behavior, suicidal attempt, non-psychiatric medical illness, malingering        Patient placed under [facility]  Psych labs obtained unremarkable   Medically clear for transfer to a psychiatric facility    Problems Addressed:  Depression with suicidal ideation: acute illness or injury that poses a threat to life or bodily functions  Substance abuse: acute illness or injury that poses a threat to life or bodily functions    Amount and/or Complexity of Data Reviewed  External Data Reviewed: notes.     Details: Last psych admission was March of 2023, multiple ED visits and psych hospitalizations  Labs: ordered. Decision-making details documented in ED Course.    Risk  Decision regarding hospitalization.  Diagnosis or treatment significantly limited by social determinants of health.  Risk Details: Social risks: psychiatric illness and substance abuse           Scribe Attestation:   Scribe #1: I performed the above scribed service and the documentation accurately describes the services I performed. I attest to the accuracy of the note.    Attending Attestation:           Physician Attestation for Scribe:  Physician Attestation Statement for Scribe #1: I, Dori Maya MD, reviewed documentation, as scribed by Anni Viera in my presence, and it is both accurate and complete.              Medically cleared for psychiatry placement:  4/5/2023  3:27 PM         Clinical Impression:   Final diagnoses:  [F32.A, R45.851] Depression with suicidal ideation (Primary)  [F19.10] Substance abuse        ED Disposition Condition    Transfer to Psych Facility Stable          ED Prescriptions    None       Follow-up Information    None          Dori Maya MD  04/05/23 1528

## 2023-04-06 VITALS
SYSTOLIC BLOOD PRESSURE: 117 MMHG | HEIGHT: 71 IN | HEART RATE: 87 BPM | BODY MASS INDEX: 24.5 KG/M2 | TEMPERATURE: 98 F | OXYGEN SATURATION: 98 % | RESPIRATION RATE: 18 BRPM | DIASTOLIC BLOOD PRESSURE: 75 MMHG | WEIGHT: 175 LBS

## 2023-04-06 NOTE — ED NOTES
Patient discharged to care of [facility] for transport to [facility], escorted per S.O.

## 2023-06-11 ENCOUNTER — HOSPITAL ENCOUNTER (EMERGENCY)
Facility: HOSPITAL | Age: 33
Discharge: HOME | End: 2023-06-11
Attending: EMERGENCY MEDICINE
Payer: MEDICAID

## 2023-06-11 VITALS
RESPIRATION RATE: 17 BRPM | BODY MASS INDEX: 23.44 KG/M2 | SYSTOLIC BLOOD PRESSURE: 149 MMHG | TEMPERATURE: 99 F | WEIGHT: 167.44 LBS | HEART RATE: 88 BPM | DIASTOLIC BLOOD PRESSURE: 73 MMHG | HEIGHT: 71 IN | OXYGEN SATURATION: 100 %

## 2023-06-11 DIAGNOSIS — L02.91 ABSCESS: Primary | ICD-10-CM

## 2023-06-11 PROCEDURE — 25000003 PHARM REV CODE 250: Performed by: NURSE PRACTITIONER

## 2023-06-11 PROCEDURE — 99284 EMERGENCY DEPT VISIT MOD MDM: CPT

## 2023-06-11 RX ORDER — SULFAMETHOXAZOLE AND TRIMETHOPRIM 800; 160 MG/1; MG/1
1 TABLET ORAL 2 TIMES DAILY
Qty: 14 TABLET | Refills: 0 | Status: SHIPPED | OUTPATIENT
Start: 2023-06-11 | End: 2023-06-18

## 2023-06-11 RX ORDER — TRAMADOL HYDROCHLORIDE 50 MG/1
50 TABLET ORAL EVERY 12 HOURS PRN
Qty: 10 TABLET | Refills: 0 | Status: SHIPPED | OUTPATIENT
Start: 2023-06-11 | End: 2023-06-16

## 2023-06-11 RX ORDER — IBUPROFEN 400 MG/1
800 TABLET ORAL
Status: COMPLETED | OUTPATIENT
Start: 2023-06-11 | End: 2023-06-11

## 2023-06-11 RX ADMIN — IBUPROFEN 800 MG: 400 TABLET, FILM COATED ORAL at 08:06

## 2023-06-12 NOTE — ED PROVIDER NOTES
"Encounter Date: 6/11/2023       History     Chief Complaint   Patient presents with    Abscess     States "something bit me in the woods last week".  Presents today with abscess to lower posterior thigh.       Pt is a 33 y.o. male who presents to the [facility] ED complaining of a wound to his Rt posterior thigh. Symptoms for approx 1 week per pt, draining for last 2 days. Denies chest pain, SOB, weakness, dizziness, fever, or loss of bowel or bladder control. Pt uncertain cause of wound whether it is due to an insect bite or infected hair. Hx of bipolar affective disorder, schizophrenia, and HIV. Denies HI or SI. Reports compliance with home medications.     Review of patient's allergies indicates:  No Known Allergies  Past Medical History:   Diagnosis Date    Bipolar affective     Depression     HIV (human immunodeficiency virus infection)     Schizophrenia      History reviewed. No pertinent surgical history.  History reviewed. No pertinent family history.  Social History     Tobacco Use    Smoking status: Every Day     Packs/day: 0.50     Types: Cigarettes, Vaping with nicotine    Smokeless tobacco: Never   Substance Use Topics    Alcohol use: Yes    Drug use: Yes     Types: Marijuana, Methamphetamines     Comment: Precription Drugs     Review of Systems   Constitutional:  Negative for chills, diaphoresis, fatigue and fever.   HENT:  Negative for facial swelling, postnasal drip, rhinorrhea, sinus pressure, sinus pain, sore throat and trouble swallowing.    Respiratory:  Negative for cough, chest tightness, shortness of breath and wheezing.    Cardiovascular:  Negative for chest pain, palpitations and leg swelling.   Gastrointestinal:  Negative for abdominal pain, diarrhea, nausea and vomiting.   Genitourinary:  Negative for dysuria, flank pain, hematuria and urgency.   Musculoskeletal:  Negative for arthralgias, back pain and myalgias.   Skin:  Positive for wound. Negative for color change and rash.   Neurological:  " Negative for dizziness, syncope, weakness and headaches.   Hematological:  Does not bruise/bleed easily.   All other systems reviewed and are negative.    Physical Exam     Initial Vitals [06/11/23 2026]   BP Pulse Resp Temp SpO2   (!) 149/73 88 17 98.6 °F (37 °C) 100 %      MAP       --         Physical Exam    Nursing note and vitals reviewed.  Constitutional: Vital signs are normal. He appears well-developed and well-nourished.   HENT:   Head: Normocephalic.   Nose: Nose normal.   Mouth/Throat: Oropharynx is clear and moist.   Eyes: Conjunctivae and EOM are normal. Pupils are equal, round, and reactive to light.   Neck: Neck supple.   Normal range of motion.  Cardiovascular:  Normal rate, regular rhythm, normal heart sounds and intact distal pulses.           Pulmonary/Chest: Effort normal and breath sounds normal. No respiratory distress. He has no wheezes. He has no rhonchi. He has no rales. He exhibits no tenderness.   Abdominal: Abdomen is soft and flat. Bowel sounds are normal. There is no abdominal tenderness. There is no rebound, no guarding, no tenderness at McBurney's point and negative Clark's sign.   Musculoskeletal:         General: Normal range of motion.      Cervical back: Normal range of motion and neck supple.     Neurological: He is alert and oriented to person, place, and time. He has normal strength.   Skin: Skin is warm and dry. Capillary refill takes less than 2 seconds. Lesion noted.        Psychiatric: He has a normal mood and affect. His behavior is normal. Judgment and thought content normal.       ED Course   Procedures  Labs Reviewed - No data to display       Imaging Results    None          Medications   ibuprofen tablet 800 mg (has no administration in time range)     Medical Decision Making:   Differential Diagnosis:   Abscess  Bacterial infection of skin  ED Management:  8:40 PM Reassessed patient at this time. Reports condition has improved. Discussed with patient all pertinent  ED information and results. Discussed diagnosis and treatment plan with patient. Follow up instructions and return to ED instruction have been given. All questions and concerns were addressed at this time. Patient voices understanding of information and instructions. Patient is comfortable with plan and discharge. Patient is stable for discharge.                           Clinical Impression:   Final diagnoses:  [L02.91] Abscess (Primary)        ED Disposition Condition    Discharge Stable          ED Prescriptions       Medication Sig Dispense Start Date End Date Auth. Provider    sulfamethoxazole-trimethoprim 800-160mg (BACTRIM DS) 800-160 mg Tab Take 1 tablet by mouth 2 (two) times daily. for 7 days 14 tablet 6/11/2023 6/18/2023 Cesar Harris Jr., RADHA    traMADoL (ULTRAM) 50 mg tablet Take 1 tablet (50 mg total) by mouth every 12 (twelve) hours as needed for Pain. 10 tablet 6/11/2023 6/16/2023 RADHA Ruiz Jr.          Follow-up Information       Follow up With Specialties Details Why Contact Info    Shavon Rodriguez NP Family Medicine In 3 days  [address] [place] [id number]  [phone]      [facility] Emergency Medicine In 3 days As needed, If symptoms worsen [address] [facility] [address] [phone]             RADHA Ruiz Jr.  06/11/23 2043

## 2023-06-12 NOTE — DISCHARGE INSTRUCTIONS
Clean wound twice daily and apply neosporin.   Return to the [facility] ED immediately for worsening redness, purulent drainage from wound, or fever.   Take medication as prescribed.  Take pain medication as prescribed for no more than 7 days.  Follow up with your primary care physician in 3-5 days for follow up evaluation.

## 2023-07-06 ENCOUNTER — HOSPITAL ENCOUNTER (EMERGENCY)
Facility: HOSPITAL | Age: 33
Discharge: HOME | End: 2023-07-06
Attending: EMERGENCY MEDICINE
Payer: MEDICAID

## 2023-07-06 VITALS
DIASTOLIC BLOOD PRESSURE: 78 MMHG | OXYGEN SATURATION: 100 % | WEIGHT: 169.06 LBS | HEIGHT: 71 IN | SYSTOLIC BLOOD PRESSURE: 121 MMHG | BODY MASS INDEX: 23.67 KG/M2 | RESPIRATION RATE: 20 BRPM | TEMPERATURE: 98 F | HEART RATE: 67 BPM

## 2023-07-06 DIAGNOSIS — B20 HISTORY OF HIV INFECTION: ICD-10-CM

## 2023-07-06 DIAGNOSIS — L02.415 ABSCESS OF RIGHT LOWER LEG: Primary | ICD-10-CM

## 2023-07-06 DIAGNOSIS — L02.415 ABSCESS OF RIGHT THIGH: ICD-10-CM

## 2023-07-06 LAB
ALBUMIN SERPL-MCNC: 3.6 G/DL (ref 3.5–5)
ALBUMIN/GLOB SERPL: 0.9 RATIO (ref 1.1–2)
ALP SERPL-CCNC: 91 UNIT/L (ref 40–150)
ALT SERPL-CCNC: 13 UNIT/L (ref 0–55)
AST SERPL-CCNC: 17 UNIT/L (ref 5–34)
BASOPHILS # BLD AUTO: 0.04 X10(3)/MCL
BASOPHILS NFR BLD AUTO: 0.6 %
BILIRUBIN DIRECT+TOT PNL SERPL-MCNC: 0.3 MG/DL
BUN SERPL-MCNC: 11.7 MG/DL (ref 8.9–20.6)
CALCIUM SERPL-MCNC: 9.3 MG/DL (ref 8.4–10.2)
CHLORIDE SERPL-SCNC: 107 MMOL/L (ref 98–107)
CO2 SERPL-SCNC: 25 MMOL/L (ref 22–29)
CREAT SERPL-MCNC: 1.06 MG/DL (ref 0.73–1.18)
EOSINOPHIL # BLD AUTO: 0.3 X10(3)/MCL (ref 0–0.9)
EOSINOPHIL NFR BLD AUTO: 4.6 %
ERYTHROCYTE [DISTWIDTH] IN BLOOD BY AUTOMATED COUNT: 12.9 % (ref 11.5–17)
GFR SERPLBLD CREATININE-BSD FMLA CKD-EPI: >60 MLS/MIN/1.73/M2
GLOBULIN SER-MCNC: 4.2 GM/DL (ref 2.4–3.5)
GLUCOSE SERPL-MCNC: 78 MG/DL (ref 74–100)
HCT VFR BLD AUTO: 45.2 % (ref 42–52)
HGB BLD-MCNC: 14.9 G/DL (ref 14–18)
IMM GRANULOCYTES # BLD AUTO: 0.01 X10(3)/MCL (ref 0–0.04)
IMM GRANULOCYTES NFR BLD AUTO: 0.2 %
LYMPHOCYTES # BLD AUTO: 2.53 X10(3)/MCL (ref 0.6–4.6)
LYMPHOCYTES NFR BLD AUTO: 38.4 %
MCH RBC QN AUTO: 33.2 PG (ref 27–31)
MCHC RBC AUTO-ENTMCNC: 33 G/DL (ref 33–36)
MCV RBC AUTO: 100.7 FL (ref 80–94)
MONOCYTES # BLD AUTO: 0.74 X10(3)/MCL (ref 0.1–1.3)
MONOCYTES NFR BLD AUTO: 11.2 %
NEUTROPHILS # BLD AUTO: 2.97 X10(3)/MCL (ref 2.1–9.2)
NEUTROPHILS NFR BLD AUTO: 45 %
NRBC BLD AUTO-RTO: 0 %
PLATELET # BLD AUTO: 242 X10(3)/MCL (ref 130–400)
PMV BLD AUTO: 9.6 FL (ref 7.4–10.4)
POTASSIUM SERPL-SCNC: 4.5 MMOL/L (ref 3.5–5.1)
PROT SERPL-MCNC: 7.8 GM/DL (ref 6.4–8.3)
RBC # BLD AUTO: 4.49 X10(6)/MCL (ref 4.7–6.1)
SODIUM SERPL-SCNC: 138 MMOL/L (ref 136–145)
WBC # SPEC AUTO: 6.59 X10(3)/MCL (ref 4.5–11.5)

## 2023-07-06 PROCEDURE — 85025 COMPLETE CBC W/AUTO DIFF WBC: CPT | Performed by: PHYSICIAN ASSISTANT

## 2023-07-06 PROCEDURE — 99283 EMERGENCY DEPT VISIT LOW MDM: CPT | Mod: 25

## 2023-07-06 PROCEDURE — 80053 COMPREHEN METABOLIC PANEL: CPT | Performed by: PHYSICIAN ASSISTANT

## 2023-07-06 PROCEDURE — 10061 I&D ABSCESS COMP/MULTIPLE: CPT

## 2023-07-06 PROCEDURE — 25000003 PHARM REV CODE 250: Performed by: PHYSICIAN ASSISTANT

## 2023-07-06 RX ORDER — LIDOCAINE HYDROCHLORIDE 10 MG/ML
15 INJECTION, SOLUTION EPIDURAL; INFILTRATION; INTRACAUDAL; PERINEURAL
Status: COMPLETED | OUTPATIENT
Start: 2023-07-06 | End: 2023-07-06

## 2023-07-06 RX ORDER — BACITRACIN ZINC 500 UNIT/G
OINTMENT (GRAM) TOPICAL 2 TIMES DAILY
Qty: 28 G | Refills: 0 | Status: SHIPPED | OUTPATIENT
Start: 2023-07-06 | End: 2023-07-16

## 2023-07-06 RX ORDER — DOXYCYCLINE 100 MG/1
100 CAPSULE ORAL 2 TIMES DAILY
Qty: 14 CAPSULE | Refills: 0 | Status: SHIPPED | OUTPATIENT
Start: 2023-07-06 | End: 2023-07-13

## 2023-07-06 RX ADMIN — LIDOCAINE HYDROCHLORIDE 150 MG: 10 SOLUTION INTRAVENOUS at 04:07

## 2023-07-06 NOTE — DISCHARGE INSTRUCTIONS
Report to Emergency Department if symptoms return or worsen; [facility] Medicine Clinic Within 1 to 2 days, It is important that you follow up with your primary care provider or specialist if indicated for further evaluation, workup, and treatment as necessary. The exam and treatment you received in Emergency Department was for an urgent problem and NOT INTENDED AS COMPLETE CARE. It is important that you FOLLOW UP with a doctor for ongoing care. If your symptoms become WORSE or you DO NOT IMPROVE and you are unable to reach your health care provider, you should RETURN to the Emergency Department. The Emergency Department provider has provided a PRELIMINARY INTERPRETATION of all your studies. A final interpretation may be done after you are discharged. If a change in your diagnosis or treatment is needed WE WILL CONTACT YOU. It is critical that we have a CURRENT PHONE NUMBER FOR YOU.

## 2023-07-06 NOTE — ED PROVIDER NOTES
Encounter Date: 7/6/2023       History     Chief Complaint   Patient presents with    Abscess     C/o abscess x 3 right leg x 1 week     32 yo M w/ PMHx significant for HIV, methamphetamine abuse, schizophrenia & bipolar disorder presents to ED c/o 1 week hx of abscesses to R leg. Patient was seen approx 1 months ago w/ similar abscess to R posterior thigh, which was drained w/ manual decompression. Discharged w/ bactrim & reports symptoms resolved until new abscess appeared approx 1 week ago. Reports compliance w/ biktarvy. Denies picking at skin. Reports he has recently started experimenting w/ meth IV, but denies long term IVDU.  Denies F/C, N/V/D, diffuse body aches, appetite changes, generalized weakness, fatigue, unintended weight loss, night sweats, CP, SOB, edema, orthopnea. VSS on arrival, patient in NAD.    Review of patient's allergies indicates:  No Known Allergies  Past Medical History:   Diagnosis Date    Bipolar affective     Depression     HIV (human immunodeficiency virus infection)     Schizophrenia      History reviewed. No pertinent surgical history.  History reviewed. No pertinent family history.  Social History     Tobacco Use    Smoking status: Every Day     Packs/day: 0.50     Types: Cigarettes, Vaping with nicotine    Smokeless tobacco: Never   Substance Use Topics    Alcohol use: Yes    Drug use: Yes     Types: Marijuana, Methamphetamines     Comment: Precription Drugs     Review of Systems   All other systems reviewed and are negative.    Physical Exam     Initial Vitals [07/06/23 1517]   BP Pulse Resp Temp SpO2   124/75 65 20 97.9 °F (36.6 °C) 98 %      MAP       --         Physical Exam    Nursing note and vitals reviewed.  Constitutional: He appears well-developed and well-nourished. He is not diaphoretic. No distress.   HENT:   Head: Normocephalic and atraumatic.   Eyes: Conjunctivae are normal. Pupils are equal, round, and reactive to light.   Neck: Neck supple.   Normal range of  motion.  Cardiovascular:  Normal rate, regular rhythm, normal heart sounds and intact distal pulses.     Exam reveals no gallop and no friction rub.       No murmur heard.  Pulmonary/Chest: Breath sounds normal. No respiratory distress. He has no wheezes. He has no rhonchi. He has no rales.   Abdominal: Abdomen is soft. Bowel sounds are normal. He exhibits no distension. There is no abdominal tenderness. There is no rebound and no guarding.   Musculoskeletal:         General: No edema. Normal range of motion.      Cervical back: Normal range of motion and neck supple.     Neurological: He is alert and oriented to person, place, and time. No cranial nerve deficit.   Skin: Skin is warm and dry. Capillary refill takes less than 2 seconds. Abscess noted.        Psychiatric: He has a normal mood and affect.       ED Course   I & D - Incision and Drainage    Date/Time: 2023 5:34 PM  Location procedure was performed: [facility] EMERGENCY DEPARTMENT  Performed by: DIGNA Gan  Authorized by: Cesar Kern MD   Consent Done: Yes  Consent: Verbal consent obtained.  Risks and benefits: risks, benefits and alternatives were discussed  Consent given by: patient  Patient understanding: patient states understanding of the procedure being performed  Patient consent: the patient's understanding of the procedure matches consent given  Patient identity confirmed:  and name  Type: abscess  Body area: lower extremity  Location details: right leg  Anesthesia: local infiltration    Anesthesia:  Local Anesthetic: lidocaine 1% without epinephrine  Anesthetic total: 5 mL    Patient sedated: no  Scalpel size: 11  Incision type: single straight  Complexity: simple  Drainage: pus  Drainage amount: moderate  Wound treatment: incision, drainage, wound packed and expression of material  Packing material: 1/ in iodoform gauze  Complications: No  Patient tolerance: Patient tolerated the procedure well with no immediate  complications      I & D - Incision and Drainage    Date/Time: 7/6/2023 5:36 PM  Location procedure was performed: [facility] EMERGENCY DEPARTMENT  Performed by: DIGNA Gan  Authorized by: Cesar Kern MD   Consent Done: Yes  Type: abscess  Anesthesia: local infiltration    Anesthesia:  Local Anesthetic: lidocaine 1% without epinephrine  Anesthetic total: 5 mL  Scalpel size: 11  Incision type: single straight  Complexity: simple  Drainage: pus  Drainage amount: moderate  Wound treatment: incision, drainage, expression of material and wound packed  Packing material: 1/4 in iodoform gauze  Patient tolerance: Patient tolerated the procedure well with no immediate complications      Labs Reviewed   COMPREHENSIVE METABOLIC PANEL - Abnormal; Notable for the following components:       Result Value    Globulin 4.2 (*)     Albumin/Globulin Ratio 0.9 (*)     All other components within normal limits   CBC WITH DIFFERENTIAL - Abnormal; Notable for the following components:    RBC 4.49 (*)     .7 (*)     MCH 33.2 (*)     All other components within normal limits   CBC W/ AUTO DIFFERENTIAL    Narrative:     The following orders were created for panel order CBC Auto Differential.  Procedure                               Abnormality         Status                     ---------                               -----------         ------                     CBC with Differential[[id number]]        Abnormal            Final result                 Please view results for these tests on the individual orders.          Imaging Results    None          Medications   LIDOcaine (PF) 10 mg/ml (1%) injection 150 mg (150 mg Infiltration Given 7/6/23 2185)     Medical Decision Making:   Clinical Tests:   Lab Tests: Ordered and Reviewed  Today's lab workup unremarkable. No significant leukocytosis to suggest major infection. No neutropenia. Denies all systemic symptoms. CMP unremarkable. Abscesses I&D'd w/o complication. Instructed  to return in 3 days for wound re-evaluation. Home hygiene instructions given. Discharged w/ doxy & bacitracin. ED precautions given for new or worsening symptoms & patient verbalized understanding.                        Clinical Impression:   Final diagnoses:  [L02.415] Abscess of right lower leg (Primary)  [L02.415] Abscess of right thigh  [B20] History of HIV infection        ED Disposition Condition    Discharge Good          ED Prescriptions       Medication Sig Dispense Start Date End Date Auth. Provider    doxycycline (VIBRAMYCIN) 100 MG Cap Take 1 capsule (100 mg total) by mouth 2 (two) times daily. for 7 days 14 capsule 7/6/2023 7/13/2023 DIGNA Gan    bacitracin 500 unit/gram Oint Apply topically 2 (two) times daily. for 10 days 28 g 7/6/2023 7/16/2023 DIGNA Gan          Follow-up Information       Follow up With Specialties Details Why Contact Info    [facility] Infectious Diseases In 2 weeks  [address] [facility] [id number] [phone]    [facility] Emergency Medicine  Return in 3 days for wound re-evaluation. Return sooner for any new or worsening symptoms. [address] [id number] [phone]             DIGNA Gan  07/06/23 1739       DIGNA Gan  07/06/23 1744

## 2023-07-07 ENCOUNTER — TELEPHONE (OUTPATIENT)
Dept: INFECTIOUS DISEASES | Facility: CLINIC | Age: 33
End: 2023-07-07
Payer: MEDICAID

## 2023-07-07 NOTE — TELEPHONE ENCOUNTER
Received referral from the ER for B20. Called and spoke with patient in regards to the referral. Patient is already established with [facility] and would like to stay with them for care and treatment. Referral to ID cancelled at this time.

## 2023-08-25 ENCOUNTER — HOSPITAL ENCOUNTER (EMERGENCY)
Facility: HOSPITAL | Age: 33
Discharge: HOME | End: 2023-08-25
Attending: EMERGENCY MEDICINE
Payer: MEDICAID

## 2023-08-25 VITALS
RESPIRATION RATE: 17 BRPM | SYSTOLIC BLOOD PRESSURE: 112 MMHG | TEMPERATURE: 99 F | DIASTOLIC BLOOD PRESSURE: 70 MMHG | WEIGHT: 166.25 LBS | BODY MASS INDEX: 24.62 KG/M2 | HEIGHT: 69 IN | OXYGEN SATURATION: 100 % | HEART RATE: 78 BPM

## 2023-08-25 VITALS
DIASTOLIC BLOOD PRESSURE: 69 MMHG | OXYGEN SATURATION: 97 % | HEART RATE: 103 BPM | BODY MASS INDEX: 28.34 KG/M2 | RESPIRATION RATE: 16 BRPM | TEMPERATURE: 100 F | WEIGHT: 166 LBS | SYSTOLIC BLOOD PRESSURE: 127 MMHG | HEIGHT: 64 IN

## 2023-08-25 DIAGNOSIS — F34.1 DYSTHYMIA: Primary | ICD-10-CM

## 2023-08-25 DIAGNOSIS — M79.662 PAIN IN LEFT LOWER LEG: ICD-10-CM

## 2023-08-25 DIAGNOSIS — L03.116 CELLULITIS OF LEFT LOWER EXTREMITY: Primary | ICD-10-CM

## 2023-08-25 PROCEDURE — 99281 EMR DPT VST MAYX REQ PHY/QHP: CPT

## 2023-08-25 PROCEDURE — 99284 EMERGENCY DEPT VISIT MOD MDM: CPT | Mod: 27

## 2023-08-25 RX ORDER — CEPHALEXIN 500 MG/1
500 CAPSULE ORAL EVERY 12 HOURS
Qty: 14 CAPSULE | Refills: 0 | Status: ON HOLD | OUTPATIENT
Start: 2023-08-25 | End: 2023-09-06

## 2023-08-25 NOTE — ED PROVIDER NOTES
Encounter Date: 8/25/2023       History     Chief Complaint   Patient presents with    Psychiatric Evaluation     Out of psych meds; reporting suicidal thoughts. Denies plan.       Mental Health Problem  The primary symptoms include dysphoric mood. The primary symptoms do not include aggression, agitation, bizarre behavior, delusions, depressed mood, disorganized speech, disorganized thinking, hallucinations, homicidal ideas, negative symptoms, paranoia, self-injury, somatic symptoms, suicidal ideas, suicidal threats or suicide attempt. The current episode started several weeks ago. This is a chronic problem.   The degree of incapacity that he is experiencing as a consequence of his illness is moderate. Additional symptoms of the illness include anhedonia and distractible. Additional symptoms of the illness do not include insomnia, hypersomnia, appetite change, unexpected weight change, fatigue, agitation, psychomotor retardation, feelings of worthlessness, attention impairment, euphoric mood, increased goal-directed activity, flight of ideas, inflated self-esteem, decreased need for sleep, poor judgment, visual change, headaches, abdominal pain or seizures. He admits to suicidal ideas. He does not have a plan to attempt suicide. He has not already injured self. He does not contemplate injuring another person. He has not already  injured another person. Risk factors that are present for mental illness include a history of mental illness.     Review of patient's allergies indicates:  No Known Allergies  Past Medical History:   Diagnosis Date    Bipolar affective     Depression     HIV (human immunodeficiency virus infection)     Schizophrenia      History reviewed. No pertinent surgical history.  History reviewed. No pertinent family history.  Social History     Tobacco Use    Smoking status: Every Day     Current packs/day: 0.50     Types: Cigarettes, Vaping with nicotine    Smokeless tobacco: Never   Substance Use  Topics    Alcohol use: Yes    Drug use: Yes     Types: Marijuana, Methamphetamines     Comment: Precription Drugs     Review of Systems   Constitutional:  Negative for appetite change, fatigue and unexpected weight change.   Gastrointestinal:  Negative for abdominal pain.   Neurological:  Negative for seizures and headaches.   Psychiatric/Behavioral:  Positive for dysphoric mood. Negative for agitation, hallucinations, homicidal ideas, paranoia, self-injury and suicidal ideas. The patient does not have insomnia.    All other systems reviewed and are negative.      Physical Exam     Initial Vitals [08/25/23 1819]   BP Pulse Resp Temp SpO2   127/69 103 16 99.8 °F (37.7 °C) 97 %      MAP       --         Physical Exam    Nursing note and vitals reviewed.  Constitutional: He appears well-developed and well-nourished. He is not diaphoretic. No distress.   HENT:   Head: Normocephalic and atraumatic.   Eyes: EOM are normal. Pupils are equal, round, and reactive to light. Right eye exhibits no discharge. Left eye exhibits no discharge.   Neck: Neck supple. No thyromegaly present. No tracheal deviation present. No JVD present.   Normal range of motion.  Cardiovascular:  Normal rate, regular rhythm, normal heart sounds and intact distal pulses.           No murmur heard.  Pulmonary/Chest: Breath sounds normal. No stridor. No respiratory distress. He has no wheezes. He has no rhonchi. He has no rales.   Abdominal: Abdomen is soft. He exhibits no distension. There is no abdominal tenderness. There is no rebound and no guarding.   Musculoskeletal:         General: No tenderness or edema. Normal range of motion.      Cervical back: Normal range of motion and neck supple.     Neurological: He is alert and oriented to person, place, and time. He has normal strength. No cranial nerve deficit. GCS score is 15. GCS eye subscore is 4. GCS verbal subscore is 5. GCS motor subscore is 6.   Skin: Skin is warm and dry. Capillary refill  takes less than 2 seconds. No rash and no abscess noted. No erythema. No pallor.   Psychiatric: He has a normal mood and affect. His behavior is normal. Judgment and thought content normal.         ED Course   Procedures  Labs Reviewed - No data to display       Imaging Results    None          Medications - No data to display  Medical Decision Making  33-year-old male presents with transient, passive, suicidal ideation.  There is no defined plan.  Patient with normal judgment normal insight, normal impulse control.    Amount and/or Complexity of Data Reviewed  External Data Reviewed: notes.     Details: Records confirm history as related by patient.    Risk  Risk Details: Patient with minor dysthymic symptoms.  Application of Stewart scale method demonstrative low risk for self-harm.  Plan discharge with follow up instructions, return precautions, anticipatory guidance.                               Clinical Impression:   Final diagnoses:  [F34.1] Dysthymia (Primary)        ED Disposition Condition    Discharge Stable          ED Prescriptions    None       Follow-up Information       Follow up With Specialties Details Why Contact Info    [facility] Emergency Dept Emergency Medicine  As needed, If symptoms worsen [address] [facility] [id number] [phone]    [facility]  Call   [address] [place] [address]  [phone]             Alfonso Lara MD  08/25/23 1606

## 2023-08-26 NOTE — ED PROVIDER NOTES
Encounter Date: 8/25/2023       History     Chief Complaint   Patient presents with    Leg Swelling     Pt c/o left LE swelling since yesterday.  Denies injury.  Pt displaying erratic behavior. Dc'ed from this ERless than 1 hour ago     Chon Parham is a 33 y.o. male with HIV, schizophrenia, depression who presents to the ED complaining of left lower leg pain and swelling. Reports he first noticed an area that looked like an insect bite yesterday that has been getting more swollen and warm. Also thinks he may have hit his leg on something. He denies fevers, chills, chest pain, SOB, wound drainage.    The history is provided by the patient. No  was used.     Review of patient's allergies indicates:  No Known Allergies  Past Medical History:   Diagnosis Date    Bipolar affective     Depression     HIV (human immunodeficiency virus infection)     Schizophrenia      No past surgical history on file.  No family history on file.  Social History     Tobacco Use    Smoking status: Every Day     Current packs/day: 0.50     Types: Cigarettes, Vaping with nicotine    Smokeless tobacco: Never   Substance Use Topics    Alcohol use: Yes    Drug use: Yes     Types: Marijuana, Methamphetamines     Comment: Precription Drugs     Review of Systems   Constitutional:  Negative for activity change, chills and fever.   HENT:  Negative for congestion and trouble swallowing.    Eyes:  Negative for photophobia and visual disturbance.   Respiratory:  Negative for chest tightness, shortness of breath and wheezing.    Cardiovascular:  Negative for chest pain, palpitations and leg swelling.   Gastrointestinal:  Negative for abdominal pain, constipation, diarrhea, nausea and vomiting.   Genitourinary:  Negative for dysuria, frequency, hematuria and urgency.   Musculoskeletal:  Positive for arthralgias. Negative for back pain and gait problem.   Skin:  Positive for color change. Negative for rash.   Neurological:  Negative  for dizziness, syncope, weakness, light-headedness, numbness and headaches.   Psychiatric/Behavioral:  Negative for agitation and confusion. The patient is not nervous/anxious.        Physical Exam     Initial Vitals [08/25/23 1855]   BP Pulse Resp Temp SpO2   116/67 84 16 98.8 °F (37.1 °C) 100 %      MAP       --         Physical Exam    Nursing note and vitals reviewed.  Constitutional: He appears well-developed and well-nourished. No distress.   HENT:   Head: Normocephalic and atraumatic.   Mouth/Throat: No oropharyngeal exudate.   Eyes: EOM are normal. No scleral icterus.   Neck: Neck supple.   Normal range of motion.  Cardiovascular:  Normal rate and regular rhythm.           No murmur heard.  Pulmonary/Chest: No respiratory distress. He has no wheezes.   Abdominal: Abdomen is soft. He exhibits no distension. There is no abdominal tenderness.   Musculoskeletal:         General: Tenderness (anterior left leg surrounding insect bite) present. No edema. Normal range of motion.      Cervical back: Normal range of motion and neck supple.      Comments: Full ROM of all joints in left lower extremity. NVI. Ambulating independently.      Neurological: He is alert and oriented to person, place, and time. No cranial nerve deficit.   Skin: Skin is warm and dry. Capillary refill takes less than 2 seconds. There is erythema.   Insect bite to anterior left lower leg with surrounding erythema and warmth   Psychiatric: He has a normal mood and affect. Thought content normal.         ED Course   Procedures  Labs Reviewed - No data to display       Imaging Results              X-Ray Tibia Fibula 2 View Left (Final result)  Result time 08/25/23 19:46:32      Final result by Desiree Yan MD (08/25/23 19:46:32)                   Impression:      No acute osseous abnormality      Electronically signed by: Desiree Yan  Date:    08/25/2023  Time:    19:46               Narrative:    EXAMINATION:  XR TIBIA FIBULA 2 VIEW  LEFT    CLINICAL HISTORY:  Pain in left lower leg    TECHNIQUE:  AP and lateral views of the left tibia and fibula were performed.    COMPARISON:  None.    FINDINGS:  No fracture or dislocation.    Regional soft tissues are normal.                                       Medications - No data to display  Medical Decision Making  Amount and/or Complexity of Data Reviewed  Radiology: ordered. Decision-making details documented in ED Course.    Risk  Prescription drug management.               ED Course as of 08/25/23 2008   Fri Aug 25, 2023   1958 X-Ray Tibia Fibula 2 View Left  No acute osseous abnormality [KD]      ED Course User Index  [KD] Dori Tucker PA-C               Medical Decision Making:   Initial Assessment:   Resting comfortably in NAD. HDS and afebrile. Cellulitic changes to left anterior lower leg.  Differential Diagnosis:   Cellulitis, abscess, dependent edema, fracture, sprain  Clinical Tests:   Radiological Study: Ordered and Reviewed  ED Management:  XR without acute osseous abnormality. Physical exam findings consistent with cellulitis. Pt HDS and afebrile. Stable for discharge with keflex BID x 7 days. Encouraged close follow up with PCP. ED return precautions given for any new or worsening symptoms. He verbalized understanding. All questions answered.       Clinical Impression:   Final diagnoses:  [M79.662] Pain in left lower leg  [L03.116] Cellulitis of left lower extremity (Primary)        ED Disposition Condition    Discharge Stable          ED Prescriptions       Medication Sig Dispense Start Date End Date Auth. Provider    cephALEXin (KEFLEX) 500 MG capsule Take 1 capsule (500 mg total) by mouth every 12 (twelve) hours. for 7 days 14 capsule 8/25/2023 9/1/2023 Dori Tucker PA-C          Follow-up Information       Follow up With Specialties Details Why Contact Info    [facility] Emergency Medicine  If symptoms worsen [address] [facility]  [place] [address]  [phone]    Shavon Rodriguez, NP Family Medicine In 3 days Hospital follow up [address] [place] [address]  [phone]               Dori Tucker PA-C  08/25/23 2008     WDL

## 2023-08-31 ENCOUNTER — HOSPITAL ENCOUNTER (INPATIENT)
Facility: HOSPITAL | Age: 33
LOS: 7 days | Discharge: HOME | DRG: 885 | End: 2023-09-07
Attending: PSYCHIATRY & NEUROLOGY | Admitting: PSYCHIATRY & NEUROLOGY
Payer: MEDICAID

## 2023-08-31 DIAGNOSIS — F32.A DEPRESSION: ICD-10-CM

## 2023-08-31 DIAGNOSIS — B20 CURRENTLY ASYMPTOMATIC HIV INFECTION, WITH HISTORY OF HIV-RELATED ILLNESS: Primary | Chronic | ICD-10-CM

## 2023-08-31 PROCEDURE — 25000003 PHARM REV CODE 250: Performed by: PEDIATRICS

## 2023-08-31 PROCEDURE — 10060 I&D ABSCESS SIMPLE/SINGLE: CPT

## 2023-08-31 PROCEDURE — 25000003 PHARM REV CODE 250: Performed by: PSYCHIATRY & NEUROLOGY

## 2023-08-31 PROCEDURE — 11400000 HC PSYCH PRIVATE ROOM

## 2023-08-31 PROCEDURE — 99285 EMERGENCY DEPT VISIT HI MDM: CPT | Mod: 25

## 2023-08-31 RX ORDER — DIPHENHYDRAMINE HCL 50 MG
50 CAPSULE ORAL EVERY 6 HOURS PRN
Status: DISCONTINUED | OUTPATIENT
Start: 2023-08-31 | End: 2023-09-07 | Stop reason: HOSPADM

## 2023-08-31 RX ORDER — CLINDAMYCIN HYDROCHLORIDE 150 MG/1
300 CAPSULE ORAL EVERY 8 HOURS
Status: COMPLETED | OUTPATIENT
Start: 2023-08-31 | End: 2023-09-07

## 2023-08-31 RX ORDER — MAG HYDROX/ALUMINUM HYD/SIMETH 200-200-20
30 SUSPENSION, ORAL (FINAL DOSE FORM) ORAL EVERY 6 HOURS PRN
Status: DISCONTINUED | OUTPATIENT
Start: 2023-08-31 | End: 2023-09-07 | Stop reason: HOSPADM

## 2023-08-31 RX ORDER — DIPHENHYDRAMINE HYDROCHLORIDE 50 MG/ML
50 INJECTION INTRAMUSCULAR; INTRAVENOUS EVERY 6 HOURS PRN
Status: DISCONTINUED | OUTPATIENT
Start: 2023-08-31 | End: 2023-09-07 | Stop reason: HOSPADM

## 2023-08-31 RX ORDER — TRAZODONE HYDROCHLORIDE 100 MG/1
100 TABLET ORAL NIGHTLY PRN
Status: DISCONTINUED | OUTPATIENT
Start: 2023-08-31 | End: 2023-09-07 | Stop reason: HOSPADM

## 2023-08-31 RX ORDER — HALOPERIDOL 5 MG/ML
10 INJECTION INTRAMUSCULAR EVERY 6 HOURS PRN
Status: DISCONTINUED | OUTPATIENT
Start: 2023-08-31 | End: 2023-09-07 | Stop reason: HOSPADM

## 2023-08-31 RX ORDER — HALOPERIDOL 5 MG/1
10 TABLET ORAL EVERY 6 HOURS PRN
Status: DISCONTINUED | OUTPATIENT
Start: 2023-08-31 | End: 2023-09-07 | Stop reason: HOSPADM

## 2023-08-31 RX ORDER — HYDROXYZINE HYDROCHLORIDE 50 MG/1
50 TABLET, FILM COATED ORAL EVERY 4 HOURS PRN
Status: DISCONTINUED | OUTPATIENT
Start: 2023-08-31 | End: 2023-09-07 | Stop reason: HOSPADM

## 2023-08-31 RX ORDER — ACETAMINOPHEN 325 MG/1
650 TABLET ORAL EVERY 6 HOURS PRN
Status: DISCONTINUED | OUTPATIENT
Start: 2023-08-31 | End: 2023-09-07 | Stop reason: HOSPADM

## 2023-08-31 RX ORDER — IBUPROFEN 200 MG
1 TABLET ORAL DAILY
Status: DISCONTINUED | OUTPATIENT
Start: 2023-09-01 | End: 2023-09-07 | Stop reason: HOSPADM

## 2023-08-31 RX ORDER — MUPIROCIN 20 MG/G
OINTMENT TOPICAL 2 TIMES DAILY
Status: DISCONTINUED | OUTPATIENT
Start: 2023-08-31 | End: 2023-08-31

## 2023-08-31 RX ADMIN — CLINDAMYCIN HYDROCHLORIDE 300 MG: 150 CAPSULE ORAL at 09:08

## 2023-08-31 RX ADMIN — ACETAMINOPHEN 650 MG: 325 TABLET ORAL at 08:08

## 2023-08-31 RX ADMIN — TRAZODONE HYDROCHLORIDE 100 MG: 100 TABLET ORAL at 08:08

## 2023-08-31 NOTE — NURSING
"Admission Note:    Chon Parham is a 33 y.o. male, : 1990, MRN: [id number], admitted on 2023 to [facility] ([facility]) for Flip Thomas MD with a diagnosis of Depression [F32.A]. Patient admitted on a status of Physician Emergency Certificate (PEC). Chon reports no known food or drug allergies.    Patient demonstrated an affect that was sad, flat, and anxious. Patient demonstrated mood during assessment that was depressed and anxious. Patient had an appearance that was disheveled.  Patient endorses suicidal ideation. Patient denies suicide plan. Patient endorses hallucinations.    Chon's  height is 5' 9" (1.753 m) and weight is 77.1 kg (170 lb). His oral temperature is 98.2 °F (36.8 °C). His blood pressure is 126/86 and his pulse is 90. His respiration is 22 (abnormal) and oxygen saturation is 98%.     Chon's last BM was noted on: 3023.    Metal detector screening performed via security personnel. The result of the scan was no contraband found. Head-to-toe physical assessment completed with the following findings: healing wound to left shin found upon body screen. A full skin assessment was performed. Chon's skin appeared warm and dry. Chon was oriented to unit, staff, peers, and room. Patient belongings/valuables stored in locked intake room cabinet and changes of clothing provided to patient. Chon was placed on Q 15 min observations.       "

## 2023-08-31 NOTE — PLAN OF CARE
Problem: Adult Inpatient Plan of Care  Goal: Plan of Care Review  Outcome: Ongoing, Not Progressing  Goal: Patient-Specific Goal (Individualized)  Outcome: Ongoing, Not Progressing  Goal: Absence of Hospital-Acquired Illness or Injury  Outcome: Ongoing, Not Progressing  Goal: Optimal Comfort and Wellbeing  Outcome: Ongoing, Not Progressing  Goal: Readiness for Transition of Care  Outcome: Ongoing, Not Progressing     Problem: Violence Risk or Actual  Goal: Anger and Impulse Control  Outcome: Ongoing, Not Progressing     Problem: Activity and Energy Impairment (Depressive Signs/Symptoms)  Goal: Optimized Energy Level (Depressive Signs/Symptoms)  Outcome: Ongoing, Not Progressing     Problem: Cognitive Impairment (Depressive Signs/Symptoms)  Goal: Optimized Cognitive Function  Outcome: Ongoing, Not Progressing     Problem: Decreased Participation/Engagement (Depressive Signs/Symptoms)  Goal: Increased Participation and Engagement (Depressive Signs/Symptoms)  Outcome: Ongoing, Not Progressing     Problem: Feelings of Worthlessness, Hopelessness or Excessive Guilt (Depressive Signs/Symptoms)  Goal: Enhanced Self-Esteem and Confidence (Depressive Signs/Symptoms)  Outcome: Ongoing, Not Progressing     Problem: Mood Impairment (Depressive Signs/Symptoms)  Goal: Improved Mood Symptoms (Depressive Signs/Symptoms)  Outcome: Ongoing, Not Progressing     Problem: Nutrition Imbalance (Depressive Signs/Symptoms)  Goal: Optimized Nutrition Intake  Outcome: Ongoing, Not Progressing     Problem: Psychomotor Impairment (Depressive Signs/Symptoms)  Goal: Improved Psychomotor Symptoms (Depressive Signs/Symptoms)  Outcome: Ongoing, Not Progressing     Problem: Sleep Disturbance (Depressive Signs/Symptoms)  Goal: Improved Sleep (Depressive Signs/Symptoms)  Outcome: Ongoing, Not Progressing     Problem: Social, Occupational or Functional Impairment (Depressive Signs/Symptoms)  Goal: Enhanced Social, Occupational or Functional Skills  (Depressive Signs/Symptoms)  Outcome: Ongoing, Not Progressing     Problem: Behavior Regulation Impairment (Psychotic Signs/Symptoms)  Goal: Improved Behavioral Control (Psychotic Signs/Symptoms)  Outcome: Ongoing, Not Progressing     Problem: Cognitive Impairment (Psychotic Signs/Symptoms)  Goal: Optimal Cognitive Function (Psychotic Signs/Symptoms)  Outcome: Ongoing, Not Progressing     Problem: Decreased Participation and Engagement (Psychotic Signs/Symptoms)  Goal: Increased Participation and Engagement (Psychotic Signs/Symptoms)  Outcome: Ongoing, Not Progressing     Problem: Mood Impairment (Psychotic Signs/Symptoms)  Goal: Improved Mood Symptoms (Psychotic Signs/Symptoms)  Outcome: Ongoing, Not Progressing     Problem: Psychomotor Impairment (Psychotic Signs/Symptoms)  Goal: Improved Psychomotor Symptoms (Psychotic Signs/Symptoms)  Outcome: Ongoing, Not Progressing     Problem: Sensory Perception Impairment (Psychotic Signs/Symptoms)  Goal: Decreased Sensory Symptoms (Psychotic Signs/Symptoms)  Outcome: Ongoing, Not Progressing     Problem: Sleep Disturbance (Psychotic Signs/Symptoms)  Goal: Improved Sleep (Psychotic Signs/Symptoms)  Outcome: Ongoing, Not Progressing     Problem: Social, Occupational or Functional Impairment (Psychotic Signs/Symptoms)  Goal: Enhanced Social, Occupational or Functional Skills (Psychotic Signs/Symptoms)  Outcome: Ongoing, Not Progressing     Problem: Activity and Energy Impairment (Excessive Substance Use)  Goal: Optimized Energy Level (Excessive Substance Use)  Outcome: Ongoing, Not Progressing     Problem: Behavior Regulation Impairment (Excessive Substance Use)  Goal: Improved Behavioral Control (Excessive Substance Use)  Outcome: Ongoing, Not Progressing     Problem: Decreased Participation and Engagement (Excessive Substance Use)  Goal: Increased Participation and Engagement (Excessive Substance Use)  Outcome: Ongoing, Not Progressing     Problem: Physiologic Impairment  (Excessive Substance Use)  Goal: Improved Physiologic Symptoms (Excessive Substance Use)  Outcome: Ongoing, Not Progressing     Problem: Social, Occupational or Functional Impairment (Excessive Substance Use)  Goal: Enhanced Social, Occupational or Functional Skills (Excessive Substance Use)  Outcome: Ongoing, Not Progressing

## 2023-09-01 LAB
CHOLEST SERPL-MCNC: 143 MG/DL
CHOLEST/HDLC SERPL: 4 {RATIO} (ref 0–5)
EST. AVERAGE GLUCOSE BLD GHB EST-MCNC: 96.8 MG/DL
GLUCOSE P FAST SERPL-MCNC: 85 MG/DL (ref 70–155)
HBA1C MFR BLD: 5 %
HDLC SERPL-MCNC: 39 MG/DL (ref 35–60)
LDLC SERPL CALC-MCNC: 88 MG/DL (ref 50–140)
RPR SER QL: REACTIVE
RPR SER-TITR: ABNORMAL {TITER}
T PALLIDUM AB SER QL: REACTIVE
TRIGL SERPL-MCNC: 80 MG/DL (ref 34–140)
VALPROATE SERPL-MCNC: <12.5 UG/ML (ref 50–100)
VLDLC SERPL CALC-MCNC: 16 MG/DL

## 2023-09-01 PROCEDURE — 80061 LIPID PANEL: CPT | Performed by: PSYCHIATRY & NEUROLOGY

## 2023-09-01 PROCEDURE — 83036 HEMOGLOBIN GLYCOSYLATED A1C: CPT | Performed by: PSYCHIATRY & NEUROLOGY

## 2023-09-01 PROCEDURE — 25000003 PHARM REV CODE 250: Performed by: PEDIATRICS

## 2023-09-01 PROCEDURE — 11400000 HC PSYCH PRIVATE ROOM

## 2023-09-01 PROCEDURE — 25000003 PHARM REV CODE 250: Performed by: PSYCHIATRY & NEUROLOGY

## 2023-09-01 PROCEDURE — 80164 ASSAY DIPROPYLACETIC ACD TOT: CPT

## 2023-09-01 PROCEDURE — 86780 TREPONEMA PALLIDUM: CPT | Performed by: PSYCHIATRY & NEUROLOGY

## 2023-09-01 PROCEDURE — 82947 ASSAY GLUCOSE BLOOD QUANT: CPT | Performed by: PSYCHIATRY & NEUROLOGY

## 2023-09-01 PROCEDURE — 25000003 PHARM REV CODE 250

## 2023-09-01 PROCEDURE — 86592 SYPHILIS TEST NON-TREP QUAL: CPT | Performed by: PSYCHIATRY & NEUROLOGY

## 2023-09-01 RX ORDER — DIVALPROEX SODIUM 500 MG/1
500 TABLET, FILM COATED, EXTENDED RELEASE ORAL DAILY
Status: DISCONTINUED | OUTPATIENT
Start: 2023-09-01 | End: 2023-09-07 | Stop reason: HOSPADM

## 2023-09-01 RX ORDER — BUPROPION HYDROCHLORIDE 150 MG/1
150 TABLET ORAL DAILY
Status: DISCONTINUED | OUTPATIENT
Start: 2023-09-01 | End: 2023-09-07 | Stop reason: HOSPADM

## 2023-09-01 RX ORDER — MUPIROCIN 20 MG/G
OINTMENT TOPICAL DAILY
Status: DISCONTINUED | OUTPATIENT
Start: 2023-09-01 | End: 2023-09-04

## 2023-09-01 RX ORDER — QUETIAPINE FUMARATE 100 MG/1
100 TABLET, FILM COATED ORAL NIGHTLY
Status: DISCONTINUED | OUTPATIENT
Start: 2023-09-01 | End: 2023-09-07 | Stop reason: HOSPADM

## 2023-09-01 RX ORDER — OLANZAPINE 5 MG/1
10 TABLET ORAL DAILY
Status: DISCONTINUED | OUTPATIENT
Start: 2023-09-01 | End: 2023-09-07 | Stop reason: HOSPADM

## 2023-09-01 RX ADMIN — OLANZAPINE 10 MG: 5 TABLET, FILM COATED ORAL at 11:09

## 2023-09-01 RX ADMIN — HYDROXYZINE HYDROCHLORIDE 50 MG: 50 TABLET, FILM COATED ORAL at 05:09

## 2023-09-01 RX ADMIN — DIVALPROEX SODIUM 500 MG: 500 TABLET, FILM COATED, EXTENDED RELEASE ORAL at 11:09

## 2023-09-01 RX ADMIN — CLINDAMYCIN HYDROCHLORIDE 300 MG: 150 CAPSULE ORAL at 05:09

## 2023-09-01 RX ADMIN — QUETIAPINE FUMARATE 100 MG: 100 TABLET ORAL at 08:09

## 2023-09-01 RX ADMIN — BUPROPION HYDROCHLORIDE 150 MG: 150 TABLET, FILM COATED, EXTENDED RELEASE ORAL at 11:09

## 2023-09-01 RX ADMIN — MUPIROCIN: 20 OINTMENT TOPICAL at 01:09

## 2023-09-01 RX ADMIN — TRAZODONE HYDROCHLORIDE 100 MG: 100 TABLET ORAL at 08:09

## 2023-09-01 RX ADMIN — BICTEGRAVIR SODIUM, EMTRICITABINE, AND TENOFOVIR ALAFENAMIDE FUMARATE 1 TABLET: 50; 200; 25 TABLET ORAL at 11:09

## 2023-09-01 RX ADMIN — CLINDAMYCIN HYDROCHLORIDE 300 MG: 150 CAPSULE ORAL at 01:09

## 2023-09-01 RX ADMIN — ACETAMINOPHEN 650 MG: 325 TABLET ORAL at 02:09

## 2023-09-01 NOTE — NURSING
"PRN Administration Note:    Behavior:    Patient (Chon Parham is a 33 y.o. male, : 1990, MRN: [id number])     Allergies: Patient has no known allergies.    Chon's  height is 5' 9" (1.753 m) and weight is 77.1 kg (170 lb). His oral temperature is 98.2 °F (36.8 °C). His blood pressure is 126/86 and his pulse is 90. His respiration is 22 (abnormal) and oxygen saturation is 98%.     Reason for PRN Administration: anxiety.    Intervention:    Administered atarax 50 mg po per physician order to Chon       Response:    Chon tolerated administration well.      Plan:     Continue to monitor per MD/PA/APRN orders; and reevaluate medication effectiveness within 30 minutes.   "

## 2023-09-01 NOTE — H&P
[facility] - Behavioral Health Unit  History & Physical    Subjective:      Chief Complaint/Reason for Admission: bipolar disorder with psychosis     Chon Parham is a 33 y.o. male. Psychosis     Past Medical History:   Diagnosis Date    Bipolar affective     Depression     HIV (human immunodeficiency virus infection)     Schizophrenia      History reviewed. No pertinent surgical history.  History reviewed. No pertinent family history.  Social History     Tobacco Use    Smoking status: Every Day     Current packs/day: 0.50     Types: Cigarettes, Vaping with nicotine    Smokeless tobacco: Never   Substance Use Topics    Alcohol use: Yes    Drug use: Yes     Types: Marijuana, Methamphetamines     Comment: Precription Drugs       PTA Medications   Medication Sig    lfetjzjmz-eosaoizq-avavcln ala (BIKTARVY) -25 mg per tablet Take 1 tablet by mouth once daily.    buPROPion (WELLBUTRIN SR) 150 MG TBSR 12 hr tablet Take 150 mg by mouth 2 (two) times daily.    cephALEXin (KEFLEX) 500 MG capsule Take 1 capsule (500 mg total) by mouth every 12 (twelve) hours. for 7 days    clindamycin (CLEOCIN) 300 MG capsule Take 1 capsule (300 mg total) by mouth every 8 (eight) hours. for 7 days    divalproex 500 MG Tb24 Take 500 mg by mouth once daily.    OLANZapine (ZYPREXA) 20 MG tablet Take 20 mg by mouth every evening.    ondansetron (ZOFRAN-ODT) 4 MG TbDL Take 1 tablet (4 mg total) by mouth every 6 (six) hours as needed (nausea).    QUEtiapine (SEROQUEL) 100 MG Tab Take 100 mg by mouth once daily.    QUEtiapine (SEROQUEL) 300 MG Tab Take 1 tablet (300 mg total) by mouth every evening.    sertraline (ZOLOFT) 50 MG tablet Take 1 tablet (50 mg total) by mouth once daily.    zinc gluconate 50 mg tablet Take 50 mg by mouth once daily.     Review of patient's allergies indicates:  No Known Allergies     Review of Systems   Constitutional: Negative.         HIV positive    HENT: Negative.     Eyes: Negative.     Respiratory: Negative.     Cardiovascular: Negative.    Gastrointestinal: Negative.    Genitourinary: Negative.    Musculoskeletal: Negative.    Skin: Negative.    Neurological: Negative.    Endo/Heme/Allergies: Negative.    Psychiatric/Behavioral:  Positive for depression and hallucinations. Negative for substance abuse and suicidal ideas.        Objective:      Vital Signs (Most Recent)  Temp: 97.5 °F (36.4 °C) (09/01/23 1100)  Pulse: 70 (09/01/23 1100)  Resp: 16 (09/01/23 1100)  BP: 114/65 (09/01/23 1100)  SpO2: 100 % (09/01/23 1100)    Vital Signs Range (Last 24H):  Temp:  [97.5 °F (36.4 °C)-98.7 °F (37.1 °C)]   Pulse:  [70-90]   Resp:  [16-22]   BP: (112-136)/(65-86)   SpO2:  [98 %-100 %]     Physical Exam  HENT:      Head: Normocephalic.      Right Ear: Tympanic membrane normal.      Left Ear: Tympanic membrane normal.      Nose: Nose normal.      Mouth/Throat:      Mouth: Mucous membranes are moist.   Eyes:      Extraocular Movements: Extraocular movements intact.      Pupils: Pupils are equal, round, and reactive to light.   Cardiovascular:      Rate and Rhythm: Normal rate and regular rhythm.   Pulmonary:      Effort: Pulmonary effort is normal.   Abdominal:      General: Abdomen is flat.   Musculoskeletal:         General: Normal range of motion.   Skin:     General: Skin is warm.      Comments: Abrasion lower shin    Neurological:      General: No focal deficit present.      Mental Status: He is alert and oriented to person, place, and time.      Comments: Vision normal   Hearing normal   EOM intact   Face muscles normal  Facial sensation normal   Shrugs shoulders  Tongue midline            Data Review:    Recent Results (from the past 48 hour(s))   Comprehensive metabolic panel    Collection Time: 08/31/23 11:56 AM   Result Value Ref Range    Sodium Level 139 136 - 145 mmol/L    Potassium Level 4.1 3.5 - 5.1 mmol/L    Chloride 106 98 - 107 mmol/L    Carbon Dioxide 26 22 - 29 mmol/L    Glucose Level 84  74 - 100 mg/dL    Blood Urea Nitrogen 19.5 8.9 - 20.6 mg/dL    Creatinine 1.14 0.73 - 1.18 mg/dL    Calcium Level Total 9.2 8.4 - 10.2 mg/dL    Protein Total 7.7 6.4 - 8.3 gm/dL    Albumin Level 3.9 3.5 - 5.0 g/dL    Globulin 3.8 (H) 2.4 - 3.5 gm/dL    Albumin/Globulin Ratio 1.0 (L) 1.1 - 2.0 ratio    Bilirubin Total 0.3 <=1.5 mg/dL    Alkaline Phosphatase 83 40 - 150 unit/L    Alanine Aminotransferase 19 0 - 55 unit/L    Aspartate Aminotransferase 29 5 - 34 unit/L    eGFR >60 mls/min/1.73/m2   TSH    Collection Time: 08/31/23 11:56 AM   Result Value Ref Range    Thyroid Stimulating Hormone 0.438 0.350 - 4.940 uIU/mL   Ethanol    Collection Time: 08/31/23 11:56 AM   Result Value Ref Range    Ethanol Level <10.0 <=10.0 mg/dL   Acetaminophen level    Collection Time: 08/31/23 11:56 AM   Result Value Ref Range    Acetaminophen Level <17.4 (L) 17.4 - 30.0 ug/ml   Salicylate level    Collection Time: 08/31/23 11:56 AM   Result Value Ref Range    Salicylate Level <5.0 (L) 15.0 - 30.0 mg/dL   Valproic Acid    Collection Time: 08/31/23 11:56 AM   Result Value Ref Range    Valproic Acid Level <12.5 (L) 50.0 - 100.0 ug/ml   CBC with Differential    Collection Time: 08/31/23 11:56 AM   Result Value Ref Range    WBC 6.21 4.50 - 11.50 x10(3)/mcL    RBC 4.60 (L) 4.70 - 6.10 x10(6)/mcL    Hgb 15.3 14.0 - 18.0 g/dL    Hct 44.2 42.0 - 52.0 %    MCV 96.1 (H) 80.0 - 94.0 fL    MCH 33.3 (H) 27.0 - 31.0 pg    MCHC 34.6 33.0 - 36.0 g/dL    RDW 12.3 11.5 - 17.0 %    Platelet 303 130 - 400 x10(3)/mcL    MPV 9.1 7.4 - 10.4 fL    Neut % 51.3 %    Lymph % 35.3 %    Mono % 9.2 %    Eos % 3.5 %    Basophil % 0.5 %    Lymph # 2.19 0.6 - 4.6 x10(3)/mcL    Neut # 3.19 2.1 - 9.2 x10(3)/mcL    Mono # 0.57 0.1 - 1.3 x10(3)/mcL    Eos # 0.22 0 - 0.9 x10(3)/mcL    Baso # 0.03 <=0.2 x10(3)/mcL    IG# 0.01 0 - 0.04 x10(3)/mcL    IG% 0.2 %    NRBC% 0.0 %   COVID/FLU A&B PCR    Collection Time: 08/31/23 12:01 PM   Result Value Ref Range    Influenza A PCR  Not Detected Not Detected    Influenza B PCR Not Detected Not Detected    SARS-CoV-2 PCR Not Detected Not Detected, Negative, Invalid   Urinalysis, Reflex to Urine Culture    Collection Time: 08/31/23 12:23 PM    Specimen: Urine   Result Value Ref Range    Color, UA Yellow Yellow, Light-Yellow, Dark Yellow, Kari, Straw    Appearance, UA Clear Clear    Specific Gravity, UA 1.032 (H) 1.005 - 1.030    pH, UA 7.5 5.0 - 8.5    Protein, UA Negative Negative    Glucose, UA Negative Negative, Normal    Ketones, UA Negative Negative    Blood, UA Negative Negative    Bilirubin, UA Negative Negative    Urobilinogen, UA 1.0 0.2, 1.0, Normal    Nitrites, UA Negative Negative    Leukocyte Esterase, UA Negative Negative   Drug Screen, Urine    Collection Time: 08/31/23 12:23 PM   Result Value Ref Range    Amphetamines, Urine Positive (A) Negative    Barbituates, Urine Negative Negative    Benzodiazepine, Urine Negative Negative    Cannabinoids, Urine Positive (A) Negative    Cocaine, Urine Negative Negative    Fentanyl, Urine Negative Negative    MDMA, Urine Negative Negative    Opiates, Urine Negative Negative    Phencyclidine, Urine Negative Negative    pH, Urine 7.5 3.0 - 11.0   Urinalysis, Microscopic    Collection Time: 08/31/23 12:23 PM   Result Value Ref Range    RBC, UA <5 <=5 /HPF    WBC, UA <5 <=5 /HPF    Squamous Epithelial Cells, UA <5 <=5 /HPF    Bacteria, UA None Seen None Seen, Rare, Occasional /HPF   SYPHILIS ANTIBODY (WITH REFLEX RPR)    Collection Time: 09/01/23  6:58 AM   Result Value Ref Range    Syphilis Antibody Reactive (A) Nonreactive, Equivocal   Hemoglobin A1C    Collection Time: 09/01/23  6:58 AM   Result Value Ref Range    Hemoglobin A1c 5.0 <=7.0 %    Estimated Average Glucose 96.8 mg/dL   Glucose, Fasting    Collection Time: 09/01/23  6:58 AM   Result Value Ref Range    Glucose Fasting 85 70 - 155 mg/dL   Lipid Panel    Collection Time: 09/01/23  6:58 AM   Result Value Ref Range    Cholesterol Total  143 <=200 mg/dL    HDL Cholesterol 39 35 - 60 mg/dL    Triglyceride 80 34 - 140 mg/dL    Cholesterol/HDL Ratio 4 0 - 5    Very Low Density Lipoprotein 16     LDL Cholesterol 88.00 50.00 - 140.00 mg/dL   RPR    Collection Time: 09/01/23  6:58 AM   Result Value Ref Range    RPR Reactive (A) Non-Reactive    RPR Titer 1:2 (A) (none)   Valproic Acid    Collection Time: 09/01/23  6:58 AM   Result Value Ref Range    Valproic Acid Level <12.5 (L) 50.0 - 100.0 ug/ml        No results found.       Assessment and Plan       Abrasion to leg   Bipolar disorder with psychosis   HIV positive

## 2023-09-01 NOTE — PROGRESS NOTES
"Chon is a 33 male admitted for Bipolar Disorder, most recent episodes mixed, severe, with psychotic features, Methamphetamine use disorder severe, and Cannabis use disorder severe with a uds +amp and cannabis. CTRS met with Pt 1:1, Chon was guarded but pleasant and cooperative, reporting ability to perform his ADL's. CTRS educated Pt to TR group times and dates, with Chon reporting that he would attempt to attend rec groups. Chon reported his treatment goals as "get stable and get back on my psyc medications".     09/01/23 1141   General   Admit Date 08/31/23   Primary Diagnosis Bipolar Disorder, most recent episodes mixed, severe, with psychotic features   Secondary Diagnosis Methamphetamine use disorder severe,  Cannabis use disorder severe   Scientologist spiritual   Number of Children 1   Children Living? 1   Occupation    Does the patient have dentures? No   If you were to take part in activities, which of the following would you prefer? Activities that you do alone   Do you feel like you have enough to keep you busy now? Yes   Do you believe that you have the opportunity for physical activity? Yes   Activity Capabilities Minimum   Subjective   Patient states I was going through a crisis with my psyc medications   Assessment   Mobility ambulates independently   Transfers independently   Musculoskeletal pain;other (see comments)  (c/o L lef pain)   Visual Acuity normal vision   Visual Perception depth perception;color perception;recognizes letters;recognizes numbers   Hearing normal   Speech/Communication normal   Cognitive Concerns oriented x4   Emotional Concerns appears isolated   Leisure Interest Survey   Leisure Interest Survey Yes   Social/Group Activities   Parties/Seasonal Program Current Interest   Restaurant Current Interest   Solitary Activities   Watching TV Current Interest   Computer Activities Current Interest   Watching Videos Current Interest   Music Listening Current " Interest   Listenting to books on tape Current Interest   Physical Activities   Fitness/Exercise Programs Current Interest   Weightlifting Current Interest   Creative Activities   Painting Current Interest   Cooking Current Interest   Outdoor Activities   Fishing Current Interest   Camping Current Interest   Horseback Riding Current Interest   Spectator Events   Concerts Current Interest   Plays Current Interest   Movies Current Interest   Sporting Events Current Interest   Goals   Additional Documentation yes   Goal Formulation With patient   Time For Goal Achievement 7 days   Goal 1 get stable and get back on my psyc medications   Plan   Planned Therapy Intervention Group Recreational Therapy   Expected Length of Stay 5-7days   PT Frequency Minimum of 3 visits per week

## 2023-09-01 NOTE — PLAN OF CARE
"Behavioral Health Unit  Psychosocial History and Assessment  Progress Note      Patient Name: Chon Parham YOB: 1990 SW: Elza Briec Date: 9/1/2023    Chief Complaint: psychosis    Consent:     Did the patient consent for an interview with the ? Yes    Did the patient consent for the  to contact family/friend/caregiver?   Yes  Name: juanita Parham, Relationship: mom, and [phone]    Did the patient give consent for the  to inform family/friend/caregiver of his/her whereabouts or to discuss discharge planning? Yes    Source of Information: Face to face with patient    Is information obtained from interviews considered reliable?   yes    Reason for Admission:     Active Hospital Problems    Diagnosis  POA    Bipolar affective disorder, mixed, severe, with psychotic behavior [F31.64]  Yes    HIV (human immunodeficiency virus infection) [B20]  Yes     Chronic      Resolved Hospital Problems   No resolved problems to display.       History of Present Illness - (Patient Perception):   "I was having a problem with my psyc, I was seeing things and hearing whispers"    Present biopsychosocial functioning: mild    Past biopsychosocial functioning: pt has history of higher functioning    Family and Marital/Relationship History:     Significant Other/Partner Relationships:  Single:  Relationship cutoff    Family Relationships: Intact      Childhood History:     Where was patient raised? [place], [place]     Who raised the patient? mom      How does patient describe their childhood? "I was curious jazmin"      Who is patient's primary support person? "Weed and music"      Culture and Druze:     Druze: Non-Lutheran    How strong of a role does Taoism and spirituality play in patient's life? minor    Taoism or spiritual concerns regarding treatment: observation of holy days     History of Abuse:   History of Abuse: Denies      Outcome: denies    Psychiatric " and Medical History:     History of psychiatric illness or treatment: prior inpatient treatment, psychotropic management by PCP, and has participated in counseling/psychotherapy on an outpatient basis in the past    Medical history:   Past Medical History:   Diagnosis Date    Bipolar affective     Depression     HIV (human immunodeficiency virus infection)     Schizophrenia        Substance Abuse History:     Alcohol - (Patient Perspective): pt states that he doesn't drink alcohol regularly  Social History     Substance and Sexual Activity   Alcohol Use Yes         Drugs - (Patient Perspective): Pt states that he smokes weed regularly and uses meth occasionally.   Social History     Substance and Sexual Activity   Drug Use Yes    Types: Marijuana, Methamphetamines    Comment: Precription Drugs       Education:     Currently Enrolled? No  Grade School (K-8)    Special Education? No    Interested in Completing Education/GED: No    Employment and Financial:     Currently employed? Employed: Current Occupation: [employer]    Source of Income: salary    Able to afford basic needs (food, shelter, utilities)? Yes    Who manages finances/personal affairs? self      Service:     ? no    Combat Service? No     Community Resources:     Describe present use of community resources: [facility]     Identify previously used community resources   (Include [facility] - [facility] and [facility]    Environmental:     Current living situation:Lives with family    Social Evaluation:     Patient Assets: General fund of knowledge, Supportive family/friends, and Physical health    Patient Limitations: limited education    High risk psychosocial issues that may impact discharge planning:   None at this time    Recommendations:     Anticipated discharge plan: Pt is interested in going to rehab    High risk issues requiring early treatment planning and immediate  intervention: limited education    Community resources needed for discharge planning:  aftercare treatment sources and rehab    Anticipated social work role(s) in treatment and discharge planning: advice and [place], groups, individual sessions as needed and aftercare referrals.    09/01/23 1529   Initial Information   Source of Information patient   Reason for Admission psychosis   Patient Aware of Diagnosis yes   Arrived From emergency department   Spiritual Beliefs   Spiritual, Cultural Beliefs, Adventism Practices, Values that Affect Care no   Substance Use/Withdrawal   Substance Use Current, used prior to admission   Additional Tobacco Use   How many cigarettes do you typically have per day? 3   What additional types of tobacco do you currently use? Other (see comments)   AUDIT-C (Alcohol Use Disorders ID Test)   Alcohol Use In Past Year 1-->monthly or less   Alcohol Amount Per Day In Past Year 1-->three or four   More Than 6 Drinks On One Occasion In Past Year 0-->never   Total Audit C Score 2

## 2023-09-01 NOTE — CARE UPDATE
Rehab Referral    Rehab referral sent on pt's behalf to   [facility]  for post DC plans.

## 2023-09-01 NOTE — NURSING
"PRN Administration Note:    Behavior:    Patient (Chon Parham is a 33 y.o. male, : 1990, MRN: [id number])     Allergies: Patient has no known allergies.    Chon's  height is 5' 9" (1.753 m) and weight is 77.1 kg (170 lb). His oral temperature is 98.2 °F (36.8 °C). His blood pressure is 126/86 and his pulse is 90. His respiration is 22 (abnormal) and oxygen saturation is 98%.     Reason for PRN Administration: insomnia and pain.    Intervention:    Administered trazodone 100mg po amd tylenol 650mg po for leg pain per physician order to Chon       Response:    Chon tolerated administration well.      Plan:     Continue to monitor per MD/PA/APRN orders; and reevaluate medication effectiveness within 30 minutes.   "

## 2023-09-01 NOTE — NURSING
"Daily Nursing Note:      Behavior:    Patient (Chon Parham is a 33 y.o. male, : 1990, MRN: [id number]) demonstrating an affect that was sad, flat, and anxious. Chon demonstrating mood that is depressed and anxious. Chon had an appearance that was disheveled. Chon denies suicidal ideation. Chon denies suicide plan. Chon denies homicidal ideation. Chon denies hallucinations.    Chon's  height is 5' 9" (1.753 m) and weight is 77.1 kg (170 lb). His oral temperature is 98.2 °F (36.8 °C). His blood pressure is 126/86 and his pulse is 90. His respiration is 22 (abnormal) and oxygen saturation is 98%.         Intervention:    Encourage Chon to perform self-hygiene, grooming, and changing of clothing. Monitor Chon's behavior and program compliance. Monitor Chon for suicidal ideation, homicidal ideation, sleep disturbance, and hallucinations. Encourage Chon to eat all portions of meals and assess for meal preferences. Monitor Chon for intake and output to ensure hydration. Notify the Physician/Physician Assistant/Advance Practice Registered Nurse (MD/PA/APRN) for any medication refusal and any change in patient condition.      Response:    Chon verbalizes understand of unit process and procedures.      Plan:     Continue to monitor per MD/PA/APRN orders; maintain patient safety.   "

## 2023-09-01 NOTE — PROGRESS NOTES
Chon refused both TR groups, alternative materials were offered   09/01/23 1400   [facility] Group Therapy   Group Name Therapeutic Recreation   Specific Interventions Skilled Activity Leisure Education and Awareness   Participation Level None   Participation Quality Refused

## 2023-09-01 NOTE — PLAN OF CARE
Problem: Adult Inpatient Plan of Care  Goal: Plan of Care Review  Outcome: Ongoing, Progressing  Goal: Patient-Specific Goal (Individualized)  Outcome: Ongoing, Progressing  Goal: Absence of Hospital-Acquired Illness or Injury  Outcome: Ongoing, Progressing  Goal: Optimal Comfort and Wellbeing  Outcome: Ongoing, Progressing  Goal: Readiness for Transition of Care  Outcome: Ongoing, Progressing     Problem: Violence Risk or Actual  Goal: Anger and Impulse Control  Outcome: Ongoing, Progressing     Problem: Activity and Energy Impairment (Depressive Signs/Symptoms)  Goal: Optimized Energy Level (Depressive Signs/Symptoms)  Outcome: Ongoing, Progressing

## 2023-09-01 NOTE — H&P
"9/1/2023 10:50 AM   Chon Parham   1990   [id number]            Psychiatry Inpatient Admission Note    Date of Admission: 8/31/2023  5:10 PM    Current Legal Status: PEC    Chief Complaint: "I told the ED I needed a prescription of my meds"    SUBJECTIVE:   History of Present Illness:   Chon Parham is a 33 y.o. male placed under a PEC at [facility] due to reported suicidal ideations after apparently not being on his medication for approximately two weeks.  Patient was recently inpatient here in March of this year. He states that he has been compliant with his medications up until recently however I do not see any medication refills past April of this year. Calm and cooperative this morning. States that he has voices whispering to him to harm himself thought he states he would not harm himself.  Patient does admit to using Meth once he stopped taking his medication and would like o go to rehab at discharge so that he does not restart this. Will restart home psychiatric medication. Admit for medication management and safety monitoring.     UDS: (+)amphetamines, cannabinoids, fentanyl  Alcohol: <10        Past Psychiatric History:   Previous Psychiatric Hospitalizations: [facility] in December 2022  Previous Medication Trials: Seroquel, Zoloft, Depakote  Previous Suicide Attempts: denies   Outpatient psychiatrist: Diana Brooks    Past Medical/Surgical History:   Past Medical History:   Diagnosis Date    Bipolar affective     Depression     HIV (human immunodeficiency virus infection)     Schizophrenia      History reviewed. No pertinent surgical history.  denies    Family Psychiatric History:   Mother and a few cousins: bipolar and substance abuse       Allergies:   Review of patient's allergies indicates:  No Known Allergies    Substance Abuse History:   Tobacco: denies  Alcohol: denies  Illicit Substances: Marijuana since age 15 and methamphetamine since age 29  Treatment: Numerous different rehabs and psychiatric " facilities      Current Medications:   Home Psychiatric Meds: Seroquel    Scheduled Meds:    [id number] ala  1 tablet Oral Daily    clindamycin  300 mg Oral Q8H    nicotine  1 patch Transdermal Daily      PRN Meds: acetaminophen, aluminum-magnesium hydroxide-simethicone, diphenhydrAMINE, diphenhydrAMINE, haloperidol lactate, haloperidoL, hydrOXYzine HCL, traZODone   Psychotherapeutics (From admission, onward)      Start     Stop Route Frequency Ordered    08/31/23 1715  traZODone tablet 100 mg         -- Oral Nightly PRN 08/31/23 1715    08/31/23 1715  haloperidol lactate injection 10 mg         -- IM Every 6 hours PRN 08/31/23 1715    08/31/23 1715  haloperidoL tablet 10 mg         -- Oral Every 6 hours PRN 08/31/23 1715              Social History:  Housing Status: Lives with his mother in [place]  Relationship Status/Sexual Orientation: single   Children: 1 son  Education: 8th grade   Employment Status/Info: [employer]    history: denies  History of physical/sexual abuse: denies   Access to gun: yes but not his       Legal History:   Past Charges/Incarcerations: denies   Pending charges: denies      OBJECTIVE:   Medical Review Of Systems:  Constitutional: negative  Respiratory: negative  Cardiovascular: negative  Gastrointestinal: negative  Genitourinary:negative  Musculoskeletal:negative  Neurological: negative      Vitals   Vitals:    09/01/23 0701   BP: 112/65   Pulse: 71   Resp: 18   Temp: 98.7 °F (37.1 °C)        Labs/Imaging/Studies:   Recent Results (from the past 48 hour(s))   Comprehensive metabolic panel    Collection Time: 08/31/23 11:56 AM   Result Value Ref Range    Sodium Level 139 136 - 145 mmol/L    Potassium Level 4.1 3.5 - 5.1 mmol/L    Chloride 106 98 - 107 mmol/L    Carbon Dioxide 26 22 - 29 mmol/L    Glucose Level 84 74 - 100 mg/dL    Blood Urea Nitrogen 19.5 8.9 - 20.6 mg/dL    Creatinine 1.14 0.73 - 1.18 mg/dL    Calcium Level Total 9.2 8.4 - 10.2 mg/dL     Protein Total 7.7 6.4 - 8.3 gm/dL    Albumin Level 3.9 3.5 - 5.0 g/dL    Globulin 3.8 (H) 2.4 - 3.5 gm/dL    Albumin/Globulin Ratio 1.0 (L) 1.1 - 2.0 ratio    Bilirubin Total 0.3 <=1.5 mg/dL    Alkaline Phosphatase 83 40 - 150 unit/L    Alanine Aminotransferase 19 0 - 55 unit/L    Aspartate Aminotransferase 29 5 - 34 unit/L    eGFR >60 mls/min/1.73/m2   TSH    Collection Time: 08/31/23 11:56 AM   Result Value Ref Range    Thyroid Stimulating Hormone 0.438 0.350 - 4.940 uIU/mL   Ethanol    Collection Time: 08/31/23 11:56 AM   Result Value Ref Range    Ethanol Level <10.0 <=10.0 mg/dL   Acetaminophen level    Collection Time: 08/31/23 11:56 AM   Result Value Ref Range    Acetaminophen Level <17.4 (L) 17.4 - 30.0 ug/ml   Salicylate level    Collection Time: 08/31/23 11:56 AM   Result Value Ref Range    Salicylate Level <5.0 (L) 15.0 - 30.0 mg/dL   Valproic Acid    Collection Time: 08/31/23 11:56 AM   Result Value Ref Range    Valproic Acid Level <12.5 (L) 50.0 - 100.0 ug/ml   CBC with Differential    Collection Time: 08/31/23 11:56 AM   Result Value Ref Range    WBC 6.21 4.50 - 11.50 x10(3)/mcL    RBC 4.60 (L) 4.70 - 6.10 x10(6)/mcL    Hgb 15.3 14.0 - 18.0 g/dL    Hct 44.2 42.0 - 52.0 %    MCV 96.1 (H) 80.0 - 94.0 fL    MCH 33.3 (H) 27.0 - 31.0 pg    MCHC 34.6 33.0 - 36.0 g/dL    RDW 12.3 11.5 - 17.0 %    Platelet 303 130 - 400 x10(3)/mcL    MPV 9.1 7.4 - 10.4 fL    Neut % 51.3 %    Lymph % 35.3 %    Mono % 9.2 %    Eos % 3.5 %    Basophil % 0.5 %    Lymph # 2.19 0.6 - 4.6 x10(3)/mcL    Neut # 3.19 2.1 - 9.2 x10(3)/mcL    Mono # 0.57 0.1 - 1.3 x10(3)/mcL    Eos # 0.22 0 - 0.9 x10(3)/mcL    Baso # 0.03 <=0.2 x10(3)/mcL    IG# 0.01 0 - 0.04 x10(3)/mcL    IG% 0.2 %    NRBC% 0.0 %   COVID/FLU A&B PCR    Collection Time: 08/31/23 12:01 PM   Result Value Ref Range    Influenza A PCR Not Detected Not Detected    Influenza B PCR Not Detected Not Detected    SARS-CoV-2 PCR Not Detected Not Detected, Negative, Invalid    Urinalysis, Reflex to Urine Culture    Collection Time: 08/31/23 12:23 PM    Specimen: Urine   Result Value Ref Range    Color, UA Yellow Yellow, Light-Yellow, Dark Yellow, Kari, Straw    Appearance, UA Clear Clear    Specific Gravity, UA 1.032 (H) 1.005 - 1.030    pH, UA 7.5 5.0 - 8.5    Protein, UA Negative Negative    Glucose, UA Negative Negative, Normal    Ketones, UA Negative Negative    Blood, UA Negative Negative    Bilirubin, UA Negative Negative    Urobilinogen, UA 1.0 0.2, 1.0, Normal    Nitrites, UA Negative Negative    Leukocyte Esterase, UA Negative Negative   Drug Screen, Urine    Collection Time: 08/31/23 12:23 PM   Result Value Ref Range    Amphetamines, Urine Positive (A) Negative    Barbituates, Urine Negative Negative    Benzodiazepine, Urine Negative Negative    Cannabinoids, Urine Positive (A) Negative    Cocaine, Urine Negative Negative    Fentanyl, Urine Negative Negative    MDMA, Urine Negative Negative    Opiates, Urine Negative Negative    Phencyclidine, Urine Negative Negative    pH, Urine 7.5 3.0 - 11.0   Urinalysis, Microscopic    Collection Time: 08/31/23 12:23 PM   Result Value Ref Range    RBC, UA <5 <=5 /HPF    WBC, UA <5 <=5 /HPF    Squamous Epithelial Cells, UA <5 <=5 /HPF    Bacteria, UA None Seen None Seen, Rare, Occasional /HPF   SYPHILIS ANTIBODY (WITH REFLEX RPR)    Collection Time: 09/01/23  6:58 AM   Result Value Ref Range    Syphilis Antibody Reactive (A) Nonreactive, Equivocal   Hemoglobin A1C    Collection Time: 09/01/23  6:58 AM   Result Value Ref Range    Hemoglobin A1c 5.0 <=7.0 %    Estimated Average Glucose 96.8 mg/dL   Glucose, Fasting    Collection Time: 09/01/23  6:58 AM   Result Value Ref Range    Glucose Fasting 85 70 - 155 mg/dL   Lipid Panel    Collection Time: 09/01/23  6:58 AM   Result Value Ref Range    Cholesterol Total 143 <=200 mg/dL    HDL Cholesterol 39 35 - 60 mg/dL    Triglyceride 80 34 - 140 mg/dL    Cholesterol/HDL Ratio 4 0 - 5    Very Low  "Density Lipoprotein 16     LDL Cholesterol 88.00 50.00 - 140.00 mg/dL      Lab Results   Component Value Date    VALPROATE <12.5 (L) 08/31/2023           Psychiatric Mental Status Exam:  General Appearance: appears stated age, well-developed, well-nourished  Arousal: alert  Behavior: cooperative  Movements and Motor Activity: no abnormal involuntary movements noted  Orientation: oriented to person, place, time, and situation  Speech: normal rate, normal rhythm, normal volume, normal tone  Mood: "All right"  Affect: mood-congruent  Thought Process: linear  Associations: intact  Thought Content and Perceptions: recent suicidal ideation reported, no homicidal ideation, recent delusions reported  Recent and Remote Memory: recent memory intact, remote memory intact; per interview/observation with patient  Attention and Concentration: grossly intact, ; per interview/observation with patient  Fund of Knowledge: intact, aware of current events, vocabulary appropriate; based on history, vocabulary, fund of knowledge, syntax, grammar, and content  Insight: questionable; based on understanding of severity of illness and HPI  Judgment: questionable; based on patient's behavior and HPI        Patient Strengths:  Able to verbalize needs, good support system      Patient Liabilities:  Medication non-compliance, substance abuse, psychosis      Discharge Criteria:  Improved mood, Improved thought process, Medication compliance, and Improved coping skills      Reason for Admission:  The patient poses a significant and immediate danger to self., To stabilize disabling psychiatric/psychological symptoms of sudden onset., and The patient can demonstrate a reasonable expectation of improvement in his/her disorder or condition as a result of active treatment being provided.    ASSESSMENT/PLAN:   Diagnosis:  Bipolar Disorder, most recent episodes mixed, severe, with psychotic features (F31.64)  Methamphetamine use disorder severe " (F15.20)  Cannabis use disorder severe (F12.20)    Past Medical History:   Diagnosis Date    Bipolar affective     Depression     HIV (human immunodeficiency virus infection)     Schizophrenia           Plan:  -Admit to [facility]    Mood  -Restart Wellbutrin 150 mg  -Restart Depakote 500 mg QD    Psychosis  -Restart Zyprexa 10 mg HS    Insomnia  -Seroquel 100 mg      -Will attempt to obtain outside psychiatric records if available  - to assist with aftercare planning and collateral  -Continue inpatient treatment as evidenced by significant psychotic thought disorder and danger to self      Estimated length of stay: 5-7 days    Estimated Disposition: Home    Estimated Follow-up: Outpatient medication management      On this date, I have reviewed the medical history and Nursing Assessment, as well as records from referral source.  I have evaluated the mental status of the above named person and concur with the findings of all assessments.  I have provided medical direction for the development of the Treatment Plan.    I conclude that this patient meets admission criteria for inpatient treatment.  I certify that this patient poses a danger to self or others, or would otherwise be considered gravely disabled based on this assessment and/or provided collateral information.     I have provided medical direction for the development of the Treatment plan.  These services will be provided while this patient is under my care and will be based on an individualized plan of care.  The patient can demonstrate a reasonable expectation of improvement in his/her disorder as a result of the active treatment being provided.      Yuriy Francis M.D.

## 2023-09-01 NOTE — NURSING
"PRN Medication Follow-up Note:    Behavior:    Patient (Chon Parham is a 33 y.o. male, : 1990, MRN: [id number])     Allergies: Patient has no known allergies.    Chon's  height is 5' 9" (1.753 m) and weight is 77.1 kg (170 lb). His oral temperature is 98.2 °F (36.8 °C). His blood pressure is 126/86 and his pulse is 90. His respiration is 22 (abnormal) and oxygen saturation is 98%.     Administered _tylenol 650mg po per physician order to Chon       Intervention:    Intervention to Chon's response: pt tolerated well.       Response:    Chon's response: effective      Plan:     Continue to monitor per MD/PA/APRN orders; and reevaluate medication effectiveness within 30 minutes.   "

## 2023-09-01 NOTE — NURSING
Pt was admitted on a PEC yest., placed on a CEC today, currently voicing no ADRs, wound to left lower shin addressed earlier, vital signs are stable, pt is not in any physical distress at the current time, pt is reporting suicidal ideations, with no specific plan, voicing no  Homicidal ideations at this time, pt reporting auditory hallucinations which tell him to harm himself, UDS is positive for thc and amphetamines, pt admits to infrequent use of this and is requesting a drug rehab program after treatment here, pt is compliant with medication ordered earlier today by RACIEL Tan,  Will monitor with suicide prec., for anger,  Psychosis and detox symptoms and will be assisted prn.

## 2023-09-01 NOTE — NURSING
"PRN Medication Follow-up Note:    Behavior:    Patient (Chon Parham is a 33 y.o. male, : 1990, MRN: [id number])     Allergies: Patient has no known allergies.    Chon's  height is 5' 9" (1.753 m) and weight is 77.1 kg (170 lb). His oral temperature is 98.2 °F (36.8 °C). His blood pressure is 126/86 and his pulse is 90. His respiration is 22 (abnormal) and oxygen saturation is 98%.     Administered atarax 50mg po per physician order to hCon       Intervention:    Intervention to Chon's response: pt tolerated well.       Response:    Chon's response: effective      Plan:     Continue to monitor per MD/PA/APRN orders; and reevaluate medication effectiveness within 30 minutes.   "

## 2023-09-01 NOTE — NURSING
"PRN Medication Follow-up Note:    Behavior:    Patient (Chon Parham is a 33 y.o. male, : 1990, MRN: [id number])     Allergies: Patient has no known allergies.    Chon's  height is 5' 9" (1.753 m) and weight is 77.1 kg (170 lb). His oral temperature is 98.2 °F (36.8 °C). His blood pressure is 126/86 and his pulse is 90. His respiration is 22 (abnormal) and oxygen saturation is 98%.     Administered trazodone 50 mg and tylenol 650 mg _per physician order to Chon       Intervention:    Intervention to Chon's response: pt tolerated well.       Response:    Chon's response: _effective.      Plan:     Continue to monitor per MD/PA/APRN orders; and reevaluate medication effectiveness within 30 minutes.   "

## 2023-09-01 NOTE — NURSING
"PRN Administration Note:    Behavior:    Patient (Chon Parham is a 33 y.o. male, : 1990, MRN: [id number])     Allergies: Patient has no known allergies.    Chon's  height is 5' 9" (1.753 m) and weight is 77.1 kg (170 lb). His oral temperature is 98.2 °F (36.8 °C). His blood pressure is 126/86 and his pulse is 90. His respiration is 22 (abnormal) and oxygen saturation is 98%.     Reason for PRN Administration: _left leg pain.    Intervention:    Administered tylenol 650mg po per physician order to Chon       Response:    Chon tolerated administration well.      Plan:     Continue to monitor per MD/PA/APRN orders; and reevaluate medication effectiveness within 30 minutes.   "

## 2023-09-01 NOTE — NURSING
Pt was seen by Dr Eastman, medical doctor for  Routine H and P, pt has cellulitis to lower left  Leg(shin), it was I and D'd yest at ER, a drsg  Was in place, it was removed, scant serous drainage noted to drsg, bactroban oint applied  To shin area, 3 mm by 4.5 mm, it is circular,  A bandage and adhesive drsg applied, pt rec'd Clindamycin 500 mg PO, will monitor for s/sxs of infection, no s/sxs of infection present.

## 2023-09-02 PROCEDURE — 11400000 HC PSYCH PRIVATE ROOM

## 2023-09-02 PROCEDURE — 25000003 PHARM REV CODE 250: Performed by: PEDIATRICS

## 2023-09-02 PROCEDURE — 25000003 PHARM REV CODE 250

## 2023-09-02 PROCEDURE — 25000003 PHARM REV CODE 250: Performed by: PSYCHIATRY & NEUROLOGY

## 2023-09-02 RX ADMIN — MUPIROCIN: 20 OINTMENT TOPICAL at 09:09

## 2023-09-02 RX ADMIN — OLANZAPINE 10 MG: 5 TABLET, FILM COATED ORAL at 08:09

## 2023-09-02 RX ADMIN — TRAZODONE HYDROCHLORIDE 100 MG: 100 TABLET ORAL at 08:09

## 2023-09-02 RX ADMIN — BICTEGRAVIR SODIUM, EMTRICITABINE, AND TENOFOVIR ALAFENAMIDE FUMARATE 1 TABLET: 50; 200; 25 TABLET ORAL at 08:09

## 2023-09-02 RX ADMIN — CLINDAMYCIN HYDROCHLORIDE 300 MG: 150 CAPSULE ORAL at 06:09

## 2023-09-02 RX ADMIN — BUPROPION HYDROCHLORIDE 150 MG: 150 TABLET, FILM COATED, EXTENDED RELEASE ORAL at 08:09

## 2023-09-02 RX ADMIN — QUETIAPINE FUMARATE 100 MG: 100 TABLET ORAL at 08:09

## 2023-09-02 RX ADMIN — CLINDAMYCIN HYDROCHLORIDE 300 MG: 150 CAPSULE ORAL at 08:09

## 2023-09-02 RX ADMIN — DIVALPROEX SODIUM 500 MG: 500 TABLET, FILM COATED, EXTENDED RELEASE ORAL at 08:09

## 2023-09-02 RX ADMIN — CLINDAMYCIN HYDROCHLORIDE 300 MG: 150 CAPSULE ORAL at 02:09

## 2023-09-02 NOTE — PLAN OF CARE
Problem: Adult Inpatient Plan of Care  Goal: Plan of Care Review  Outcome: Ongoing, Progressing  Goal: Patient-Specific Goal (Individualized)  Outcome: Ongoing, Progressing  Goal: Absence of Hospital-Acquired Illness or Injury  Outcome: Ongoing, Progressing  Goal: Optimal Comfort and Wellbeing  Outcome: Ongoing, Progressing  Goal: Readiness for Transition of Care  Outcome: Ongoing, Progressing     Problem: Violence Risk or Actual  Goal: Anger and Impulse Control  Outcome: Ongoing, Progressing     Problem: Activity and Energy Impairment (Depressive Signs/Symptoms)  Goal: Optimized Energy Level (Depressive Signs/Symptoms)  Outcome: Ongoing, Progressing     Problem: Decreased Participation/Engagement (Depressive Signs/Symptoms)  Goal: Increased Participation and Engagement (Depressive Signs/Symptoms)  Outcome: Ongoing, Progressing     Problem: Mood Impairment (Depressive Signs/Symptoms)  Goal: Improved Mood Symptoms (Depressive Signs/Symptoms)  Outcome: Ongoing, Progressing     Problem: Nutrition Imbalance (Depressive Signs/Symptoms)  Goal: Optimized Nutrition Intake  Outcome: Ongoing, Progressing

## 2023-09-02 NOTE — NURSING
"PRN Administration Note:    Behavior:    Patient (Chon Parham is a 33 y.o. male, : 1990, MRN: [id number])     Allergies: Patient has no known allergies.    Chon's  height is 5' 9" (1.753 m) and weight is 77.1 kg (170 lb). His oral temperature is 98.7 °F (37.1 °C). His blood pressure is 118/74 and his pulse is 69. His respiration is 18 and oxygen saturation is 99%.     Reason for PRN Administration: insomnia.    Intervention:    Administered trazodone 100mg po per physician order to Chon       Response:    Chon tolerated administration well.      Plan:     Continue to monitor per MD/PA/APRN orders; and reevaluate medication effectiveness within 30 minutes.   "

## 2023-09-02 NOTE — NURSING
Pt is currently voicing no ADRs, pt has cellulitis to left lower shin, drsg was changed just now,   Wound is 6 mm by 6 mm, slight drainage noted,  Scant serous fluid, no s/sxs of infection noted,  Pt is compliant c Clindamycin, antibiotic ointment applied to gauze(Bactroban), it placed over wound, secured with adhesive bandage,currently voicing no suicidal or homicidal ideations at this time, pt is reporting  Depression, mainly isolates in room, reports auditory hallucinations telling him to hurt himself, voicing no detox symptoms at this time, pt is compliant with medication ordered, pt was seen yest. By RACIEL Tan, also seen by Dr Eastman, pt will be placed on medical followup for tomorrow, will monitor with suicide prec., for  Anger, for psychosis and detox symptoms and will be assisted prn.

## 2023-09-02 NOTE — NURSING
"PRN Medication Follow-up Note:    Behavior:    Patient (Chon Parham is a 33 y.o. male, : 1990, MRN: [id number])     Allergies: Patient has no known allergies.    Chon's  height is 5' 9" (1.753 m) and weight is 77.1 kg (170 lb). His oral temperature is 98.7 °F (37.1 °C). His blood pressure is 118/74 and his pulse is 69. His respiration is 18 and oxygen saturation is 99%.     Administered trazodone 100mg po per physician order to Chon       Intervention:    Intervention to Chon's response: pt tolerated well.       Response:    Chon's response: effective      Plan:     Continue to monitor per MD/PA/APRN orders; and reevaluate medication effectiveness within 30 minutes.   "

## 2023-09-02 NOTE — NURSING
"Daily Nursing Note:      Behavior:    Patient (Chon Parham is a 33 y.o. male, : 1990, MRN: [id number]) demonstrating an affect that was flat and anxious. Chon demonstrating mood that is depressed and anxious. Chon had an appearance that was clean. Chon denies suicidal ideation. Chon denies suicide plan. Chon denies homicidal ideation. Chon denies hallucinations.    Chon's  height is 5' 9" (1.753 m) and weight is 77.1 kg (170 lb). His temperature is 98.4 °F (36.9 °C). His blood pressure is 129/75 and his pulse is 78. His respiration is 20 and oxygen saturation is 98%.       Intervention:    Encourage Chon to perform self-hygiene, grooming, and changing of clothing. Monitor Chon's behavior and program compliance. Monitor Chon for suicidal ideation, homicidal ideation, sleep disturbance, and hallucinations. Encourage Chon to eat all portions of meals and assess for meal preferences. Monitor Chon for intake and output to ensure hydration. Notify the Physician/Physician Assistant/Advance Practice Registered Nurse (MD/PA/APRN) for any medication refusal and any change in patient condition.      Response:    Chon verbalizes understand of unit process and procedures.      Plan:     Continue to monitor per MD/PA/APRN orders; maintain patient safety.   "

## 2023-09-03 PROCEDURE — 25000003 PHARM REV CODE 250

## 2023-09-03 PROCEDURE — 25000003 PHARM REV CODE 250: Performed by: PSYCHIATRY & NEUROLOGY

## 2023-09-03 PROCEDURE — 11400000 HC PSYCH PRIVATE ROOM

## 2023-09-03 PROCEDURE — 25000003 PHARM REV CODE 250: Performed by: PEDIATRICS

## 2023-09-03 RX ADMIN — CLINDAMYCIN HYDROCHLORIDE 300 MG: 150 CAPSULE ORAL at 08:09

## 2023-09-03 RX ADMIN — MUPIROCIN: 20 OINTMENT TOPICAL at 09:09

## 2023-09-03 RX ADMIN — HYDROXYZINE HYDROCHLORIDE 50 MG: 50 TABLET, FILM COATED ORAL at 09:09

## 2023-09-03 RX ADMIN — CLINDAMYCIN HYDROCHLORIDE 300 MG: 150 CAPSULE ORAL at 06:09

## 2023-09-03 RX ADMIN — CLINDAMYCIN HYDROCHLORIDE 300 MG: 150 CAPSULE ORAL at 02:09

## 2023-09-03 RX ADMIN — OLANZAPINE 10 MG: 5 TABLET, FILM COATED ORAL at 08:09

## 2023-09-03 RX ADMIN — TRAZODONE HYDROCHLORIDE 100 MG: 100 TABLET ORAL at 08:09

## 2023-09-03 RX ADMIN — DIVALPROEX SODIUM 500 MG: 500 TABLET, FILM COATED, EXTENDED RELEASE ORAL at 08:09

## 2023-09-03 RX ADMIN — BUPROPION HYDROCHLORIDE 150 MG: 150 TABLET, FILM COATED, EXTENDED RELEASE ORAL at 08:09

## 2023-09-03 RX ADMIN — QUETIAPINE FUMARATE 100 MG: 100 TABLET ORAL at 08:09

## 2023-09-03 RX ADMIN — BICTEGRAVIR SODIUM, EMTRICITABINE, AND TENOFOVIR ALAFENAMIDE FUMARATE 1 TABLET: 50; 200; 25 TABLET ORAL at 08:09

## 2023-09-03 NOTE — NURSING
"Daily Nursing Note:      Behavior:    Patient (Chon Parham is a 33 y.o. male, : 1990, MRN: [id number]) demonstrating an affect that was sad and flat. Chon demonstrating mood that is depressed and anxious. Chon had an appearance that was disheveled. Chon denies suicidal ideation. Chon denies suicide plan. Chon denies homicidal ideation. Chon denies hallucinations.    Chon's  height is 5' 9" (1.753 m) and weight is 77.1 kg (170 lb). His temperature is 99 °F (37.2 °C). His blood pressure is 118/95 (abnormal) and his pulse is 84. His respiration is 18 and oxygen saturation is 100%.         Intervention:    Encourage Chon to perform self-hygiene, grooming, and changing of clothing. Monitor Chon's behavior and program compliance. Monitor Chon for suicidal ideation, homicidal ideation, sleep disturbance, and hallucinations. Encourage Chon to eat all portions of meals and assess for meal preferences. Monitor Chon for intake and output to ensure hydration. Notify the Physician/Physician Assistant/Advance Practice Registered Nurse (MD/PA/APRN) for any medication refusal and any change in patient condition.      Response:    Chon verbalizes understand of unit process and procedures.      Plan:     Continue to monitor per MD/PA/APRN orders; maintain patient safety.   "

## 2023-09-03 NOTE — NURSING
"PRN Medication Follow-up Note:    Behavior:    Patient (Chon Parham is a 33 y.o. male, : 1990, MRN: [id number])     Allergies: Patient has no known allergies.    Chon's  height is 5' 9" (1.753 m) and weight is 77.1 kg (170 lb). His oral temperature is 99 °F (37.2 °C). His blood pressure is 129/75 and his pulse is 72. His respiration is 19 and oxygen saturation is 99%.     Administered trazodone 100mg po_per physician order to Chon       Intervention:    Intervention to Chon's response: pt tolerated well.       Response:    Chon's response: effective      Plan:     Continue to monitor per MD/PA/APRN orders; and reevaluate medication effectiveness within 30 minutes.   "

## 2023-09-03 NOTE — PROGRESS NOTES
Progress Note    Admit Date: 8/31/2023   LOS: 3 days     SUBJECTIVE:     Consulted to see to recheck a wound on his left shin. He feels it is doing ok.     Scheduled Meds:   tnutlakgk-gqpzcuad-nnblkhj ala  1 tablet Oral Daily    buPROPion  150 mg Oral Daily    clindamycin  300 mg Oral Q8H    divalproex ER  500 mg Oral Daily    mupirocin   Topical (Top) Daily    nicotine  1 patch Transdermal Daily    OLANZapine  10 mg Oral Daily    QUEtiapine  100 mg Oral QHS     Continuous Infusions:  PRN Meds:acetaminophen, aluminum-magnesium hydroxide-simethicone, diphenhydrAMINE, diphenhydrAMINE, haloperidol lactate, haloperidoL, hydrOXYzine HCL, traZODone    Review of patient's allergies indicates:  No Known Allergies    Review of Systems  Review of Systems   Constitutional:  Negative for chills and fever.   Skin:         Sore to left shin        OBJECTIVE:     Vital Signs (Most Recent)  Temp: 97.9 °F (36.6 °C) (09/03/23 1100)  Pulse: 67 (09/03/23 1100)  Resp: 18 (09/03/23 1100)  BP: 138/77 (09/03/23 1100)  SpO2: 99 % (09/03/23 1100)    Vital Signs Range (Last 24H):  Temp:  [97.9 °F (36.6 °C)-99 °F (37.2 °C)]   Pulse:  [67-84]   Resp:  [18-19]   BP: (116-138)/(72-95)   SpO2:  [98 %-100 %]     I & O (Last 24H):No intake or output data in the 24 hours ending 09/03/23 1211  Physical Exam:  Physical Exam  Constitutional:       General: He is not in acute distress.  HENT:      Head: Normocephalic.   Skin:     Comments: Bandage removed from left shin, aprox 4-5cm shallow wound , covered with skin from blister, fluid has drained out of blister, no erythema, no d/c, no signs infection    Neurological:      Mental Status: He is alert.          Laboratory:  No results found for this or any previous visit (from the past 24 hour(s)).         ASSESSMENT/PLAN:   Continue current wound care, dressing changes qd, bactroban, oral abx

## 2023-09-03 NOTE — NURSING
Pt was seen by Dr BHAVANA Bey, looked at pt's wound on lower left shin, stated to keep the same protocol for drsg change and for pt to continue to receive Clindamycin.   none

## 2023-09-03 NOTE — NURSING
Pt is currently voicing no ADRs or physical  Complaints at this time, vital signs are stable, pt is not in any physical distress at the current time, currently voicing no suicidal or homicidal ideations at this time, reports depression and  Anxiety, still with auditory hallucinations at times, pt isolates in room, appears paranoid,voicing no detox symptoms at this time, compliant with medication ordered, will monitor with suicide prec., for anger, psychosis and detox symptoms and will be assisted prn.

## 2023-09-03 NOTE — GROUP NOTE
Group Psychotherapy       Group Focus: Meds      Number of patients in attendance: 17    Group Start Time: 2030  Group End Time:  2100  Groups Date: 9/2/2023  Group Topic:  Behavioral Health  Group Department: [facility] Behavioral Health Unit  Group Facilitators:  Lisandra Quinones RN  _____________________________________________________________________    Patient Name: Chon Parham  MRN: [id number]  Patient Class: IP- Psych   Admission Date\Time: 8/31/2023  5:10 PM  Hospital Length of Stay: 3  Primary Care Provider: Shavon Rodriguez NP     Referred by: Acute Psychiatry Unit Treatment Team     Target symptoms: Depression     Patient's response to treatment: Active Listening     Progress toward goals: Progressing adequately     Interval History:      Diagnosis: Depression     Plan: Continue treatment on [facility]

## 2023-09-03 NOTE — NURSING
"PRN Administration Note:    Behavior:    Patient (Chon Parham is a 33 y.o. male, : 1990, MRN: [id number])     Allergies: Patient has no known allergies.    Chon's  height is 5' 9" (1.753 m) and weight is 77.1 kg (170 lb). His oral temperature is 99 °F (37.2 °C). His blood pressure is 129/75 and his pulse is 72. His respiration is 19 and oxygen saturation is 99%.     Reason for PRN Administration: insomnia .    Intervention:    Administered trazodone 100mg po per physician order to Chon       Response:    Chon tolerated administration well.      Plan:     Continue to monitor per MD/PA/APRN orders; and reevaluate medication effectiveness within 30 minutes.   "

## 2023-09-04 PROCEDURE — 25000003 PHARM REV CODE 250: Performed by: PEDIATRICS

## 2023-09-04 PROCEDURE — 11400000 HC PSYCH PRIVATE ROOM

## 2023-09-04 PROCEDURE — 25000003 PHARM REV CODE 250

## 2023-09-04 RX ADMIN — CLINDAMYCIN HYDROCHLORIDE 300 MG: 150 CAPSULE ORAL at 03:09

## 2023-09-04 RX ADMIN — BUPROPION HYDROCHLORIDE 150 MG: 150 TABLET, FILM COATED, EXTENDED RELEASE ORAL at 08:09

## 2023-09-04 RX ADMIN — OLANZAPINE 10 MG: 5 TABLET, FILM COATED ORAL at 08:09

## 2023-09-04 RX ADMIN — DIVALPROEX SODIUM 500 MG: 500 TABLET, FILM COATED, EXTENDED RELEASE ORAL at 08:09

## 2023-09-04 RX ADMIN — BICTEGRAVIR SODIUM, EMTRICITABINE, AND TENOFOVIR ALAFENAMIDE FUMARATE 1 TABLET: 50; 200; 25 TABLET ORAL at 08:09

## 2023-09-04 RX ADMIN — CLINDAMYCIN HYDROCHLORIDE 300 MG: 150 CAPSULE ORAL at 06:09

## 2023-09-04 NOTE — NURSING
"PRN Administration Note:    Behavior:    Patient (Chon Parham is a 33 y.o. male, : 1990, MRN: [id number])     Allergies: Patient has no known allergies.    Chon's  height is 5' 9" (1.753 m) and weight is 77.1 kg (170 lb). His oral temperature is 98.6 °F (37 °C). His blood pressure is 132/75 and his pulse is 73. His respiration is 18 and oxygen saturation is 99%.     Reason for PRN Administration: insomnia.    Intervention:    Administered trazodone 100mg po per physician order to Chon       Response:    Chon tolerated administration well.      Plan:     Continue to monitor per MD/PA/APRN orders; and reevaluate medication effectiveness within 30 minutes.   "

## 2023-09-04 NOTE — NURSING
"Daily Nursing Note:      Behavior:    Patient (Chon Parham is a 33 y.o. male, : 1990, MRN: [id number]) demonstrating an affect that was flat and anxious. Chon demonstrating mood that is depressed and anxious. Chon had an appearance that was disheveled. Chon denies suicidal ideation. Chon denies suicide plan. Chon denies homicidal ideation. Chon denies hallucinations.    Chon's  height is 5' 9" (1.753 m) and weight is 77.1 kg (170 lb). His temperature is 98.6 °F (37 °C). His blood pressure is 116/79 and his pulse is 77. His respiration is 18 and oxygen saturation is 99%.       Intervention:    Encourage Chon to perform self-hygiene, grooming, and changing of clothing. Monitor Chon's behavior and program compliance. Monitor Chon for suicidal ideation, homicidal ideation, sleep disturbance, and hallucinations. Encourage Chon to eat all portions of meals and assess for meal preferences. Monitor Chon for intake and output to ensure hydration. Notify the Physician/Physician Assistant/Advance Practice Registered Nurse (MD/PA/APRN) for any medication refusal and any change in patient condition.      Response:    Chon verbalizes understand of unit process and procedures.       Plan:     Continue to monitor per MD/PA/APRN orders; maintain patient safety.   "

## 2023-09-04 NOTE — NURSING
"PRN Administration Note:    Behavior:    Patient (Chon Parham is a 33 y.o. male, : 1990, MRN: [id number])     Allergies: Patient has no known allergies.    Chon's  height is 5' 9" (1.753 m) and weight is 77.1 kg (170 lb). His oral temperature is 98.6 °F (37 °C). His blood pressure is 132/75 and his pulse is 73. His respiration is 18 and oxygen saturation is 99%.     Reason for PRN Administration: insomnia.    Intervention:    Administered trazodone 100mg po  per physician order to Chon       Response:    Chon tolerated administration well.      Plan:     Continue to monitor per MD/PA/APRN orders; and reevaluate medication effectiveness within 30 minutes.   "

## 2023-09-04 NOTE — NURSING
"PRN Medication Follow-up Note:    Behavior:    Patient (Chon Parham is a 33 y.o. male, : 1990, MRN: [id number])     Allergies: Patient has no known allergies.    Chon's  height is 5' 9" (1.753 m) and weight is 77.1 kg (170 lb). His oral temperature is 98.6 °F (37 °C). His blood pressure is 132/75 and his pulse is 73. His respiration is 18 and oxygen saturation is 99%.     Administered trazodone 100mg po_ per physician order to Chon       Intervention:    Intervention to Chon's response: pt tolerated well.       Response:    Novas response: effective.      Plan:     Continue to monitor per MD/PA/APRN orders; and reevaluate medication effectiveness within 30 minutes.   "

## 2023-09-04 NOTE — NURSING
Performed drsg change to left lower shin area,  No s/sxs of infection noted, will continue to monitor.

## 2023-09-04 NOTE — NURSING
Pt is currently voicing no ADRs or physical  Complaints at this time, vital signs are stable, pt  Is not in any physical distress at the current time, currently voicing no suicidal or homicidal ideations at this time, reports decrease in depression, currently voicing no hallucinations or delusions at this time, voicing no detox symptoms at this time, pt was seen earlier today by Dr Thomsa, pt plans to go to a drug rehab program, pt has been compliant with medication ordered, will monitor with suicide  Prec., for anger, psychosis and detox symptoms  And will be assisted prn.

## 2023-09-04 NOTE — PROGRESS NOTES
"9/4/2023 3:59 PM   Chon Parham   1990   [id number]        Psychiatry Progress Note     Chief Complaint: "I told the ED I needed a prescription of my meds"    SUBJECTIVE:   Chon Parham is a 33 y.o. male placed under a PEC at [facility] due to reported suicidal ideations after apparently not being on his medication for approximately two weeks.    Staff reports no acute behavioral issues over the weekend.  Of note, patient was here in March of this year, and was discharged due to volitional threatening and physical agitation.  He is much calmer during this admission.  States that he is interested in going to rehab at discharge.  His preference would be [facility].  Tolerating current medication regimen without issues.  Will continue current medication regimen and will augment as needed.    JUSTIFICATION FOR TWO ANTIPSYCHOTICS:  Patient was admitted to the unit on an outpatient medication regimen that consisted of two antipsychotics.  Because of the chronicity of this patient's condition, and the acuity of symptoms during hospitalization, we decided to continue this regimen during hospitalization, but always recommend using the least amount of medication necessary to appropriately address the issue.  Patient should discuss tapering/discontinuation of one of these agents with patient's outpatient provider.       UDS: (+)amphetamines, cannabinoids  Blood Alcohol: <10       Current Medications:   Scheduled Meds:    kcfxbzloz-ywkcngys-obfnslp ala  1 tablet Oral Daily    buPROPion  150 mg Oral Daily    clindamycin  300 mg Oral Q8H    divalproex ER  500 mg Oral Daily    nicotine  1 patch Transdermal Daily    OLANZapine  10 mg Oral Daily    QUEtiapine  100 mg Oral QHS      PRN Meds: acetaminophen, aluminum-magnesium hydroxide-simethicone, diphenhydrAMINE, diphenhydrAMINE, haloperidol lactate, haloperidoL, hydrOXYzine HCL, traZODone   Psychotherapeutics (From admission, onward)      Start     Stop Route Frequency Ordered    " "09/01/23 2100  QUEtiapine tablet 100 mg         -- Oral Nightly 09/01/23 1107    09/01/23 1215  buPROPion TB24 tablet 150 mg         -- Oral Daily 09/01/23 1107    09/01/23 1215  OLANZapine tablet 10 mg         -- Oral Daily 09/01/23 1107 08/31/23 1715  traZODone tablet 100 mg         -- Oral Nightly PRN 08/31/23 1715    08/31/23 1715  haloperidol lactate injection 10 mg         -- IM Every 6 hours PRN 08/31/23 1715    08/31/23 1715  haloperidoL tablet 10 mg         -- Oral Every 6 hours PRN 08/31/23 1715            Allergies:   Review of patient's allergies indicates:  No Known Allergies     OBJECTIVE:   Vitals   Vitals:    09/03/23 1901   BP: 132/75   Pulse: 73   Resp: 18   Temp: 98.6 °F (37 °C)        Labs/Imaging/Studies:   No results found for this or any previous visit (from the past 36 hour(s)).         Medical Review Of Systems:  Constitutional: negative  Respiratory: negative  Cardiovascular: negative  Gastrointestinal: negative  Genitourinary:negative  Musculoskeletal:negative  Neurological: negative      Psychiatric Mental Status Exam:  General Appearance: appears stated age, well-developed, well-nourished  Arousal: alert  Behavior: cooperative  Movements and Motor Activity: no abnormal involuntary movements noted  Orientation: oriented to person, place, time, and situation  Speech: normal rate, normal rhythm, normal volume, normal tone  Mood: "Ok"  Affect: constricted  Thought Process: linear  Associations: intact  Thought Content and Perceptions: no suicidal ideation, no homicidal ideation, no auditory hallucinations, no visual hallucinations, no paranoid ideation, no ideas of reference  Recent and Remote Memory: recent memory intact, remote memory intact; per interview/observation with patient  Attention and Concentration: intact, attentive to conversation; per interview/observation with patient  Fund of Knowledge: intact, aware of current events, vocabulary appropriate; based on history, " vocabulary, fund of knowledge, syntax, grammar, and content  Insight: questionable; based on understanding of severity of illness and HPI  Judgment: questionable; based on patient's behavior and HPI      ASSESSMENT/PLAN:   Problems Addressed/Diagnoses:  Bipolar Disorder, most recent episode depressed, severe (F31.4)  Methamphetamine use disorder severe (F15.20)  Cannabis use disorder severe (F12.20)    Past Medical History:   Diagnosis Date    Bipolar affective     Depression     HIV (human immunodeficiency virus infection)     Schizophrenia         Plan:  Bipolar disorder  -Continue Depakote, Wellbutrin, Zyprexa, and Seroquel    Methamphetamine use  -Group/Individual psychotherapy    Cannabis use  -Group/Individual psychotherapy      Expected Disposition Plan: Rehab ([facility]?)        Flip Thomas M.D.

## 2023-09-04 NOTE — PLAN OF CARE
Problem: Adult Inpatient Plan of Care  Goal: Plan of Care Review  Outcome: Ongoing, Progressing  Goal: Patient-Specific Goal (Individualized)  Outcome: Ongoing, Progressing  Goal: Absence of Hospital-Acquired Illness or Injury  Outcome: Ongoing, Progressing  Goal: Optimal Comfort and Wellbeing  Outcome: Ongoing, Progressing  Goal: Readiness for Transition of Care  Outcome: Ongoing, Progressing     Problem: Violence Risk or Actual  Goal: Anger and Impulse Control  Outcome: Ongoing, Progressing     Problem: Activity and Energy Impairment (Depressive Signs/Symptoms)  Goal: Optimized Energy Level (Depressive Signs/Symptoms)  Outcome: Ongoing, Progressing     Problem: Cognitive Impairment (Depressive Signs/Symptoms)  Goal: Optimized Cognitive Function  Outcome: Ongoing, Progressing     Problem: Decreased Participation/Engagement (Depressive Signs/Symptoms)  Goal: Increased Participation and Engagement (Depressive Signs/Symptoms)  Outcome: Ongoing, Progressing     Problem: Feelings of Worthlessness, Hopelessness or Excessive Guilt (Depressive Signs/Symptoms)  Goal: Enhanced Self-Esteem and Confidence (Depressive Signs/Symptoms)  Outcome: Ongoing, Progressing     Problem: Mood Impairment (Depressive Signs/Symptoms)  Goal: Improved Mood Symptoms (Depressive Signs/Symptoms)  Outcome: Ongoing, Progressing     Problem: Nutrition Imbalance (Depressive Signs/Symptoms)  Goal: Optimized Nutrition Intake  Outcome: Ongoing, Progressing     Problem: Psychomotor Impairment (Depressive Signs/Symptoms)  Goal: Improved Psychomotor Symptoms (Depressive Signs/Symptoms)  Outcome: Ongoing, Progressing     Problem: Sleep Disturbance (Depressive Signs/Symptoms)  Goal: Improved Sleep (Depressive Signs/Symptoms)  Outcome: Ongoing, Progressing     Problem: Social, Occupational or Functional Impairment (Depressive Signs/Symptoms)  Goal: Enhanced Social, Occupational or Functional Skills (Depressive Signs/Symptoms)  Outcome: Ongoing,  Progressing     Problem: Behavior Regulation Impairment (Psychotic Signs/Symptoms)  Goal: Improved Behavioral Control (Psychotic Signs/Symptoms)  Outcome: Ongoing, Progressing     Problem: Cognitive Impairment (Psychotic Signs/Symptoms)  Goal: Optimal Cognitive Function (Psychotic Signs/Symptoms)  Outcome: Ongoing, Progressing     Problem: Decreased Participation and Engagement (Psychotic Signs/Symptoms)  Goal: Increased Participation and Engagement (Psychotic Signs/Symptoms)  Outcome: Ongoing, Progressing     Problem: Mood Impairment (Psychotic Signs/Symptoms)  Goal: Improved Mood Symptoms (Psychotic Signs/Symptoms)  Outcome: Ongoing, Progressing     Problem: Psychomotor Impairment (Psychotic Signs/Symptoms)  Goal: Improved Psychomotor Symptoms (Psychotic Signs/Symptoms)  Outcome: Ongoing, Progressing

## 2023-09-05 PROCEDURE — 25000003 PHARM REV CODE 250

## 2023-09-05 PROCEDURE — 25000003 PHARM REV CODE 250: Performed by: PEDIATRICS

## 2023-09-05 PROCEDURE — 11400000 HC PSYCH PRIVATE ROOM

## 2023-09-05 RX ADMIN — CLINDAMYCIN HYDROCHLORIDE 300 MG: 150 CAPSULE ORAL at 08:09

## 2023-09-05 RX ADMIN — QUETIAPINE FUMARATE 100 MG: 100 TABLET ORAL at 08:09

## 2023-09-05 RX ADMIN — BICTEGRAVIR SODIUM, EMTRICITABINE, AND TENOFOVIR ALAFENAMIDE FUMARATE 1 TABLET: 50; 200; 25 TABLET ORAL at 08:09

## 2023-09-05 RX ADMIN — DIVALPROEX SODIUM 500 MG: 500 TABLET, FILM COATED, EXTENDED RELEASE ORAL at 08:09

## 2023-09-05 RX ADMIN — BUPROPION HYDROCHLORIDE 150 MG: 150 TABLET, FILM COATED, EXTENDED RELEASE ORAL at 08:09

## 2023-09-05 RX ADMIN — CLINDAMYCIN HYDROCHLORIDE 300 MG: 150 CAPSULE ORAL at 02:09

## 2023-09-05 RX ADMIN — OLANZAPINE 10 MG: 5 TABLET, FILM COATED ORAL at 08:09

## 2023-09-05 NOTE — NURSING
"Daily Nursing Note:      Behavior:    Patient (Chon Parham is a 33 y.o. male, : 1990, MRN: [id number]) demonstrating an affect that was congruent. Chon demonstrating mood that is anxious. Chon had an appearance that was disheveled. Chon denies suicidal ideation. Chon denies suicide plan. Chon denies homicidal ideation. Chon denies hallucinations.    Chon's  height is 5' 9" (1.753 m) and weight is 77.1 kg (170 lb). His oral temperature is 98.6 °F (37 °C). His blood pressure is 127/76 and his pulse is 88. His respiration is 18 and oxygen saturation is 100%.       Intervention:    Encourage Chon to perform self-hygiene, grooming, and changing of clothing. Monitor Chon's behavior and program compliance. Monitor Chon for suicidal ideation, homicidal ideation, sleep disturbance, and hallucinations. Encourage Chon to eat all portions of meals and assess for meal preferences. Monitor Chon for intake and output to ensure hydration. Notify the Physician/Physician Assistant/Advance Practice Registered Nurse (MD/PA/APRN) for any medication refusal and any change in patient condition.      Response:    Chon verbalizes understand of unit process and procedures.      Plan:     Continue to monitor per MD/PA/APRN orders; maintain patient safety.   "

## 2023-09-05 NOTE — GROUP NOTE
Group Psychotherapy       Group Focus: Communication Skills    Group topic: Treatment Planning. Therapist explored patients need for identifying personal strengths and qualities in working towards mental health goals.      Number of patients in attendance: 4    Group Start Time: 1045  Group End Time:  1130  Groups Date: 9/5/2023  Group Topic:  Behavioral Health  Group Department: [facility]al [facility] Unit  Group Facilitators:  Elza Brice  _____________________________________________________________________    Patient Name: Chon Parham  MRN: [id number]  Patient Class: IP- Psych   Admission Date\Time: 8/31/2023  5:10 PM  Hospital Length of Stay: 5  Primary Care Provider: Shavon Rodriguez NP     Referred by: Acute Psychiatry Unit Treatment Team     Target symptoms: Substance Abuse     Patient's response to treatment: Pt did not attend group; alternative provided     Progress toward goals: Progressing adequately     Interval History:      Diagnosis:      Plan: Continue treatment on [facility]

## 2023-09-05 NOTE — PLAN OF CARE
Problem: Adult Inpatient Plan of Care  Goal: Plan of Care Review  Outcome: Ongoing, Progressing  Goal: Patient-Specific Goal (Individualized)  Outcome: Ongoing, Progressing  Goal: Absence of Hospital-Acquired Illness or Injury  Outcome: Ongoing, Progressing  Goal: Optimal Comfort and Wellbeing  Outcome: Ongoing, Progressing  Goal: Readiness for Transition of Care  Outcome: Ongoing, Progressing     Problem: Violence Risk or Actual  Goal: Anger and Impulse Control  Outcome: Ongoing, Progressing     Problem: Activity and Energy Impairment (Depressive Signs/Symptoms)  Goal: Optimized Energy Level (Depressive Signs/Symptoms)  Outcome: Ongoing, Progressing     Problem: Cognitive Impairment (Depressive Signs/Symptoms)  Goal: Optimized Cognitive Function  Outcome: Ongoing, Progressing     Problem: Decreased Participation/Engagement (Depressive Signs/Symptoms)  Goal: Increased Participation and Engagement (Depressive Signs/Symptoms)  Outcome: Ongoing, Progressing     Problem: Feelings of Worthlessness, Hopelessness or Excessive Guilt (Depressive Signs/Symptoms)  Goal: Enhanced Self-Esteem and Confidence (Depressive Signs/Symptoms)  Outcome: Ongoing, Progressing

## 2023-09-05 NOTE — NURSING
Pt is currently voicing no ADRs or physical complaints at this time, vital signs are stable,   Pt is not in any physical distress at this time,   Currently voicing no suicidal or homicidal ideations at this time, voicing no hallucinations or delusions at this time, voicing no detox symptoms at this time, compliant with medication ordered, pt was seen today by   RACIEL Tan, will monitor with suicide prec., for anger, psychosis and detox symptoms and   Will assist prn.

## 2023-09-05 NOTE — PROGRESS NOTES
Chon refused both TR groups despite encouragement; Alternative materials were offered.   09/05/23 1400   [facility] Group Therapy   Group Name Therapeutic Recreation   Specific Interventions Skilled Activity Creative Expression   Participation Level None   Participation Quality Refused

## 2023-09-05 NOTE — PROGRESS NOTES
"9/5/2023 3:59 PM   Chon Parham   1990   [id number]        Psychiatry Progress Note     Chief Complaint: "I told the ED I needed a prescription of my meds"    SUBJECTIVE:   Chon Parham is a 33 y.o. male placed under a PEC at [facility] due to reported suicidal ideations after apparently not being on his medication for approximately two weeks.    Patient states that he is feeling much better today.   He has been calm, cooperative, and polite throughout this stay.  States that he changed his mind about going to rehab at discharge but is interested in IOP. Tolerating current medication regimen without issues.  Will continue current medication regimen and will augment as needed.    JUSTIFICATION FOR TWO ANTIPSYCHOTICS:  Patient was admitted to the unit on an outpatient medication regimen that consisted of two antipsychotics.  Because of the chronicity of this patient's condition, and the acuity of symptoms during hospitalization, we decided to continue this regimen during hospitalization, but always recommend using the least amount of medication necessary to appropriately address the issue.  Patient should discuss tapering/discontinuation of one of these agents with patient's outpatient provider.       UDS: (+)amphetamines, cannabinoids  Blood Alcohol: <10       Current Medications:   Scheduled Meds:    hztlzfgwl-fjrcivto-lriqahc ala  1 tablet Oral Daily    buPROPion  150 mg Oral Daily    clindamycin  300 mg Oral Q8H    divalproex ER  500 mg Oral Daily    nicotine  1 patch Transdermal Daily    OLANZapine  10 mg Oral Daily    QUEtiapine  100 mg Oral QHS      PRN Meds: acetaminophen, aluminum-magnesium hydroxide-simethicone, diphenhydrAMINE, diphenhydrAMINE, haloperidol lactate, haloperidoL, hydrOXYzine HCL, traZODone   Psychotherapeutics (From admission, onward)      Start     Stop Route Frequency Ordered    09/01/23 2100  QUEtiapine tablet 100 mg         -- Oral Nightly 09/01/23 1107    09/01/23 1215  buPROPion TB24 " "tablet 150 mg         -- Oral Daily 09/01/23 1107    09/01/23 1215  OLANZapine tablet 10 mg         -- Oral Daily 09/01/23 1107    08/31/23 1715  traZODone tablet 100 mg         -- Oral Nightly PRN 08/31/23 1715 08/31/23 1715  haloperidol lactate injection 10 mg         -- IM Every 6 hours PRN 08/31/23 1715 08/31/23 1715  haloperidoL tablet 10 mg         -- Oral Every 6 hours PRN 08/31/23 1715            Allergies:   Review of patient's allergies indicates:  No Known Allergies     OBJECTIVE:   Vitals   Vitals:    09/05/23 0701   BP: 119/77   Pulse: 74   Resp: 18   Temp: 98 °F (36.7 °C)        Labs/Imaging/Studies:   No results found for this or any previous visit (from the past 36 hour(s)).         Medical Review Of Systems:  Constitutional: negative  Respiratory: negative  Cardiovascular: negative  Gastrointestinal: negative  Genitourinary:negative  Musculoskeletal:negative  Neurological: negative      Psychiatric Mental Status Exam:  General Appearance: appears stated age, well-developed, well-nourished  Arousal: alert  Behavior: cooperative  Movements and Motor Activity: no abnormal involuntary movements noted  Orientation: oriented to person, place, time, and situation  Speech: normal rate, normal rhythm, normal volume, normal tone  Mood: "Ok"  Affect: constricted  Thought Process: linear  Associations: intact  Thought Content and Perceptions: no suicidal ideation, no homicidal ideation, no auditory hallucinations, no visual hallucinations, no paranoid ideation, no ideas of reference  Recent and Remote Memory: recent memory intact, remote memory intact; per interview/observation with patient  Attention and Concentration: intact, attentive to conversation; per interview/observation with patient  Fund of Knowledge: intact, aware of current events, vocabulary appropriate; based on history, vocabulary, fund of knowledge, syntax, grammar, and content  Insight: questionable; based on understanding of severity of " illness and HPI  Judgment: questionable; based on patient's behavior and HPI      ASSESSMENT/PLAN:   Problems Addressed/Diagnoses:  Bipolar Disorder, most recent episode depressed, severe (F31.4)  Methamphetamine use disorder severe (F15.20)  Cannabis use disorder severe (F12.20)    Past Medical History:   Diagnosis Date    Bipolar affective     Depression     HIV (human immunodeficiency virus infection)     Schizophrenia         Plan:  Bipolar disorder  -Continue Depakote, Wellbutrin, Zyprexa, and Seroquel    Methamphetamine use  -Group/Individual psychotherapy    Cannabis use  -Group/Individual psychotherapy      Expected Disposition Plan: Outpatient Medication Management and IOP        ARCADIO BhandariP-BC

## 2023-09-06 PROCEDURE — 11400000 HC PSYCH PRIVATE ROOM

## 2023-09-06 PROCEDURE — 25000003 PHARM REV CODE 250: Performed by: PEDIATRICS

## 2023-09-06 PROCEDURE — 25000003 PHARM REV CODE 250

## 2023-09-06 PROCEDURE — 25000003 PHARM REV CODE 250: Performed by: PSYCHIATRY & NEUROLOGY

## 2023-09-06 RX ORDER — BUPROPION HYDROCHLORIDE 150 MG/1
150 TABLET ORAL DAILY
Qty: 30 TABLET | Refills: 0 | Status: SHIPPED | OUTPATIENT
Start: 2023-09-07 | End: 2023-09-27

## 2023-09-06 RX ORDER — QUETIAPINE FUMARATE 100 MG/1
100 TABLET, FILM COATED ORAL NIGHTLY
Qty: 30 TABLET | Refills: 11 | Status: SHIPPED | OUTPATIENT
Start: 2023-09-06 | End: 2023-09-27

## 2023-09-06 RX ORDER — OLANZAPINE 10 MG/1
10 TABLET ORAL DAILY
Qty: 30 TABLET | Refills: 0 | Status: SHIPPED | OUTPATIENT
Start: 2023-09-07 | End: 2023-09-27

## 2023-09-06 RX ORDER — DIVALPROEX SODIUM 500 MG/1
500 TABLET, FILM COATED, EXTENDED RELEASE ORAL DAILY
Qty: 30 TABLET | Refills: 0 | Status: SHIPPED | OUTPATIENT
Start: 2023-09-06 | End: 2023-09-27

## 2023-09-06 RX ADMIN — CLINDAMYCIN HYDROCHLORIDE 300 MG: 150 CAPSULE ORAL at 10:09

## 2023-09-06 RX ADMIN — DIVALPROEX SODIUM 500 MG: 500 TABLET, FILM COATED, EXTENDED RELEASE ORAL at 08:09

## 2023-09-06 RX ADMIN — QUETIAPINE FUMARATE 100 MG: 100 TABLET ORAL at 08:09

## 2023-09-06 RX ADMIN — TRAZODONE HYDROCHLORIDE 100 MG: 100 TABLET ORAL at 08:09

## 2023-09-06 RX ADMIN — CLINDAMYCIN HYDROCHLORIDE 300 MG: 150 CAPSULE ORAL at 08:09

## 2023-09-06 RX ADMIN — BUPROPION HYDROCHLORIDE 150 MG: 150 TABLET, FILM COATED, EXTENDED RELEASE ORAL at 08:09

## 2023-09-06 RX ADMIN — CLINDAMYCIN HYDROCHLORIDE 300 MG: 150 CAPSULE ORAL at 02:09

## 2023-09-06 RX ADMIN — BICTEGRAVIR SODIUM, EMTRICITABINE, AND TENOFOVIR ALAFENAMIDE FUMARATE 1 TABLET: 50; 200; 25 TABLET ORAL at 08:09

## 2023-09-06 RX ADMIN — OLANZAPINE 10 MG: 5 TABLET, FILM COATED ORAL at 08:09

## 2023-09-06 NOTE — GROUP NOTE
Group Psychotherapy       Group Focus: Emotion Regulation Skills      Number of patients in attendance: 1    Review of Emotion Regulation Handout 1: Goals of Emotion Regulation Training, Handout 2: Myths about Emotions, and Handout 3: Model for Describing Emotions.     Mental Health Tech reminded each patient that group was starting. Only 1 attended group. Handout 1 and 2 were provided to patients not in attendance.     Group Start Time: 1045  Group End Time:  1130  Groups Date: 9/6/2023  Group Topic:  Behavioral Health  Group Department: [facility]  Group Facilitators:  Kari Baron SSW   _____________________________________________________________________    Patient Name: Chon Parham  MRN: [id number]  Patient Class: IP- Psych   Admission Date\Time: 8/31/2023  5:10 PM  Hospital Length of Stay: 6  Primary Care Provider: Shavon Rodriguez NP     Referred by: Acute Psychiatry Unit Treatment Team     Target symptoms: Poor Coping Skills     Patient's response to treatment: did not attend group     Progress toward goals: Not progressing     Interval History:      Diagnosis:      Plan: Continue treatment on APU

## 2023-09-06 NOTE — PROGRESS NOTES
Chon refused both TR groups despite encouragement, alternative material was offered.   09/06/23 1500   [facility] Group Therapy   Group Name Therapeutic Recreation   Specific Interventions Coping Skills Training   Participation Level None   Participation Quality Refused

## 2023-09-06 NOTE — PLAN OF CARE
Problem: Adult Inpatient Plan of Care  Goal: Plan of Care Review  Outcome: Ongoing, Progressing  Goal: Patient-Specific Goal (Individualized)  Outcome: Ongoing, Progressing  Goal: Absence of Hospital-Acquired Illness or Injury  Outcome: Ongoing, Progressing  Goal: Optimal Comfort and Wellbeing  Outcome: Ongoing, Progressing  Goal: Readiness for Transition of Care  Outcome: Ongoing, Progressing     Problem: Violence Risk or Actual  Goal: Anger and Impulse Control  Outcome: Ongoing, Progressing     Problem: Activity and Energy Impairment (Depressive Signs/Symptoms)  Goal: Optimized Energy Level (Depressive Signs/Symptoms)  Outcome: Ongoing, Progressing     Problem: Cognitive Impairment (Depressive Signs/Symptoms)  Goal: Optimized Cognitive Function  Outcome: Ongoing, Progressing     Problem: Decreased Participation/Engagement (Depressive Signs/Symptoms)  Goal: Increased Participation and Engagement (Depressive Signs/Symptoms)  Outcome: Ongoing, Progressing     Problem: Feelings of Worthlessness, Hopelessness or Excessive Guilt (Depressive Signs/Symptoms)  Goal: Enhanced Self-Esteem and Confidence (Depressive Signs/Symptoms)  Outcome: Ongoing, Progressing     Problem: Mood Impairment (Depressive Signs/Symptoms)  Goal: Improved Mood Symptoms (Depressive Signs/Symptoms)  Outcome: Ongoing, Progressing     Problem: Nutrition Imbalance (Depressive Signs/Symptoms)  Goal: Optimized Nutrition Intake  Outcome: Ongoing, Progressing     Problem: Psychomotor Impairment (Depressive Signs/Symptoms)  Goal: Improved Psychomotor Symptoms (Depressive Signs/Symptoms)  Outcome: Ongoing, Progressing     Problem: Sleep Disturbance (Depressive Signs/Symptoms)  Goal: Improved Sleep (Depressive Signs/Symptoms)  Outcome: Ongoing, Progressing     Problem: Social, Occupational or Functional Impairment (Depressive Signs/Symptoms)  Goal: Enhanced Social, Occupational or Functional Skills (Depressive Signs/Symptoms)  Outcome: Ongoing,  Progressing     Problem: Behavior Regulation Impairment (Psychotic Signs/Symptoms)  Goal: Improved Behavioral Control (Psychotic Signs/Symptoms)  Outcome: Ongoing, Progressing     Problem: Cognitive Impairment (Psychotic Signs/Symptoms)  Goal: Optimal Cognitive Function (Psychotic Signs/Symptoms)  Outcome: Ongoing, Progressing     Problem: Decreased Participation and Engagement (Psychotic Signs/Symptoms)  Goal: Increased Participation and Engagement (Psychotic Signs/Symptoms)  Outcome: Ongoing, Progressing     Problem: Mood Impairment (Psychotic Signs/Symptoms)  Goal: Improved Mood Symptoms (Psychotic Signs/Symptoms)  Outcome: Ongoing, Progressing     Problem: Psychomotor Impairment (Psychotic Signs/Symptoms)  Goal: Improved Psychomotor Symptoms (Psychotic Signs/Symptoms)  Outcome: Ongoing, Progressing     Problem: Sensory Perception Impairment (Psychotic Signs/Symptoms)  Goal: Decreased Sensory Symptoms (Psychotic Signs/Symptoms)  Outcome: Ongoing, Progressing     Problem: Sleep Disturbance (Psychotic Signs/Symptoms)  Goal: Improved Sleep (Psychotic Signs/Symptoms)  Outcome: Ongoing, Progressing     Problem: Social, Occupational or Functional Impairment (Psychotic Signs/Symptoms)  Goal: Enhanced Social, Occupational or Functional Skills (Psychotic Signs/Symptoms)  Outcome: Ongoing, Progressing     Problem: Activity and Energy Impairment (Excessive Substance Use)  Goal: Optimized Energy Level (Excessive Substance Use)  Outcome: Ongoing, Progressing     Problem: Behavior Regulation Impairment (Excessive Substance Use)  Goal: Improved Behavioral Control (Excessive Substance Use)  Outcome: Ongoing, Progressing     Problem: Decreased Participation and Engagement (Excessive Substance Use)  Goal: Increased Participation and Engagement (Excessive Substance Use)  Outcome: Ongoing, Progressing     Problem: Physiologic Impairment (Excessive Substance Use)  Goal: Improved Physiologic Symptoms (Excessive Substance  Use)  Outcome: Ongoing, Progressing     Problem: Social, Occupational or Functional Impairment (Excessive Substance Use)  Goal: Enhanced Social, Occupational or Functional Skills (Excessive Substance Use)  Outcome: Ongoing, Progressing     Problem: Impaired Wound Healing  Goal: Optimal Wound Healing  Outcome: Ongoing, Progressing

## 2023-09-06 NOTE — PLAN OF CARE
Chon refuses TR groups, does not interact with peers nor staff, and attend his ADL's.    Chon attended treatment ream, was cooperative and attends his ADL's.

## 2023-09-06 NOTE — PROGRESS NOTES
"9/6/2023 3:59 PM   Chon Parham   1990   [id number]        Psychiatry Progress Note     Chief Complaint: "I told the ED I needed a prescription of my meds"    SUBJECTIVE:   Chon Parham is a 33 y.o. male placed under a PEC at [facility] due to reported suicidal ideations after apparently not being on his medication for approximately two weeks.    Staff reports that, yesterday, patient stated that he was no longer interested in going to rehab.  He is now planning to return home with f/u with [facility].  No overt behavioral issues.  Tolerating current medication regimen without issues.  No tremors, rigidity, extrapyramidal symptoms, or excessive sedation were noted at discharge.  Will continue current plan of care and will proceed with discharge tomorrow.    JUSTIFICATION FOR TWO ANTIPSYCHOTICS:  Patient was admitted to the unit on an outpatient medication regimen that consisted of two antipsychotics.  Because of the chronicity of this patient's condition, and the acuity of symptoms during hospitalization, we decided to continue this regimen during hospitalization, but always recommend using the least amount of medication necessary to appropriately address the issue.  Patient should discuss tapering/discontinuation of one of these agents with patient's outpatient provider.       UDS: (+)amphetamines, cannabinoids  Blood Alcohol: <10       Current Medications:   Scheduled Meds:    msigviwoz-xxzuwdaf-kswkgmm ala  1 tablet Oral Daily    buPROPion  150 mg Oral Daily    clindamycin  300 mg Oral Q8H    divalproex ER  500 mg Oral Daily    nicotine  1 patch Transdermal Daily    OLANZapine  10 mg Oral Daily    QUEtiapine  100 mg Oral QHS      PRN Meds: acetaminophen, aluminum-magnesium hydroxide-simethicone, diphenhydrAMINE, diphenhydrAMINE, haloperidol lactate, haloperidoL, hydrOXYzine HCL, traZODone   Psychotherapeutics (From admission, onward)      Start     Stop Route Frequency Ordered    09/01/23 2100  QUEtiapine " "tablet 100 mg         -- Oral Nightly 09/01/23 1107    09/01/23 1215  buPROPion TB24 tablet 150 mg         -- Oral Daily 09/01/23 1107    09/01/23 1215  OLANZapine tablet 10 mg         -- Oral Daily 09/01/23 1107    08/31/23 1715  traZODone tablet 100 mg         -- Oral Nightly PRN 08/31/23 1715    08/31/23 1715  haloperidol lactate injection 10 mg         -- IM Every 6 hours PRN 08/31/23 1715    08/31/23 1715  haloperidoL tablet 10 mg         -- Oral Every 6 hours PRN 08/31/23 1715            Allergies:   Review of patient's allergies indicates:  No Known Allergies     OBJECTIVE:   Vitals   Vitals:    09/05/23 0701   BP: 119/77   Pulse: 74   Resp: 18   Temp: 98 °F (36.7 °C)        Labs/Imaging/Studies:   No results found for this or any previous visit (from the past 36 hour(s)).         Medical Review Of Systems:  Constitutional: negative  Respiratory: negative  Cardiovascular: negative  Gastrointestinal: negative  Genitourinary:negative  Musculoskeletal:negative  Neurological: negative      Psychiatric Mental Status Exam:  General Appearance: appears stated age, well-developed, well-nourished  Arousal: alert  Behavior: cooperative  Movements and Motor Activity: no abnormal involuntary movements noted  Orientation: oriented to person, place, time, and situation  Speech: normal rate, normal rhythm, normal volume, normal tone  Mood: "All right"  Affect: constricted  Thought Process: linear  Associations: intact  Thought Content and Perceptions: no suicidal ideation, no homicidal ideation, no auditory hallucinations, no visual hallucinations, no paranoid ideation, no ideas of reference  Recent and Remote Memory: recent memory intact, remote memory intact; per interview/observation with patient  Attention and Concentration: intact, attentive to conversation; per interview/observation with patient  Fund of Knowledge: intact, aware of current events, vocabulary appropriate; based on history, vocabulary, fund of " knowledge, syntax, grammar, and content  Insight: questionable; based on understanding of severity of illness and HPI  Judgment: questionable; based on patient's behavior and HPI      ASSESSMENT/PLAN:   Problems Addressed/Diagnoses:  Bipolar Disorder, most recent episode depressed, severe (F31.4)  Methamphetamine use disorder severe (F15.20)  Cannabis use disorder severe (F12.20)    Past Medical History:   Diagnosis Date    Bipolar affective     Depression     HIV (human immunodeficiency virus infection)     Schizophrenia         Plan:  Bipolar disorder  -Continue Depakote, Wellbutrin, Zyprexa, and Seroquel    Methamphetamine use  -Group/Individual psychotherapy    Cannabis use  -Group/Individual psychotherapy      Expected Disposition Plan: Home        Flip Thomas M.D.

## 2023-09-06 NOTE — CARE UPDATE
Treatment Team    Pt seem for treatment team today with interdisciplinary team.  Pt is Cooperative with Tx team. Pt denies symptoms at this time. MD did not change pt meds at this time. Treatment teams goals Not met at this time. Pt DC plan is home. DC date scheduled for 9.7.2023.

## 2023-09-06 NOTE — PLAN OF CARE
Problem: Adult Inpatient Plan of Care  Goal: Plan of Care Review  Outcome: Ongoing, Progressing  Goal: Patient-Specific Goal (Individualized)  Outcome: Ongoing, Progressing  Goal: Absence of Hospital-Acquired Illness or Injury  Outcome: Ongoing, Progressing  Goal: Optimal Comfort and Wellbeing  Outcome: Ongoing, Progressing  Goal: Readiness for Transition of Care  Outcome: Ongoing, Progressing     Problem: Violence Risk or Actual  Goal: Anger and Impulse Control  Outcome: Ongoing, Progressing     Problem: Activity and Energy Impairment (Depressive Signs/Symptoms)  Goal: Optimized Energy Level (Depressive Signs/Symptoms)  Outcome: Ongoing, Progressing     Problem: Activity and Energy Impairment (Depressive Signs/Symptoms)  Goal: Optimized Energy Level (Depressive Signs/Symptoms)  Outcome: Ongoing, Progressing     Problem: Cognitive Impairment (Depressive Signs/Symptoms)  Goal: Optimized Cognitive Function  Outcome: Ongoing, Progressing     Problem: Decreased Participation/Engagement (Depressive Signs/Symptoms)  Goal: Increased Participation and Engagement (Depressive Signs/Symptoms)  Outcome: Ongoing, Progressing     Problem: Decreased Participation/Engagement (Depressive Signs/Symptoms)  Goal: Increased Participation and Engagement (Depressive Signs/Symptoms)  Outcome: Ongoing, Progressing     Problem: Feelings of Worthlessness, Hopelessness or Excessive Guilt (Depressive Signs/Symptoms)  Goal: Enhanced Self-Esteem and Confidence (Depressive Signs/Symptoms)  Outcome: Ongoing, Progressing     Problem: Mood Impairment (Depressive Signs/Symptoms)  Goal: Improved Mood Symptoms (Depressive Signs/Symptoms)  Outcome: Ongoing, Progressing     Problem: Nutrition Imbalance (Depressive Signs/Symptoms)  Goal: Optimized Nutrition Intake  Outcome: Ongoing, Progressing

## 2023-09-06 NOTE — NURSING
"Daily Nursing Note:      Behavior:    Patient (Chon Parham is a 33 y.o. male, : 1990, MRN: [id number]) demonstrating an affect that was flat and anxious. Chon demonstrating mood that is depressed and anxious. Chon had an appearance that was clean. Chon denies suicidal ideation. Chon denies suicide plan. Chon denies homicidal ideation. Chon denies hallucinations.    Chon's  height is 5' 9" (1.753 m) and weight is 77.1 kg (170 lb). His oral temperature is 98 °F (36.7 °C). His blood pressure is 119/77 and his pulse is 74. His respiration is 18 and oxygen saturation is 99%.         Intervention:    Encourage Chon to perform self-hygiene, grooming, and changing of clothing. Monitor Chon's behavior and program compliance. Monitor Chon for suicidal ideation, homicidal ideation, sleep disturbance, and hallucinations. Encourage Chon to eat all portions of meals and assess for meal preferences. Monitor Chon for intake and output to ensure hydration. Notify the Physician/Physician Assistant/Advance Practice Registered Nurse (MD/PA/APRN) for any medication refusal and any change in patient condition.      Response:    Chon verbalizes understand of unit process and procedures.      Plan:     Continue to monitor per MD/PA/APRN orders; maintain patient safety.   "

## 2023-09-06 NOTE — NURSING
"Daily Nursing Note:      Behavior:    Patient (Chon Parham is a 33 y.o. male, : 1990, MRN: [id number]) demonstrating an affect that was flat. Chon demonstrating mood that is pleasant and appropriate. Chon had an appearance that was disheveled. Chon denies suicidal ideation. Chon denies suicide plan. Chon denies homicidal ideation. Chon denies hallucinations.    Chon's  height is 5' 9" (1.753 m) and weight is 77.1 kg (170 lb). His oral temperature is 98 °F (36.7 °C). His blood pressure is 119/77 and his pulse is 74. His respiration is 18 and oxygen saturation is 99%.   Patient comes to treatment team calm cooperative and pleasant.  He has good insight and able to answers questions.  He denies HI/SI/ or AVH      Intervention:    Encourage Chon to perform self-hygiene, grooming, and changing of clothing. Monitor Chon's behavior and program compliance. Monitor Chon for suicidal ideation, homicidal ideation, sleep disturbance, and hallucinations. Encourage Chon to eat all portions of meals and assess for meal preferences. Monitor Chon for intake and output to ensure hydration. Notify the Physician/Physician Assistant/Advance Practice Registered Nurse (MD/PA/APRN) for any medication refusal and any change in patient condition.      Response:    Chon verbalizes understand of unit process and procedures. Chon reported medications are working well      Plan:     Continue to monitor per MD/PA/APRN orders; maintain patient safety.   "

## 2023-09-07 VITALS
TEMPERATURE: 98 F | BODY MASS INDEX: 25.18 KG/M2 | HEART RATE: 77 BPM | SYSTOLIC BLOOD PRESSURE: 121 MMHG | OXYGEN SATURATION: 97 % | WEIGHT: 170 LBS | RESPIRATION RATE: 16 BRPM | HEIGHT: 69 IN | DIASTOLIC BLOOD PRESSURE: 78 MMHG

## 2023-09-07 PROCEDURE — 25000003 PHARM REV CODE 250: Performed by: PEDIATRICS

## 2023-09-07 PROCEDURE — 25000003 PHARM REV CODE 250

## 2023-09-07 RX ADMIN — BUPROPION HYDROCHLORIDE 150 MG: 150 TABLET, FILM COATED, EXTENDED RELEASE ORAL at 08:09

## 2023-09-07 RX ADMIN — CLINDAMYCIN HYDROCHLORIDE 300 MG: 150 CAPSULE ORAL at 06:09

## 2023-09-07 RX ADMIN — DIVALPROEX SODIUM 500 MG: 500 TABLET, FILM COATED, EXTENDED RELEASE ORAL at 08:09

## 2023-09-07 RX ADMIN — BICTEGRAVIR SODIUM, EMTRICITABINE, AND TENOFOVIR ALAFENAMIDE FUMARATE 1 TABLET: 50; 200; 25 TABLET ORAL at 09:09

## 2023-09-07 RX ADMIN — OLANZAPINE 10 MG: 5 TABLET, FILM COATED ORAL at 08:09

## 2023-09-07 NOTE — NURSING
Discharge Note:    Chon Parham is a 33 y.o. male, : 1990, MRN: [id number], admitted on 2023 for Flip Thomas MD with a diagnosis of Depression [F32.A].    Patient discharged on 2023 per physician orders in stable condition. Patient denied suicidal ideation, homicidal ideation, or hallucinations. Patient was discharged with valuables, personal belongings, prescriptions, discharge instructions, and an educational handout explaining the diagnosis and prescribed medications. Patient verbalized understanding of the discharge instructions and importance of follow-up visits. Patient was escorted out of the facility by [facility] and placed into a cab to be transported to home.     Patient discharged on the following medications:     Medication List        START taking these medications      buPROPion 150 MG TB24 tablet  Commonly known as: WELLBUTRIN XL  Take 1 tablet (150 mg total) by mouth once daily.  Replaces: buPROPion 150 MG TBSR 12 hr tablet            CHANGE how you take these medications      OLANZapine 10 MG tablet  Commonly known as: ZyPREXA  Take 1 tablet (10 mg total) by mouth once daily.  What changed:   medication strength  how much to take  when to take this     QUEtiapine 100 MG Tab  Commonly known as: SEROQUEL  Take 1 tablet (100 mg total) by mouth every evening.  What changed:   medication strength  how much to take  Another medication with the same name was removed. Continue taking this medication, and follow the directions you see here.            CONTINUE taking these medications      vtktcbffy-govjjduo-topfigz ala -25 mg (25 kg or greater)  Commonly known as: BIKTARVY  Take 1 tablet by mouth once daily.     divalproex 500 MG Tb24  Take 1 tablet (500 mg total) by mouth once daily.            STOP taking these medications      buPROPion 150 MG TBSR 12 hr tablet  Commonly known as: WELLBUTRIN SR  Replaced by: buPROPion 150 MG TB24 tablet     cephALEXin 500 MG  capsule  Commonly known as: KEFLEX     clindamycin 300 MG capsule  Commonly known as: CLEOCIN     ondansetron 4 MG Tbdl  Commonly known as: ZOFRAN-ODT     sertraline 50 MG tablet  Commonly known as: ZOLOFT     zinc gluconate 50 mg tablet               Where to Get Your Medications        These medications were sent to [facility]/pharmacy #[id number] - [place] - [address] AT CORNER OF [address], [place] [address]      Phone: [phone]   QUEtiapine 100 MG Tab       You can get these medications from any pharmacy    Bring a paper prescription for each of these medications  ojvkmmnla-wfhxehpw-ijyuutr ala -25 mg (25 kg or greater)  buPROPion 150 MG TB24 tablet  divalproex 500 MG Tb24  OLANZapine 10 MG tablet

## 2023-09-07 NOTE — PLAN OF CARE
Chon met his TR treatment goal of get stable and get back on my psyc medications  CTRS Discharge Recommendations:  Encouraged Pt. to actively utilize available community resources to increase leisure involvement to decrease signs and symptoms of illness.  Encouraged Pt. to utilize coping skills on a regular basis to reduce the risk of decomposition and re-hospitalization.

## 2023-09-07 NOTE — PROGRESS NOTES
"9/7/2023 3:59 PM   Chon Parham   1990   [id number]        Psychiatry Progress Note     Chief Complaint: "I told the ED I needed a prescription of my meds"    SUBJECTIVE:   Chon Parham is a 33 y.o. male placed under a PEC at [facility] due to reported suicidal ideations after apparently not being on his medication for approximately two weeks.    Staff reports that, yesterday, patient did not attend group therapy or interact much with peers.  Patient will return home and f/u with [facility].  No overt behavioral issues. Patient was very pleasant and appeared to be in a positive mood. States that he will return to work today.  Tolerating current medication regimen without issues.  No tremors, rigidity, extrapyramidal symptoms, or excessive sedation were noted at discharge.  Will continue current plan of care and will proceed with discharge today.    JUSTIFICATION FOR TWO ANTIPSYCHOTICS:  Patient was admitted to the unit on an outpatient medication regimen that consisted of two antipsychotics.  Because of the chronicity of this patient's condition, and the acuity of symptoms during hospitalization, we decided to continue this regimen during hospitalization, but always recommend using the least amount of medication necessary to appropriately address the issue.  Patient should discuss tapering/discontinuation of one of these agents with patient's outpatient provider.       UDS: (+)amphetamines, cannabinoids  Blood Alcohol: <10       Current Medications:   Scheduled Meds:    uokbjtwpl-dqpomylm-crdqtcq ala  1 tablet Oral Daily    buPROPion  150 mg Oral Daily    divalproex ER  500 mg Oral Daily    nicotine  1 patch Transdermal Daily    OLANZapine  10 mg Oral Daily    QUEtiapine  100 mg Oral QHS      PRN Meds: acetaminophen, aluminum-magnesium hydroxide-simethicone, diphenhydrAMINE, diphenhydrAMINE, haloperidol lactate, haloperidoL, hydrOXYzine HCL, traZODone   Psychotherapeutics (From admission, onward)      Start  " "   Stop Route Frequency Ordered    09/01/23 2100  QUEtiapine tablet 100 mg         -- Oral Nightly 09/01/23 1107    09/01/23 1215  buPROPion TB24 tablet 150 mg         -- Oral Daily 09/01/23 1107    09/01/23 1215  OLANZapine tablet 10 mg         -- Oral Daily 09/01/23 1107    08/31/23 1715  traZODone tablet 100 mg         -- Oral Nightly PRN 08/31/23 1715    08/31/23 1715  haloperidol lactate injection 10 mg         -- IM Every 6 hours PRN 08/31/23 1715    08/31/23 1715  haloperidoL tablet 10 mg         -- Oral Every 6 hours PRN 08/31/23 1715            Allergies:   Review of patient's allergies indicates:  No Known Allergies     OBJECTIVE:   Vitals   Vitals:    09/06/23 0701   BP: (!) 144/76   Pulse: 72   Resp: 18   Temp: 97.9 °F (36.6 °C)        Labs/Imaging/Studies:   No results found for this or any previous visit (from the past 36 hour(s)).         Medical Review Of Systems:  Constitutional: negative  Respiratory: negative  Cardiovascular: negative  Gastrointestinal: negative  Genitourinary:negative  Musculoskeletal:negative  Neurological: negative      Psychiatric Mental Status Exam:  General Appearance: appears stated age, well-developed, well-nourished  Arousal: alert  Behavior: cooperative  Movements and Motor Activity: no abnormal involuntary movements noted  Orientation: oriented to person, place, time, and situation  Speech: normal rate, normal rhythm, normal volume, normal tone  Mood: "All right"  Affect: constricted  Thought Process: linear  Associations: intact  Thought Content and Perceptions: no suicidal ideation, no homicidal ideation, no auditory hallucinations, no visual hallucinations, no paranoid ideation, no ideas of reference  Recent and Remote Memory: recent memory intact, remote memory intact; per interview/observation with patient  Attention and Concentration: intact, attentive to conversation; per interview/observation with patient  Fund of Knowledge: intact, aware of current events, " vocabulary appropriate; based on history, vocabulary, fund of knowledge, syntax, grammar, and content  Insight: questionable; based on understanding of severity of illness and HPI  Judgment: questionable; based on patient's behavior and HPI      ASSESSMENT/PLAN:   Problems Addressed/Diagnoses:  Bipolar Disorder, most recent episode depressed, severe (F31.4)  Methamphetamine use disorder severe (F15.20)  Cannabis use disorder severe (F12.20)    Past Medical History:   Diagnosis Date    Bipolar affective     Depression     HIV (human immunodeficiency virus infection)     Schizophrenia         Plan:  Bipolar disorder  -Continue Depakote, Wellbutrin, Zyprexa, and Seroquel    Methamphetamine use  -Group/Individual psychotherapy    Cannabis use  -Group/Individual psychotherapy      Expected Disposition Plan: Home        Yuriy Francis, PMCAMRONP-BC

## 2023-09-07 NOTE — NURSING
"Daily Nursing Note:        Behavior:     Patient (Chon Parham is a 33 y.o. male, : 1990, MRN: [id number]) demonstrating an affect that was flat. Chon demonstrating mood that is pleasant and appropriate. Chon had an appearance that was disheveled. Chon denies suicidal ideation. Chon denies suicide plan. Chon denies homicidal ideation. Chon denies hallucinations.     Chon's  height is 5' 9" (1.753 m) and weight is 77.1 kg (170 lb). His oral temperature is 98 °F (36.7 °C). His blood pressure is 119/77 and his pulse is 74. His respiration is 18 and oxygen saturation is 99%.   Patient comes to treatment team calm cooperative and pleasant. He has good insight and able to answers questions. He denies HI/SI/ or AVH        Intervention:     Encourage Chon to perform self-hygiene, grooming, and changing of clothing. Monitor Chon's behavior and program compliance. Monitor Chon for suicidal ideation, homicidal ideation, sleep disturbance, and hallucinations. Encourage Chon to eat all portions of meals and assess for meal preferences. Monitor Chon for intake and output to ensure hydration. Notify the Physician/Physician Assistant/Advance Practice Registered Nurse (MD/PA/APRN) for any medication refusal and any change in patient condition.        Response:     Chon verbalizes understand of unit process and procedures. Chon reported medications are working well.        Plan:      Continue to monitor per MD/PA/APRN orders; maintain patient safety.  "

## 2023-09-07 NOTE — GROUP NOTE
Group Psychotherapy       Group Focus: Meds      Number of patients in attendance: 7    Group Start Time: 2030  Group End Time:  2100  Groups Date: 9/6/2023  Group Topic:  Behavioral Health  Group Department: [facility] - Behavioral Health Unit  Group Facilitators:  Lisandra Quinones RN  _____________________________________________________________________    Patient Name: Chon Parham  MRN: [id number]  Patient Class: IP- Psych   Admission Date\Time: 8/31/2023  5:10 PM  Hospital Length of Stay: 7  Primary Care Provider: Shavon Rodriguez NP     Referred by: Acute Psychiatry Unit Treatment Team     Target symptoms: Depression     Patient's response to treatment: Active Listening     Progress toward goals: Progressing adequately     Interval History:      Diagnosis: Depression     Plan: Continue treatment on APU

## 2023-09-08 NOTE — DISCHARGE SUMMARY
"DISCHARGE SUMMARY  PSYCHIATRY      Admit Date: 8/31/2023  5:10 PM    Discharge Date:  9/7/2023    SITE:   [facility]    Discharge Attending Physician: Flip Thomas M.D.    Chief Complaint:  "All right"    History of Present Illness On Admit:   Chon Parham is a 33 y.o. male placed under a PEC at [facility] due to reported suicidal ideations after apparently not being on his medication for approximately two weeks.  Patient was recently inpatient here in March of this year. He states that he has been compliant with his medications up until recently however I do not see any medication refills past April of this year. Calm and cooperative this morning. States that he has voices whispering to him to harm himself thought he states he would not harm himself.  Patient does admit to using Meth once he stopped taking his medication and would like o go to rehab at discharge so that he does not restart this. Will restart home psychiatric medication. Admit for medication management and safety monitoring.       Admit Mental Status Exam:  General Appearance: appears stated age, well-developed, well-nourished  Arousal: alert  Behavior: cooperative  Movements and Motor Activity: no abnormal involuntary movements noted  Orientation: oriented to person, place, time, and situation  Speech: normal rate, normal rhythm, normal volume, normal tone  Mood: "All right"  Affect: mood-congruent  Thought Process: linear  Associations: intact  Thought Content and Perceptions: recent suicidal ideation reported, no homicidal ideation, recent delusions reported  Recent and Remote Memory: recent memory intact, remote memory intact; per interview/observation with patient  Attention and Concentration: grossly intact, ; per interview/observation with patient  Fund of Knowledge: intact, aware of current events, vocabulary appropriate; based on history, vocabulary, fund of knowledge, syntax, grammar, and " content  Insight: questionable; based on understanding of severity of illness and HPI  Judgment: questionable; based on patient's behavior and HPI      Diagnoses:  PRINCIPAL PROBLEM:  Bipolar affective disorder, mixed, severe, with psychotic behavior      PROBLEM LIST    Bipolar affective disorder, mixed, severe, with psychotic behavior    HIV (human immunodeficiency virus infection)    Methamphetamine addiction    Cannabis dependence, continuous        Hospital Course:   Patient was admitted to [facility] and started on Wellbutrin, Depakote, Zyprexa, and Seroquel      9/4/23  Staff reports no acute behavioral issues over the weekend.  Of note, patient was here in March of this year, and was discharged due to volitional threatening and physical agitation.  He is much calmer during this admission.  States that he is interested in going to rehab at discharge.  His preference would be [place].  Tolerating current medication regimen without issues.  Will continue current medication regimen and will augment as needed.     JUSTIFICATION FOR TWO ANTIPSYCHOTICS:  Patient was admitted to the unit on an outpatient medication regimen that consisted of two antipsychotics.  Because of the chronicity of this patient's condition, and the acuity of symptoms during hospitalization, we decided to continue this regimen during hospitalization, but always recommend using the least amount of medication necessary to appropriately address the issue.  Patient should discuss tapering/discontinuation of one of these agents with patient's outpatient provider.        UDS: (+)amphetamines, cannabinoids  Blood Alcohol: <10      9/5/23  Patient states that he is feeling much better today.   He has been calm, cooperative, and polite throughout this stay.  States that he changed his mind about going to rehab at discharge but is interested in IOP. Tolerating current medication regimen without issues.  Will continue current medication regimen and will  "augment as needed.      9/6/23  Staff reports that, yesterday, patient did not attend group therapy or interact much with peers.  Patient will return home and f/u with [facility].  No overt behavioral issues. Patient was very pleasant and appeared to be in a positive mood. States that he will return to work today.  Tolerating current medication regimen without issues.  No tremors, rigidity, extrapyramidal symptoms, or excessive sedation were noted at discharge.  Will continue current plan of care and will proceed with discharge today.            DISCHARGE EXAMINATION    VITALS   Vitals:    09/04/23 0701 09/05/23 0701 09/06/23 0701 09/07/23 0701   BP: 127/76 119/77 (!) 144/76 121/78   BP Location: Left arm Left arm     Pulse: 88 74 72 77   Resp: 18 18 18 16   Temp: 98.6 °F (37 °C) 98 °F (36.7 °C) 97.9 °F (36.6 °C) 98.1 °F (36.7 °C)   TempSrc: Oral Oral     SpO2: 100% 99% 99% 97%   Weight:       Height:             Discharge Mental Status Exam:  General Appearance: appears stated age, well-developed, well-nourished  Arousal: alert  Behavior: cooperative  Movements and Motor Activity: no abnormal involuntary movements noted  Orientation: oriented to person, place, time, and situation  Speech: normal rate, normal rhythm, normal volume, normal tone  Mood: "All right"  Affect: constricted  Thought Process: linear  Associations: intact  Thought Content and Perceptions: no suicidal ideation, no homicidal ideation, no auditory hallucinations, no visual hallucinations, no paranoid ideation, no ideas of reference  Recent and Remote Memory: recent memory intact, remote memory intact; per interview/observation with patient  Attention and Concentration: intact, attentive to conversation; per interview/observation with patient  Fund of Knowledge: intact, aware of current events, vocabulary appropriate; based on history, vocabulary, fund of knowledge, syntax, grammar, and content  Insight: questionable; based on " understanding of severity of illness and HPI  Judgment: questionable; based on patient's behavior and HPI      Discharge Condition:  Stable    Prognosis:  Fair    Justification for >1 antipsychotic:  Patient was admitted to the unit on an outpatient medication regimen that consisted of two antipsychotics.  Because of the chronicity of this patient's condition, and the acuity of symptoms during hospitalization, we decided to continue this regimen during hospitalization, but always recommend using the least amount of medication necessary to appropriately address the issue.  Patient should discuss tapering/discontinuation of one of these agents with patient's outpatient provider.    Disposition:  discharged to home    Follow-up:     Follow-up Information       [facility] Follow up.    [facility]  Contact information:  [address] [place] [address] [phone]                             Medication Regimen:  No current facility-administered medications for this encounter.    Current Outpatient Medications:     ffrpwlnue-ojunbyjj-kkgwwcz ala (BIKTARVY) -25 mg (25 kg or greater), Take 1 tablet by mouth once daily., Disp: 30 tablet, Rfl: 0    buPROPion (WELLBUTRIN XL) 150 MG TB24 tablet, Take 1 tablet (150 mg total) by mouth once daily., Disp: 30 tablet, Rfl: 0    divalproex 500 MG Tb24, Take 1 tablet (500 mg total) by mouth once daily., Disp: 30 tablet, Rfl: 0    OLANZapine (ZYPREXA) 10 MG tablet, Take 1 tablet (10 mg total) by mouth once daily., Disp: 30 tablet, Rfl: 0    QUEtiapine (SEROQUEL) 100 MG Tab, Take 1 tablet (100 mg total) by mouth every evening., Disp: 30 tablet, Rfl: 11      Patient Instructions:   Continue medication regimen as prescribed.    Disposition plan per  - see  notes for details.    Patient instructed to call 911 or present to emergency department if any of the following complications develop status  post discharge: suicidality, homicidality, or grave disability.     Total time spent discharging patient: <30 minutes      Flip Thomas M.D.

## 2023-09-27 ENCOUNTER — HOSPITAL ENCOUNTER (INPATIENT)
Facility: HOSPITAL | Age: 33
LOS: 2 days | Discharge: HOME | DRG: 885 | End: 2023-09-29
Attending: PSYCHIATRY & NEUROLOGY | Admitting: PSYCHIATRY & NEUROLOGY
Payer: MEDICAID

## 2023-09-27 ENCOUNTER — HOSPITAL ENCOUNTER (OUTPATIENT)
Facility: HOSPITAL | Age: 33
Discharge: HOME | DRG: 885 | End: 2023-09-27
Attending: PSYCHIATRY & NEUROLOGY | Admitting: PSYCHIATRY & NEUROLOGY
Payer: MEDICAID

## 2023-09-27 VITALS
BODY MASS INDEX: 22.69 KG/M2 | HEART RATE: 99 BPM | DIASTOLIC BLOOD PRESSURE: 73 MMHG | HEIGHT: 71 IN | TEMPERATURE: 98 F | WEIGHT: 162.06 LBS | RESPIRATION RATE: 22 BRPM | OXYGEN SATURATION: 95 % | SYSTOLIC BLOOD PRESSURE: 134 MMHG

## 2023-09-27 DIAGNOSIS — F29 PSYCHOSIS: ICD-10-CM

## 2023-09-27 PROBLEM — F39 MODERATE MOOD DISORDER: Status: ACTIVE | Noted: 2023-09-27

## 2023-09-27 PROCEDURE — 25000003 PHARM REV CODE 250: Performed by: PSYCHIATRY & NEUROLOGY

## 2023-09-27 PROCEDURE — 12400001 HC PSYCH SEMI-PRIVATE ROOM

## 2023-09-27 RX ORDER — IBUPROFEN 200 MG
1 TABLET ORAL DAILY
Status: DISCONTINUED | OUTPATIENT
Start: 2023-09-27 | End: 2023-09-27 | Stop reason: HOSPADM

## 2023-09-27 RX ORDER — HYDROXYZINE HYDROCHLORIDE 50 MG/1
50 TABLET, FILM COATED ORAL EVERY 4 HOURS PRN
Status: DISCONTINUED | OUTPATIENT
Start: 2023-09-27 | End: 2023-09-27 | Stop reason: HOSPADM

## 2023-09-27 RX ORDER — ADHESIVE BANDAGE
30 BANDAGE TOPICAL DAILY PRN
Status: DISCONTINUED | OUTPATIENT
Start: 2023-09-27 | End: 2023-09-27 | Stop reason: HOSPADM

## 2023-09-27 RX ORDER — ACETAMINOPHEN 325 MG/1
650 TABLET ORAL EVERY 6 HOURS PRN
Status: DISCONTINUED | OUTPATIENT
Start: 2023-09-27 | End: 2023-09-27 | Stop reason: HOSPADM

## 2023-09-27 RX ORDER — LORAZEPAM 2 MG/ML
2 INJECTION INTRAMUSCULAR EVERY 6 HOURS PRN
Status: DISCONTINUED | OUTPATIENT
Start: 2023-09-27 | End: 2023-09-29 | Stop reason: HOSPADM

## 2023-09-27 RX ORDER — DIPHENHYDRAMINE HCL 50 MG
50 CAPSULE ORAL EVERY 6 HOURS PRN
Status: DISCONTINUED | OUTPATIENT
Start: 2023-09-27 | End: 2023-09-29 | Stop reason: HOSPADM

## 2023-09-27 RX ORDER — DIPHENHYDRAMINE HYDROCHLORIDE 50 MG/ML
50 INJECTION INTRAMUSCULAR; INTRAVENOUS EVERY 6 HOURS PRN
Status: DISCONTINUED | OUTPATIENT
Start: 2023-09-27 | End: 2023-09-29 | Stop reason: HOSPADM

## 2023-09-27 RX ORDER — LORAZEPAM 2 MG/ML
2 INJECTION INTRAMUSCULAR EVERY 6 HOURS PRN
Status: DISCONTINUED | OUTPATIENT
Start: 2023-09-27 | End: 2023-09-27 | Stop reason: HOSPADM

## 2023-09-27 RX ORDER — ONDANSETRON 4 MG/1
4 TABLET, ORALLY DISINTEGRATING ORAL EVERY 6 HOURS PRN
Status: DISCONTINUED | OUTPATIENT
Start: 2023-09-27 | End: 2023-09-27 | Stop reason: HOSPADM

## 2023-09-27 RX ORDER — MAG HYDROX/ALUMINUM HYD/SIMETH 200-200-20
30 SUSPENSION, ORAL (FINAL DOSE FORM) ORAL EVERY 6 HOURS PRN
Status: DISCONTINUED | OUTPATIENT
Start: 2023-09-27 | End: 2023-09-27 | Stop reason: HOSPADM

## 2023-09-27 RX ORDER — LORAZEPAM 1 MG/1
2 TABLET ORAL EVERY 6 HOURS PRN
Status: DISCONTINUED | OUTPATIENT
Start: 2023-09-27 | End: 2023-09-27 | Stop reason: HOSPADM

## 2023-09-27 RX ORDER — HALOPERIDOL 5 MG/1
10 TABLET ORAL EVERY 4 HOURS PRN
Status: DISCONTINUED | OUTPATIENT
Start: 2023-09-27 | End: 2023-09-29 | Stop reason: HOSPADM

## 2023-09-27 RX ORDER — HALOPERIDOL 5 MG/ML
10 INJECTION INTRAMUSCULAR EVERY 6 HOURS PRN
Status: DISCONTINUED | OUTPATIENT
Start: 2023-09-27 | End: 2023-09-29 | Stop reason: HOSPADM

## 2023-09-27 RX ORDER — HALOPERIDOL 5 MG/1
10 TABLET ORAL EVERY 6 HOURS PRN
Status: DISCONTINUED | OUTPATIENT
Start: 2023-09-27 | End: 2023-09-27 | Stop reason: HOSPADM

## 2023-09-27 RX ORDER — PROMETHAZINE HYDROCHLORIDE 25 MG/1
25 TABLET ORAL EVERY 6 HOURS PRN
Status: DISCONTINUED | OUTPATIENT
Start: 2023-09-27 | End: 2023-09-27 | Stop reason: HOSPADM

## 2023-09-27 RX ORDER — ADHESIVE BANDAGE
30 BANDAGE TOPICAL DAILY PRN
Status: DISCONTINUED | OUTPATIENT
Start: 2023-09-27 | End: 2023-09-29 | Stop reason: HOSPADM

## 2023-09-27 RX ORDER — HYDROXYZINE HYDROCHLORIDE 50 MG/1
50 TABLET, FILM COATED ORAL EVERY 6 HOURS PRN
Status: DISCONTINUED | OUTPATIENT
Start: 2023-09-27 | End: 2023-09-29 | Stop reason: HOSPADM

## 2023-09-27 RX ORDER — DIPHENHYDRAMINE HYDROCHLORIDE 50 MG/ML
50 INJECTION INTRAMUSCULAR; INTRAVENOUS EVERY 6 HOURS PRN
Status: DISCONTINUED | OUTPATIENT
Start: 2023-09-27 | End: 2023-09-27 | Stop reason: HOSPADM

## 2023-09-27 RX ORDER — TRAZODONE HYDROCHLORIDE 100 MG/1
100 TABLET ORAL NIGHTLY PRN
Status: DISCONTINUED | OUTPATIENT
Start: 2023-09-27 | End: 2023-09-29 | Stop reason: HOSPADM

## 2023-09-27 RX ORDER — DIPHENHYDRAMINE HCL 50 MG
50 CAPSULE ORAL EVERY 6 HOURS PRN
Status: DISCONTINUED | OUTPATIENT
Start: 2023-09-27 | End: 2023-09-27 | Stop reason: HOSPADM

## 2023-09-27 RX ORDER — LORAZEPAM 1 MG/1
2 TABLET ORAL EVERY 6 HOURS PRN
Status: DISCONTINUED | OUTPATIENT
Start: 2023-09-27 | End: 2023-09-29 | Stop reason: HOSPADM

## 2023-09-27 RX ORDER — TRAZODONE HYDROCHLORIDE 100 MG/1
100 TABLET ORAL NIGHTLY PRN
Status: DISCONTINUED | OUTPATIENT
Start: 2023-09-27 | End: 2023-09-27 | Stop reason: HOSPADM

## 2023-09-27 RX ORDER — HALOPERIDOL 5 MG/ML
10 INJECTION INTRAMUSCULAR EVERY 6 HOURS PRN
Status: DISCONTINUED | OUTPATIENT
Start: 2023-09-27 | End: 2023-09-27 | Stop reason: HOSPADM

## 2023-09-27 RX ADMIN — HYDROXYZINE HYDROCHLORIDE 50 MG: 50 TABLET, FILM COATED ORAL at 11:09

## 2023-09-27 NOTE — DISCHARGE SUMMARY
Patient reported to [facility] from the ED.  While in the intake room with the RN, when asked to remove his sock, he removed a blue bill from a sock and consumed it.  He admitted to staff that this was ecstasy.  Patient was transported back to [facility] ED for monitoring prior to being seen by providers.     Please see note by RN on 9/27.    Filp Thomas M.D.

## 2023-09-27 NOTE — H&P
Patient reported to [facility] from the ED.  While in the intake room with the RN, when asked to remove his sock, he removed a blue bill from a sock and consumed it.  He admitted to staff that this was ecstasy.  Patient was transported back to [facility] ED for monitoring.    Please see note by RN on 9/27.      Flip Thomas M.D.

## 2023-09-27 NOTE — NURSING
"Pt was admitted to [facility]. While in the Intake Room, he was being searched by SUHAIL SONG. He was asked to remove his socks. When removing socks, he had a blue pill which he consumed. After questioning the patient, he admitted it was "a jigga" which is MDMA (Ecstasy). [facility] ER phoned and report given to JOSE Burris. The accepting MD is Dr. Branham. Dr. Thomas notified and he voiced understanding. Shawanda Hernandez, [facility] Nursing notified of patient being transported back to [facility] ER due to him consuming this MDMA pill. She voiced understanding. Kari Baron,  Supervisor notified and she voiced understanding. Patient left [facility] per [facility]. He was AAO X4. No signs of distress noted. His discharge vital signs are as follows: T-36.9, R-22, P-99, BP-134/73, and pulse ox-95%.  "

## 2023-09-28 PROCEDURE — 25000003 PHARM REV CODE 250: Performed by: PSYCHIATRY & NEUROLOGY

## 2023-09-28 PROCEDURE — 12400001 HC PSYCH SEMI-PRIVATE ROOM

## 2023-09-28 PROCEDURE — 25000003 PHARM REV CODE 250

## 2023-09-28 RX ORDER — DIVALPROEX SODIUM 500 MG/1
500 TABLET, FILM COATED, EXTENDED RELEASE ORAL DAILY
Status: DISCONTINUED | OUTPATIENT
Start: 2023-09-28 | End: 2023-09-29 | Stop reason: HOSPADM

## 2023-09-28 RX ORDER — ACETAMINOPHEN 325 MG/1
650 TABLET ORAL EVERY 6 HOURS PRN
Status: DISCONTINUED | OUTPATIENT
Start: 2023-09-28 | End: 2023-09-29 | Stop reason: HOSPADM

## 2023-09-28 RX ORDER — QUETIAPINE FUMARATE 100 MG/1
100 TABLET, FILM COATED ORAL NIGHTLY
Status: DISCONTINUED | OUTPATIENT
Start: 2023-09-28 | End: 2023-09-29 | Stop reason: HOSPADM

## 2023-09-28 RX ORDER — BUPROPION HYDROCHLORIDE 150 MG/1
150 TABLET ORAL DAILY
Status: DISCONTINUED | OUTPATIENT
Start: 2023-09-28 | End: 2023-09-29 | Stop reason: HOSPADM

## 2023-09-28 RX ORDER — OLANZAPINE 5 MG/1
10 TABLET ORAL DAILY
Status: DISCONTINUED | OUTPATIENT
Start: 2023-09-28 | End: 2023-09-29 | Stop reason: HOSPADM

## 2023-09-28 RX ADMIN — BUPROPION HYDROCHLORIDE 150 MG: 150 TABLET, FILM COATED, EXTENDED RELEASE ORAL at 11:09

## 2023-09-28 RX ADMIN — QUETIAPINE FUMARATE 100 MG: 100 TABLET ORAL at 08:09

## 2023-09-28 RX ADMIN — HYDROXYZINE HYDROCHLORIDE 50 MG: 50 TABLET, FILM COATED ORAL at 11:09

## 2023-09-28 RX ADMIN — DIVALPROEX SODIUM 500 MG: 500 TABLET, FILM COATED, EXTENDED RELEASE ORAL at 11:09

## 2023-09-28 RX ADMIN — OLANZAPINE 10 MG: 5 TABLET, FILM COATED ORAL at 11:09

## 2023-09-28 NOTE — GROUP NOTE
Group Psychotherapy       Group Focus: Life Skills      Number of patients in attendance: 10    Introduction to DBT skills and 4 skill sets used in DBT: mindfulness, distress  tolerance, emotion regulation and interpersonal effectiveness.       Group Start Time: 1045  Group End Time:  1130  Groups Date: 9/28/2023  Group Topic:  Behavioral Health  Group Department: [facility] Behavioral Health Unit  Group Facilitators:  Kari Baron SSW   _____________________________________________________________________    Patient Name: Chon Parham  MRN: [id number]  Patient Class: IP- Psych   Admission Date\Time: 9/27/2023 10:15 PM  Hospital Length of Stay: 1  Primary Care Provider: Nora, Primary Doctor     Referred by: Acute Psychiatry Unit Treatment Team     Target symptoms: Poor Coping Skills     Patient's response to treatment: Not Participating; pt entered group toward the end of the session and did not participate in discussion     Progress toward goals: Minimal progress     Interval History:      Diagnosis:      Plan: Continue treatment on APU

## 2023-09-28 NOTE — NURSING
"PRN Medication Follow-up Note:    Behavior:    Patient (Chon Parham is a 33 y.o. male, : 1990, MRN: [id number])     Allergies: Patient has no known allergies.    Chon's  height is 5' 11" (1.803 m) and weight is 73.7 kg (162 lb 7.7 oz). His temperature is 97.9 °F (36.6 °C). His blood pressure is 133/68 and his pulse is 71. His respiration is 18 and oxygen saturation is 99%.     Administered _ Atarax 50 mg.,______ per physician order to Chon       Intervention:    Intervention to Chon's response: Administer medication as ordered. ________.       Response:    Chon's response: Decreasing anxiety._________      Plan:     Continue to monitor per MD/PA/APRN orders; and reevaluate medication effectiveness within 30 minutes.    "

## 2023-09-28 NOTE — PLAN OF CARE
Behavioral Health Unit  Psychosocial History and Assessment  Progress Note      Patient Name: Chon Parham YOB: 1990 SW: Elza Brice Date: 9/28/2023    Chief Complaint: anxiety    Consent:     Did the patient consent for an interview with the ? Yes    Did the patient consent for the  to contact family/friend/caregiver?   Yes  Name: Gini Parham, Relationship: Mother, and Contact: [phone]    Did the patient give consent for the  to inform family/friend/caregiver of his/her whereabouts or to discuss discharge planning? Yes    Source of Information: Face to face with patient    Is information obtained from interviews considered reliable?   yes    Reason for Admission:     There are no hospital problems to display for this patient.      History of Present Illness - (Patient Perception):   Pt stated that he took too many jigga's and he got scared because he felt weird and didn't know what to do        Present biopsychosocial functioning: mild symptoms    Past biopsychosocial functioning: history of higher functioning    Family and Marital/Relationship History:     Significant Other/Partner Relationships:  Single:  Relationship cutoff    Family Relationships: Intact      Childhood History:     Where was patient raised? [place], [place]     Who raised the patient? Mom      How does patient describe their childhood? I was curious jazmin      Who is patient's primary support person? Weed and music      Culture and Restorationism:     Restorationism: Non-Jew    How strong of a role does Adventism and spirituality play in patient's life? Little to none    Bahai or spiritual concerns regarding treatment: observation of holy days     History of Abuse:   History of Abuse: Denies      Outcome: denies    Psychiatric and Medical History:     History of psychiatric illness or treatment: prior inpatient treatment and has participated in counseling/psychotherapy on an  outpatient basis in the past    Medical history:   Past Medical History:   Diagnosis Date    Bipolar affective     Depression     HIV (human immunodeficiency virus infection)     Schizophrenia        Substance Abuse History:     Alcohol - (Patient Perspective): pt states that he does not drink alcohol regularly  Social History     Substance and Sexual Activity   Alcohol Use Yes         Drugs - (Patient Perspective): pt states that he smokes weeds and pops jigga's regulary  Social History     Substance and Sexual Activity   Drug Use Yes    Types: Marijuana, Methamphetamines    Comment: Precription Drugs         Education:     Currently Enrolled? No  Grade School (K-8)    Special Education? No    Interested in Completing Education/GED: No    Employment and Financial:     Currently employed? Employed: Current Occupation: [employer]    Source of Income: salary    Able to afford basic needs (food, shelter, utilities)? Yes    Who manages finances/personal affairs? self      Service:     [place]? no    Combat Service? No     Community Resources:     Describe present use of community resources: inpatient mental health, [facility]     Identify previously used community resources   (Include previous mental health treatment - outpatient and inpatient): inpatient mental health, outpatient mental health, [facility]    Environmental:     Current living situation:Lives with family    Social Evaluation:     Patient Assets: General fund of knowledge, Supportive family/friends, Work skills, and Communicable skills    Patient Limitations: limited education, physical health    High risk psychosocial issues that may impact discharge planning:   None at this time    Recommendations:     Anticipated discharge plan:   Home; outpatient follow up    High risk issues requiring early treatment planning and immediate intervention: none at this time    Community resources needed for discharge planning:  aftercare  treatment sources    Anticipated social work role(s) in treatment and discharge planning: advice and [place], groups, individual sessions as needed and aftercare referrals     09/28/23 1123   Initial Information   Source of Information patient   Reason for Admission anxiety   Patient Aware of Diagnosis yes   Arrived From emergency department   Spiritual Beliefs   Spiritual, Cultural Beliefs, Islam Practices, Values that Affect Care no   Substance Use/Withdrawal   Substance Use Current, used prior to admission   Additional Tobacco Use   How many cigarettes do you typically have per day? 3   Abuse Screen (yes response referral indicated)   Feels Unsafe at Home or Work/School no   Feels Threatened by Someone no   Does anyone try to keep you from having contact with others or doing things outside your home? no   AUDIT-C (Alcohol Use Disorders ID Test)   Alcohol Use In Past Year 1-->monthly or less   Alcohol Amount Per Day In Past Year 0-->one or two   More Than 6 Drinks On One Occasion In Past Year 0-->never   Total Audit C Score 1

## 2023-09-28 NOTE — NURSING
"PRN Administration Note:    Behavior:    Patient (Chon Parham is a 33 y.o. male, : 1990, MRN: [id number])     Allergies: Patient has no known allergies.    Chon's  height is 5' 11" (1.803 m) and weight is 73.7 kg (162 lb 7.7 oz). His temperature is 97.9 °F (36.6 °C). His blood pressure is 133/68 and his pulse is 71. His respiration is 18 and oxygen saturation is 99%.     Reason for PRN Administration: Complaints of anxiety. _________.    Intervention:    Administered Atarax 50 mg.,_______ per physician order to Chon       Response:    Chon tolerated administration well.      Plan:     Continue to monitor per MD/PA/APRN orders; and reevaluate medication effectiveness within 30 minutes.    "

## 2023-09-28 NOTE — PROGRESS NOTES
"Chon is a 33 male admitted for Bipolar Disorder, most recent episodes mixed, Methamphetamine use disorder severe, Cocaine use disorder severe, and Cannabis use disorder severe, with a uds +amp, cocaine, and cannabis. CTRS met with Pt 1:1, Chon was guarded but cooperative, reporting ability to perform his ADL's. CTRS educated PT to TR group times and dates, with Chon uncertain attending TR groups; CTRS encouraged Pt to attend. Chon reported his treatment goal as "Stop druging".     09/28/23 1407   General   Admit Date 09/27/23   Primary Diagnosis Bipolar Disorder, most recent episodes mixed   Secondary Diagnosis Methamphetamine use disorder severe, Cocaine use disorder severe, and Cannabis use disorder severe   [place] spiritual   Number of Children 1   Children Living? 1   Occupation    Does the patient have dentures? No   If you were to take part in activities, which of the following would you prefer? Activities that you do alone   Do you feel like you have enough to keep you busy now? Yes   Do you believe that you have the opportunity for physical activity? Yes   Activity Capabilities Minimum   Subjective   Patient states I got over amped, over high and panicked   Assessment   Mobility ambulates independently   Transfers independently   Musculoskeletal   (none)   Visual Acuity normal vision   Visual Perception depth perception;color perception;recognizes letters;recognizes numbers   Hearing normal   Speech/Communication normal   Cognitive Concerns oriented x4   Emotional Concerns poor self image   Leisure Interest Survey   Leisure Interest Survey Yes   Social/Group Activities   Team Sports Current Interest   Parties/Seasonal Program Current Interest   Shopping Current Interest   Solitary Activities   Watching TV Current Interest   Watching Videos Current Interest   Music Listening Current Interest   Physical Activities   Fitness/Exercise Programs Current Interest   Weightlifting Current " Interest   Walk/Run Current Interest   Creative Activities   Singing Current Interest   Outdoor Activities   Bicycling Current Interest   Fishing Current Interest   Camping Current Interest   Water Sports Current Interest   Horseback Riding Current Interest   Spectator Events   Sporting Events Current Interest   Goals   Additional Documentation yes   Goal Formulation With patient   Time For Goal Achievement 7 days   Goal 1 stop druging   Plan   Planned Therapy Intervention Group Recreational Therapy   Expected Length of Stay 5-7days   PT Frequency Minimum of 3 visits per week

## 2023-09-28 NOTE — PLAN OF CARE
Problem: Violence Risk or Actual  Goal: Anger and Impulse Control  Outcome: Ongoing, Progressing     Problem: Adult Inpatient Plan of Care  Goal: Plan of Care Review  Outcome: Ongoing, Progressing  Goal: Patient-Specific Goal (Individualized)  Outcome: Ongoing, Progressing  Goal: Absence of Hospital-Acquired Illness or Injury  Outcome: Ongoing, Progressing  Goal: Optimal Comfort and Wellbeing  Outcome: Ongoing, Progressing  Goal: Readiness for Transition of Care  Outcome: Ongoing, Progressing     Problem: Activity and Energy Impairment (Excessive Substance Use)  Goal: Optimized Energy Level (Excessive Substance Use)  Outcome: Ongoing, Progressing     Problem: Behavior Regulation Impairment (Excessive Substance Use)  Goal: Improved Behavioral Control (Excessive Substance Use)  Outcome: Ongoing, Progressing     Problem: Decreased Participation and Engagement (Excessive Substance Use)  Goal: Increased Participation and Engagement (Excessive Substance Use)  Outcome: Ongoing, Progressing     Problem: Physiologic Impairment (Excessive Substance Use)  Goal: Improved Physiologic Symptoms (Excessive Substance Use)  Outcome: Ongoing, Progressing     Problem: Social, Occupational or Functional Impairment (Excessive Substance Use)  Goal: Enhanced Social, Occupational or Functional Skills (Excessive Substance Use)  Outcome: Ongoing, Progressing     Problem: Behavior Regulation Impairment (Psychotic Signs/Symptoms)  Goal: Improved Behavioral Control (Psychotic Signs/Symptoms)  Outcome: Ongoing, Progressing     Problem: Cognitive Impairment (Psychotic Signs/Symptoms)  Goal: Optimal Cognitive Function (Psychotic Signs/Symptoms)  Outcome: Ongoing, Progressing     Problem: Decreased Participation and Engagement (Psychotic Signs/Symptoms)  Goal: Increased Participation and Engagement (Psychotic Signs/Symptoms)  Outcome: Ongoing, Progressing     Problem: Mood Impairment (Psychotic Signs/Symptoms)  Goal: Improved Mood Symptoms  (Psychotic Signs/Symptoms)  Outcome: Ongoing, Progressing     Problem: Psychomotor Impairment (Psychotic Signs/Symptoms)  Goal: Improved Psychomotor Symptoms (Psychotic Signs/Symptoms)  Outcome: Ongoing, Progressing     Problem: Sensory Perception Impairment (Psychotic Signs/Symptoms)  Goal: Decreased Sensory Symptoms (Psychotic Signs/Symptoms)  Outcome: Ongoing, Progressing     Problem: Sleep Disturbance (Psychotic Signs/Symptoms)  Goal: Improved Sleep (Psychotic Signs/Symptoms)  Outcome: Ongoing, Progressing     Problem: Social, Occupational or Functional Impairment (Psychotic Signs/Symptoms)  Goal: Enhanced Social, Occupational or Functional Skills (Psychotic Signs/Symptoms)  Outcome: Ongoing, Progressing     Problem: Activity and Energy Impairment (Depressive Signs/Symptoms)  Goal: Optimized Energy Level (Depressive Signs/Symptoms)  Outcome: Ongoing, Progressing     Problem: Cognitive Impairment (Depressive Signs/Symptoms)  Goal: Optimized Cognitive Function  Outcome: Ongoing, Progressing     Problem: Decreased Participation/Engagement (Depressive Signs/Symptoms)  Goal: Increased Participation and Engagement (Depressive Signs/Symptoms)  Outcome: Ongoing, Progressing     Problem: Feelings of Worthlessness, Hopelessness or Excessive Guilt (Depressive Signs/Symptoms)  Goal: Enhanced Self-Esteem and Confidence (Depressive Signs/Symptoms)  Outcome: Ongoing, Progressing     Problem: Mood Impairment (Depressive Signs/Symptoms)  Goal: Improved Mood Symptoms (Depressive Signs/Symptoms)  Outcome: Ongoing, Progressing     Problem: Nutrition Imbalance (Depressive Signs/Symptoms)  Goal: Optimized Nutrition Intake  Outcome: Ongoing, Progressing     Problem: Psychomotor Impairment (Depressive Signs/Symptoms)  Goal: Improved Psychomotor Symptoms (Depressive Signs/Symptoms)  Outcome: Ongoing, Progressing     Problem: Sleep Disturbance (Depressive Signs/Symptoms)  Goal: Improved Sleep (Depressive Signs/Symptoms)  Outcome:  Ongoing, Progressing     Problem: Social, Occupational or Functional Impairment (Depressive Signs/Symptoms)  Goal: Enhanced Social, Occupational or Functional Skills (Depressive Signs/Symptoms)  Outcome: Ongoing, Progressing

## 2023-09-28 NOTE — NURSING
"Admission Note:    Chon Parham is a 33 y.o. male, : 1990, MRN: [id number], admitted on 2023 to [facility] ([facility]) for Flip Thomas MD with a diagnosis of Psychosis [F29]. Patient admitted on a status of Physician Emergency Certificate (PEC). Chon reports no known food or drug allergies.    Patient demonstrated an affect that was flat, anxious, and irritable. Patient demonstrated mood during assessment that was anxious. Patient had an appearance that was disheveled.  Patient denies suicidal ideation. Patient denies suicide plan. Patient denies hallucinations.    Chon's  height is 5' 11" (1.803 m) and weight is 73.7 kg (162 lb 7.7 oz). His temperature is 97.5 °F (36.4 °C). His blood pressure is 158/98 (abnormal) and his pulse is 67. His respiration is 21 (abnormal).     Chon's last BM was noted on: _______    Metal detector screening performed via security personnel. The result of the scan was _______. Head-to-toe physical assessment completed with the following findings:  ________ found upon body screen. A full skin assessment was performed. Novas skin appeared _______.  Chon was oriented to unit, staff, peers, and room. Patient belongings/valuables stored in locked intake room cabinet and changes of clothing provided to patient. Chon was placed on Q 15 min observations.      "

## 2023-09-28 NOTE — H&P
"9/28/2023 10:50 AM   Chon Parham   1990   [id number]            Psychiatry Inpatient Admission Note    Date of Admission: 9/27/2023 10:15 PM    Current Legal Status: PEC    Chief Complaint: "I told the ED I needed a prescription of my meds"    SUBJECTIVE:   History of Present Illness:   Chon Parham is a 33 y.o. male placed under a PEC at [facility] due to reported suicidal ideations after apparently not being on his medication for approximately two weeks.  Patient was recently inpatient here in August of this year. He states that he was compliant with his medications but started using again and let himself get down. Calm and cooperative this morning. Focused on being discharged. Denies any SI/HI. States that now that he is sober he feels fine. Denies any medical history changes. Will restart home psychiatric medication. Admit for medication management and safety monitoring.     UDS: (+)amphetamines, cannabinoids, fentanyl  Alcohol: <10        Past Psychiatric History:   Previous Psychiatric Hospitalizations: [facility] in December 2022  Previous Medication Trials: Seroquel, Zoloft, Depakote  Previous Suicide Attempts: denies   Outpatient psychiatrist: Diana Brooks    Past Medical/Surgical History:   Past Medical History:   Diagnosis Date    Bipolar affective     Depression     HIV (human immunodeficiency virus infection)     Schizophrenia      No past surgical history on file.  denies    Family Psychiatric History:   Mother and a few cousins: bipolar and substance abuse       Allergies:   Review of patient's allergies indicates:  No Known Allergies    Substance Abuse History:   Tobacco: denies  Alcohol: denies  Illicit Substances: Marijuana since age 15 and methamphetamine since age 29  Treatment: Numerous different rehabs and psychiatric facilities      Current Medications:   Home Psychiatric Meds: Seroquel    Scheduled Meds:        PRN Meds: acetaminophen, diphenhydrAMINE, diphenhydrAMINE, haloperidol lactate, " haloperidoL, hydrOXYzine HCL, lorazepam, LORazepam, magnesium hydroxide 400 mg/5 ml, traZODone   Psychotherapeutics (From admission, onward)      Start     Stop Route Frequency Ordered    09/27/23 2314  haloperidoL tablet 10 mg         -- Oral Every 4 hours PRN 09/27/23 2214 09/27/23 2311  haloperidol lactate injection 10 mg         -- IM Every 6 hours PRN 09/27/23 2214 09/27/23 2311  LORazepam injection 2 mg         -- IM Every 6 hours PRN 09/27/23 2214 09/27/23 2311  LORazepam tablet 2 mg         -- Oral Every 6 hours PRN 09/27/23 2214 09/27/23 2311  traZODone tablet 100 mg         -- Oral Nightly PRN 09/27/23 2214              Social History:  Housing Status: Lives with his mother in [place]  Relationship Status/Sexual Orientation: single   Children: 1 son  Education: 8th grade   Employment Status/Info: detailing [employer]    history: denies  History of physical/sexual abuse: denies   Access to gun: yes but not his       Legal History:   Past Charges/Incarcerations: denies   Pending charges: denies      OBJECTIVE:   Medical Review Of Systems:  Constitutional: negative  Respiratory: negative  Cardiovascular: negative  Gastrointestinal: negative  Genitourinary:negative  Musculoskeletal:negative  Neurological: negative      Vitals   Vitals:    09/27/23 2126   BP: (!) 158/98   Pulse: 67   Resp: (!) 21   Temp: 97.5 °F (36.4 °C)        Labs/Imaging/Studies:   Recent Results (from the past 48 hour(s))   Comprehensive Metabolic Panel    Collection Time: 09/27/23  9:58 AM   Result Value Ref Range    Sodium Level 137 136 - 145 mmol/L    Potassium Level 4.0 3.5 - 5.1 mmol/L    Chloride 102 98 - 107 mmol/L    Carbon Dioxide 27 22 - 29 mmol/L    Glucose Level 143 (H) 74 - 100 mg/dL    Blood Urea Nitrogen 9.7 8.9 - 20.6 mg/dL    Creatinine 1.21 (H) 0.73 - 1.18 mg/dL    Calcium Level Total 10.0 8.4 - 10.2 mg/dL    Protein Total 8.1 6.4 - 8.3 gm/dL    Albumin Level 4.3 3.5 - 5.0 g/dL    Globulin 3.8 (H)  2.4 - 3.5 gm/dL    Albumin/Globulin Ratio 1.1 1.1 - 2.0 ratio    Bilirubin Total 0.5 <=1.5 mg/dL    Alkaline Phosphatase 83 40 - 150 unit/L    Alanine Aminotransferase 18 0 - 55 unit/L    Aspartate Aminotransferase 25 5 - 34 unit/L    eGFR >60 mls/min/1.73/m2   Magnesium    Collection Time: 09/27/23  9:58 AM   Result Value Ref Range    Magnesium Level 2.00 1.60 - 2.60 mg/dL   CBC with Differential    Collection Time: 09/27/23  9:58 AM   Result Value Ref Range    WBC 6.43 4.50 - 11.50 x10(3)/mcL    RBC 4.88 4.70 - 6.10 x10(6)/mcL    Hgb 16.1 14.0 - 18.0 g/dL    Hct 47.5 42.0 - 52.0 %    MCV 97.3 (H) 80.0 - 94.0 fL    MCH 33.0 (H) 27.0 - 31.0 pg    MCHC 33.9 33.0 - 36.0 g/dL    RDW 12.8 11.5 - 17.0 %    Platelet 248 130 - 400 x10(3)/mcL    MPV 10.3 7.4 - 10.4 fL    Neut % 60.5 %    Lymph % 29.2 %    Mono % 8.6 %    Eos % 1.4 %    Basophil % 0.3 %    Lymph # 1.88 0.6 - 4.6 x10(3)/mcL    Neut # 3.89 2.1 - 9.2 x10(3)/mcL    Mono # 0.55 0.1 - 1.3 x10(3)/mcL    Eos # 0.09 0 - 0.9 x10(3)/mcL    Baso # 0.02 <=0.2 x10(3)/mcL    IG# 0.00 0 - 0.04 x10(3)/mcL    IG% 0.0 %    NRBC% 0.0 %   Drug Screen, Urine    Collection Time: 09/27/23 10:08 AM   Result Value Ref Range    Amphetamines, Urine Positive (A) Negative    Barbituates, Urine Negative Negative    Benzodiazepine, Urine Negative Negative    Cannabinoids, Urine Positive (A) Negative    Cocaine, Urine Positive (A) Negative    Fentanyl, Urine Negative Negative    MDMA, Urine Negative Negative    Opiates, Urine Negative Negative    Phencyclidine, Urine Negative Negative    pH, Urine 8.0 3.0 - 11.0   Urinalysis, Reflex to Urine Culture    Collection Time: 09/27/23 10:08 AM    Specimen: Urine   Result Value Ref Range    Color, UA Yellow Yellow, Light-Yellow, Dark Yellow, Kari, Straw    Appearance, UA Clear Clear    Specific Gravity, UA 1.019 1.005 - 1.030    pH, UA 8.0 5.0 - 8.5    Protein, UA Negative Negative    Glucose, UA Negative Negative, Normal    Ketones, UA Negative  Negative    Blood, UA Negative Negative    Bilirubin, UA Negative Negative    Urobilinogen, UA 1.0 0.2, 1.0, Normal    Nitrites, UA Negative Negative    Leukocyte Esterase, UA Negative Negative   COVID-19 Rapid Screening    Collection Time: 09/27/23 10:08 AM   Result Value Ref Range    SARS COV-2 MOLECULAR Negative Negative   Urinalysis, Microscopic    Collection Time: 09/27/23 10:08 AM   Result Value Ref Range    RBC, UA 0-5 None Seen, 0-2, 3-5, 0-5 /HPF    WBC, UA 0-2 None Seen, 0-2, 3-5, 0-5 /HPF    Squamous Epithelial Cells, UA 0-4 0-4, None Seen /HPF    Bacteria, UA None Seen None Seen, Rare, Occasional /HPF   Drug Screen, Urine    Collection Time: 09/27/23  4:47 PM   Result Value Ref Range    Amphetamines, Urine Positive (A) Negative    Barbituates, Urine Negative Negative    Benzodiazepine, Urine Negative Negative    Cannabinoids, Urine Positive (A) Negative    Cocaine, Urine Positive (A) Negative    Fentanyl, Urine Negative Negative    MDMA, Urine Negative Negative    Opiates, Urine Negative Negative    Phencyclidine, Urine Negative Negative    pH, Urine 8.0 3.0 - 11.0   Urinalysis, Reflex to Urine Culture    Collection Time: 09/27/23  4:47 PM    Specimen: Urine   Result Value Ref Range    Color, UA Dark Yellow Yellow, Light-Yellow, Dark Yellow, Kari, Straw    Appearance, UA Clear Clear    Specific Gravity, UA 1.024 1.005 - 1.030    pH, UA 6.0 5.0 - 8.5    Protein, UA Negative Negative    Glucose, UA Negative Negative, Normal    Ketones, UA Trace (A) Negative    Blood, UA Negative Negative    Bilirubin, UA Negative Negative    Urobilinogen, UA 1.0 0.2, 1.0, Normal    Nitrites, UA Negative Negative    Leukocyte Esterase, UA Negative Negative   Urinalysis, Microscopic    Collection Time: 09/27/23  4:47 PM   Result Value Ref Range    RBC, UA <5 <=5 /HPF    WBC, UA <5 <=5 /HPF    Squamous Epithelial Cells, UA <5 <=5 /HPF    Bacteria, UA None Seen None Seen, Rare, Occasional /HPF      Lab Results   Component  "Value Date    VALPROATE <12.5 (L) 09/01/2023           Psychiatric Mental Status Exam:  General Appearance: appears stated age, well-developed, well-nourished  Arousal: alert  Behavior: cooperative  Movements and Motor Activity: no abnormal involuntary movements noted  Orientation: oriented to person, place, time, and situation  Speech: normal rate, normal rhythm, normal volume, normal tone  Mood: "I'm good"  Affect: mood-congruent  Thought Process: linear  Associations: intact  Thought Content and Perceptions: recent suicidal ideation reported but patient denies, no homicidal ideation, no AVH or delusions reported  Recent and Remote Memory: recent memory intact, remote memory intact; per interview/observation with patient  Attention and Concentration: grossly intact, ; per interview/observation with patient  Fund of Knowledge: intact, aware of current events, vocabulary appropriate; based on history, vocabulary, fund of knowledge, syntax, grammar, and content  Insight: questionable; based on understanding of severity of illness and HPI  Judgment: questionable; based on patient's behavior and HPI        Patient Strengths:  Able to verbalize needs, good support system      Patient Liabilities:  Medication non-compliance, substance abuse, psychosis      Discharge Criteria:  Improved mood, Improved thought process, Medication compliance, and Improved coping skills      Reason for Admission:  The patient poses a significant and immediate danger to self., To stabilize disabling psychiatric/psychological symptoms of sudden onset., and The patient can demonstrate a reasonable expectation of improvement in his/her disorder or condition as a result of active treatment being provided.    ASSESSMENT/PLAN:   Diagnosis:  Bipolar Disorder, most recent episodes mixed (F31.60)  Methamphetamine use disorder severe (F15.20)  Cannabis use disorder severe (F12.20)    Past Medical History:   Diagnosis Date    Bipolar affective     " Depression     HIV (human immunodeficiency virus infection)     Schizophrenia           Plan:  -Admit to [facility]    Mood  -Restart Wellbutrin 150 mg  -Restart Depakote 500 mg QD    Psychosis  -Restart Zyprexa 10 mg HS    Insomnia  -Seroquel 100 mg      -Will attempt to obtain outside psychiatric records if available  - to assist with aftercare planning and collateral  -Continue inpatient treatment as evidenced by significant psychotic thought disorder and danger to self      Estimated length of stay: 5-7 days    Estimated Disposition: Home    Estimated Follow-up: Outpatient medication management      On this date, I have reviewed the medical history and Nursing Assessment, as well as records from referral source.  I have evaluated the mental status of the above named person and concur with the findings of all assessments.  I have provided medical direction for the development of the Treatment Plan.    I conclude that this patient meets admission criteria for inpatient treatment.  I certify that this patient poses a danger to self or others, or would otherwise be considered gravely disabled based on this assessment and/or provided collateral information.     I have provided medical direction for the development of the Treatment plan.  These services will be provided while this patient is under my care and will be based on an individualized plan of care.  The patient can demonstrate a reasonable expectation of improvement in his/her disorder as a result of the active treatment being provided.      Yuriy Francis M.D.

## 2023-09-28 NOTE — NURSING
"PRN Medication Follow-up Note:    Behavior:    Patient (Chon Parham is a 33 y.o. male, : 1990, MRN: [id number])     Allergies: Patient has no known allergies.    Chon's  height is 5' 11" (1.803 m) and weight is 73.7 kg (162 lb 7.7 oz). His temperature is 97.5 °F (36.4 °C). His blood pressure is 158/98 (abnormal) and his pulse is 67. His respiration is 21 (abnormal).     Administered _Atarax 50 mg PO___ per physician order to Chon       Response:    Chon's response: _patient lying in room resting comfortably, no further complaints at this time____      Plan:     Continue to monitor per MD/PA/APRN orders; and reevaluate medication effectiveness within 30 minutes.   "

## 2023-09-28 NOTE — NURSING
"Daily Nursing Note:      Behavior:    Patient (Chon Parham is a 33 y.o. male, : 1990, MRN: [id number]) demonstrating an affect that was flat. Chon demonstrating mood that is anxious. Chon had an appearance that was disheveled. Chon denies suicidal ideation. Chon denies suicide plan. Chon denies homicidal ideation. Chon denies hallucinations.    Chon's  height is 5' 11" (1.803 m) and weight is 73.7 kg (162 lb 7.7 oz). His temperature is 97.9 °F (36.6 °C). His blood pressure is 133/68 and his pulse is 71. His respiration is 18 and oxygen saturation is 99%.     Novas last BM was noted on: _2023  ______      Intervention:    Encourage Chon to perform self-hygiene, grooming, and changing of clothing. Monitor Chon's behavior and program compliance. Monitor Chon for suicidal ideation, homicidal ideation, sleep disturbance, and hallucinations. Encourage Chon to eat all portions of meals and assess for meal preferences. Monitor Chon for intake and output to ensure hydration. Notify the Physician/Physician Assistant/Advance Practice Registered Nurse (MD/PA/APRN) for any medication refusal and any change in patient condition.      Response:    Chon verbalizes understand of unit process and procedures. Chon reported medications  Medications started today.  Educated on uses of medication.  Voices understanding._____.      Plan:     Continue to monitor per MD/PA/APRN orders; maintain patient safety.    "

## 2023-09-28 NOTE — H&P
[facility]  History & Physical    Subjective:      Chief Complaint/Reason for Admission: substance abuse with bizarre behavior     Chon Parham is a 33 y.o. male. Crystal meth abuse     Past Medical History:   Diagnosis Date    Bipolar affective     Depression     HIV (human immunodeficiency virus infection)     Schizophrenia      No past surgical history on file.  No family history on file.  Social History     Tobacco Use    Smoking status: Every Day     Current packs/day: 0.50     Types: Cigarettes, Vaping with nicotine    Smokeless tobacco: Never   Substance Use Topics    Alcohol use: Yes    Drug use: Yes     Types: Marijuana, Methamphetamines     Comment: Precription Drugs       No medications prior to admission.     Review of patient's allergies indicates:  No Known Allergies     Review of Systems   Constitutional: Negative.    HENT: Negative.     Eyes: Negative.    Respiratory: Negative.     Cardiovascular: Negative.    Gastrointestinal: Negative.    Genitourinary: Negative.    Musculoskeletal: Negative.    Skin: Negative.    Neurological: Negative.    Endo/Heme/Allergies: Negative.    Psychiatric/Behavioral:  Positive for depression and substance abuse. Negative for hallucinations and suicidal ideas.        Objective:      Vital Signs (Most Recent)  Temp: 98.6 °F (37 °C) (09/28/23 0700)  Pulse: 83 (09/28/23 0700)  Resp: 18 (09/28/23 0700)  BP: (!) 145/84 (09/28/23 0700)  SpO2: 99 % (09/28/23 0700)    Vital Signs Range (Last 24H):  Temp:  [97.5 °F (36.4 °C)-98.6 °F (37 °C)]   Pulse:  [61-99]   Resp:  [17-22]   BP: (134-158)/(73-98)   SpO2:  [95 %-100 %]     Physical Exam  HENT:      Head: Normocephalic.      Right Ear: Tympanic membrane normal.      Left Ear: Tympanic membrane normal.      Nose: Nose normal.      Mouth/Throat:      Mouth: Mucous membranes are moist.   Eyes:      Extraocular Movements: Extraocular movements intact.      Pupils: Pupils are equal, round,  and reactive to light.   Cardiovascular:      Rate and Rhythm: Normal rate and regular rhythm.   Pulmonary:      Effort: Pulmonary effort is normal.   Abdominal:      General: Abdomen is flat.   Musculoskeletal:         General: Normal range of motion.   Skin:     General: Skin is warm.   Neurological:      General: No focal deficit present.      Mental Status: He is alert and oriented to person, place, and time.      Comments: Vision normal   Hearing normal   EOM intact   Face muscles normal  Facial sensation normal   Shrugs shoulders  Tongue midline            Data Review:    Recent Results (from the past 48 hour(s))   Comprehensive Metabolic Panel    Collection Time: 09/27/23  9:58 AM   Result Value Ref Range    Sodium Level 137 136 - 145 mmol/L    Potassium Level 4.0 3.5 - 5.1 mmol/L    Chloride 102 98 - 107 mmol/L    Carbon Dioxide 27 22 - 29 mmol/L    Glucose Level 143 (H) 74 - 100 mg/dL    Blood Urea Nitrogen 9.7 8.9 - 20.6 mg/dL    Creatinine 1.21 (H) 0.73 - 1.18 mg/dL    Calcium Level Total 10.0 8.4 - 10.2 mg/dL    Protein Total 8.1 6.4 - 8.3 gm/dL    Albumin Level 4.3 3.5 - 5.0 g/dL    Globulin 3.8 (H) 2.4 - 3.5 gm/dL    Albumin/Globulin Ratio 1.1 1.1 - 2.0 ratio    Bilirubin Total 0.5 <=1.5 mg/dL    Alkaline Phosphatase 83 40 - 150 unit/L    Alanine Aminotransferase 18 0 - 55 unit/L    Aspartate Aminotransferase 25 5 - 34 unit/L    eGFR >60 mls/min/1.73/m2   Magnesium    Collection Time: 09/27/23  9:58 AM   Result Value Ref Range    Magnesium Level 2.00 1.60 - 2.60 mg/dL   CBC with Differential    Collection Time: 09/27/23  9:58 AM   Result Value Ref Range    WBC 6.43 4.50 - 11.50 x10(3)/mcL    RBC 4.88 4.70 - 6.10 x10(6)/mcL    Hgb 16.1 14.0 - 18.0 g/dL    Hct 47.5 42.0 - 52.0 %    MCV 97.3 (H) 80.0 - 94.0 fL    MCH 33.0 (H) 27.0 - 31.0 pg    MCHC 33.9 33.0 - 36.0 g/dL    RDW 12.8 11.5 - 17.0 %    Platelet 248 130 - 400 x10(3)/mcL    MPV 10.3 7.4 - 10.4 fL    Neut % 60.5 %    Lymph % 29.2 %    Mono % 8.6  %    Eos % 1.4 %    Basophil % 0.3 %    Lymph # 1.88 0.6 - 4.6 x10(3)/mcL    Neut # 3.89 2.1 - 9.2 x10(3)/mcL    Mono # 0.55 0.1 - 1.3 x10(3)/mcL    Eos # 0.09 0 - 0.9 x10(3)/mcL    Baso # 0.02 <=0.2 x10(3)/mcL    IG# 0.00 0 - 0.04 x10(3)/mcL    IG% 0.0 %    NRBC% 0.0 %   Drug Screen, Urine    Collection Time: 09/27/23 10:08 AM   Result Value Ref Range    Amphetamines, Urine Positive (A) Negative    Barbituates, Urine Negative Negative    Benzodiazepine, Urine Negative Negative    Cannabinoids, Urine Positive (A) Negative    Cocaine, Urine Positive (A) Negative    Fentanyl, Urine Negative Negative    MDMA, Urine Negative Negative    Opiates, Urine Negative Negative    Phencyclidine, Urine Negative Negative    pH, Urine 8.0 3.0 - 11.0   Urinalysis, Reflex to Urine Culture    Collection Time: 09/27/23 10:08 AM    Specimen: Urine   Result Value Ref Range    Color, UA Yellow Yellow, Light-Yellow, Dark Yellow, Kari, Straw    Appearance, UA Clear Clear    Specific Gravity, UA 1.019 1.005 - 1.030    pH, UA 8.0 5.0 - 8.5    Protein, UA Negative Negative    Glucose, UA Negative Negative, Normal    Ketones, UA Negative Negative    Blood, UA Negative Negative    Bilirubin, UA Negative Negative    Urobilinogen, UA 1.0 0.2, 1.0, Normal    Nitrites, UA Negative Negative    Leukocyte Esterase, UA Negative Negative   COVID-19 Rapid Screening    Collection Time: 09/27/23 10:08 AM   Result Value Ref Range    SARS COV-2 MOLECULAR Negative Negative   Urinalysis, Microscopic    Collection Time: 09/27/23 10:08 AM   Result Value Ref Range    RBC, UA 0-5 None Seen, 0-2, 3-5, 0-5 /HPF    WBC, UA 0-2 None Seen, 0-2, 3-5, 0-5 /HPF    Squamous Epithelial Cells, UA 0-4 0-4, None Seen /HPF    Bacteria, UA None Seen None Seen, Rare, Occasional /HPF   Drug Screen, Urine    Collection Time: 09/27/23  4:47 PM   Result Value Ref Range    Amphetamines, Urine Positive (A) Negative    Barbituates, Urine Negative Negative    Benzodiazepine, Urine  Negative Negative    Cannabinoids, Urine Positive (A) Negative    Cocaine, Urine Positive (A) Negative    Fentanyl, Urine Negative Negative    MDMA, Urine Negative Negative    Opiates, Urine Negative Negative    Phencyclidine, Urine Negative Negative    pH, Urine 8.0 3.0 - 11.0   Urinalysis, Reflex to Urine Culture    Collection Time: 09/27/23  4:47 PM    Specimen: Urine   Result Value Ref Range    Color, UA Dark Yellow Yellow, Light-Yellow, Dark Yellow, Kari, Straw    Appearance, UA Clear Clear    Specific Gravity, UA 1.024 1.005 - 1.030    pH, UA 6.0 5.0 - 8.5    Protein, UA Negative Negative    Glucose, UA Negative Negative, Normal    Ketones, UA Trace (A) Negative    Blood, UA Negative Negative    Bilirubin, UA Negative Negative    Urobilinogen, UA 1.0 0.2, 1.0, Normal    Nitrites, UA Negative Negative    Leukocyte Esterase, UA Negative Negative   Urinalysis, Microscopic    Collection Time: 09/27/23  4:47 PM   Result Value Ref Range    RBC, UA <5 <=5 /HPF    WBC, UA <5 <=5 /HPF    Squamous Epithelial Cells, UA <5 <=5 /HPF    Bacteria, UA None Seen None Seen, Rare, Occasional /HPF        No results found.       Assessment and Plan       Methamphetamine abuse   HIV positive

## 2023-09-28 NOTE — NURSING
"PRN Administration Note:    Behavior:    Patient (Chon Parham is a 33 y.o. male, : 1990, MRN: [id number])     Allergies: Patient has no known allergies.    Chon's  height is 5' 11" (1.803 m) and weight is 73.7 kg (162 lb 7.7 oz). His temperature is 97.5 °F (36.4 °C). His blood pressure is 158/98 (abnormal) and his pulse is 67. His respiration is 21 (abnormal).     Reason for PRN Administration: _anxiety____.    Intervention:    Administered _Atarax 50 mg PO__ per physician order to Chon       Response:    Chon tolerated administration well.      Plan:     Continue to monitor per MD/PA/APRN orders; and reevaluate medication effectiveness within 30 minutes.   "

## 2023-09-28 NOTE — PROGRESS NOTES
Chon refused both TR groups; Alternative materials were offered   09/28/23 1500   [facility] Group Therapy   Group Name Therapeutic Recreation   Specific Interventions Skilled Activity Creative Expression   Participation Level None   Participation Quality Refused

## 2023-09-29 VITALS
DIASTOLIC BLOOD PRESSURE: 79 MMHG | BODY MASS INDEX: 22.75 KG/M2 | SYSTOLIC BLOOD PRESSURE: 120 MMHG | TEMPERATURE: 98 F | OXYGEN SATURATION: 100 % | RESPIRATION RATE: 18 BRPM | HEART RATE: 86 BPM | WEIGHT: 162.5 LBS | HEIGHT: 71 IN

## 2023-09-29 PROCEDURE — 25000003 PHARM REV CODE 250

## 2023-09-29 RX ORDER — OLANZAPINE 10 MG/1
10 TABLET ORAL DAILY
Qty: 30 TABLET | Refills: 0 | Status: ON HOLD | OUTPATIENT
Start: 2023-09-29 | End: 2023-12-13

## 2023-09-29 RX ORDER — DIVALPROEX SODIUM 500 MG/1
500 TABLET, FILM COATED, EXTENDED RELEASE ORAL DAILY
Qty: 30 TABLET | Refills: 0 | Status: ON HOLD | OUTPATIENT
Start: 2023-09-29 | End: 2023-12-13

## 2023-09-29 RX ORDER — BUPROPION HYDROCHLORIDE 150 MG/1
150 TABLET ORAL DAILY
Qty: 30 TABLET | Refills: 0 | Status: ON HOLD | OUTPATIENT
Start: 2023-09-29 | End: 2023-12-13

## 2023-09-29 RX ORDER — QUETIAPINE FUMARATE 100 MG/1
100 TABLET, FILM COATED ORAL NIGHTLY
Qty: 30 TABLET | Refills: 0 | Status: ON HOLD | OUTPATIENT
Start: 2023-09-29 | End: 2023-12-13 | Stop reason: HOSPADM

## 2023-09-29 RX ADMIN — DIVALPROEX SODIUM 500 MG: 500 TABLET, FILM COATED, EXTENDED RELEASE ORAL at 08:09

## 2023-09-29 RX ADMIN — BUPROPION HYDROCHLORIDE 150 MG: 150 TABLET, FILM COATED, EXTENDED RELEASE ORAL at 08:09

## 2023-09-29 RX ADMIN — OLANZAPINE 10 MG: 5 TABLET, FILM COATED ORAL at 08:09

## 2023-09-29 NOTE — NURSING
Discharge Note:    Chon Parham is a 33 y.o. male, : 1990, MRN: [id number], admitted on 2023 for Flip Thomas MD with a diagnosis of Psychosis [F29].    Patient discharged on 2023 per physician orders in stable condition. Patient denied suicidal ideation, homicidal ideation, or hallucinations. Patient was discharged with valuables, personal belongings, prescriptions, discharge instructions, and an educational handout explaining the diagnosis and prescribed medications. Patient verbalized understanding of the discharge instructions and importance of follow-up visits. Patient was escorted out of the facility by [facility] and placed into a secure transport vehicle to be transported to home.     Patient discharged on the following medications:     Medication List        START taking these medications      buPROPion 150 MG TB24 tablet  Commonly known as: WELLBUTRIN XL  Take 1 tablet (150 mg total) by mouth once daily.     divalproex 500 MG Tb24  Take 1 tablet (500 mg total) by mouth once daily.     OLANZapine 10 MG tablet  Commonly known as: ZyPREXA  Take 1 tablet (10 mg total) by mouth once daily.     QUEtiapine 100 MG Tab  Commonly known as: SEROQUEL  Take 1 tablet (100 mg total) by mouth every evening.               Where to Get Your Medications        You can get these medications from any pharmacy    Bring a paper prescription for each of these medications  buPROPion 150 MG TB24 tablet  divalproex 500 MG Tb24  OLANZapine 10 MG tablet  QUEtiapine 100 MG Tab

## 2023-09-29 NOTE — PLAN OF CARE
Problem: Violence Risk or Actual  Goal: Anger and Impulse Control  9/29/2023 0920 by Sagar Rodas RN  Outcome: Met  9/29/2023 0747 by Sagar Rodas RN  Outcome: Ongoing, Progressing     Problem: Adult Inpatient Plan of Care  Goal: Plan of Care Review  9/29/2023 0920 by Sagar Rodas RN  Outcome: Met  9/29/2023 0747 by Sagar Rodas RN  Outcome: Ongoing, Progressing  Goal: Patient-Specific Goal (Individualized)  9/29/2023 0920 by Sagar Rodas RN  Outcome: Met  9/29/2023 0747 by Sagar Rodas RN  Outcome: Ongoing, Progressing  Goal: Absence of Hospital-Acquired Illness or Injury  9/29/2023 0920 by Sagar Rodas RN  Outcome: Met  9/29/2023 0747 by Sagar Rodas RN  Outcome: Ongoing, Progressing  Goal: Optimal Comfort and Wellbeing  9/29/2023 0920 by Sagar Rodas RN  Outcome: Met  9/29/2023 0747 by Sagar Rodas RN  Outcome: Ongoing, Progressing  Goal: Readiness for Transition of Care  9/29/2023 0920 by Sagar Rodas RN  Outcome: Met  9/29/2023 0747 by Sagar Rodas RN  Outcome: Ongoing, Progressing     Problem: Activity and Energy Impairment (Excessive Substance Use)  Goal: Optimized Energy Level (Excessive Substance Use)  9/29/2023 0920 by Sagar Rodas RN  Outcome: Met  9/29/2023 0747 by Sagar Rodas RN  Outcome: Ongoing, Progressing     Problem: Behavior Regulation Impairment (Excessive Substance Use)  Goal: Improved Behavioral Control (Excessive Substance Use)  9/29/2023 0920 by Sagar Rodas RN  Outcome: Met  9/29/2023 0747 by Sagar Rodas RN  Outcome: Ongoing, Progressing     Problem: Decreased Participation and Engagement (Excessive Substance Use)  Goal: Increased Participation and Engagement (Excessive Substance Use)  9/29/2023 0920 by Sagar Rodas RN  Outcome: Met  9/29/2023 0747 by Sagar Rodas RN  Outcome: Ongoing, Progressing     Problem: Physiologic Impairment (Excessive Substance Use)  Goal: Improved Physiologic Symptoms (Excessive Substance Use)  9/29/2023 0920 by Sagar Rodas RN  Outcome: Met  9/29/2023 0747 by  Sagar Rodas RN  Outcome: Ongoing, Progressing     Problem: Social, Occupational or Functional Impairment (Excessive Substance Use)  Goal: Enhanced Social, Occupational or Functional Skills (Excessive Substance Use)  9/29/2023 0920 by Sagar Rodas RN  Outcome: Met  9/29/2023 0747 by Sagar Rodas RN  Outcome: Ongoing, Progressing     Problem: Behavior Regulation Impairment (Psychotic Signs/Symptoms)  Goal: Improved Behavioral Control (Psychotic Signs/Symptoms)  9/29/2023 0920 by Sagar Rodas RN  Outcome: Met  9/29/2023 0747 by Sagar Rodas RN  Outcome: Ongoing, Progressing     Problem: Cognitive Impairment (Psychotic Signs/Symptoms)  Goal: Optimal Cognitive Function (Psychotic Signs/Symptoms)  9/29/2023 0920 by Sagar Rodas RN  Outcome: Met  9/29/2023 0747 by Sagar Rodas RN  Outcome: Ongoing, Progressing     Problem: Decreased Participation and Engagement (Psychotic Signs/Symptoms)  Goal: Increased Participation and Engagement (Psychotic Signs/Symptoms)  9/29/2023 0920 by Sagar Rodas RN  Outcome: Met  9/29/2023 0747 by Sagar Rodas RN  Outcome: Ongoing, Progressing     Problem: Mood Impairment (Psychotic Signs/Symptoms)  Goal: Improved Mood Symptoms (Psychotic Signs/Symptoms)  9/29/2023 0920 by Sagar Rodas RN  Outcome: Met  9/29/2023 0747 by Sagar Rodas RN  Outcome: Ongoing, Progressing     Problem: Psychomotor Impairment (Psychotic Signs/Symptoms)  Goal: Improved Psychomotor Symptoms (Psychotic Signs/Symptoms)  9/29/2023 0920 by Sagar Rodas RN  Outcome: Met  9/29/2023 0747 by Sagar Rodas RN  Outcome: Ongoing, Progressing     Problem: Sensory Perception Impairment (Psychotic Signs/Symptoms)  Goal: Decreased Sensory Symptoms (Psychotic Signs/Symptoms)  9/29/2023 0920 by Sagar Rodas RN  Outcome: Met  9/29/2023 0747 by Sagar Rodas RN  Outcome: Ongoing, Progressing     Problem: Sleep Disturbance (Psychotic Signs/Symptoms)  Goal: Improved Sleep (Psychotic Signs/Symptoms)  9/29/2023 0920 by Sagar Rodas  RN  Outcome: Met  9/29/2023 0747 by Sagar Rodas RN  Outcome: Ongoing, Progressing     Problem: Social, Occupational or Functional Impairment (Psychotic Signs/Symptoms)  Goal: Enhanced Social, Occupational or Functional Skills (Psychotic Signs/Symptoms)  9/29/2023 0920 by Sagar Rodas RN  Outcome: Met  9/29/2023 0747 by Sagar Rodas RN  Outcome: Ongoing, Progressing     Problem: Activity and Energy Impairment (Depressive Signs/Symptoms)  Goal: Optimized Energy Level (Depressive Signs/Symptoms)  9/29/2023 0920 by Sagar Rodas RN  Outcome: Met  9/29/2023 0747 by Sagar Rodas RN  Outcome: Ongoing, Progressing     Problem: Cognitive Impairment (Depressive Signs/Symptoms)  Goal: Optimized Cognitive Function  9/29/2023 0920 by Sagar Rodas RN  Outcome: Met  9/29/2023 0747 by Sagar Rodas RN  Outcome: Ongoing, Progressing     Problem: Decreased Participation/Engagement (Depressive Signs/Symptoms)  Goal: Increased Participation and Engagement (Depressive Signs/Symptoms)  9/29/2023 0920 by Sagar Rodas RN  Outcome: Met  9/29/2023 0747 by Sagar Rodas RN  Outcome: Ongoing, Progressing     Problem: Feelings of Worthlessness, Hopelessness or Excessive Guilt (Depressive Signs/Symptoms)  Goal: Enhanced Self-Esteem and Confidence (Depressive Signs/Symptoms)  9/29/2023 0920 by Sagar Rodas RN  Outcome: Met  9/29/2023 0747 by Sagar Rodas RN  Outcome: Ongoing, Progressing     Problem: Mood Impairment (Depressive Signs/Symptoms)  Goal: Improved Mood Symptoms (Depressive Signs/Symptoms)  9/29/2023 0920 by Sagar Rodas RN  Outcome: Met  9/29/2023 0747 by Sagar Rodas RN  Outcome: Ongoing, Progressing     Problem: Nutrition Imbalance (Depressive Signs/Symptoms)  Goal: Optimized Nutrition Intake  9/29/2023 0920 by Sagar Rodas RN  Outcome: Met  9/29/2023 0747 by Sagar Rodas RN  Outcome: Ongoing, Progressing     Problem: Psychomotor Impairment (Depressive Signs/Symptoms)  Goal: Improved Psychomotor Symptoms (Depressive  Signs/Symptoms)  9/29/2023 0920 by Sagar Rodas RN  Outcome: Met  9/29/2023 0747 by Sagar Rodas RN  Outcome: Ongoing, Progressing     Problem: Sleep Disturbance (Depressive Signs/Symptoms)  Goal: Improved Sleep (Depressive Signs/Symptoms)  9/29/2023 0920 by Sagar Rodas RN  Outcome: Met  9/29/2023 0747 by Sagar Rodas RN  Outcome: Ongoing, Progressing     Problem: Social, Occupational or Functional Impairment (Depressive Signs/Symptoms)  Goal: Enhanced Social, Occupational or Functional Skills (Depressive Signs/Symptoms)  9/29/2023 0920 by Sagar Rodas RN  Outcome: Met  9/29/2023 0747 by Sagar Rodas RN  Outcome: Ongoing, Progressing

## 2023-09-29 NOTE — PLAN OF CARE
Chon did not meet his treatment goals of stop druging due to lack of group attendance.  CTRS Discharge Recommendations:  Encouraged Pt. to actively utilize available community resources to increase leisure involvement to decrease signs and symptoms of illness.  Encouraged Pt. to utilize coping skills on a regular basis to reduce the risk of decomposition and re-hospitalization.

## 2023-09-29 NOTE — PROGRESS NOTES
"9/29/2023  Chon Parham   1990   [id number]        Psychiatry Progress Note     Chief Complaint: "I'm doing good"    SUBJECTIVE:   Chon Parham is a 33 y.o. male placed under a PEC at [facility] due to reported suicidal ideations after apparently not being on his medication for approximately two weeks.    Today patient was calm and cooperative. He is denying all thoughts of harming himself or others. Patient was restarted back on his medication and is tolerating them fine. I believe this is the only reason this patient went to the ED. He was noncompliant with his medications and needed a refill after going on an ecstasy binge. Patient is very knowledgeable of the system and knows what he needs to say in order to get admitted for medication. Patient was educated that this is not an appropriate way to get his medications. Patient is refusing inpatient rehab option. He was educated on outpatient rehab options. Patient states that he would like to start going back to narcotics anonymous. Patient shows an understanding of the impact drugs has on his decisions and I believe this is all drug induced and further psychiatric care her will be of minimal benefit. Patient was also educated on remaining on his medications and the importance of following up with his outpatient provider. Will continue with current POC and proceed with discharge today.      Current Medications:   Scheduled Meds:    buPROPion  150 mg Oral Daily    divalproex ER  500 mg Oral Daily    OLANZapine  10 mg Oral Daily    QUEtiapine  100 mg Oral QHS      PRN Meds: acetaminophen, diphenhydrAMINE, diphenhydrAMINE, haloperidol lactate, haloperidoL, hydrOXYzine HCL, lorazepam, LORazepam, magnesium hydroxide 400 mg/5 ml, traZODone   Psychotherapeutics (From admission, onward)      Start     Stop Route Frequency Ordered    09/28/23 2100  QUEtiapine tablet 100 mg         -- Oral Nightly 09/28/23 1131    09/28/23 1245  buPROPion TB24 tablet 150 mg         -- Oral " "Daily 09/28/23 1131 09/28/23 1245  OLANZapine tablet 10 mg         -- Oral Daily 09/28/23 1131 09/27/23 2314  haloperidoL tablet 10 mg         -- Oral Every 4 hours PRN 09/27/23 2214 09/27/23 2311  haloperidol lactate injection 10 mg         -- IM Every 6 hours PRN 09/27/23 2214 09/27/23 2311  LORazepam injection 2 mg         -- IM Every 6 hours PRN 09/27/23 2214 09/27/23 2311  LORazepam tablet 2 mg         -- Oral Every 6 hours PRN 09/27/23 2214 09/27/23 2311  traZODone tablet 100 mg         -- Oral Nightly PRN 09/27/23 2214            Allergies:   Review of patient's allergies indicates:  No Known Allergies     OBJECTIVE:   Vitals   Vitals:    09/28/23 1901   BP: 120/72   Pulse: 89   Resp: 16   Temp: 99 °F (37.2 °C)        Labs/Imaging/Studies:   No results found for this or any previous visit (from the past 36 hour(s)).       Medical Review Of Systems:  A comprehensive review of systems was negative.      Psychiatric Mental Status Exam:  General Appearance: appears stated age, well-developed, well-nourished  Arousal: alert  Behavior: cooperative  Movements and Motor Activity: no abnormal involuntary movements noted  Orientation: oriented to person, place, time, and situation  Speech: normal rate, normal rhythm, normal volume, normal tone  Mood: "Blessed"  Affect: mood-congruent  Thought Process: linear  Associations: intact  Thought Content and Perceptions:  denies suicidal ideation, no homicidal ideation, no AVH or delusions reported  Recent and Remote Memory: recent memory intact, remote memory intact; per interview/observation with patient  Attention and Concentration: grossly intact, ; per interview/observation with patient  Fund of Knowledge: intact, aware of current events, vocabulary appropriate; based on history, vocabulary, fund of knowledge, syntax, grammar, and content  Insight: questionable; based on understanding of severity of illness and HPI  Judgment: questionable; based on " patient's behavior and HPI    ASSESSMENT/PLAN:   Problems Addressed/Diagnoses:  Bipolar Disorder, most recent episodes mixed (F31.60)  Methamphetamine use disorder severe (F15.20)  Cannabis use disorder severe (F12.20)    Past Medical History:   Diagnosis Date    Bipolar affective     Depression     HIV (human immunodeficiency virus infection)     Schizophrenia         Plan:  Mood  -Wellbutrin 150 mg  -Depakote 500 mg QD     Psychosis  -Zyprexa 10 mg HS     Insomnia  -Seroquel 100 mg    Expected Disposition Plan: Home        Yuriy SHIPMAN-BC

## 2023-10-08 PROBLEM — F31.60 BIPOLAR DISORDER, MIXED: Status: ACTIVE | Noted: 2023-10-08

## 2023-10-08 NOTE — DISCHARGE SUMMARY
"DISCHARGE SUMMARY  PSYCHIATRY      Admit Date: 9/27/2023 10:15 PM    Discharge Date:  9/29/2023    SITE:   [facility]    Discharge Attending Physician: Flip Thomas M.D.    Chief Complaint:  "I'm doing good"    History of Present Illness On Admit:   Chon Parham is a 33 y.o. male placed under a PEC at [facility] due to reported suicidal ideations after apparently not being on his medication for approximately two weeks.  Patient was recently inpatient here in August of this year. He states that he was compliant with his medications but started using again and let himself get down. Calm and cooperative this morning. Focused on being discharged. Denies any SI/HI. States that now that he is sober he feels fine. Denies any medical history changes. Will restart home psychiatric medication. Admit for medication management and safety monitoring.      UDS: (+)amphetamines, cannabinoids, fentanyl  Alcohol: <10      Admit Mental Status Exam:  General Appearance: appears stated age, well-developed, well-nourished  Arousal: alert  Behavior: cooperative  Movements and Motor Activity: no abnormal involuntary movements noted  Orientation: oriented to person, place, time, and situation  Speech: normal rate, normal rhythm, normal volume, normal tone  Mood: "I'm good"  Affect: mood-congruent  Thought Process: linear  Associations: intact  Thought Content and Perceptions: recent suicidal ideation reported but patient denies, no homicidal ideation, no AVH or delusions reported  Recent and Remote Memory: recent memory intact, remote memory intact; per interview/observation with patient  Attention and Concentration: grossly intact, ; per interview/observation with patient  Fund of Knowledge: intact, aware of current events, vocabulary appropriate; based on history, vocabulary, fund of knowledge, syntax, grammar, and content  Insight: questionable; based on understanding of severity of " illness and HPI  Judgment: questionable; based on patient's behavior and HPI      Diagnoses:  PRINCIPAL PROBLEM:  Bipolar disorder, mixed      PROBLEM LIST    Bipolar disorder, mixed    Stimulant dependence    Cannabis dependence, continuous        Hospital Course:   Patient was admitted to [facility] and restarted on Wellbutrin, Depakote, and Zyprexa.  Seroquel was started.      9/29  Today patient was calm and cooperative. He is denying all thoughts of harming himself or others. Patient was restarted back on his medication and is tolerating them fine. I believe this is the only reason this patient went to the ED. He was noncompliant with his medications and needed a refill after going on an ecstasy binge. Patient is very knowledgeable of the system and knows what he needs to say in order to get admitted for medication. Patient was educated that this is not an appropriate way to get his medications. Patient is refusing inpatient rehab option. He was educated on outpatient rehab options. Patient states that he would like to start going back to narcotics anonymous. Patient shows an understanding of the impact drugs has on his decisions and I believe this is all drug induced and further psychiatric care her will be of minimal benefit. Patient was also educated on remaining on his medications and the importance of following up with his outpatient provider. Will continue with current POC and proceed with discharge today.      Current Medications:   Scheduled Meds:        DISCHARGE EXAMINATION    VITALS   Vitals:    09/28/23 1100 09/28/23 1700 09/28/23 1901 09/29/23 0701   BP: 133/68 115/61 120/72 120/79   BP Location:   Left arm Right arm   Patient Position:   Sitting Sitting   Pulse: 71 75 89 86   Resp: 18 16 16 18   Temp: 97.9 °F (36.6 °C) 97.9 °F (36.6 °C) 99 °F (37.2 °C) 98.2 °F (36.8 °C)   TempSrc:  Oral Oral Oral   SpO2: 99% 99% 98% 100%   Weight:       Height:             Discharge Mental Status Exam:  General  "Appearance: appears stated age, well-developed, well-nourished  Arousal: alert  Behavior: cooperative  Movements and Motor Activity: no abnormal involuntary movements noted  Orientation: oriented to person, place, time, and situation  Speech: normal rate, normal rhythm, normal volume, normal tone  Mood: "Blessed"  Affect: mood-congruent  Thought Process: linear  Associations: intact  Thought Content and Perceptions:  denies suicidal ideation, no homicidal ideation, no AVH or delusions reported  Recent and Remote Memory: recent memory intact, remote memory intact; per interview/observation with patient  Attention and Concentration: grossly intact, ; per interview/observation with patient  Fund of Knowledge: intact, aware of current events, vocabulary appropriate; based on history, vocabulary, fund of knowledge, syntax, grammar, and content  Insight: questionable; based on understanding of severity of illness and HPI  Judgment: questionable; based on patient's behavior and HPI      Discharge Condition:  Stable    Prognosis:  Fair    Justification for >1 antipsychotic:  Because of the chronicity of this patient's condition, and the acuity of symptoms during hospitalization, the decision was made to start Seroquel during hospitalization.  However, it is always recommended to use the least amount of medication necessary to appropriately address the patient's issue.  Patient should discuss tapering/discontinuation of one of these agents with patient's outpatient provider, if appropriate.     Disposition:  discharged to home    Follow-up:     Follow-up Information       [facility] Follow up.    Why: 10.5.2023 @9am  Bring insurance card and id  Contact information:  [place] [place] [address]             [facility] - Follow up.    Contact information:  [address][1] [address] [phone]                             Medication Regimen:  No " current facility-administered medications for this encounter.    Current Outpatient Medications:     buPROPion (WELLBUTRIN XL) 150 MG TB24 tablet, Take 1 tablet (150 mg total) by mouth once daily., Disp: 30 tablet, Rfl: 0    divalproex 500 MG Tb24, Take 1 tablet (500 mg total) by mouth once daily., Disp: 30 tablet, Rfl: 0    OLANZapine (ZYPREXA) 10 MG tablet, Take 1 tablet (10 mg total) by mouth once daily., Disp: 30 tablet, Rfl: 0    QUEtiapine (SEROQUEL) 100 MG Tab, Take 1 tablet (100 mg total) by mouth every evening., Disp: 30 tablet, Rfl: 0      Patient Instructions:   Continue medication regimen as prescribed.    Disposition plan per  - see  notes for details.    Patient instructed to call 911 or present to emergency department if any of the following complications develop status post discharge: suicidality, homicidality, or grave disability.     Total time spent discharging patient: <30 minutes      Flip Thomas M.D.

## 2023-11-24 ENCOUNTER — HOSPITAL ENCOUNTER (EMERGENCY)
Facility: HOSPITAL | Age: 33
Discharge: PSYCHIATRIC OR SUBSTANCE USE FACILITY | End: 2023-11-25
Attending: INTERNAL MEDICINE
Payer: MEDICAID

## 2023-11-24 DIAGNOSIS — F31.2 BIPOLAR AFFECTIVE DISORDER, CURRENTLY MANIC, SEVERE, WITH PSYCHOTIC FEATURES: Primary | ICD-10-CM

## 2023-11-24 DIAGNOSIS — Z00.8 MEDICAL CLEARANCE FOR PSYCHIATRIC ADMISSION: ICD-10-CM

## 2023-11-24 DIAGNOSIS — R45.851 SUICIDAL IDEATIONS: ICD-10-CM

## 2023-11-24 DIAGNOSIS — F19.10 POLYSUBSTANCE ABUSE: ICD-10-CM

## 2023-11-24 LAB
ALBUMIN SERPL-MCNC: 4.2 G/DL (ref 3.5–5)
ALBUMIN/GLOB SERPL: 1 RATIO (ref 1.1–2)
ALP SERPL-CCNC: 85 UNIT/L (ref 40–150)
ALT SERPL-CCNC: 23 UNIT/L (ref 0–55)
AMPHET UR QL SCN: POSITIVE
APAP SERPL-MCNC: <17.4 UG/ML (ref 17.4–30)
APPEARANCE UR: CLEAR
AST SERPL-CCNC: 37 UNIT/L (ref 5–34)
BACTERIA #/AREA URNS AUTO: ABNORMAL /HPF
BARBITURATE SCN PRESENT UR: NEGATIVE
BASOPHILS # BLD AUTO: 0.03 X10(3)/MCL
BASOPHILS NFR BLD AUTO: 0.7 %
BENZODIAZ UR QL SCN: NEGATIVE
BILIRUB SERPL-MCNC: 1.1 MG/DL
BILIRUB UR QL STRIP.AUTO: NEGATIVE
BUN SERPL-MCNC: 16.6 MG/DL (ref 8.9–20.6)
CALCIUM SERPL-MCNC: 9.3 MG/DL (ref 8.4–10.2)
CANNABINOIDS UR QL SCN: POSITIVE
CHLORIDE SERPL-SCNC: 105 MMOL/L (ref 98–107)
CO2 SERPL-SCNC: 23 MMOL/L (ref 22–29)
COCAINE UR QL SCN: NEGATIVE
COLOR UR AUTO: ABNORMAL
CREAT SERPL-MCNC: 1.32 MG/DL (ref 0.73–1.18)
EOSINOPHIL # BLD AUTO: 0.19 X10(3)/MCL (ref 0–0.9)
EOSINOPHIL NFR BLD AUTO: 4.3 %
ERYTHROCYTE [DISTWIDTH] IN BLOOD BY AUTOMATED COUNT: 12.5 % (ref 11.5–17)
ETHANOL SERPL-MCNC: <10 MG/DL
FENTANYL UR QL SCN: NEGATIVE
GFR SERPLBLD CREATININE-BSD FMLA CKD-EPI: >60 MLS/MIN/1.73/M2
GLOBULIN SER-MCNC: 4.1 GM/DL (ref 2.4–3.5)
GLUCOSE SERPL-MCNC: 115 MG/DL (ref 74–100)
GLUCOSE UR QL STRIP.AUTO: NORMAL
HCT VFR BLD AUTO: 47.3 % (ref 42–52)
HGB BLD-MCNC: 16.1 G/DL (ref 14–18)
HOLD SPECIMEN: NORMAL
HOLD SPECIMEN: NORMAL
HYALINE CASTS #/AREA URNS LPF: ABNORMAL /LPF
IMM GRANULOCYTES # BLD AUTO: 0 X10(3)/MCL (ref 0–0.04)
IMM GRANULOCYTES NFR BLD AUTO: 0 %
KETONES UR QL STRIP.AUTO: NEGATIVE
LEUKOCYTE ESTERASE UR QL STRIP.AUTO: NEGATIVE
LYMPHOCYTES # BLD AUTO: 2.1 X10(3)/MCL (ref 0.6–4.6)
LYMPHOCYTES NFR BLD AUTO: 47.8 %
MCH RBC QN AUTO: 32.4 PG (ref 27–31)
MCHC RBC AUTO-ENTMCNC: 34 G/DL (ref 33–36)
MCV RBC AUTO: 95.2 FL (ref 80–94)
MDMA UR QL SCN: NEGATIVE
MONOCYTES # BLD AUTO: 0.4 X10(3)/MCL (ref 0.1–1.3)
MONOCYTES NFR BLD AUTO: 9.1 %
MUCOUS THREADS URNS QL MICRO: ABNORMAL /LPF
NEUTROPHILS # BLD AUTO: 1.67 X10(3)/MCL (ref 2.1–9.2)
NEUTROPHILS NFR BLD AUTO: 38.1 %
NITRITE UR QL STRIP.AUTO: NEGATIVE
NRBC BLD AUTO-RTO: 0 %
OPIATES UR QL SCN: NEGATIVE
PCP UR QL: NEGATIVE
PH UR STRIP.AUTO: 6 [PH]
PH UR: 6 [PH] (ref 3–11)
PLATELET # BLD AUTO: 270 X10(3)/MCL (ref 130–400)
PMV BLD AUTO: 9.3 FL (ref 7.4–10.4)
POTASSIUM SERPL-SCNC: 3.6 MMOL/L (ref 3.5–5.1)
PROT SERPL-MCNC: 8.3 GM/DL (ref 6.4–8.3)
PROT UR QL STRIP.AUTO: NEGATIVE
RBC # BLD AUTO: 4.97 X10(6)/MCL (ref 4.7–6.1)
RBC #/AREA URNS AUTO: ABNORMAL /HPF
RBC UR QL AUTO: NEGATIVE
SALICYLATES SERPL-MCNC: <5 MG/DL (ref 15–30)
SARS-COV-2 RDRP RESP QL NAA+PROBE: NEGATIVE
SODIUM SERPL-SCNC: 138 MMOL/L (ref 136–145)
SP GR UR STRIP.AUTO: 1.01 (ref 1–1.03)
SQUAMOUS #/AREA URNS LPF: ABNORMAL /HPF
T4 FREE SERPL-MCNC: 1.03 NG/DL (ref 0.7–1.48)
TSH SERPL-ACNC: 0.28 UIU/ML (ref 0.35–4.94)
UROBILINOGEN UR STRIP-ACNC: NORMAL
WBC # SPEC AUTO: 4.39 X10(3)/MCL (ref 4.5–11.5)
WBC #/AREA URNS AUTO: ABNORMAL /HPF

## 2023-11-24 PROCEDURE — 84443 ASSAY THYROID STIM HORMONE: CPT | Performed by: INTERNAL MEDICINE

## 2023-11-24 PROCEDURE — 25000003 PHARM REV CODE 250: Performed by: INTERNAL MEDICINE

## 2023-11-24 PROCEDURE — 87635 SARS-COV-2 COVID-19 AMP PRB: CPT | Performed by: INTERNAL MEDICINE

## 2023-11-24 PROCEDURE — 80179 DRUG ASSAY SALICYLATE: CPT | Performed by: INTERNAL MEDICINE

## 2023-11-24 PROCEDURE — 99285 EMERGENCY DEPT VISIT HI MDM: CPT

## 2023-11-24 PROCEDURE — 81001 URINALYSIS AUTO W/SCOPE: CPT | Performed by: INTERNAL MEDICINE

## 2023-11-24 PROCEDURE — 93005 ELECTROCARDIOGRAM TRACING: CPT

## 2023-11-24 PROCEDURE — 80053 COMPREHEN METABOLIC PANEL: CPT | Performed by: INTERNAL MEDICINE

## 2023-11-24 PROCEDURE — 80143 DRUG ASSAY ACETAMINOPHEN: CPT | Performed by: INTERNAL MEDICINE

## 2023-11-24 PROCEDURE — 85025 COMPLETE CBC W/AUTO DIFF WBC: CPT | Performed by: INTERNAL MEDICINE

## 2023-11-24 PROCEDURE — 84439 ASSAY OF FREE THYROXINE: CPT | Performed by: INTERNAL MEDICINE

## 2023-11-24 PROCEDURE — 80307 DRUG TEST PRSMV CHEM ANLYZR: CPT | Performed by: INTERNAL MEDICINE

## 2023-11-24 PROCEDURE — 82077 ASSAY SPEC XCP UR&BREATH IA: CPT | Performed by: INTERNAL MEDICINE

## 2023-11-24 RX ORDER — IBUPROFEN 200 MG
1 TABLET ORAL DAILY
Status: DISCONTINUED | OUTPATIENT
Start: 2023-11-25 | End: 2023-11-25 | Stop reason: HOSPADM

## 2023-11-24 RX ORDER — DIVALPROEX SODIUM 250 MG/1
500 TABLET, DELAYED RELEASE ORAL
Status: COMPLETED | OUTPATIENT
Start: 2023-11-24 | End: 2023-11-24

## 2023-11-24 RX ORDER — OLANZAPINE 20 MG/1
20 TABLET, ORALLY DISINTEGRATING ORAL NIGHTLY
Status: DISCONTINUED | OUTPATIENT
Start: 2023-11-24 | End: 2023-11-25 | Stop reason: HOSPADM

## 2023-11-24 RX ADMIN — DIVALPROEX SODIUM 500 MG: 250 TABLET, DELAYED RELEASE ORAL at 03:11

## 2023-11-24 RX ADMIN — OLANZAPINE 20 MG: 20 TABLET, ORALLY DISINTEGRATING ORAL at 03:11

## 2023-11-24 NOTE — ED PROVIDER NOTES
Encounter Date: 11/24/2023       History     Chief Complaint   Patient presents with    Psychiatric Evaluation     C/o SI, and admits to not taking mental health meds.      Presents with suicidal ideations. States Hx of Bipolar disorder, stopped taking his medications and is using methamphetamines and marijuana.     The history is provided by the patient.     Review of patient's allergies indicates:  No Known Allergies  Past Medical History:   Diagnosis Date    Bipolar affective     Depression     HIV (human immunodeficiency virus infection)     Schizophrenia      History reviewed. No pertinent surgical history.  History reviewed. No pertinent family history.  Social History     Tobacco Use    Smoking status: Every Day     Current packs/day: 0.50     Types: Cigarettes, Vaping with nicotine    Smokeless tobacco: Never   Substance Use Topics    Alcohol use: Yes    Drug use: Yes     Types: Marijuana, Methamphetamines     Comment: Precription Drugs     Review of Systems   Constitutional:  Negative for fever.   HENT:  Negative for sore throat.    Respiratory:  Negative for shortness of breath.    Cardiovascular:  Negative for chest pain.   Gastrointestinal:  Negative for nausea.   Genitourinary:  Negative for dysuria.   Musculoskeletal:  Negative for back pain.   Skin:  Negative for rash.   Neurological:  Negative for weakness.   Hematological:  Does not bruise/bleed easily.   Psychiatric/Behavioral:  Positive for sleep disturbance and suicidal ideas. The patient is nervous/anxious.    All other systems reviewed and are negative.      Physical Exam     Initial Vitals [11/24/23 1513]   BP Pulse Resp Temp SpO2   (!) 146/62 82 16 98.2 °F (36.8 °C) 97 %      MAP       --         Physical Exam    Nursing note and vitals reviewed.  Constitutional: He appears well-developed and well-nourished. No distress.   HENT:   Head: Normocephalic and atraumatic.   Mouth/Throat: Oropharynx is clear and moist.   Eyes: Conjunctivae and EOM  are normal. Pupils are equal, round, and reactive to light.   Neck: Neck supple. No thyromegaly present. No JVD present.   Normal range of motion.  Cardiovascular:  Normal rate, regular rhythm, normal heart sounds and intact distal pulses.           Pulmonary/Chest: Breath sounds normal. No respiratory distress.   Abdominal: Abdomen is soft. Bowel sounds are normal. He exhibits no distension. There is no abdominal tenderness. There is no rebound and no guarding.   Musculoskeletal:         General: No edema. Normal range of motion.      Cervical back: Normal range of motion and neck supple.     Neurological: He is alert and oriented to person, place, and time. He has normal strength. GCS score is 15. GCS eye subscore is 4. GCS verbal subscore is 5. GCS motor subscore is 6.   Skin: Skin is warm and dry. No rash noted.   Psychiatric: His mood appears anxious. His speech is rapid and/or pressured. He is hyperactive. He is not actively hallucinating. Thought content is delusional. Cognition and memory are normal. He expresses impulsivity. He expresses suicidal ideation. He is attentive.         ED Course   Procedures  Labs Reviewed   COMPREHENSIVE METABOLIC PANEL - Abnormal; Notable for the following components:       Result Value    Glucose Level 115 (*)     Creatinine 1.32 (*)     Globulin 4.1 (*)     Albumin/Globulin Ratio 1.0 (*)     Aspartate Aminotransferase 37 (*)     All other components within normal limits   TSH - Abnormal; Notable for the following components:    TSH 0.279 (*)     All other components within normal limits   ACETAMINOPHEN LEVEL - Abnormal; Notable for the following components:    Acetaminophen Level <17.4 (*)     All other components within normal limits   SALICYLATE LEVEL - Abnormal; Notable for the following components:    Salicylate Level <5.0 (*)     All other components within normal limits   CBC WITH DIFFERENTIAL - Abnormal; Notable for the following components:    WBC 4.39 (*)     MCV  95.2 (*)     MCH 32.4 (*)     Neut # 1.67 (*)     All other components within normal limits   ALCOHOL,MEDICAL (ETHANOL) - Normal   SARS-COV-2 RNA AMPLIFICATION, QUAL - Normal    Narrative:     The IDNOW COVID-19 assay is a rapid molecular in vitro diagnostic test utilizing an isothermal nucleic acid amplification technology intended for the qualitative detection of nucleic acid from the SARS-CoV-2 viral RNA in direct nasal, nasopharyngeal or throat swabs from individuals who are suspected of COVID-19 by their healthcare provider.   CBC W/ AUTO DIFFERENTIAL    Narrative:     The following orders were created for panel order CBC auto differential.  Procedure                               Abnormality         Status                     ---------                               -----------         ------                     CBC with Differential[[id number]]       Abnormal            Final result                 Please view results for these tests on the individual orders.   EXTRA TUBES    Narrative:     The following orders were created for panel order EXTRA TUBES.  Procedure                               Abnormality         Status                     ---------                               -----------         ------                     Light Blue Top Hold[[id number]]                             Final result               Gold Top Hold[[id number]]                                   Final result                 Please view results for these tests on the individual orders.   LIGHT BLUE TOP HOLD   GOLD TOP HOLD   URINALYSIS, REFLEX TO URINE CULTURE   DRUG SCREEN, URINE (BEAKER)   T4, FREE     EKG Readings: (Independently Interpreted)   Initial Reading: No STEMI. Rhythm: Sinus Bradycardia. Heart Rate: 54. Ectopy: No Ectopy. Conduction: Normal. ST Segments: Normal ST Segments. T Waves: Normal. Axis: Normal. Clinical Impression: Sinus Bradycardia       Imaging Results    None          Medications   nicotine 14 mg/24 hr 1 patch  (has no administration in time range)   OLANZapine zydis disintegrating tablet 20 mg (20 mg Oral Given 11/24/23 1546)   divalproex EC tablet 500 mg (500 mg Oral Given 11/24/23 1546)     Medical Decision Making  Amount and/or Complexity of Data Reviewed  Labs: ordered. Decision-making details documented in ED Course.  ECG/medicine tests: ordered and independent interpretation performed. Decision-making details documented in ED Course.    Risk  OTC drugs.  Prescription drug management.      Additional MDM:   Differential Diagnosis:   Schizophrenia exacerbation, bipolar psychosis, drug induced psychosis, thyrotoxicosis, renal failure, liver failure, toxydrome, among others                  Medically cleared for psychiatry placement: 11/24/2023  3:31 PM                Clinical Impression:  Final diagnoses:  [Z00.8] Medical clearance for psychiatric admission  [F31.2] Bipolar affective disorder, currently manic, severe, with psychotic features (Primary)  [R45.851] Suicidal ideations  [F19.10] Polysubstance abuse          ED Disposition Condition    Transfer to Psych Facility Stable          ED Prescriptions    None       Follow-up Information    None          Christian Abdi MD  11/24/23 1532       Christian Abdi MD  11/24/23 1705       Christian Abdi MD  11/24/23 1741       Christian Abdi MD  11/24/23 1752

## 2023-11-25 VITALS
RESPIRATION RATE: 18 BRPM | WEIGHT: 165.38 LBS | TEMPERATURE: 98 F | DIASTOLIC BLOOD PRESSURE: 72 MMHG | HEIGHT: 71 IN | BODY MASS INDEX: 23.15 KG/M2 | SYSTOLIC BLOOD PRESSURE: 123 MMHG | HEART RATE: 68 BPM | OXYGEN SATURATION: 99 %

## 2023-12-09 ENCOUNTER — HOSPITAL ENCOUNTER (INPATIENT)
Facility: HOSPITAL | Age: 33
LOS: 4 days | Discharge: HOME | DRG: 885 | End: 2023-12-13
Attending: PSYCHIATRY & NEUROLOGY | Admitting: PSYCHIATRY & NEUROLOGY
Payer: MEDICAID

## 2023-12-09 DIAGNOSIS — F29 PSYCHOSIS: ICD-10-CM

## 2023-12-09 DIAGNOSIS — B20 HIV INFECTION, UNSPECIFIED SYMPTOM STATUS: Primary | Chronic | ICD-10-CM

## 2023-12-09 PROCEDURE — 25000003 PHARM REV CODE 250: Performed by: PSYCHIATRY & NEUROLOGY

## 2023-12-09 PROCEDURE — 11400000 HC PSYCH PRIVATE ROOM

## 2023-12-09 RX ORDER — ADHESIVE BANDAGE
30 BANDAGE TOPICAL DAILY PRN
Status: DISCONTINUED | OUTPATIENT
Start: 2023-12-09 | End: 2023-12-13 | Stop reason: HOSPADM

## 2023-12-09 RX ORDER — HYDROXYZINE HYDROCHLORIDE 50 MG/1
50 TABLET, FILM COATED ORAL EVERY 6 HOURS PRN
Status: DISCONTINUED | OUTPATIENT
Start: 2023-12-09 | End: 2023-12-13 | Stop reason: HOSPADM

## 2023-12-09 RX ORDER — HALOPERIDOL 5 MG/1
10 TABLET ORAL EVERY 4 HOURS PRN
Status: DISCONTINUED | OUTPATIENT
Start: 2023-12-09 | End: 2023-12-13 | Stop reason: HOSPADM

## 2023-12-09 RX ORDER — TRAZODONE HYDROCHLORIDE 100 MG/1
100 TABLET ORAL NIGHTLY PRN
Status: DISCONTINUED | OUTPATIENT
Start: 2023-12-09 | End: 2023-12-13 | Stop reason: HOSPADM

## 2023-12-09 RX ORDER — DIPHENHYDRAMINE HCL 50 MG
50 CAPSULE ORAL EVERY 6 HOURS PRN
Status: DISCONTINUED | OUTPATIENT
Start: 2023-12-09 | End: 2023-12-13 | Stop reason: HOSPADM

## 2023-12-09 RX ORDER — DIPHENHYDRAMINE HYDROCHLORIDE 50 MG/ML
50 INJECTION INTRAMUSCULAR; INTRAVENOUS EVERY 6 HOURS PRN
Status: DISCONTINUED | OUTPATIENT
Start: 2023-12-09 | End: 2023-12-13 | Stop reason: HOSPADM

## 2023-12-09 RX ORDER — ACETAMINOPHEN 325 MG/1
650 TABLET ORAL EVERY 6 HOURS PRN
Status: DISCONTINUED | OUTPATIENT
Start: 2023-12-09 | End: 2023-12-13 | Stop reason: HOSPADM

## 2023-12-09 RX ORDER — LORAZEPAM 2 MG/ML
2 INJECTION INTRAMUSCULAR EVERY 6 HOURS PRN
Status: DISCONTINUED | OUTPATIENT
Start: 2023-12-09 | End: 2023-12-13 | Stop reason: HOSPADM

## 2023-12-09 RX ORDER — LORAZEPAM 1 MG/1
2 TABLET ORAL EVERY 6 HOURS PRN
Status: DISCONTINUED | OUTPATIENT
Start: 2023-12-09 | End: 2023-12-13 | Stop reason: HOSPADM

## 2023-12-09 RX ORDER — HALOPERIDOL 5 MG/ML
10 INJECTION INTRAMUSCULAR EVERY 6 HOURS PRN
Status: DISCONTINUED | OUTPATIENT
Start: 2023-12-09 | End: 2023-12-13 | Stop reason: HOSPADM

## 2023-12-09 RX ADMIN — HYDROXYZINE HYDROCHLORIDE 50 MG: 50 TABLET, FILM COATED ORAL at 10:12

## 2023-12-09 RX ADMIN — TRAZODONE HYDROCHLORIDE 100 MG: 100 TABLET ORAL at 10:12

## 2023-12-10 PROCEDURE — 25000003 PHARM REV CODE 250: Performed by: PSYCHIATRY & NEUROLOGY

## 2023-12-10 PROCEDURE — 11400000 HC PSYCH PRIVATE ROOM

## 2023-12-10 PROCEDURE — 25000003 PHARM REV CODE 250: Performed by: NURSE PRACTITIONER

## 2023-12-10 RX ORDER — DIVALPROEX SODIUM 500 MG/1
500 TABLET, FILM COATED, EXTENDED RELEASE ORAL DAILY
Status: DISCONTINUED | OUTPATIENT
Start: 2023-12-10 | End: 2023-12-13 | Stop reason: HOSPADM

## 2023-12-10 RX ORDER — OLANZAPINE 5 MG/1
10 TABLET ORAL DAILY
Status: DISCONTINUED | OUTPATIENT
Start: 2023-12-10 | End: 2023-12-13 | Stop reason: HOSPADM

## 2023-12-10 RX ORDER — QUETIAPINE FUMARATE 100 MG/1
200 TABLET, FILM COATED ORAL NIGHTLY
Status: DISCONTINUED | OUTPATIENT
Start: 2023-12-10 | End: 2023-12-13 | Stop reason: HOSPADM

## 2023-12-10 RX ORDER — BUPROPION HYDROCHLORIDE 150 MG/1
150 TABLET ORAL DAILY
Status: DISCONTINUED | OUTPATIENT
Start: 2023-12-10 | End: 2023-12-13 | Stop reason: HOSPADM

## 2023-12-10 RX ADMIN — OLANZAPINE 10 MG: 5 TABLET, FILM COATED ORAL at 04:12

## 2023-12-10 RX ADMIN — BICTEGRAVIR SODIUM, EMTRICITABINE, AND TENOFOVIR ALAFENAMIDE FUMARATE 1 TABLET: 50; 200; 25 TABLET ORAL at 05:12

## 2023-12-10 RX ADMIN — BUPROPION HYDROCHLORIDE 150 MG: 150 TABLET, FILM COATED, EXTENDED RELEASE ORAL at 04:12

## 2023-12-10 RX ADMIN — DIVALPROEX SODIUM 500 MG: 500 TABLET, FILM COATED, EXTENDED RELEASE ORAL at 04:12

## 2023-12-10 NOTE — H&P
[facility]  History & Physical    Subjective:      Chief Complaint/Reason for Admission: PEC ADMIT    Chon Parham is a 33 y.o. male. NO ACUTE MED C/O'S    Past Medical History:   Diagnosis Date    Bipolar affective     Depression     HIV (human immunodeficiency virus infection)     Schizophrenia      No past surgical history on file.  No family history on file.  Social History     Tobacco Use    Smoking status: Every Day     Current packs/day: 0.50     Types: Cigarettes, Vaping with nicotine    Smokeless tobacco: Never   Substance Use Topics    Alcohol use: Yes    Drug use: Yes     Types: Marijuana, Methamphetamines     Comment: Precription Drugs   SINGLE  1 SON  NO SURGERY    PTA Medications   Medication Sig    buPROPion (WELLBUTRIN XL) 150 MG TB24 tablet Take 1 tablet (150 mg total) by mouth once daily.    divalproex 500 MG Tb24 Take 1 tablet (500 mg total) by mouth once daily.    OLANZapine (ZYPREXA) 10 MG tablet Take 1 tablet (10 mg total) by mouth once daily.    QUEtiapine (SEROQUEL) 100 MG Tab Take 1 tablet (100 mg total) by mouth every evening.     Review of patient's allergies indicates:  No Known Allergies     Review of Systems   Constitutional: Negative.    HENT: Negative.     Eyes: Negative.    Respiratory: Negative.     Cardiovascular: Negative.    Gastrointestinal: Negative.    Genitourinary: Negative.    Musculoskeletal: Negative.    Skin: Negative.    Neurological: Negative.    Endo/Heme/Allergies: Negative.        Objective:      Vital Signs (Most Recent)  Temp: 98.2 °F (36.8 °C) (12/09/23 2300)  Pulse: 84 (12/09/23 2300)  Resp: 20 (12/09/23 2300)  BP: (!) 148/82 (12/09/23 2300)    Vital Signs Range (Last 24H):  Temp:  [98.2 °F (36.8 °C)]   Pulse:  [80-84]   Resp:  [20]   BP: (147-148)/(79-82)   SpO2:  [98 %]     Physical Exam  Vitals reviewed.   Constitutional:       Appearance: Normal appearance.   HENT:      Head: Normocephalic.      Right Ear: External  ear normal.      Left Ear: External ear normal.      Mouth/Throat:      Mouth: Mucous membranes are moist.      Pharynx: Oropharynx is clear.   Eyes:      Extraocular Movements: Extraocular movements intact.      Conjunctiva/sclera: Conjunctivae normal.      Pupils: Pupils are equal, round, and reactive to light.   Cardiovascular:      Rate and Rhythm: Normal rate and regular rhythm.      Heart sounds: No murmur heard.  Pulmonary:      Effort: Pulmonary effort is normal.      Breath sounds: Normal breath sounds. No wheezing.   Abdominal:      General: Abdomen is flat.      Palpations: Abdomen is soft.      Tenderness: There is no abdominal tenderness.   Musculoskeletal:         General: Normal range of motion.      Cervical back: Normal range of motion.      Right lower leg: No edema.      Left lower leg: No edema.   Lymphadenopathy:      Cervical: No cervical adenopathy.   Skin:     General: Skin is warm and dry.   Neurological:      General: No focal deficit present.      Mental Status: He is alert.      GCS: GCS eye subscore is 4. GCS verbal subscore is 5. GCS motor subscore is 6.      Cranial Nerves: Cranial nerves 2-12 are intact. No cranial nerve deficit.      Sensory: Sensation is intact.      Motor: Motor function is intact.      Coordination: Coordination is intact.      Gait: Gait is intact.      Deep Tendon Reflexes:      Reflex Scores:       Patellar reflexes are 2+ on the right side and 2+ on the left side.        Data Review:    Recent Results (from the past 48 hour(s))   Comprehensive metabolic panel    Collection Time: 12/09/23  6:38 PM   Result Value Ref Range    Sodium Level 139 136 - 145 mmol/L    Potassium Level 4.0 3.5 - 5.1 mmol/L    Chloride 106 98 - 107 mmol/L    Carbon Dioxide 25 22 - 29 mmol/L    Glucose Level 155 (H) 74 - 100 mg/dL    Blood Urea Nitrogen 13.2 8.9 - 20.6 mg/dL    Creatinine 1.26 (H) 0.73 - 1.18 mg/dL    Calcium Level Total 9.4 8.4 - 10.2 mg/dL    Protein Total 7.9 6.4 - 8.3  gm/dL    Albumin Level 4.0 3.5 - 5.0 g/dL    Globulin 3.9 (H) 2.4 - 3.5 gm/dL    Albumin/Globulin Ratio 1.0 (L) 1.1 - 2.0 ratio    Bilirubin Total 0.4 <=1.5 mg/dL    Alkaline Phosphatase 102 40 - 150 unit/L    Alanine Aminotransferase 15 0 - 55 unit/L    Aspartate Aminotransferase 23 5 - 34 unit/L    eGFR >60 mls/min/1.73/m2   TSH    Collection Time: 12/09/23  6:38 PM   Result Value Ref Range    TSH 0.517 0.350 - 4.940 uIU/mL   Ethanol    Collection Time: 12/09/23  6:38 PM   Result Value Ref Range    Ethanol Level <10.0 <=10.0 mg/dL   Acetaminophen level    Collection Time: 12/09/23  6:38 PM   Result Value Ref Range    Acetaminophen Level <17.4 (L) 17.4 - 30.0 ug/ml   Salicylate level    Collection Time: 12/09/23  6:38 PM   Result Value Ref Range    Salicylate Level <5.0 (L) 15.0 - 30.0 mg/dL   CBC with Differential    Collection Time: 12/09/23  6:38 PM   Result Value Ref Range    WBC 8.57 4.50 - 11.50 x10(3)/mcL    RBC 4.43 (L) 4.70 - 6.10 x10(6)/mcL    Hgb 14.9 14.0 - 18.0 g/dL    Hct 42.8 42.0 - 52.0 %    MCV 96.6 (H) 80.0 - 94.0 fL    MCH 33.6 (H) 27.0 - 31.0 pg    MCHC 34.8 33.0 - 36.0 g/dL    RDW 12.5 11.5 - 17.0 %    Platelet 296 130 - 400 x10(3)/mcL    MPV 9.0 7.4 - 10.4 fL    Neut % 60.4 %    Lymph % 28.7 %    Mono % 6.5 %    Eos % 3.6 %    Basophil % 0.6 %    Lymph # 2.46 0.6 - 4.6 x10(3)/mcL    Neut # 5.17 2.1 - 9.2 x10(3)/mcL    Mono # 0.56 0.1 - 1.3 x10(3)/mcL    Eos # 0.31 0 - 0.9 x10(3)/mcL    Baso # 0.05 <=0.2 x10(3)/mcL    IG# 0.02 0 - 0.04 x10(3)/mcL    IG% 0.2 %    NRBC% 0.0 %   Urinalysis, Reflex to Urine Culture    Collection Time: 12/09/23  6:50 PM    Specimen: Urine   Result Value Ref Range    Color, UA Yellow Yellow, Light-Yellow, Colorless, Straw, Dark-Yellow    Appearance, UA Clear Clear    Specific Gravity, UA 1.028 1.005 - 1.030    pH, UA 6.0 5.0 - 8.5    Protein, UA Trace (A) Negative    Glucose, UA Normal Negative, Normal    Ketones, UA Negative Negative    Blood, UA Negative Negative     Bilirubin, UA Negative Negative    Urobilinogen, UA Normal 0.2, 1.0, Normal    Nitrites, UA Negative Negative    Leukocyte Esterase, UA Negative Negative    WBC, UA 0-5 None Seen, 0-2, 3-5, 0-5 /HPF    Bacteria, UA None Seen None Seen, Trace /HPF    Squamous Epithelial Cells, UA Trace None Seen /HPF    Mucous, UA Few (A) None Seen /LPF    RBC, UA 0-5 None Seen, 0-2, 3-5, 0-5 /HPF   Drug Screen, Urine    Collection Time: 12/09/23  6:50 PM   Result Value Ref Range    Amphetamines, Urine Positive (A) Negative    Barbituates, Urine Negative Negative    Benzodiazepine, Urine Negative Negative    Cannabinoids, Urine Positive (A) Negative    Cocaine, Urine Negative Negative    Fentanyl, Urine Negative Negative    MDMA, Urine Negative Negative    Opiates, Urine Negative Negative    Phencyclidine, Urine Negative Negative    pH, Urine 6.0 3.0 - 11.0   COVID/FLU A&B PCR    Collection Time: 12/09/23  6:50 PM   Result Value Ref Range    Influenza A PCR Not Detected Not Detected    Influenza B PCR Not Detected Not Detected    SARS-CoV-2 PCR Not Detected Not Detected, Negative        No results found.       Assessment and Plan       PE WNL  DEFER TO PSYCHIATRY

## 2023-12-10 NOTE — H&P
"12/10/2023  Chon Parham   1990   [id number]            Psychiatry Inpatient Admission Note    Date of Admission: 12/9/2023 10:39 PM    Current Legal Status: Physician's Emergency Certificate    Chief Complaint: " I am in my psychotic mode, trying to get in shape, you know...."    SUBJECTIVE:   History of Present Illness:   Chon Parham is a 33 y.o. male with history of depression, HIV,  Bipolar disorder and schizophrenia. He was placed under a PEC at [facility] after he presented  "for psychiatric clearance". Patient reports today he had ran out of his medications last week and hoping to "get things back in shape". He had reported auditory hallucinations on arrival at the ED, but now denies  AH   Patient was recently inpatient here in November of this year. He denies having any other problems except for getting his meds. Patient admits he "smokes weeds constantly and never stops". 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 He denies suicidal or homicidal ideations. He is restless, guarded, sometimes rambling through his responses and laughing inappropriately. He currently does not believe he has any psych illness. When asked about family psych hx, patient replies "my big cousin has mental illness, not me".  His insight is impaired and his judgement is questionable.  He is more fixated on resuming his HIV medications and others. Urine toxicology is  positive for amphetamine and cannabis.  Will admit for medication management and restart home psychiatric medication.     Past Psychiatric History:   Previous Psychiatric Hospitalizations: Multiple, most recent in November 2023   Previous Medication Trials: Unknown  Previous Suicide Attempts: Denies   Outpatient psychiatrist: Diana Brooks    Past Medical/Surgical History:   Past Medical History:   Diagnosis Date    Bipolar affective     Depression     HIV (human immunodeficiency virus infection)     Schizophrenia  " "    No past surgical history on file.      Family Psychiatric History:"My big cousin, it's him not me"     Allergies:   Review of patient's allergies indicates:  No Known Allergies    Substance Abuse History:   Tobacco: Denies  Alcohol: Denies  Illicit Substances:Methamphetamine, Marijuana "everyday"  Treatment: No history      Current Medications:   Home Psychiatric Meds: Bupropion  150 mg, Depakote 500 mg daily, Olanzapine 10 mg daily, Seroquel 100 mg HS    Scheduled Meds:    PRN Meds: acetaminophen, diphenhydrAMINE, diphenhydrAMINE, haloperidol lactate, haloperidoL, hydrOXYzine HCL, lorazepam, LORazepam, magnesium hydroxide 400 mg/5 ml, traZODone   Psychotherapeutics (From admission, onward)      Start     Stop Route Frequency Ordered    12/09/23 2242  haloperidoL tablet 10 mg         -- Oral Every 4 hours PRN 12/09/23 2240    12/09/23 2240  haloperidol lactate injection 10 mg         -- IM Every 6 hours PRN 12/09/23 2240    12/09/23 2240  LORazepam injection 2 mg         -- IM Every 6 hours PRN 12/09/23 2240    12/09/23 2240  LORazepam tablet 2 mg         -- Oral Every 6 hours PRN 12/09/23 2240    12/09/23 2240  traZODone tablet 100 mg         -- Oral Nightly PRN 12/09/23 2240            Social History:  Housing Status: [place]  Relationship Status/Sexual Orientation: Single   Children: 1 son, age 11  Education: 9th grade   Employment Status/Info: Work at car detailing    history: No history  History of physical/sexual abuse: Denies   Access to gun: "Everybody do"     Legal History:   Past Charges/Incarcerations: "I don't mess the law". Reports past  charges as a teenager  Pending charges:Denies    OBJECTIVE:   Medical Review Of Systems:  Constitutional: negative  Eyes: negative  Ears, nose, mouth, throat, and face: negative  Respiratory: negative  Gastrointestinal: negative  Genitourinary:negative  Integument/breast: negative, Left finger small " laceration  Hematologic/lymphatic: positive for HIV  Musculoskeletal:negative  Neurological: negative  Behavioral/Psych: positive for anxiety, bad mood, behavior problems, illegal drug usage, and mood swings  Endocrine: negative  Allergic/Immunologic: negative    Vitals   Vitals:    12/10/23 1100   BP: 126/74   Pulse: 76   Resp: 16   Temp: 98 °F (36.7 °C)        Labs/Imaging/Studies:   Recent Results (from the past 48 hour(s))   Comprehensive metabolic panel    Collection Time: 12/09/23  6:38 PM   Result Value Ref Range    Sodium Level 139 136 - 145 mmol/L    Potassium Level 4.0 3.5 - 5.1 mmol/L    Chloride 106 98 - 107 mmol/L    Carbon Dioxide 25 22 - 29 mmol/L    Glucose Level 155 (H) 74 - 100 mg/dL    Blood Urea Nitrogen 13.2 8.9 - 20.6 mg/dL    Creatinine 1.26 (H) 0.73 - 1.18 mg/dL    Calcium Level Total 9.4 8.4 - 10.2 mg/dL    Protein Total 7.9 6.4 - 8.3 gm/dL    Albumin Level 4.0 3.5 - 5.0 g/dL    Globulin 3.9 (H) 2.4 - 3.5 gm/dL    Albumin/Globulin Ratio 1.0 (L) 1.1 - 2.0 ratio    Bilirubin Total 0.4 <=1.5 mg/dL    Alkaline Phosphatase 102 40 - 150 unit/L    Alanine Aminotransferase 15 0 - 55 unit/L    Aspartate Aminotransferase 23 5 - 34 unit/L    eGFR >60 mls/min/1.73/m2   TSH    Collection Time: 12/09/23  6:38 PM   Result Value Ref Range    TSH 0.517 0.350 - 4.940 uIU/mL   Ethanol    Collection Time: 12/09/23  6:38 PM   Result Value Ref Range    Ethanol Level <10.0 <=10.0 mg/dL   Acetaminophen level    Collection Time: 12/09/23  6:38 PM   Result Value Ref Range    Acetaminophen Level <17.4 (L) 17.4 - 30.0 ug/ml   Salicylate level    Collection Time: 12/09/23  6:38 PM   Result Value Ref Range    Salicylate Level <5.0 (L) 15.0 - 30.0 mg/dL   CBC with Differential    Collection Time: 12/09/23  6:38 PM   Result Value Ref Range    WBC 8.57 4.50 - 11.50 x10(3)/mcL    RBC 4.43 (L) 4.70 - 6.10 x10(6)/mcL    Hgb 14.9 14.0 - 18.0 g/dL    Hct 42.8 42.0 - 52.0 %    MCV 96.6 (H) 80.0 - 94.0 fL    MCH 33.6 (H) 27.0 -  31.0 pg    MCHC 34.8 33.0 - 36.0 g/dL    RDW 12.5 11.5 - 17.0 %    Platelet 296 130 - 400 x10(3)/mcL    MPV 9.0 7.4 - 10.4 fL    Neut % 60.4 %    Lymph % 28.7 %    Mono % 6.5 %    Eos % 3.6 %    Basophil % 0.6 %    Lymph # 2.46 0.6 - 4.6 x10(3)/mcL    Neut # 5.17 2.1 - 9.2 x10(3)/mcL    Mono # 0.56 0.1 - 1.3 x10(3)/mcL    Eos # 0.31 0 - 0.9 x10(3)/mcL    Baso # 0.05 <=0.2 x10(3)/mcL    IG# 0.02 0 - 0.04 x10(3)/mcL    IG% 0.2 %    NRBC% 0.0 %   Urinalysis, Reflex to Urine Culture    Collection Time: 12/09/23  6:50 PM    Specimen: Urine   Result Value Ref Range    Color, UA Yellow Yellow, Light-Yellow, Colorless, Straw, Dark-Yellow    Appearance, UA Clear Clear    Specific Gravity, UA 1.028 1.005 - 1.030    pH, UA 6.0 5.0 - 8.5    Protein, UA Trace (A) Negative    Glucose, UA Normal Negative, Normal    Ketones, UA Negative Negative    Blood, UA Negative Negative    Bilirubin, UA Negative Negative    Urobilinogen, UA Normal 0.2, 1.0, Normal    Nitrites, UA Negative Negative    Leukocyte Esterase, UA Negative Negative    WBC, UA 0-5 None Seen, 0-2, 3-5, 0-5 /HPF    Bacteria, UA None Seen None Seen, Trace /HPF    Squamous Epithelial Cells, UA Trace None Seen /HPF    Mucous, UA Few (A) None Seen /LPF    RBC, UA 0-5 None Seen, 0-2, 3-5, 0-5 /HPF   Drug Screen, Urine    Collection Time: 12/09/23  6:50 PM   Result Value Ref Range    Amphetamines, Urine Positive (A) Negative    Barbituates, Urine Negative Negative    Benzodiazepine, Urine Negative Negative    Cannabinoids, Urine Positive (A) Negative    Cocaine, Urine Negative Negative    Fentanyl, Urine Negative Negative    MDMA, Urine Negative Negative    Opiates, Urine Negative Negative    Phencyclidine, Urine Negative Negative    pH, Urine 6.0 3.0 - 11.0   COVID/FLU A&B PCR    Collection Time: 12/09/23  6:50 PM   Result Value Ref Range    Influenza A PCR Not Detected Not Detected    Influenza B PCR Not Detected Not Detected    SARS-CoV-2 PCR Not Detected Not Detected,  Negative      Lab Results   Component Value Date    VALPROATE <12.5 (L) 09/01/2023           Psychiatric Mental Status Exam:  General Appearance: appears stated age, dressed in hospital garb, disheveled  Arousal: alert  Behavior: redirectable, restless and fidgety, poor eye contact  Movements and Motor Activity: no abnormal involuntary movements noted; no tics, no tremors, no akathisia, no dystonia, no evidence of tardive dyskinesia; no psychomotor agitation or retardation  Orientation: oriented to person, place, and time  Speech: rapid, mumbling, +articulation errors.rambling sometimes  Mood: Irritable, Dysphoric, and Guarded  Affect: inappropriate, anxious, irritable, bizarre  Thought Process: disorganized, tangential, scattered  Associations: +loosening of associations  Thought Content and Perceptions: no suicidal ideation, no homicidal ideation, + auditory hallucinations  Recent and Remote Memory: mild impairments noted; per interview/observation with patient  Attention and Concentration: easily distractible; per interview/observation with patient  Fund of Knowledge: unable to identify the , unable to identify the ; based on history, vocabulary, fund of knowledge, syntax, grammar, and content  Insight: poor; based on understanding of severity of illness and HPI  Judgment: questionable; based on patient's behavior and HPI      Patient Strengths:  Access to care      Patient Liabilities:  Medication non-compliance, Substance use, and Chronic psychiatric illness      Discharge Criteria:  Improved mood, Improved thought process, Medication compliance, Improved coping skills, Decreased anxiety, and Improved social functioning      Reason for Admission:  The patient is gravely disabled due to a psychiatric condition.    ASSESSMENT/PLAN:   Diagnoses:  SUBSTANCE-RELATED DISORDERS; Amphetamine Related Disorders; Amphetamine-Induced Mood Disorder  and  Cannabis-Related Disorders; Cannabis Abuse (F12.10), SCHIZOPHRENIA AND OTHER PSYCHOTIC DISORDERS; Schizoaffective Disorder , and MOOD DISORDERS; Bipolar Disorder NOS (F31.9)     DIAGNOSIS DEFERRED ON AXIS II (R69)     Past Medical History:   Diagnosis Date    Bipolar affective     Depression     HIV (human immunodeficiency virus infection)     Schizophrenia       Problem lists and Management Plans:  Resume current home mediations:  Seroquel 200 mg HS, Olanzapine 10 mg PO daily, Bupropion 150 mg XL PO daily, Depakote 500 mg PO daily.  Verify Biktarvy dose and resume  VPA level on 12/12/23   Will attempt to obtain outside psychiatric records if available   to assist with aftercare planning and collateral-Continue inpatient treatment as evidenced by significant psychotic thought disorder and gravely disabled      Estimated length of stay: To be determined    Estimated Disposition: Home    Estimated Follow-up: Outpatient medication management and Substance treatment program      On this date, I have reviewed the medical history and Nursing Assessment, as well as records from referral source.  I have evaluated the mental status of the above named person and concur with the findings of all assessments.  I have provided medical direction for the development of the Treatment Plan.      Dayami Padilla

## 2023-12-10 NOTE — NURSING
Pt admitted last night on a PEC from [facility] ER,  Pt is currently voicing no ADRs or physical complaints, pt is not in any physical distress at the current time, vital signs are stable, per ER,  Pt reporting auditory hallucinations which tell him to harm himself, pt does not take his home meds on a regular basis, UDS is positive for amphetamines and thc, pt is HIV positive from ,  Hx of Bipolar D/O, Schizophrenia, currently voicing no suicidal or homicidal ideations at this time, still reports auditory hallucinations, voicing no detox symptoms at this time, pt will be seen  Later today by RACIEL Stock for psych eval., will monitor with suicide prec., for anger, psychosis and detox symptoms and will be assisted prn.

## 2023-12-10 NOTE — NURSING
Admission Note:    Chon Parham is a 33 y.o. male, : 1990, MRN: [id number], admitted on 2023 to [facility] ([facility]) for Flip Thomas MD with a diagnosis of Psychosis [F29]. Patient admitted on a status of Physician Emergency Certificate (PEC). Chon reports no known food or drug allergies.    Patient demonstrated an affect that was anxious. Patient demonstrated mood during assessment that was anxious. Patient had an appearance that was disheveled.  Patient denies suicidal ideation. Patient denies suicide plan. Patient endorses hallucinations.    Chon's  height is 6' (1.829 m) and weight is 77.1 kg (170 lb). His temperature is 98.2 °F (36.8 °C). His blood pressure is 148/82 (abnormal) and his pulse is 84. His respiration is 20.     Chon's last BM was noted on: _______    Metal detector screening performed via security personnel. The result of the scan was _______. Head-to-toe physical assessment completed with the following findings:  ________ found upon body screen. A full skin assessment was performed. Chon's skin appeared _______.  Chon was oriented to unit, staff, peers, and room. Patient belongings/valuables stored in locked intake room cabinet and changes of clothing provided to patient. Chon was placed on Q 15 min observations.

## 2023-12-11 PROCEDURE — 11400000 HC PSYCH PRIVATE ROOM

## 2023-12-11 PROCEDURE — 25000003 PHARM REV CODE 250: Performed by: PSYCHIATRY & NEUROLOGY

## 2023-12-11 PROCEDURE — 25000003 PHARM REV CODE 250: Performed by: NURSE PRACTITIONER

## 2023-12-11 RX ADMIN — BUPROPION HYDROCHLORIDE 150 MG: 150 TABLET, FILM COATED, EXTENDED RELEASE ORAL at 08:12

## 2023-12-11 RX ADMIN — OLANZAPINE 10 MG: 5 TABLET, FILM COATED ORAL at 08:12

## 2023-12-11 RX ADMIN — BICTEGRAVIR SODIUM, EMTRICITABINE, AND TENOFOVIR ALAFENAMIDE FUMARATE 1 TABLET: 50; 200; 25 TABLET ORAL at 08:12

## 2023-12-11 RX ADMIN — QUETIAPINE FUMARATE 200 MG: 100 TABLET ORAL at 08:12

## 2023-12-11 RX ADMIN — DIVALPROEX SODIUM 500 MG: 500 TABLET, FILM COATED, EXTENDED RELEASE ORAL at 08:12

## 2023-12-11 NOTE — CARE UPDATE
Rehab Referral    Rehab referral sent on pt's behalf to   [facility]  for post DC plans.

## 2023-12-11 NOTE — PLAN OF CARE
Behavioral Health Unit  Psychosocial History and Assessment  Progress Note      Patient Name: Chon Parham YOB: 1990 SW: Elza Brice Date: 12/11/2023    Chief Complaint: addictive disorder    Consent:     Did the patient consent for an interview with the ? Yes    Did the patient consent for the  to contact family/friend/caregiver?   Yes  Name: juanita Parham, Relationship: mother, and Contact: [phone]    Did the patient give consent for the  to inform family/friend/caregiver of his/her whereabouts or to discuss discharge planning? Yes    Source of Information: Face to face with patient    Is information obtained from interviews considered reliable?   yes    Reason for Admission:     There are no hospital problems to display for this patient.      History of Present Illness - (Patient Perception):   I took too much drugs, I was breaking everything at work, not taking my medicine. I got to get back straight      Present biopsychosocial functioning: moderate    Past biopsychosocial functioning: history of higher functioning    Family and Marital/Relationship History:     Significant Other/Partner Relationships:  Single:  1 child    Family Relationships: Intact      Childhood History:     Where was patient raised? [place], [place]    Who raised the patient? mom      How does patient describe their childhood? I had the best childhood      Who is patient's primary support person? [facility]      Culture and Lutheran:     Lutheran: Non-Jehovah's witness    How strong of a role does Tenriism and spirituality play in patient's life? Little to none    Anabaptist or spiritual concerns regarding treatment: observation of holy days  and spiritual concerns / distress    History of Abuse:   History of Abuse: Denies      Outcome: denies    Psychiatric and Medical History:     History of psychiatric illness or treatment: prior inpatient treatment, psychotropic management by  PCP, has participated in counseling/psychotherapy on an outpatient basis in the past, and currently under psychiatric care    Medical history:   Past Medical History:   Diagnosis Date    Bipolar affective     Depression     HIV (human immunodeficiency virus infection)     Schizophrenia        Substance Abuse History:     Alcohol - (Patient Perspective): pt states that he does not drink alcohol regularly  Social History     Substance and Sexual Activity   Alcohol Use Yes       Drugs - (Patient Perspective): Pt states that he smokes marijuana daily and uses meth regualarly   Social History     Substance and Sexual Activity   Drug Use Yes    Types: Marijuana, Methamphetamines    Comment: Precription Drugs         Education:     Currently Enrolled? No  dropped out in 8th grade    Special Education? No    Interested in Completing Education/GED: No    Employment and Financial:     Currently employed? 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 Yes    Who manages finances/personal affairs? self      Service:     ? no    Combat Service? No     Community Resources:     Describe present use of community resources: inpatient mental health, [facility]     Identify previously used community resources   (Include previous mental health treatment - outpatient and inpatient): inpatient mental health, outpatient mental health    Environmental:     Current living situation:Lives with family    Social Evaluation:     Patient Assets: General fund of knowledge, Supportive family/friends, Motivation for treatment/growth, and Communicable skills    Patient Limitations: poor coping skills, potential for relapse    High risk psychosocial issues that may impact discharge planning:   None at this time    Recommendations:     Anticipated discharge plan:   Second Chances    High risk issues requiring early treatment planning and  immediate intervention: none    Community resources needed for discharge planning:  aftercare treatment sources    Anticipated social work role(s) in treatment and discharge planning: advice and [place], groups, individual as needed, referral to aftercare.    12/11/23 1986   Initial Information   Source of Information patient   Reason for Admission psychosis, polysubstance abuse   Patient Aware of Diagnosis yes   Arrived From emergency department   Spiritual Beliefs   Spiritual, Cultural Beliefs, Hoahaoism Practices, Values that Affect Care no   Relationship/Environment   Primary Source of Support/Comfort parent   Name of Support/Comfort Primary Source Gini Dione   Resource/Environmental Concerns   Current Living Arrangements home   Advance Directives (For Healthcare)   Advance Directive  (If Adv Dir status is received, view document under Adv Dir in header or Chart Review Media tab) Patient does not have Advance Directive, declines information.   Substance Use/Withdrawal   Substance Use Current, used prior to admission   Abuse Screen (yes response referral indicated)   Feels Unsafe at Home or Work/School no   Feels Threatened by Someone no   Does anyone try to keep you from having contact with others or doing things outside your home? no   Physical Signs of Abuse Present no   Abuse Details   Physical Abuse No   Sexual Abuse No   Emotional Abuse No   AUDIT-C (Alcohol Use Disorders ID Test)   Alcohol Use In Past Year 1-->monthly or less   Alcohol Amount Per Day In Past Year 0-->none   More Than 6 Drinks On One Occasion In Past Year 0-->never   Total Audit C Score 1

## 2023-12-11 NOTE — PROGRESS NOTES
Chon refused both TR groups despite encouragement; Alternative material was offered   12/11/23 1500   [facility] Group Therapy   Group Name Therapeutic Recreation   Specific Interventions Skilled Activity Creative Expression   Participation Level None   Participation Quality Refused

## 2023-12-11 NOTE — NURSING
"Pt is currently voicing no ADRs or physical complaints at this time, vital signs are stable, pt  Is not in any physical distress at this time, currently voicing no suicidal or homicidal ideations at this time, voicing no hallucinations or delusions at this time, voicing no detox symptoms at this time, pt asked, "Am I going home today?"  "I am good, I am ready to go.", pt will be seen today by Dr Thomas, will monitor with suicide prec., for anger, psychosis and detox symptoms   And will be assisted prn.  "

## 2023-12-11 NOTE — GROUP NOTE
Group Psychotherapy       Group Focus: Promoting Healthy Lifestyles   Group topic: Discharge planning. Therapist educated patients on discharge planning. Patients explored what they felt discharge planning was and needs.          Number of patients in attendance: 6    Group Start Time: 1045  Group End Time:  1130  Groups Date: 12/11/2023  Group Topic:  Behavioral Health  Group Department: [facility]  Group Facilitators:  Paris Garrido MSW  _____________________________________________________________________    Patient Name: Chon Parham  MRN: [id number]  Patient Class: IP- Psych   Admission Date\Time: 12/9/2023 10:39 PM  Hospital Length of Stay: 2  Primary Care Provider: Nora, Primary Doctor     Referred by: Acute Psychiatry Unit Treatment Team     Target symptoms: Substance Abuse     Patient's response to treatment: Not Participating; Pt was not present in group session; alternate provided.      Progress toward goals: Not progressing     Interval History:      Diagnosis:      Plan: Continue treatment on APU

## 2023-12-11 NOTE — PROGRESS NOTES
"Chon is a 33 male admitted for Bipolar Disorder, most recent episode depressed, severe; Methamphetamine use disorder severe, and Cannabis use disorder severe with a uds +amp and cannabis. CTRS met with Pt 1:1 at bedside, Chon appeared distracted with +RIS but cooperative and reported ability to perform his ADL's. CTRS educated Pt to TR group times and dates with Chon uncertain if he would attend TR groups, CTRS encouraged Pt to attend groups as this is part of his treatment; CTRS will prompt Pt prior to group. Chon reported his treatment goal as "I don't know" with CTRS utilizing interdisciplinary treatment goal of Increased participation and engagement.       12/11/23 0850   General   Admit Date 12/09/23   Primary Diagnosis Bipolar Disorder, most recent episode depressed, severe   Secondary Diagnosis Methamphetamine use disorder severe, and Cannabis use disorder severe   Druze spititual   Number of Children 1   Children Living? 1   Occupation    Does the patient have dentures? No   If you were to take part in activities, which of the following would you prefer? Activities that you do alone   Do you feel like you have enough to keep you busy now? Yes   Do you believe that you have the opportunity for physical activity? Yes   Activity Capabilities Minimum   Subjective   Patient states mumbled nonsensical sounds (not word salad) and moving his hand in a [place]   Assessment   Mobility ambulates independently   Transfers independently   Musculoskeletal   (none)   Visual Acuity normal vision   Visual Perception depth perception;color perception;recognizes letters;recognizes numbers   Hearing normal   Speech/Communication normal   Cognitive Concerns oriented x4   Emotional Concerns appears isolated   Leisure Interest Survey   Leisure Interest Survey Yes   Social/Group Activities   Team Sports Current Interest   Parties/Seasonal Program Current Interest   Shopping Current Interest   Solitary " Activities   Watching TV Current Interest   Watching Videos Current Interest   Music Listening Current Interest   Physical Activities   Fitness/Exercise Programs Current Interest   Weightlifting Current Interest   Walk/Run Current Interest   Creative Activities   Singing Current Interest   Outdoor Activities   Bicycling Current Interest   Fishing Current Interest   Camping Current Interest   Water Sports Current Interest   Horseback Riding Current Interest   Spectator Events   Sporting Events Current Interest   Goals   Additional Documentation yes   Goal Formulation With patient   Time For Goal Achievement 7 days   Goal 1 I don't know  (Increased Participation and Engagement)   Plan   Planned Therapy Intervention Group Recreational Therapy   Expected Length of Stay 5-7days   PT Frequency Minimum of 3 visits per week

## 2023-12-11 NOTE — NURSING
Daily Nursing Note:      Behavior:    Patient (Chon Parham is a 33 y.o. male, : 1990, MRN: [id number]) demonstrating an affect that was anxious. Chon demonstrating mood that is anxious. Chon had an appearance that was disheveled. Chon denies suicidal ideation. Chon denies suicide plan. Chon denies homicidal ideation. Chon endorses hallucinations.    Chon's  height is 6' (1.829 m) and weight is 77.1 kg (170 lb). His temperature is 98.2 °F (36.8 °C). His blood pressure is 129/78 and his pulse is 68. His respiration is 18 and oxygen saturation is 98%.     Chon's last BM was noted on: _______      Intervention:    Encourage Chon to perform self-hygiene, grooming, and changing of clothing. Monitor Chon's behavior and program compliance. Monitor Chon for suicidal ideation, homicidal ideation, sleep disturbance, and hallucinations. Encourage Chon to eat all portions of meals and assess for meal preferences. Monitor Chon for intake and output to ensure hydration. Notify the Physician/Physician Assistant/Advance Practice Registered Nurse (MD/PA/APRN) for any medication refusal and any change in patient condition.      Response:    Chon verbalizes understand of unit process and procedures. Chon reported medications ______.      Plan:     Continue to monitor per MD/PA/APRN orders; maintain patient safety.

## 2023-12-11 NOTE — PROGRESS NOTES
"12/11/2023 3:59 PM   Chon Parham   1990   [id number]        Psychiatry Progress Note     Chief Complaint: "All right"    SUBJECTIVE:   Chon Parham is a 33 y.o. male placed under a PEC at [facility] due to flight of ideas and suspected suicidal ideation.    Numerous admissions here.  Today, he states that his mood is "clearer."  Reports that he is interested in residential substance treatment.  Staff sent referral to [facility].  He would also like a referral sent to [place].  No acute physical aggression.  Will continue current medication regimen and will f/u on referrals.    JUSTIFICATION FOR TWO ANTIPSYCHOTICS:  Patient was admitted to the unit on an outpatient medication regimen that consisted of two antipsychotics.  Because of the chronicity of this patient's condition, and the acuity of symptoms during hospitalization, this regimen was continued during hospitalization, but it is always recommended to use the least amount of medication necessary to appropriately address the patient's issue.  Patient should discuss tapering/discontinuation of one of these agents with patient's outpatient provider, in appropriate.       UDS: (+)amphetamines, cannabinoids  Blood Alcohol: <10       Current Medications:   Scheduled Meds:    usqvqolon-cpaxgsgk-hfvcqqg ala  1 tablet Oral Daily    buPROPion  150 mg Oral Daily    divalproex ER  500 mg Oral Daily    OLANZapine  10 mg Oral Daily    QUEtiapine  200 mg Oral QHS      PRN Meds: acetaminophen, diphenhydrAMINE, diphenhydrAMINE, haloperidol lactate, haloperidoL, hydrOXYzine HCL, lorazepam, LORazepam, magnesium hydroxide 400 mg/5 ml, traZODone   Psychotherapeutics (From admission, onward)      Start     Stop Route Frequency Ordered    12/10/23 2100  QUEtiapine tablet 200 mg         -- Oral Nightly 12/10/23 1528    12/10/23 1527  buPROPion TB24 tablet 150 mg         -- Oral Daily 12/10/23 1528    12/10/23 1527  OLANZapine tablet 10 mg         -- Oral Daily 12/10/23 1528    " "12/09/23 2242  haloperidoL tablet 10 mg         -- Oral Every 4 hours PRN 12/09/23 2240 12/09/23 2240  haloperidol lactate injection 10 mg         -- IM Every 6 hours PRN 12/09/23 2240 12/09/23 2240  LORazepam injection 2 mg         -- IM Every 6 hours PRN 12/09/23 2240 12/09/23 2240  LORazepam tablet 2 mg         -- Oral Every 6 hours PRN 12/09/23 2240 12/09/23 2240  traZODone tablet 100 mg         -- Oral Nightly PRN 12/09/23 2240            Allergies:   Review of patient's allergies indicates:  No Known Allergies     OBJECTIVE:   Vitals   Vitals:    12/10/23 1600   BP: 129/78   Pulse: 68   Resp: 18   Temp: 98.2 °F (36.8 °C)        Labs/Imaging/Studies:   No results found for this or any previous visit (from the past 36 hour(s)).         Medical Review Of Systems:  Constitutional: negative  Respiratory: negative  Cardiovascular: negative  Gastrointestinal: negative  Genitourinary:negative  Musculoskeletal:negative  Neurological: negative      Psychiatric Mental Status Exam:  General Appearance: appears stated age, well-developed, well-nourished  Arousal: alert  Behavior: cooperative  Movements and Motor Activity: no abnormal involuntary movements noted  Orientation: oriented to person, place, time, and situation  Speech: normal rate, normal rhythm, normal volume, normal tone  Mood: "All right"  Affect: constricted  Thought Process: linear  Associations: intact  Thought Content and Perceptions: no acute suicidal ideation, no homicidal ideation, no auditory hallucinations, no visual hallucinations, no paranoid ideation, no ideas of reference  Recent and Remote Memory: recent memory intact, remote memory intact; per interview/observation with patient  Attention and Concentration: intact, attentive to conversation; per interview/observation with patient  Fund of Knowledge: intact, aware of current events, vocabulary appropriate; based on history, vocabulary, fund of knowledge, syntax, grammar, and " content  Insight: questionable; based on understanding of severity of illness and HPI  Judgment: questionable; based on patient's behavior and HPI      ASSESSMENT/PLAN:   Problems Addressed/Diagnoses:  Bipolar Disorder, most recent episode depressed, severe (F31.4)  Methamphetamine use disorder severe (F15.20)  Cannabis use disorder severe (F12.20)    Past Medical History:   Diagnosis Date    Bipolar affective     Depression     HIV (human immunodeficiency virus infection)     Schizophrenia         Plan:  Bipolar disorder, chronic with acute exacerbation   -Continue Depakote, Wellbutrin, Zyprexa, and Seroquel    Methamphetamine use, acute  -Group/Individual psychotherapy    Cannabis use, acute  -Group/Individual psychotherapy      Expected Disposition Plan: Home        Flip Thomas M.D.

## 2023-12-12 PROCEDURE — 25000003 PHARM REV CODE 250: Performed by: PSYCHIATRY & NEUROLOGY

## 2023-12-12 PROCEDURE — 25000003 PHARM REV CODE 250: Performed by: NURSE PRACTITIONER

## 2023-12-12 PROCEDURE — 11400000 HC PSYCH PRIVATE ROOM

## 2023-12-12 RX ADMIN — OLANZAPINE 10 MG: 5 TABLET, FILM COATED ORAL at 08:12

## 2023-12-12 RX ADMIN — BICTEGRAVIR SODIUM, EMTRICITABINE, AND TENOFOVIR ALAFENAMIDE FUMARATE 1 TABLET: 50; 200; 25 TABLET ORAL at 08:12

## 2023-12-12 RX ADMIN — BUPROPION HYDROCHLORIDE 150 MG: 150 TABLET, FILM COATED, EXTENDED RELEASE ORAL at 08:12

## 2023-12-12 RX ADMIN — HYDROXYZINE HYDROCHLORIDE 50 MG: 50 TABLET, FILM COATED ORAL at 08:12

## 2023-12-12 RX ADMIN — QUETIAPINE FUMARATE 200 MG: 100 TABLET ORAL at 08:12

## 2023-12-12 RX ADMIN — DIVALPROEX SODIUM 500 MG: 500 TABLET, FILM COATED, EXTENDED RELEASE ORAL at 08:12

## 2023-12-12 RX ADMIN — TRAZODONE HYDROCHLORIDE 100 MG: 100 TABLET ORAL at 08:12

## 2023-12-12 NOTE — PROGRESS NOTES
"12/12/2023 3:59 PM   Chon Parham   1990   [id number]        Psychiatry Progress Note     Chief Complaint: "All right"    SUBJECTIVE:   Chon Parham is a 33 y.o. male placed under a PEC at [facility] due to flight of ideas and suspected suicidal ideation.    Today, he states that his mood is "Fine."  Reports yesterday that he is interested in residential substance treatment, however today he states that he wants to go home. Patient is focused on discharge. Tolerating his medication well without issue. No acute physical aggression.  Will continue current medication regimen and will f/u on discharge plan.    JUSTIFICATION FOR TWO ANTIPSYCHOTICS:  Patient was admitted to the unit on an outpatient medication regimen that consisted of two antipsychotics.  Because of the chronicity of this patient's condition, and the acuity of symptoms during hospitalization, this regimen was continued during hospitalization, but it is always recommended to use the least amount of medication necessary to appropriately address the patient's issue.  Patient should discuss tapering/discontinuation of one of these agents with patient's outpatient provider, in appropriate.      UDS: (+)amphetamines, cannabinoids  Blood Alcohol: <10       Current Medications:   Scheduled Meds:    lnstcmowc-zexejzdk-dhhdjrx ala  1 tablet Oral Daily    buPROPion  150 mg Oral Daily    divalproex ER  500 mg Oral Daily    OLANZapine  10 mg Oral Daily    QUEtiapine  200 mg Oral QHS      PRN Meds: acetaminophen, diphenhydrAMINE, diphenhydrAMINE, haloperidol lactate, haloperidoL, hydrOXYzine HCL, lorazepam, LORazepam, magnesium hydroxide 400 mg/5 ml, traZODone   Psychotherapeutics (From admission, onward)      Start     Stop Route Frequency Ordered    12/10/23 2100  QUEtiapine tablet 200 mg         -- Oral Nightly 12/10/23 1528    12/10/23 1527  buPROPion TB24 tablet 150 mg         -- Oral Daily 12/10/23 1528    12/10/23 1527  OLANZapine tablet 10 mg         -- Oral " "Daily 12/10/23 1528    12/09/23 2242  haloperidoL tablet 10 mg         -- Oral Every 4 hours PRN 12/09/23 2240 12/09/23 2240  haloperidol lactate injection 10 mg         -- IM Every 6 hours PRN 12/09/23 2240 12/09/23 2240  LORazepam injection 2 mg         -- IM Every 6 hours PRN 12/09/23 2240 12/09/23 2240  LORazepam tablet 2 mg         -- Oral Every 6 hours PRN 12/09/23 2240 12/09/23 2240  traZODone tablet 100 mg         -- Oral Nightly PRN 12/09/23 2240            Allergies:   Review of patient's allergies indicates:  No Known Allergies     OBJECTIVE:   Vitals   Vitals:    12/11/23 1700   BP: 128/74   Pulse: 70   Resp: 20   Temp: 98.2 °F (36.8 °C)        Labs/Imaging/Studies:   No results found for this or any previous visit (from the past 36 hour(s)).         Medical Review Of Systems:  Constitutional: negative  Respiratory: negative  Cardiovascular: negative  Gastrointestinal: negative  Genitourinary:negative  Musculoskeletal:negative  Neurological: negative      Psychiatric Mental Status Exam:  General Appearance: appears stated age, well-developed, well-nourished  Arousal: alert  Behavior: cooperative  Movements and Motor Activity: no abnormal involuntary movements noted  Orientation: oriented to person, place, time, and situation  Speech: normal rate, normal rhythm, normal volume, normal tone  Mood: "All right"  Affect: constricted  Thought Process: linear  Associations: intact  Thought Content and Perceptions: no acute suicidal ideation, no homicidal ideation, no auditory hallucinations, no visual hallucinations, no paranoid ideation, no ideas of reference  Recent and Remote Memory: recent memory intact, remote memory intact; per interview/observation with patient  Attention and Concentration: intact, attentive to conversation; per interview/observation with patient  Fund of Knowledge: intact, aware of current events, vocabulary appropriate; based on history, vocabulary, fund of knowledge, " syntax, grammar, and content  Insight: questionable; based on understanding of severity of illness and HPI  Judgment: questionable; based on patient's behavior and HPI      ASSESSMENT/PLAN:   Problems Addressed/Diagnoses:  Bipolar Disorder, most recent episode depressed, severe (F31.4)  Methamphetamine use disorder severe (F15.20)  Cannabis use disorder severe (F12.20)    Past Medical History:   Diagnosis Date    Bipolar affective     Depression     HIV (human immunodeficiency virus infection)     Schizophrenia         Plan:  Bipolar disorder, chronic with acute exacerbation   -Continue Depakote, Wellbutrin, Zyprexa, and Seroquel    Methamphetamine use, acute  -Group/Individual psychotherapy    Cannabis use, acute  -Group/Individual psychotherapy      Expected Disposition Plan: Home        Yuriy Francis, ARCADIOP-BC

## 2023-12-12 NOTE — PROGRESS NOTES
Chon refused both TR groups despite encouragement; Alternative material was offered    12/12/23 1400   [facility] Group Therapy   Group Name Therapeutic Recreation   Specific Interventions Skilled Activity Leisure Education and Awareness   Participation Level None   Participation Quality Refused

## 2023-12-12 NOTE — GROUP NOTE
Group Psychotherapy       Group Focus: Breaking the cycle of addiction, setting goals for recovery      Number of patients in attendance: 6    Group discussed breaking the cycle of addiction, changing thought patterns, and setting goals/having a plan to remain safe and healthy.     Group Start Time: 1045  Group End Time:  1130  Groups Date: 12/12/2023  Group Topic:  Behavioral Health  Group Department: [facility]  Group Facilitators:  Kari Baron SSW   _____________________________________________________________________    Patient Name: Chon Parham  MRN: [id number]  Patient Class: IP- Psych   Admission Date\Time: 12/9/2023 10:39 PM  Hospital Length of Stay: 3  Primary Care Provider: Shavon Rodriguez NP     Referred by: Acute Psychiatry Unit Treatment Team     Target symptoms: psychosis      Patient's response to treatment: did not attend group     Progress toward goals: Not progressing     Interval History:      Diagnosis:      Plan: Continue treatment on [facility]

## 2023-12-12 NOTE — NURSING
Daily Nursing Note:      Behavior:    Patient (Chon Parham is a 33 y.o. male, : 1990, MRN: [id number]) demonstrating an affect that was anxious. Chon demonstrating mood that is anxious. Chon had an appearance that was disheveled. Chon denies suicidal ideation. Chon denies suicide plan. Chon denies homicidal ideation. Chon denies hallucinations.    Chon's  height is 6' (1.829 m) and weight is 77.1 kg (170 lb). His temperature is 98.2 °F (36.8 °C). His blood pressure is 128/74 and his pulse is 70. His respiration is 20 and oxygen saturation is 98%.     Chon's last BM was noted on: _______      Intervention:    Encourage Chon to perform self-hygiene, grooming, and changing of clothing. Monitor Chon's behavior and program compliance. Monitor Chon for suicidal ideation, homicidal ideation, sleep disturbance, and hallucinations. Encourage Chon to eat all portions of meals and assess for meal preferences. Monitor Chon for intake and output to ensure hydration. Notify the Physician/Physician Assistant/Advance Practice Registered Nurse (MD/PA/APRN) for any medication refusal and any change in patient condition.      Response:    Chon verbalizes understand of unit process and procedures. Chon reported medications ______.      Plan:     Continue to monitor per MD/PA/APRN orders; maintain patient safety.

## 2023-12-12 NOTE — NURSING
Pt is currently voicing no ADRs or physical complaints at this time, vital signs are stable,  Pt is not in any physical distress at the current time, pt did not take HS meds last night, pt was seen by RACIEL Tan today, currently voicing no suicidal or homicidal ideations at this time, voicing no hallucinations or delusions at this time,  Voicing no detox symptoms at this time, yest., pt  Was asking to go to a rehab program, today he is  Stating he wants to go home, will monitor with suicide prec., for anger, psychosis and detox symptoms and will be assisted prn.  Pt was compliant with morning meds today.

## 2023-12-13 VITALS
WEIGHT: 170 LBS | DIASTOLIC BLOOD PRESSURE: 70 MMHG | BODY MASS INDEX: 23.03 KG/M2 | SYSTOLIC BLOOD PRESSURE: 125 MMHG | OXYGEN SATURATION: 99 % | TEMPERATURE: 98 F | RESPIRATION RATE: 18 BRPM | HEIGHT: 72 IN | HEART RATE: 61 BPM

## 2023-12-13 PROBLEM — F31.4 BIPOLAR I DISORDER, MOST RECENT EPISODE DEPRESSED, SEVERE WITHOUT PSYCHOTIC FEATURES: Status: ACTIVE | Noted: 2023-12-13

## 2023-12-13 PROCEDURE — 25000003 PHARM REV CODE 250: Performed by: NURSE PRACTITIONER

## 2023-12-13 PROCEDURE — 25000003 PHARM REV CODE 250: Performed by: PSYCHIATRY & NEUROLOGY

## 2023-12-13 RX ORDER — BUPROPION HYDROCHLORIDE 150 MG/1
150 TABLET ORAL DAILY
Qty: 30 TABLET | Refills: 0 | Status: SHIPPED | OUTPATIENT
Start: 2023-12-13 | End: 2024-01-12

## 2023-12-13 RX ORDER — QUETIAPINE FUMARATE 200 MG/1
200 TABLET, FILM COATED ORAL NIGHTLY
Qty: 30 TABLET | Refills: 0 | Status: SHIPPED | OUTPATIENT
Start: 2023-12-13 | End: 2024-12-12

## 2023-12-13 RX ORDER — OLANZAPINE 10 MG/1
10 TABLET ORAL DAILY
Qty: 30 TABLET | Refills: 0 | Status: SHIPPED | OUTPATIENT
Start: 2023-12-13 | End: 2024-01-12

## 2023-12-13 RX ORDER — DIVALPROEX SODIUM 500 MG/1
500 TABLET, FILM COATED, EXTENDED RELEASE ORAL DAILY
Qty: 30 TABLET | Refills: 0 | Status: SHIPPED | OUTPATIENT
Start: 2023-12-13 | End: 2024-01-12

## 2023-12-13 RX ADMIN — BICTEGRAVIR SODIUM, EMTRICITABINE, AND TENOFOVIR ALAFENAMIDE FUMARATE 1 TABLET: 50; 200; 25 TABLET ORAL at 08:12

## 2023-12-13 RX ADMIN — BUPROPION HYDROCHLORIDE 150 MG: 150 TABLET, FILM COATED, EXTENDED RELEASE ORAL at 08:12

## 2023-12-13 RX ADMIN — DIVALPROEX SODIUM 500 MG: 500 TABLET, FILM COATED, EXTENDED RELEASE ORAL at 08:12

## 2023-12-13 RX ADMIN — OLANZAPINE 10 MG: 5 TABLET, FILM COATED ORAL at 08:12

## 2023-12-13 NOTE — PROGRESS NOTES
Treatment Team    Pt seem for treatment team today with interdisciplinary team.  Pt is Cooperative with Tx team. Pt denies symptoms at this time. MD did not change pt meds at this time. Treatment teams goals Not met at this time. Pt DC plan is home. DC date scheduled for 12.13.23.    Pt requested to receive outpt treatment at [facility], will send d/c paperwork to his provider.

## 2023-12-13 NOTE — NURSING
Discharge Note:    Chon Parham is a 33 y.o. male, : 1990, MRN: [id number], admitted on 2023 for Flip Thomas MD with a diagnosis of Psychosis [F29].    Patient discharged on 2023 per physician orders in stable condition. Patient denied suicidal ideation, homicidal ideation, or hallucinations. Patient was discharged with valuables, personal belongings, prescriptions, discharge instructions, and an educational handout explaining the diagnosis and prescribed medications. Patient verbalized understanding of the discharge instructions and importance of follow-up visits. Patient was escorted out of the facility by [facility] and placed into a secure transport vehicle to be transported to home.     Patient discharged on the following medications:     Medication List        START taking these medications      zbkrjxrfc-bajvnjsq-vfukbof ala -25 mg (25 kg or greater)  Commonly known as: BIKTARVY  Take 1 tablet by mouth once daily.  Start taking on: 2023            CHANGE how you take these medications      QUEtiapine 200 MG Tab  Commonly known as: SEROQUEL  Take 1 tablet (200 mg total) by mouth every evening.  What changed:   medication strength  how much to take            CONTINUE taking these medications      buPROPion 150 MG TB24 tablet  Commonly known as: WELLBUTRIN XL  Take 1 tablet (150 mg total) by mouth once daily.     divalproex  MG Tb24  Commonly known as: DEPAKOTE ER  Take 1 tablet (500 mg total) by mouth once daily.     OLANZapine 10 MG tablet  Commonly known as: ZyPREXA  Take 1 tablet (10 mg total) by mouth once daily.               Where to Get Your Medications        You can get these medications from any pharmacy    Bring a paper prescription for each of these medications  rwuerkkwr-cmrztnnb-awtqjql ala -25 mg (25 kg or greater)  buPROPion 150 MG TB24 tablet  divalproex  MG Tb24  OLANZapine 10 MG tablet  QUEtiapine 200 MG Tab

## 2023-12-13 NOTE — DISCHARGE SUMMARY
"DISCHARGE SUMMARY  PSYCHIATRY      Admit Date: 12/9/2023 10:39 PM    Discharge Date:  12/13/2023    SITE:   [facility]    Discharge Attending Physician: Flip Thomas M.D.    Chief Complaint:  "I am in my psychotic mode, trying to get in shape, you know...."     History of Present Illness On Admit:   Chon Parham is a 33 y.o. male with history of depression, HIV,  Bipolar disorder and schizophrenia. He was placed under a PEC at [facility] after he presented  "for psychiatric clearance". Patient reports today he had ran out of his medications last week and hoping to "get things back in shape". He had reported auditory hallucinations on arrival at the ED, but now denies  AH   Patient was recently inpatient here in November of this year. He denies having any other problems except for getting his meds. Patient admits he "smokes weeds constantly and never stops". 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 He denies suicidal or homicidal ideations. He is restless, guarded, sometimes rambling through his responses and laughing inappropriately. He currently does not believe he has any psych illness. When asked about family psych hx, patient replies "my big cousin has mental illness, not me".  His insight is impaired and his judgement is questionable.  He is more fixated on resuming his HIV medications and others. Urine toxicology is  positive for amphetamine and cannabis.  Will admit for medication management and restart home psychiatric medication.       Admit Mental Status Exam:  General Appearance: appears stated age, dressed in hospital garb, disheveled  Arousal: alert  Behavior: redirectable, restless and fidgety, poor eye contact  Movements and Motor Activity: no abnormal involuntary movements noted; no tics, no tremors, no akathisia, no dystonia, no evidence of tardive dyskinesia; no psychomotor agitation or " "retardation  Orientation: oriented to person, place, and time  Speech: rapid, mumbling, +articulation errors.rambling sometimes  Mood: Irritable, Dysphoric, and Guarded  Affect: inappropriate, anxious, irritable, bizarre  Thought Process: disorganized, tangential, scattered  Associations: +loosening of associations  Thought Content and Perceptions: no suicidal ideation, no homicidal ideation, + auditory hallucinations  Recent and Remote Memory: mild impairments noted; per interview/observation with patient  Attention and Concentration: easily distractible; per interview/observation with patient  Fund of Knowledge: unable to identify the , unable to identify the ; based on history, vocabulary, fund of knowledge, syntax, grammar, and content  Insight: poor; based on understanding of severity of illness and HPI  Judgment: questionable; based on patient's behavior and HPI      Diagnoses:  PRINCIPAL PROBLEM:  Bipolar I disorder, most recent episode depressed, severe without psychotic features      PROBLEM LIST    Bipolar I disorder, most recent episode depressed, severe without psychotic features    Methamphetamine addiction    Cannabis dependence, continuous        Hospital Course:   Patient was admitted to [facility] and restarted on Seroquel, Zyprexa, Wellbutrin, Depakote, and Biktarvy.    12/11/23  Numerous admissions here.  Today, he states that his mood is "clearer."  Reports that he is interested in residential substance treatment.  Staff sent referral to [facility].  He would also like a referral sent to [facility].  No acute physical aggression.  Will continue current medication regimen and will f/u on referrals.     12/12/23  Today, he states that his mood is "Fine."  Reports yesterday that he is interested in residential substance treatment, however today he states that he wants to go home. Patient is focused on discharge. Tolerating his medication " "well without issue. No acute physical aggression.  Will continue current medication regimen and will f/u on discharge plan.     12/13/23  Has reported that he is no longer interested in substance rehab.  States that he will now return home.  Has been somewhat irritable but no overt physical aggression.  Poor participation in treatment.  Denies thoughts of self-harm or harm to others.  Will proceed with discharge.      Current Medications:   Scheduled Meds:    wgyqatqst-sdzummpf-esitdqm ala  1 tablet Oral Daily    buPROPion  150 mg Oral Daily    divalproex ER  500 mg Oral Daily    OLANZapine  10 mg Oral Daily    QUEtiapine  200 mg Oral QHS          DISCHARGE EXAMINATION    VITALS   Vitals:    12/11/23 1700 12/12/23 1700 12/12/23 1901 12/13/23 0701   BP: 128/74 124/84 132/78 122/78   BP Location:   Left arm    Patient Position:   Sitting    Pulse: 70  78 74   Resp: 20 20 18 16   Temp: 98.2 °F (36.8 °C) 98.4 °F (36.9 °C) 97.5 °F (36.4 °C) 97.9 °F (36.6 °C)   TempSrc:  Oral Oral    SpO2: 98% 98% 99% 99%   Weight:       Height:             Discharge Mental Status Exam:  General Appearance: appears stated age, well-developed, well-nourished  Arousal: alert  Behavior: uncooperative  Movements and Motor Activity: no abnormal involuntary movements noted  Orientation: oriented to person, place, time, and situation  Speech: normal rate, normal rhythm, normal volume, normal tone  Mood: "All right"  Affect: constricted  Thought Process: linear  Associations: intact  Thought Content and Perceptions: no suicidal ideation, no homicidal ideation, no auditory hallucinations, no visual hallucinations, no paranoid ideation, no ideas of reference  Recent and Remote Memory: recent memory intact, remote memory intact; per interview/observation with patient  Attention and Concentration: intact, attentive to conversation; per interview/observation with patient  Fund of Knowledge: intact, aware of current events, vocabulary appropriate; " based on history, vocabulary, fund of knowledge, syntax, grammar, and content  Insight: questionable; based on understanding of severity of illness and HPI  Judgment: questionable; based on patient's behavior and HPI        Discharge Condition:  Stable    Prognosis:  Fair    Justification for multiple antipsychotics:  Patient was admitted to the unit on an outpatient medication regimen that consisted of two antipsychotics.  Because of the chronicity of this patient's condition, and the acuity of symptoms during hospitalization, this regimen was continued during hospitalization, but it is always recommended to use the least amount of medication necessary to appropriately address the patient's issue.  Patient should discuss tapering/discontinuation of one of these agents with patient's outpatient provider, in appropriate.      Disposition:  discharged to home    Follow-up:     Follow-up Information       [facility] Follow up.    Contact information:  [address] [address] [phone]             Jennifer, Shavon, NP Follow up.    Specialty: Family Medicine  Contact information:  [address] [place] [address] [phone]               [facility] Follow up.    Specialties: Behavioral Health, Psychiatry, Psychology  Contact information:  [address] [place] [address] [phone]                             Medication Regimen:    Current Facility-Administered Medications:     acetaminophen tablet 650 mg, 650 mg, Oral, Q6H PRN, Flip Thomas MD    jepvrzegw-fswknrbr-zhhytbf ala -25 mg (25 kg or greater) 1 tablet, 1 tablet, Oral, Daily, Flip Thomas MD, 1 tablet at 12/13/23 0815    buPROPion TB24 tablet 150 mg, 150 mg, Oral, Daily, Dayami Padilla PMHNP, 150 mg at 12/13/23 0810    diphenhydrAMINE capsule 50 mg, 50 mg, Oral, Q6H PRN, Flip Thomas MD    diphenhydrAMINE injection 50 mg, 50 mg, Intramuscular, Q6H PRN, Flip Thomas  MD    divalproex ER 24 hr tablet 500 mg, 500 mg, Oral, Daily, Ajala, Dayami, PMHNP, 500 mg at 12/13/23 0812    haloperidol lactate injection 10 mg, 10 mg, Intramuscular, Q6H PRN, Flip Thomas MD    haloperidoL tablet 10 mg, 10 mg, Oral, Q4H PRN, Flip Thomas MD    hydrOXYzine tablet 50 mg, 50 mg, Oral, Q6H PRN, Flip Thomas MD, 50 mg at 12/12/23 2009    LORazepam injection 2 mg, 2 mg, Intramuscular, Q6H PRN, Flip Thomas MD    LORazepam tablet 2 mg, 2 mg, Oral, Q6H PRN, Flip Thomas MD    magnesium hydroxide 400 mg/5 ml suspension 2,400 mg, 30 mL, Oral, Daily PRN, Flip Thomas MD    OLANZapine tablet 10 mg, 10 mg, Oral, Daily, Ajala, Dayami, PMHNP, 10 mg at 12/13/23 0810    QUEtiapine tablet 200 mg, 200 mg, Oral, QHS, Valerie, Dayami, PMHNP, 200 mg at 12/12/23 2009    traZODone tablet 100 mg, 100 mg, Oral, Nightly PRN, Flip Thomas MD, 100 mg at 12/12/23 2009      Patient Instructions:   Continue medication regimen as prescribed.    Disposition plan per  - see  notes for details.    Patient instructed to call 911 or present to emergency department if any of the following complications develop status post discharge: suicidality, homicidality, or grave disability.     Total time spent discharging patient: <30 minutes      Flip Thomas M.D.

## 2023-12-13 NOTE — PLAN OF CARE
Chon did not meet interdisciplinary treatment goal of Increased Participation and Engagement.   CTRS Discharge Recommendations:  Encouraged Pt. to actively utilize available community resources to increase leisure involvement to decrease signs and symptoms of illness.   Encouraged Pt. to utilize coping skills on a regular basis to reduce the risk of decomposition and re-hospitalization.

## 2023-12-13 NOTE — PLAN OF CARE
12/12/23 2000   Pain/Comfort/Sleep   Pain Rating (0-10): Rest 0   Pain Rating (0-10): Activity 0   Pain Rating: Rest 0 - no pain   FACES Pain Rating: Rest 0-->no hurt   rFLACC Pain Rating: - Face 0-->no particular expression or smile   rFLACC Pain Rating: - Legs 0-->normal position or relaxed   rFLACC Pain Rating: - Activity 0-->lying quietly, normal position, moves easily   rFLACC Pain Rating: - Cry 0-->no cry (awake or asleep)   rFLACC Pain Rating: - Consolability 0-->content, relaxed   rFLACC Score: 0   PAINAD Breathing 0-->normal   PAINAD Negative Vocalization 0-->none   PAINAD Facial Expression 0-->smiling or inexpressive   PAINAD Body Language 0-->relaxed   PAINAD Consolability 0-->no need to console   PAINAD Score 0   Cognitive   Cognitive/Neuro/Behavioral WDL ex   Arousal Level opens eyes spontaneously   Orientation oriented x 4   Speech clear/fluent   Mood/Behavior anxious   Behavioral   General Appearance [WDL Definition: Well-kept, clean; dress appropriate for weather/appropriate for setting] WDL except   General Appearance unkempt   Emotion Mood WDL   Emotion/Mood/Affect [WDL Definition: Calm; euthymic; affect consistent with mood; facial expression relaxed, appropriate to situation] WDL   Speech WDL   Speech [WDL Definition: Moderate rate and volume; clear, coherent; articulate; effective] WDL   Perceptual State WDL   Perceptual State [WDL Definition: Consistent with reality; denies hallucinations] WDL   Hallucinations denies hallucinations   Thought Process WDL   Thought Process [WDL Definition: Judgment and insight appropriate to situation; logical, relevant, and linear thought process] WDL   Intellectual Performance WDL   Intellectual Performance [WDL Definition: Alert, oriented x 4; immediate, recent and remote memory intact; able to comprehend] WDL   AIMS   Face/Oral Movement: Face Muscle Express 0-->none   Face/Oral Movement: Lips/Perioral 0-->none   Face/Oral Movement: Jaw 0-->none   Face/Oral  Movement: Tongue 0-->none   Extremity Movement: Upper 0-->none   Extremity Movement: Lower 0-->none   Trunk Movement: Neck/Shoulder/Hips 0-->none   Global Judgment: Severity 0-->none, normal   Global Judgment: Incapacitated 0-->none, normal   Global Judgment: Awareness 0-->no awareness   Dental: Current Problems no   Dental: Dentures Worn no   Additional Info: Edentia no   Additional Info: Disappear with Sleep no   Safety   Observed Behavior calm   Safety Measures safety rounds completed   Hyndman Suicide Severity Rating Scale   1. Wish to be Dead: Have you wished you were dead or wished you could go to sleep and not wake up? No   2. Suicidal Thoughts: Have you actually had any thoughts of killing yourself? No   3. Suicidal Thoughts with Method Without Specific Plan or Intent to Act: Have you been thinking about how you might kill yourself? No   4. Suicidal Intent Without Specific Plan: Have you had these thoughts and had some intention of acting on them? No   5. Suicide Intent with Specific Plan: Have you started to work out or worked out the details of how to kill yourself? Do you intend to carry out this plan? No   6. Suicide Behavior Question: Have you ever done anything, started to do anything, or prepared to do anything to end your life? Yes   How long ago did you do any of these? Within the last three months   Suicide Risk High Risk   Wolf Psychiatric Fall Risk Tool   Age 8-->Less than 50   Mental Status -4-->Fully alert/oriented at all times   Elimination 8-->Independent with control of bowel/bladder   Medications 8-->Psychotropic medications   Diagnosis 10-->Bipolar/schizoaffective disorder   Ambulation/Balance 7-->Independent/steady gait/immobile   Nutrition 0-->No apparent abnormalities with appetite   Sleep Disturbance 8-->No sleep disturbance   History of Falls 8-->No history of falls   Score (Staley Fall Risk) 53   Violence Risk Screening-10   Previous and/or current violence Yes   Previous  and/or current threats (verbal/physical) Yes   Previous and/or current substance abuse Yes   Previous and/or current major mental illness Yes   Personality disorder Maybe/moderate   Shows lack of insight into illness and/or behavior Yes   Expresses suspicion No   Shows lack of empathy No   Unrealistic planning No   Future stress-situations Yes   Safety Management    Patient Rounds visualized patient   Daily Care   Activity (Behavioral Health) up ad robby   Activity   Activity Assistance Provided independent   Positioning   Body Position position changed independently   Nutrition   Intake (%) 100%   Skin   Skin WDL WDL   Armond Risk Assessment   Sensory Perception 4-->no impairment   Moisture 4-->rarely moist   Activity 4-->walks frequently   Mobility 4-->no limitation   Nutrition 3-->adequate   Friction and Shear 3-->no apparent problem   Armond Score 22   Gastrointestinal   GI WDL WDL   Genitourinary   Genitourinary WDL WDL   Coping/Psychosocial   Verbalized Emotional State anxiety   Plan of Care Reviewed With patient   Patient Agreement with Plan of Care agrees   RN Clinical Review   I have evaluated the data collected on this patient and nursing care provided. Done   Safety Management   Safety Promotion/Fall Prevention nonskid shoes/socks when out of bed

## 2023-12-13 NOTE — NURSING
Daily Nursing Note:      Behavior:    Patient (Chon Parham is a 33 y.o. male, : 1990, MRN: [id number]) demonstrating an affect that was anxious. Chon demonstrating mood that is anxious. Chon had an appearance that was disheveled. Chon denies suicidal ideation. Chon denies suicide plan. Chon denies homicidal ideation. Chon denies hallucinations.    Chon's  height is 6' (1.829 m) and weight is 77.1 kg (170 lb). His oral temperature is 98.4 °F (36.9 °C). His blood pressure is 124/84 and his pulse is 70. His respiration is 20 and oxygen saturation is 98%.     Chon's last BM was noted on: _______      Intervention:    Encourage Chon to perform self-hygiene, grooming, and changing of clothing. Monitor Chon's behavior and program compliance. Monitor Chon for suicidal ideation, homicidal ideation, sleep disturbance, and hallucinations. Encourage Chon to eat all portions of meals and assess for meal preferences. Monitor Chon for intake and output to ensure hydration. Notify the Physician/Physician Assistant/Advance Practice Registered Nurse (MD/PA/APRN) for any medication refusal and any change in patient condition.      Response:    Chon verbalizes understand of unit process and procedures. Chon reported medications ______.      Plan:     Continue to monitor per MD/PA/APRN orders; maintain patient safety.

## 2023-12-13 NOTE — PLAN OF CARE
Problem: Adult Inpatient Plan of Care  Goal: Plan of Care Review  Outcome: Met  Goal: Patient-Specific Goal (Individualized)  Outcome: Met  Goal: Absence of Hospital-Acquired Illness or Injury  Outcome: Met  Goal: Optimal Comfort and Wellbeing  Outcome: Met  Goal: Readiness for Transition of Care  Outcome: Met     Problem: Behavior Regulation Impairment (Psychotic Signs/Symptoms)  Goal: Improved Behavioral Control (Psychotic Signs/Symptoms)  Outcome: Met     Problem: Cognitive Impairment (Psychotic Signs/Symptoms)  Goal: Optimal Cognitive Function (Psychotic Signs/Symptoms)  Outcome: Met     Problem: Decreased Participation and Engagement (Psychotic Signs/Symptoms)  Goal: Increased Participation and Engagement (Psychotic Signs/Symptoms)  Outcome: Met     Problem: Mood Impairment (Psychotic Signs/Symptoms)  Goal: Improved Mood Symptoms (Psychotic Signs/Symptoms)  Outcome: Met     Problem: Psychomotor Impairment (Psychotic Signs/Symptoms)  Goal: Improved Psychomotor Symptoms (Psychotic Signs/Symptoms)  Outcome: Met     Problem: Sensory Perception Impairment (Psychotic Signs/Symptoms)  Goal: Decreased Sensory Symptoms (Psychotic Signs/Symptoms)  Outcome: Met     Problem: Sleep Disturbance (Psychotic Signs/Symptoms)  Goal: Improved Sleep (Psychotic Signs/Symptoms)  Outcome: Met     Problem: Social, Occupational or Functional Impairment (Psychotic Signs/Symptoms)  Goal: Enhanced Social, Occupational or Functional Skills (Psychotic Signs/Symptoms)  Outcome: Met     Problem: Violence Risk or Actual  Goal: Anger and Impulse Control  Outcome: Met

## 2023-12-13 NOTE — NURSING
Treatment Team Note:      Behavior:    Patient (Chon Parham is a 33 y.o. male, : 1990, MRN: [id number]) demonstrating an affect that was flat. Chon demonstrating mood that is anxious. Chon had an appearance that was clean. Chon denies suicidal ideation. Chon denies suicide plan. Chon denies hallucinations.      Intervention:    Encourage Chon to perform self-hygiene, grooming, and changing of clothing. Encourage Chon to attend all scheduled groups. Monitor Novas behavior and program compliance. Monitor Chon for suicidal ideation, homicidal ideation, sleep disturbance, and hallucinations. Encourage Chon to eat all portions of meals and assess for meal preferences. Monitor Chon for intake and output to ensure hydration. Notify the Physician/Physician Assistant/Advance Practice Registered Nurse (MD/PA/APRN) for any medication refusal and any change in patient condition.    Discussed with Chon course of treatment. Discussed with Chon medications ordered and schedule of medications. Discussed collateral contact with Chon.      Response:    Chon's response to treatment team meeting: denies.      Plan:     Continue to monitor per MD/PA/APRN orders; maintain patient safety.

## 2023-12-13 NOTE — NURSING
PRN Administration Note:    Behavior:    Patient (Chon Parham is a 33 y.o. male, : 1990, MRN: [id number])     Allergies: Patient has no known allergies.    Chon's  height is 6' (1.829 m) and weight is 77.1 kg (170 lb). His oral temperature is 98.4 °F (36.9 °C). His blood pressure is 124/84 and his pulse is 70. His respiration is 20 and oxygen saturation is 98%.     Reason for PRN Administration: _sleep,anxiety_________.    Intervention:    Administered trazodone,atarax_______ per physician order to Chon       Response:    Chon tolerated administration well.      Plan:     Continue to monitor per MD/PA/APRN orders; and reevaluate medication effectiveness within 30 minutes.

## 2023-12-20 ENCOUNTER — HOSPITAL ENCOUNTER (EMERGENCY)
Facility: HOSPITAL | Age: 33
Discharge: HOME | End: 2023-12-20
Attending: EMERGENCY MEDICINE
Payer: MEDICAID

## 2023-12-20 VITALS
OXYGEN SATURATION: 100 % | SYSTOLIC BLOOD PRESSURE: 118 MMHG | RESPIRATION RATE: 16 BRPM | TEMPERATURE: 98 F | DIASTOLIC BLOOD PRESSURE: 86 MMHG | HEIGHT: 72 IN | HEART RATE: 80 BPM | BODY MASS INDEX: 24.18 KG/M2 | WEIGHT: 178.56 LBS

## 2023-12-20 DIAGNOSIS — L03.317 CELLULITIS OF BUTTOCK, LEFT: Primary | ICD-10-CM

## 2023-12-20 PROCEDURE — 99283 EMERGENCY DEPT VISIT LOW MDM: CPT

## 2023-12-20 RX ORDER — SULFAMETHOXAZOLE AND TRIMETHOPRIM 800; 160 MG/1; MG/1
1 TABLET ORAL 2 TIMES DAILY
Qty: 14 TABLET | Refills: 0 | Status: SHIPPED | OUTPATIENT
Start: 2023-12-20 | End: 2023-12-27

## 2023-12-20 NOTE — ED PROVIDER NOTES
"Encounter Date: 12/20/2023       History     Chief Complaint   Patient presents with    Abscess     C/o abscess left groin, left buttock, right knee x 3 days.      Chon Parham is a 33 y.o. male who presents to the ED with complaints of an abscess to his L buttock that started 3 day(s) ago. He reports he has a small boil on his R knee and feels "like I have tumors in my groin." He denies drainage, fever, chills.        The history is provided by the patient. No  was used.     Review of patient's allergies indicates:  No Known Allergies  Past Medical History:   Diagnosis Date    Bipolar affective     Depression     HIV (human immunodeficiency virus infection)     Schizophrenia      History reviewed. No pertinent surgical history.  History reviewed. No pertinent family history.  Social History     Tobacco Use    Smoking status: Every Day     Current packs/day: 0.50     Types: Cigarettes, Vaping with nicotine    Smokeless tobacco: Never   Substance Use Topics    Alcohol use: Yes    Drug use: Yes     Types: Marijuana, Methamphetamines     Comment: Precription Drugs     Review of Systems   Constitutional:  Negative for chills, fatigue and fever.   HENT:  Negative for congestion, ear pain, sinus pain and sore throat.    Eyes:  Negative for pain.   Respiratory:  Negative for cough, chest tightness and shortness of breath.    Cardiovascular:  Negative for chest pain.   Gastrointestinal:  Negative for abdominal pain, constipation, diarrhea, nausea and vomiting.   Genitourinary:  Negative for dysuria.   Musculoskeletal:  Negative for back pain and joint swelling.   Skin:  Positive for wound. Negative for color change and rash.   Neurological:  Negative for dizziness and weakness.   Psychiatric/Behavioral:  Negative for behavioral problems and confusion.        Physical Exam     Initial Vitals [12/20/23 1505]   BP Pulse Resp Temp SpO2   125/74 71 20 99.1 °F (37.3 °C) 95 %      MAP       --     " "    Physical Exam    Nursing note and vitals reviewed.  Constitutional: He appears well-developed and well-nourished.   HENT:   Head: Normocephalic and atraumatic.   Nose: Nose normal.   Eyes: Conjunctivae and EOM are normal. Pupils are equal, round, and reactive to light.   Neck: Neck supple.   Normal range of motion.  Cardiovascular:  Normal rate, regular rhythm, normal heart sounds and intact distal pulses.           Pulmonary/Chest: Breath sounds normal. No respiratory distress. He has no wheezes. He has no rhonchi. He has no rales.   Abdominal: Abdomen is soft. Bowel sounds are normal. He exhibits no distension and no mass. There is no abdominal tenderness. There is no rebound and no guarding.   Musculoskeletal:         General: No tenderness. Normal range of motion.      Cervical back: Normal range of motion and neck supple.     Lymphadenopathy:     He has no cervical adenopathy.   Neurological: He is alert and oriented to person, place, and time.   Skin: Skin is warm. Capillary refill takes less than 2 seconds.        Psychiatric: He has a normal mood and affect. Thought content normal.         ED Course   ED US LTD Soft Tissue/Skin    Date/Time: 12/20/2023 3:41 PM    Performed by: Lan Jaime DO  Authorized by: Lan Jaime DO    Procedure:  Focused Soft Tissue Ultrasound Exam (no focused fluid collection, cobblestone appearance consistent with cellulitis)  Charge?:  No (educational)    Labs Reviewed - No data to display       Imaging Results    None          Medications - No data to display  Medical Decision Making  DDX: buttock abscess, cellulitis, lymphadenopathy, among others    Chon Parham is a 33 y.o. male who presents to the ED with complaints of an abscess to his L buttock that started 3 day(s) ago. He reports he has a small boil on his R knee and feels "like I have tumors in my groin." He denies drainage, fever, chills.  On exam, he has a skin lesion on the L buttock that is hard to " palpation, not fluctuance, but tender. There is no head on the lesion. Dr. Jaime performed a bedside ultrasound which showed cobblestone appearance consistent with cellulitis. There is no inguinal lymphadenopathy or other abscesses palpated. He has a small pustule to right anterior knee. No joint swelling or difficulty with range of motion. Will DC home on antibiotics and gave strict return precautions. He verbalized understanding. Stable for discharge.                                       Clinical Impression:  Final diagnoses:  [L03.317] Cellulitis of buttock, left (Primary)          ED Disposition Condition    Discharge Stable          ED Prescriptions       Medication Sig Dispense Start Date End Date Auth. Provider    sulfamethoxazole-trimethoprim 800-160mg (BACTRIM DS) 800-160 mg Tab Take 1 tablet by mouth 2 (two) times daily. for 7 days 14 tablet 12/20/2023 12/27/2023 Wendy Franklin PA-C          Follow-up Information       Follow up With Specialties Details Why Contact Info    Shavon Rodriguez NP Family Medicine   [address] [place] [id number] [phone]      [facility] Emergency Medicine In 3 days As needed, If symptoms worsen [address] [facility] [address] [phone]             Wenyd Franklin PA-C  12/20/23 9005

## 2024-01-29 ENCOUNTER — HOSPITAL ENCOUNTER (EMERGENCY)
Facility: HOSPITAL | Age: 34
Discharge: HOME | End: 2024-01-29
Attending: INTERNAL MEDICINE
Payer: MEDICAID

## 2024-01-29 VITALS
HEIGHT: 71 IN | DIASTOLIC BLOOD PRESSURE: 79 MMHG | WEIGHT: 160.94 LBS | SYSTOLIC BLOOD PRESSURE: 116 MMHG | OXYGEN SATURATION: 97 % | RESPIRATION RATE: 18 BRPM | HEART RATE: 70 BPM | TEMPERATURE: 98 F | BODY MASS INDEX: 22.53 KG/M2

## 2024-01-29 DIAGNOSIS — K64.4 EXTERNAL HEMORRHOIDS: Primary | ICD-10-CM

## 2024-01-29 PROCEDURE — 99284 EMERGENCY DEPT VISIT MOD MDM: CPT

## 2024-01-29 RX ORDER — HYDROCORTISONE 1 %
CREAM (GRAM) TOPICAL
Qty: 56 G | Refills: 0 | OUTPATIENT
Start: 2024-01-29 | End: 2024-03-24

## 2024-01-29 RX ORDER — DOCUSATE SODIUM 100 MG/1
100 CAPSULE, LIQUID FILLED ORAL 2 TIMES DAILY
Qty: 10 CAPSULE | Refills: 0 | Status: SHIPPED | OUTPATIENT
Start: 2024-01-29 | End: 2024-02-03

## 2024-01-30 NOTE — ED PROVIDER NOTES
Adequate: hears normal conversation without difficulty Encounter Date: 1/29/2024       History     Chief Complaint   Patient presents with    Hemorrhoids     Reports painful hemorrhoids x5 months. Denies bleeding.     Patient reports to the emergency room with complaints of hemorrhoids that have been present for the last 5 months; patient reports pain is worse when he is having bowel movements, he denies any blood in his stool; patient reports he has not come previously for a evaluation due to being embarrassed; patient denies abdominal pain, fever, vomiting    The history is provided by the patient.   General Illness   The current episode started several weeks ago. The problem occurs occasionally. Nothing relieves the symptoms. Pertinent negatives include no fever, no decreased vision, no abdominal pain, no diarrhea, no nausea, no vomiting, no sore throat, no neck pain, no shortness of breath, no rash and no pain.     Review of patient's allergies indicates:  No Known Allergies  Past Medical History:   Diagnosis Date    Bipolar affective     Depression     HIV (human immunodeficiency virus infection)     Schizophrenia      No past surgical history on file.  No family history on file.  Social History     Tobacco Use    Smoking status: Every Day     Current packs/day: 0.50     Types: Cigarettes, Vaping with nicotine    Smokeless tobacco: Never   Substance Use Topics    Alcohol use: Yes    Drug use: Yes     Types: Marijuana, Methamphetamines     Comment: Precription Drugs     Review of Systems   Constitutional:  Negative for fever.   HENT:  Negative for sore throat.    Eyes:  Negative for pain.   Respiratory:  Negative for shortness of breath.    Cardiovascular:  Negative for chest pain.   Gastrointestinal:  Negative for abdominal pain, diarrhea, nausea and vomiting.   Genitourinary:  Negative for dysuria.   Musculoskeletal:  Negative for back pain and neck pain.   Skin:  Negative for rash.   Neurological:  Negative for weakness.   Hematological:  Does not bruise/bleed  easily.   Psychiatric/Behavioral: Negative.         Physical Exam     Initial Vitals [01/29/24 1708]   BP Pulse Resp Temp SpO2   136/83 74 18 97.4 °F (36.3 °C) 100 %      MAP       --         Physical Exam    Vitals reviewed.  Constitutional: He appears well-developed and well-nourished.   HENT:   Head: Normocephalic and atraumatic.   Eyes: Conjunctivae and EOM are normal. Pupils are equal, round, and reactive to light.   Neck:   Normal range of motion.  Cardiovascular:  Normal rate, regular rhythm, normal heart sounds and intact distal pulses.           Pulmonary/Chest: Breath sounds normal. No respiratory distress. He exhibits no tenderness.   Abdominal: Abdomen is soft. Bowel sounds are normal. He exhibits no distension. There is no abdominal tenderness.   Genitourinary: Rectum:      Guaiac result negative.      External hemorrhoid present.      No rectal mass or anal fissure.   Guaiac negative stool.    Genitourinary Comments: External external hemorrhoid present; no bleeding or thrombosed hemorrhoid visualized    FOBT negative    Exam performed with Rabia GARCIA as chaperone             Musculoskeletal:         General: Normal range of motion.      Cervical back: Normal range of motion.     Neurological: He is alert and oriented to person, place, and time. He displays normal reflexes. No cranial nerve deficit or sensory deficit. GCS score is 15. GCS eye subscore is 4. GCS verbal subscore is 5. GCS motor subscore is 6.   Skin: Skin is warm. No pallor.   Psychiatric: He has a normal mood and affect. His behavior is normal. Judgment and thought content normal.         ED Course   Procedures  Labs Reviewed - No data to display       Imaging Results    None          Medications - No data to display  Medical Decision Making  Risk  OTC drugs.  Risk Details: Given strict ED return precautions. I have spoken with the patient and/or caregivers. I have explained the patient's condition, diagnoses and treatment plan based on  the information available to me at this time. I have answered the patient's and/or caregiver's questions and addressed any concerns. The patient and/or caregivers have as good an understanding of the patient's diagnosis, condition and treatment plan as can be expected at this point. The vital signs have been stable. The patient's condition is stable and appropriate for discharge from the emergency department.      The patient will pursue further outpatient evaluation with the primary care physician or other designated or consulting physician as outlined in the discharge instructions. The patient and/or caregivers are agreeable to this plan of care and follow-up instructions have been explained in detail. The patient and/or caregivers have received these instructions in written format and have expressed an understanding of the discharge instructions. The patient and/or caregivers are aware that any significant change in condition or worsening of symptoms should prompt an immediate return to this or the closest emergency department or a call to 911.                                      Clinical Impression:  Final diagnoses:  [K64.4] External hemorrhoids (Primary)          ED Disposition Condition    Discharge Stable          ED Prescriptions       Medication Sig Dispense Start Date End Date Auth. Provider    hydrocortisone 1 % cream Apply to affected area 2 times daily 56 g 1/29/2024 -- Darío Bello PA    docusate sodium (COLACE) 100 MG capsule Take 1 capsule (100 mg total) by mouth 2 (two) times daily. for 5 days 10 capsule 1/29/2024 2/3/2024 Darío Bello PA          Follow-up Information       Follow up With Specialties Details Why Contact Info    Shavon Rodriguez NP Family Medicine   [address] [place] [id number] [phone]      discharge info    Discussed all pertinent ED information, results, diagnosis and treatment plan; All questions and concerns were addressed at this time. Patient voices  understanding of information and instructions. Patient is comfortable with plan and discharge    discharge followup    If your symptoms become WORSE or you DO NOT IMPROVE and you are unable to reach your health care provider, you should RETURN to the emergency department             Darío Bello PA  01/29/24 7394

## 2024-02-03 ENCOUNTER — HOSPITAL ENCOUNTER (EMERGENCY)
Facility: HOSPITAL | Age: 34
Discharge: HOME | End: 2024-02-03
Attending: STUDENT IN AN ORGANIZED HEALTH CARE EDUCATION/TRAINING PROGRAM
Payer: MEDICAID

## 2024-02-03 VITALS
DIASTOLIC BLOOD PRESSURE: 61 MMHG | BODY MASS INDEX: 22.96 KG/M2 | RESPIRATION RATE: 18 BRPM | TEMPERATURE: 97 F | WEIGHT: 164 LBS | OXYGEN SATURATION: 100 % | HEIGHT: 71 IN | SYSTOLIC BLOOD PRESSURE: 115 MMHG | HEART RATE: 82 BPM

## 2024-02-03 DIAGNOSIS — L03.317 CELLULITIS OF BUTTOCK: ICD-10-CM

## 2024-02-03 DIAGNOSIS — S46.912A STRAIN OF LEFT SHOULDER, INITIAL ENCOUNTER: Primary | ICD-10-CM

## 2024-02-03 PROCEDURE — 99284 EMERGENCY DEPT VISIT MOD MDM: CPT | Mod: 25

## 2024-02-03 RX ORDER — NAPROXEN 500 MG/1
500 TABLET ORAL 2 TIMES DAILY
Qty: 20 TABLET | Refills: 0 | Status: SHIPPED | OUTPATIENT
Start: 2024-02-03

## 2024-02-03 RX ORDER — SULFAMETHOXAZOLE AND TRIMETHOPRIM 800; 160 MG/1; MG/1
1 TABLET ORAL 2 TIMES DAILY
Qty: 20 TABLET | Refills: 0 | Status: SHIPPED | OUTPATIENT
Start: 2024-02-03 | End: 2024-02-13

## 2024-02-14 ENCOUNTER — HOSPITAL ENCOUNTER (EMERGENCY)
Facility: HOSPITAL | Age: 34
Discharge: PSYCHIATRIC OR SUBSTANCE USE FACILITY | End: 2024-02-15
Attending: STUDENT IN AN ORGANIZED HEALTH CARE EDUCATION/TRAINING PROGRAM
Payer: MEDICAID

## 2024-02-14 VITALS
RESPIRATION RATE: 17 BRPM | TEMPERATURE: 97 F | BODY MASS INDEX: 22.4 KG/M2 | DIASTOLIC BLOOD PRESSURE: 87 MMHG | WEIGHT: 160 LBS | HEIGHT: 71 IN | HEART RATE: 78 BPM | SYSTOLIC BLOOD PRESSURE: 148 MMHG | OXYGEN SATURATION: 96 %

## 2024-02-14 DIAGNOSIS — Z00.8 MEDICAL CLEARANCE FOR PSYCHIATRIC ADMISSION: Primary | ICD-10-CM

## 2024-02-14 DIAGNOSIS — F23 ACUTE PSYCHOSIS: ICD-10-CM

## 2024-02-14 DIAGNOSIS — R45.851 SUICIDAL IDEATIONS: ICD-10-CM

## 2024-02-14 PROCEDURE — 99285 EMERGENCY DEPT VISIT HI MDM: CPT

## 2024-02-15 LAB
ALBUMIN SERPL-MCNC: 4.2 G/DL (ref 3.5–5)
ALBUMIN/GLOB SERPL: 1 RATIO (ref 1.1–2)
ALP SERPL-CCNC: 101 UNIT/L (ref 40–150)
ALT SERPL-CCNC: 20 UNIT/L (ref 0–55)
AMPHET UR QL SCN: POSITIVE
APAP SERPL-MCNC: <17.4 UG/ML (ref 17.4–30)
APPEARANCE UR: CLEAR
AST SERPL-CCNC: 31 UNIT/L (ref 5–34)
BACTERIA #/AREA URNS AUTO: ABNORMAL /HPF
BARBITURATE SCN PRESENT UR: NEGATIVE
BASOPHILS # BLD AUTO: 0.04 X10(3)/MCL
BASOPHILS NFR BLD AUTO: 0.5 %
BENZODIAZ UR QL SCN: NEGATIVE
BILIRUB SERPL-MCNC: 0.5 MG/DL
BILIRUB UR QL STRIP.AUTO: NEGATIVE
BUN SERPL-MCNC: 13.2 MG/DL (ref 8.9–20.6)
CALCIUM SERPL-MCNC: 9.2 MG/DL (ref 8.4–10.2)
CANNABINOIDS UR QL SCN: POSITIVE
CHLORIDE SERPL-SCNC: 104 MMOL/L (ref 98–107)
CO2 SERPL-SCNC: 23 MMOL/L (ref 22–29)
COCAINE UR QL SCN: NEGATIVE
COLOR UR AUTO: ABNORMAL
CREAT SERPL-MCNC: 0.95 MG/DL (ref 0.73–1.18)
EOSINOPHIL # BLD AUTO: 0.31 X10(3)/MCL (ref 0–0.9)
EOSINOPHIL NFR BLD AUTO: 4.1 %
ERYTHROCYTE [DISTWIDTH] IN BLOOD BY AUTOMATED COUNT: 12 % (ref 11.5–17)
ETHANOL SERPL-MCNC: <10 MG/DL
FENTANYL UR QL SCN: NEGATIVE
GFR SERPLBLD CREATININE-BSD FMLA CKD-EPI: >60 MLS/MIN/1.73/M2
GLOBULIN SER-MCNC: 4.1 GM/DL (ref 2.4–3.5)
GLUCOSE SERPL-MCNC: 86 MG/DL (ref 74–100)
GLUCOSE UR QL STRIP.AUTO: NORMAL
HCT VFR BLD AUTO: 46.6 % (ref 42–52)
HGB BLD-MCNC: 15.9 G/DL (ref 14–18)
HYALINE CASTS #/AREA URNS LPF: ABNORMAL /LPF
IMM GRANULOCYTES # BLD AUTO: 0.01 X10(3)/MCL (ref 0–0.04)
IMM GRANULOCYTES NFR BLD AUTO: 0.1 %
KETONES UR QL STRIP.AUTO: NEGATIVE
LEUKOCYTE ESTERASE UR QL STRIP.AUTO: NEGATIVE
LYMPHOCYTES # BLD AUTO: 1.58 X10(3)/MCL (ref 0.6–4.6)
LYMPHOCYTES NFR BLD AUTO: 20.8 %
MCH RBC QN AUTO: 33.2 PG (ref 27–31)
MCHC RBC AUTO-ENTMCNC: 34.1 G/DL (ref 33–36)
MCV RBC AUTO: 97.3 FL (ref 80–94)
MDMA UR QL SCN: NEGATIVE
MONOCYTES # BLD AUTO: 0.67 X10(3)/MCL (ref 0.1–1.3)
MONOCYTES NFR BLD AUTO: 8.8 %
MUCOUS THREADS URNS QL MICRO: ABNORMAL /LPF
NEUTROPHILS # BLD AUTO: 4.99 X10(3)/MCL (ref 2.1–9.2)
NEUTROPHILS NFR BLD AUTO: 65.7 %
NITRITE UR QL STRIP.AUTO: NEGATIVE
NRBC BLD AUTO-RTO: 0 %
OPIATES UR QL SCN: NEGATIVE
PCP UR QL: NEGATIVE
PH UR STRIP.AUTO: 5.5 [PH]
PH UR: 5.5 [PH] (ref 3–11)
PLATELET # BLD AUTO: 313 X10(3)/MCL (ref 130–400)
PMV BLD AUTO: 9.2 FL (ref 7.4–10.4)
POTASSIUM SERPL-SCNC: 3.7 MMOL/L (ref 3.5–5.1)
PROT SERPL-MCNC: 8.3 GM/DL (ref 6.4–8.3)
PROT UR QL STRIP.AUTO: NEGATIVE
RBC # BLD AUTO: 4.79 X10(6)/MCL (ref 4.7–6.1)
RBC #/AREA URNS AUTO: ABNORMAL /HPF
RBC UR QL AUTO: NEGATIVE
SARS-COV-2 RDRP RESP QL NAA+PROBE: NEGATIVE
SODIUM SERPL-SCNC: 136 MMOL/L (ref 136–145)
SP GR UR STRIP.AUTO: 1.01 (ref 1–1.03)
SPECIFIC GRAVITY, URINE AUTO (.000) (OHS): 1.01 (ref 1–1.03)
SPERM URNS QL MICRO: ABNORMAL /HPF
SQUAMOUS #/AREA URNS LPF: ABNORMAL /HPF
T4 FREE SERPL-MCNC: 1.09 NG/DL (ref 0.7–1.48)
TSH SERPL-ACNC: 0.03 UIU/ML (ref 0.35–4.94)
UROBILINOGEN UR STRIP-ACNC: NORMAL
WBC # SPEC AUTO: 7.6 X10(3)/MCL (ref 4.5–11.5)
WBC #/AREA URNS AUTO: ABNORMAL /HPF

## 2024-02-15 PROCEDURE — 80307 DRUG TEST PRSMV CHEM ANLYZR: CPT | Performed by: STUDENT IN AN ORGANIZED HEALTH CARE EDUCATION/TRAINING PROGRAM

## 2024-02-15 PROCEDURE — 80053 COMPREHEN METABOLIC PANEL: CPT | Performed by: STUDENT IN AN ORGANIZED HEALTH CARE EDUCATION/TRAINING PROGRAM

## 2024-02-15 PROCEDURE — 84439 ASSAY OF FREE THYROXINE: CPT | Performed by: STUDENT IN AN ORGANIZED HEALTH CARE EDUCATION/TRAINING PROGRAM

## 2024-02-15 PROCEDURE — 80143 DRUG ASSAY ACETAMINOPHEN: CPT | Performed by: STUDENT IN AN ORGANIZED HEALTH CARE EDUCATION/TRAINING PROGRAM

## 2024-02-15 PROCEDURE — 85025 COMPLETE CBC W/AUTO DIFF WBC: CPT | Performed by: STUDENT IN AN ORGANIZED HEALTH CARE EDUCATION/TRAINING PROGRAM

## 2024-02-15 PROCEDURE — 84443 ASSAY THYROID STIM HORMONE: CPT | Performed by: STUDENT IN AN ORGANIZED HEALTH CARE EDUCATION/TRAINING PROGRAM

## 2024-02-15 PROCEDURE — 82077 ASSAY SPEC XCP UR&BREATH IA: CPT | Performed by: STUDENT IN AN ORGANIZED HEALTH CARE EDUCATION/TRAINING PROGRAM

## 2024-02-15 PROCEDURE — 81001 URINALYSIS AUTO W/SCOPE: CPT | Performed by: STUDENT IN AN ORGANIZED HEALTH CARE EDUCATION/TRAINING PROGRAM

## 2024-02-15 PROCEDURE — 87635 SARS-COV-2 COVID-19 AMP PRB: CPT | Performed by: STUDENT IN AN ORGANIZED HEALTH CARE EDUCATION/TRAINING PROGRAM
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
## 2024-03-05 NOTE — ED PROVIDER NOTES
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 Also c/o abscesses on buttocks x 2 . For 1 week     Patient presents to the emergency department complaining of left shoulder pain as well as pain in his buttocks from what he thinks her abscesses.  He states he was had 2 or 3 red spots on his buttocks it has been painful, he was intermittently drain purulent material.  He was states he was lifting heavy things at work, and is complaining of some left shoulder pain.        Review of patient's allergies indicates:  No Known Allergies  Past Medical History:   Diagnosis Date    Bipolar affective     Depression     HIV (human immunodeficiency virus infection)     Immune deficiency disorder     Schizophrenia      History reviewed. No pertinent surgical history.  History reviewed. No pertinent family history.  Social History     Tobacco Use    Smoking status: Every Day     Current packs/day: 0.50     Types: Cigarettes, Vaping with nicotine    Smokeless tobacco: Never   Substance Use Topics    Alcohol use: Yes    Drug use: Yes     Types: Marijuana, Methamphetamines     Comment: Precription Drugs     Review of Systems   Constitutional:  Negative for chills and fever.   HENT:  Negative for congestion and sore throat.    Respiratory:  Negative for cough and shortness of breath.    Cardiovascular:  Negative for chest pain and palpitations.   Gastrointestinal:  Negative for abdominal pain and nausea.   Genitourinary:  Negative for dysuria and hematuria.   Musculoskeletal:  Negative for arthralgias and myalgias.   Neurological:  Negative for dizziness and weakness.       Physical Exam     Initial Vitals [02/03/24 1248]   BP Pulse Resp Temp SpO2   119/63 73 20 97 °F (36.1 °C) 97 %      MAP       --         Physical Exam    Nursing note and vitals reviewed.  Constitutional: He appears well-developed and  well-nourished.   HENT:   Head: Normocephalic and atraumatic.   Eyes: Conjunctivae are normal. Pupils are equal, round, and reactive to light.   Neck: Neck supple.   Normal range of motion.  Cardiovascular:  Normal rate and regular rhythm.           Pulmonary/Chest: Breath sounds normal. No respiratory distress.   Abdominal: Abdomen is soft. There is no abdominal tenderness.   Genitourinary:    Genitourinary Comments: Small amount of what appears to be folliculitis on patient was buttocks, no large abscess or induration appreciated     Musculoskeletal:         General: No tenderness or edema. Normal range of motion.      Cervical back: Normal range of motion and neck supple.     Neurological: He is alert and oriented to person, place, and time.   Skin: Skin is warm and dry.         ED Course   Procedures  Labs Reviewed - No data to display       Imaging Results              X-Ray Shoulder 2 or More Views Left (Final result)  Result time 02/03/24 14:40:23      Final result by Gume Baron MD (02/03/24 14:40:23)                   Impression:      No acute osseous process identified.      Electronically signed by: Gume Baron  Date:    02/03/2024  Time:    14:40               Narrative:    EXAMINATION:  XR SHOULDER COMPLETE 2 OR MORE VIEWS LEFT    CLINICAL HISTORY:  pain;    TECHNIQUE:  Two or three views of the left shoulder.    COMPARISON:  None    FINDINGS:  No acute fracture identified.  Aligned glenohumeral and AC joints.  No defined rotator cuff calcification.                                       Medications - No data to display  Medical Decision Making  Patient was good range of motion of shoulder, x-rays showed no acute bony process likely strain shoulder, started on naproxen.  Will place on Bactrim for patient's folliculitis/cellulitis of buttocks, no drainable abscess at this time.    Amount and/or Complexity of Data Reviewed  Radiology: ordered. Decision-making details documented in ED  Course.    Risk  Prescription drug management.                                      Clinical Impression:  Final diagnoses:  [S46.912A] Strain of left shoulder, initial encounter (Primary)  [L03.317] Cellulitis of buttock          ED Disposition Condition    Discharge Stable          ED Prescriptions       Medication Sig Dispense Start Date End Date Auth. Provider    sulfamethoxazole-trimethoprim 800-160mg (BACTRIM DS) 800-160 mg Tab () Take 1 tablet by mouth 2 (two) times daily. for 10 days 20 tablet 2/3/2024 2024 Micah Miller MD    naproxen (NAPROSYN) 500 MG tablet Take 1 tablet (500 mg total) by mouth 2 (two) times daily. 20 tablet 2/3/2024 -- Micah Miller MD          Follow-up Information       Follow up With Specialties Details Why Contact Info    [facility] Emergency Medicine Go to  If symptoms worsen [address] [facility] [address] [phone]             Micah Miller MD  24 [phone]

## 2024-03-08 ENCOUNTER — HOSPITAL ENCOUNTER (EMERGENCY)
Facility: HOSPITAL | Age: 34
Discharge: PSYCHIATRIC OR SUBSTANCE USE FACILITY | End: 2024-03-09
Attending: STUDENT IN AN ORGANIZED HEALTH CARE EDUCATION/TRAINING PROGRAM
Payer: MEDICAID

## 2024-03-08 DIAGNOSIS — Z00.8 MEDICAL CLEARANCE FOR PSYCHIATRIC ADMISSION: Primary | ICD-10-CM

## 2024-03-08 DIAGNOSIS — R45.851 SUICIDAL IDEATION: ICD-10-CM

## 2024-03-08 DIAGNOSIS — F23 ACUTE PSYCHOSIS: ICD-10-CM

## 2024-03-08 LAB
ALBUMIN SERPL-MCNC: 4.1 G/DL (ref 3.5–5)
ALBUMIN/GLOB SERPL: 1.1 RATIO (ref 1.1–2)
ALP SERPL-CCNC: 85 UNIT/L (ref 40–150)
ALT SERPL-CCNC: 13 UNIT/L (ref 0–55)
AMPHET UR QL SCN: POSITIVE
APAP SERPL-MCNC: <17.4 UG/ML (ref 17.4–30)
APPEARANCE UR: CLEAR
AST SERPL-CCNC: 21 UNIT/L (ref 5–34)
BACTERIA #/AREA URNS AUTO: ABNORMAL /HPF
BARBITURATE SCN PRESENT UR: NEGATIVE
BASOPHILS # BLD AUTO: 0.02 X10(3)/MCL
BASOPHILS NFR BLD AUTO: 0.3 %
BENZODIAZ UR QL SCN: NEGATIVE
BILIRUB SERPL-MCNC: 0.4 MG/DL
BILIRUB UR QL STRIP.AUTO: NEGATIVE
BUN SERPL-MCNC: 15.7 MG/DL (ref 8.9–20.6)
CALCIUM SERPL-MCNC: 9.6 MG/DL (ref 8.4–10.2)
CANNABINOIDS UR QL SCN: POSITIVE
CHLORIDE SERPL-SCNC: 105 MMOL/L (ref 98–107)
CO2 SERPL-SCNC: 23 MMOL/L (ref 22–29)
COCAINE UR QL SCN: NEGATIVE
COLOR UR AUTO: YELLOW
CREAT SERPL-MCNC: 1.1 MG/DL (ref 0.73–1.18)
EOSINOPHIL # BLD AUTO: 0.14 X10(3)/MCL (ref 0–0.9)
EOSINOPHIL NFR BLD AUTO: 2 %
ERYTHROCYTE [DISTWIDTH] IN BLOOD BY AUTOMATED COUNT: 13 % (ref 11.5–17)
ETHANOL SERPL-MCNC: <10 MG/DL
FENTANYL UR QL SCN: NEGATIVE
GFR SERPLBLD CREATININE-BSD FMLA CKD-EPI: >60 MLS/MIN/1.73/M2
GLOBULIN SER-MCNC: 3.7 GM/DL (ref 2.4–3.5)
GLUCOSE SERPL-MCNC: 92 MG/DL (ref 74–100)
GLUCOSE UR QL STRIP.AUTO: NORMAL
HCT VFR BLD AUTO: 43.1 % (ref 42–52)
HGB BLD-MCNC: 15.3 G/DL (ref 14–18)
HOLD SPECIMEN: NORMAL
HOLD SPECIMEN: NORMAL
HYALINE CASTS #/AREA URNS LPF: ABNORMAL /LPF
IMM GRANULOCYTES # BLD AUTO: 0.01 X10(3)/MCL (ref 0–0.04)
IMM GRANULOCYTES NFR BLD AUTO: 0.1 %
KETONES UR QL STRIP.AUTO: NEGATIVE
LEUKOCYTE ESTERASE UR QL STRIP.AUTO: NEGATIVE
LYMPHOCYTES # BLD AUTO: 2.1 X10(3)/MCL (ref 0.6–4.6)
LYMPHOCYTES NFR BLD AUTO: 30.6 %
MCH RBC QN AUTO: 34 PG (ref 27–31)
MCHC RBC AUTO-ENTMCNC: 35.5 G/DL (ref 33–36)
MCV RBC AUTO: 95.8 FL (ref 80–94)
MDMA UR QL SCN: NEGATIVE
MONOCYTES # BLD AUTO: 0.44 X10(3)/MCL (ref 0.1–1.3)
MONOCYTES NFR BLD AUTO: 6.4 %
MUCOUS THREADS URNS QL MICRO: ABNORMAL /LPF
NEUTROPHILS # BLD AUTO: 4.15 X10(3)/MCL (ref 2.1–9.2)
NEUTROPHILS NFR BLD AUTO: 60.6 %
NITRITE UR QL STRIP.AUTO: NEGATIVE
NRBC BLD AUTO-RTO: 0 %
OPIATES UR QL SCN: NEGATIVE
PCP UR QL: NEGATIVE
PH UR STRIP.AUTO: 6 [PH]
PH UR: 6 [PH] (ref 3–11)
PLATELET # BLD AUTO: 293 X10(3)/MCL (ref 130–400)
PMV BLD AUTO: 9.5 FL (ref 7.4–10.4)
POTASSIUM SERPL-SCNC: 3.6 MMOL/L (ref 3.5–5.1)
PROT SERPL-MCNC: 7.8 GM/DL (ref 6.4–8.3)
PROT UR QL STRIP.AUTO: ABNORMAL
RBC # BLD AUTO: 4.5 X10(6)/MCL (ref 4.7–6.1)
RBC #/AREA URNS AUTO: ABNORMAL /HPF
RBC UR QL AUTO: NEGATIVE
SARS-COV-2 RDRP RESP QL NAA+PROBE: NEGATIVE
SODIUM SERPL-SCNC: 137 MMOL/L (ref 136–145)
SP GR UR STRIP.AUTO: 1.03 (ref 1–1.03)
SPECIFIC GRAVITY, URINE AUTO (.000) (OHS): 1.03 (ref 1–1.03)
SQUAMOUS #/AREA URNS LPF: ABNORMAL /HPF
TSH SERPL-ACNC: 0.44 UIU/ML (ref 0.35–4.94)
UROBILINOGEN UR STRIP-ACNC: NORMAL
WBC # SPEC AUTO: 6.86 X10(3)/MCL (ref 4.5–11.5)
WBC #/AREA URNS AUTO: ABNORMAL /HPF

## 2024-03-08 PROCEDURE — 82077 ASSAY SPEC XCP UR&BREATH IA: CPT | Performed by: STUDENT IN AN ORGANIZED HEALTH CARE EDUCATION/TRAINING PROGRAM

## 2024-03-08 PROCEDURE — 80307 DRUG TEST PRSMV CHEM ANLYZR: CPT | Performed by: STUDENT IN AN ORGANIZED HEALTH CARE EDUCATION/TRAINING PROGRAM

## 2024-03-08 PROCEDURE — 87635 SARS-COV-2 COVID-19 AMP PRB: CPT | Performed by: STUDENT IN AN ORGANIZED HEALTH CARE EDUCATION/TRAINING PROGRAM

## 2024-03-08 PROCEDURE — 84443 ASSAY THYROID STIM HORMONE: CPT | Performed by: STUDENT IN AN ORGANIZED HEALTH CARE EDUCATION/TRAINING PROGRAM

## 2024-03-08 PROCEDURE — 85025 COMPLETE CBC W/AUTO DIFF WBC: CPT | Performed by: STUDENT IN AN ORGANIZED HEALTH CARE EDUCATION/TRAINING PROGRAM

## 2024-03-08 PROCEDURE — 80053 COMPREHEN METABOLIC PANEL: CPT | Performed by: STUDENT IN AN ORGANIZED HEALTH CARE EDUCATION/TRAINING PROGRAM

## 2024-03-08 PROCEDURE — 25000003 PHARM REV CODE 250: Performed by: STUDENT IN AN ORGANIZED HEALTH CARE EDUCATION/TRAINING PROGRAM

## 2024-03-08 PROCEDURE — 99285 EMERGENCY DEPT VISIT HI MDM: CPT

## 2024-03-08 PROCEDURE — 80143 DRUG ASSAY ACETAMINOPHEN: CPT | Performed by: STUDENT IN AN ORGANIZED HEALTH CARE EDUCATION/TRAINING PROGRAM

## 2024-03-08 PROCEDURE — 81001 URINALYSIS AUTO W/SCOPE: CPT | Mod: XB | Performed by: STUDENT IN AN ORGANIZED HEALTH CARE EDUCATION/TRAINING PROGRAM

## 2024-03-08 RX ORDER — ACETAMINOPHEN 325 MG/1
650 TABLET ORAL
Status: COMPLETED | OUTPATIENT
Start: 2024-03-08 | End: 2024-03-08

## 2024-03-08 RX ORDER — LORAZEPAM 1 MG/1
1 TABLET ORAL
Status: COMPLETED | OUTPATIENT
Start: 2024-03-08 | End: 2024-03-08

## 2024-03-08 RX ADMIN — LORAZEPAM 1 MG: 1 TABLET ORAL at 08:03

## 2024-03-08 RX ADMIN — ACETAMINOPHEN 650 MG: 325 TABLET, FILM COATED ORAL at 08:03

## 2024-03-09 VITALS
TEMPERATURE: 98 F | WEIGHT: 165.81 LBS | RESPIRATION RATE: 20 BRPM | HEIGHT: 71 IN | DIASTOLIC BLOOD PRESSURE: 72 MMHG | OXYGEN SATURATION: 100 % | HEART RATE: 77 BPM | BODY MASS INDEX: 23.21 KG/M2 | SYSTOLIC BLOOD PRESSURE: 126 MMHG

## 2024-03-09 NOTE — ED PROVIDER NOTES
Encounter Date: 3/8/2024       History     Chief Complaint   Patient presents with    Medication Refill     Needs psych meds refilled. Also states is suicidal at this time due to not being on all psych meds     Psychiatric Evaluation     Patient presents to the emergency department for psychiatric evaluation.  He states that he has been abusing drugs, specifically MDMA, and has thoughts of suicide.  He states he tried to overdose 3 days ago but it did not work.  He denies any homicidal ideations.  When asked if he was hallucinating he was said not really but sometimes he can hear his own thoughts.    The history is provided by the patient.     Review of patient's allergies indicates:  No Known Allergies  Past Medical History:   Diagnosis Date    Bipolar affective     Depression     HIV (human immunodeficiency virus infection)     Immune deficiency disorder     Schizophrenia      History reviewed. No pertinent surgical history.  History reviewed. No pertinent family history.  Social History     Tobacco Use    Smoking status: Every Day     Current packs/day: 0.50     Types: Cigarettes, Vaping with nicotine    Smokeless tobacco: Never   Substance Use Topics    Alcohol use: Yes    Drug use: Yes     Types: Marijuana, Methamphetamines     Comment: Precription Drugs     Review of Systems   Unable to perform ROS: Psychiatric disorder       Physical Exam     Initial Vitals [03/08/24 1913]   BP Pulse Resp Temp SpO2   (!) 149/83 68 20 97.5 °F (36.4 °C) 99 %      MAP       --         Physical Exam    Nursing note and vitals reviewed.  Constitutional: He appears well-developed and well-nourished.   HENT:   Head: Normocephalic and atraumatic.   Eyes: Conjunctivae are normal. Pupils are equal, round, and reactive to light.   Neck: Neck supple.   Normal range of motion.  Cardiovascular:  Normal rate and regular rhythm.           Pulmonary/Chest: Breath sounds normal. No respiratory distress.   Abdominal: Abdomen is soft. There is  no abdominal tenderness.   Musculoskeletal:         General: No edema. Normal range of motion.      Cervical back: Normal range of motion and neck supple.     Neurological: He is alert and oriented to person, place, and time.   Skin: Skin is warm and dry.         ED Course   Procedures  Labs Reviewed   COMPREHENSIVE METABOLIC PANEL - Abnormal; Notable for the following components:       Result Value    Globulin 3.7 (*)     All other components within normal limits   URINALYSIS, REFLEX TO URINE CULTURE - Abnormal; Notable for the following components:    Protein, UA Trace (*)     Mucous, UA Occ (*)     All other components within normal limits   DRUG SCREEN, URINE (BEAKER) - Abnormal; Notable for the following components:    Amphetamines, Urine Positive (*)     Cannabinoids, Urine Positive (*)     All other components within normal limits    Narrative:     Cut off concentrations:    Amphetamines - 1000 ng/ml  Barbiturates - 200 ng/ml  Benzodiazepine - 200 ng/ml  Cannabinoids (THC) - 50 ng/ml  Cocaine - 300 ng/ml  Fentanyl - 1.0 ng/ml  MDMA - 500 ng/ml  Opiates - 300 ng/ml   Phencyclidine (PCP) - 25 ng/ml    Specimen submitted for drug analysis and tested for pH and specific gravity in order to evaluate sample integrity. Suspect tampering if specific gravity is <1.003 and/or pH is not within the range of 4.5 - 8.0  False negatives may result form substances such as bleach added to urine.  False positives may result for the presence of a substance with similar chemical structure to the drug or its metabolite.    This test provides only a PRELIMINARY analytical test result. A more specific alternate chemical method must be used in order to obtain a confirmed analytical result. Gas chromatography/mass spectrometry (GC/MS) is the preferred confirmatory method. Other chemical confirmation methods are available. Clinical consideration and professional judgement should be applied to any drug of abuse test result,  particularly when preliminary positive results are used.    Positive results will be confirmed only at the physicians request. Unconfirmed screening results are to be used only for medical purposes (treatment).        ACETAMINOPHEN LEVEL - Abnormal; Notable for the following components:    Acetaminophen Level <17.4 (*)     All other components within normal limits   CBC WITH DIFFERENTIAL - Abnormal; Notable for the following components:    RBC 4.50 (*)     MCV 95.8 (*)     MCH 34.0 (*)     All other components within normal limits   ALCOHOL,MEDICAL (ETHANOL) - Normal   CBC W/ AUTO DIFFERENTIAL    Narrative:     The following orders were created for panel order CBC auto differential.  Procedure                               Abnormality         Status                     ---------                               -----------         ------                     CBC with Differential[[id number]]       Abnormal            Final result                 Please view results for these tests on the individual orders.   TSH   SARS-COV-2 RNA AMPLIFICATION, QUAL   EXTRA TUBES    Narrative:     The following orders were created for panel order EXTRA TUBES.  Procedure                               Abnormality         Status                     ---------                               -----------         ------                     Light Blue Top Hold[[id number]]                             In process                 Gold Top Hold[[id number]]                                   In process                   Please view results for these tests on the individual orders.   LIGHT BLUE TOP HOLD   GOLD TOP HOLD          Imaging Results    None          Medications   acetaminophen tablet 650 mg (650 mg Oral Given 3/8/24 2005)   LORazepam tablet 1 mg (1 mg Oral Given 3/8/24 2005)     Medical Decision Making  PEC was placed.  CBC, CMP, blood tox panels are unremarkable.  Patient is medically stable for psychiatric admission.    Amount and/or  Complexity of Data Reviewed  Labs: ordered. Decision-making details documented in ED Course.    Risk  OTC drugs.  Prescription drug management.                                      Clinical Impression:  Final diagnoses:  [Z00.8] Medical clearance for psychiatric admission (Primary)  [F23] Acute psychosis  [R45.851] Suicidal ideation          ED Disposition Condition    Transfer to Psych Facility Stable          ED Prescriptions    None       Follow-up Information    None          Micah Miller MD  03/08/24 6830

## 2024-03-24 ENCOUNTER — HOSPITAL ENCOUNTER (EMERGENCY)
Facility: HOSPITAL | Age: 34
Discharge: HOME | End: 2024-03-24
Attending: STUDENT IN AN ORGANIZED HEALTH CARE EDUCATION/TRAINING PROGRAM
Payer: MEDICAID

## 2024-03-24 VITALS
OXYGEN SATURATION: 99 % | RESPIRATION RATE: 18 BRPM | HEART RATE: 73 BPM | WEIGHT: 177.56 LBS | DIASTOLIC BLOOD PRESSURE: 64 MMHG | SYSTOLIC BLOOD PRESSURE: 121 MMHG | TEMPERATURE: 98 F | BODY MASS INDEX: 24.77 KG/M2

## 2024-03-24 DIAGNOSIS — L08.9 SOFT TISSUE INFECTION: Primary | ICD-10-CM

## 2024-03-24 DIAGNOSIS — K64.4 INFLAMED EXTERNAL HEMORRHOID: ICD-10-CM

## 2024-03-24 PROCEDURE — 99284 EMERGENCY DEPT VISIT MOD MDM: CPT

## 2024-03-24 RX ORDER — SULFAMETHOXAZOLE AND TRIMETHOPRIM 800; 160 MG/1; MG/1
1 TABLET ORAL 2 TIMES DAILY
Qty: 20 TABLET | Refills: 0 | Status: SHIPPED | OUTPATIENT
Start: 2024-03-24 | End: 2024-04-03

## 2024-03-24 RX ORDER — HYDROCORTISONE 25 MG/G
CREAM TOPICAL 2 TIMES DAILY PRN
Qty: 28 G | Refills: 0 | Status: SHIPPED | OUTPATIENT
Start: 2024-03-24

## 2024-03-24 RX ORDER — DOCUSATE SODIUM 100 MG/1
100 CAPSULE, LIQUID FILLED ORAL 2 TIMES DAILY
Qty: 20 CAPSULE | Refills: 0 | Status: SHIPPED | OUTPATIENT
Start: 2024-03-24 | End: 2024-04-03

## 2024-03-24 NOTE — ED PROVIDER NOTES
Encounter Date: 3/24/2024       History     Chief Complaint   Patient presents with    Abscess     Pt c/o an abscess on his buttock that he noticed few days ago. States it is getting difficult to sit down.     The patient presents with rectal pain and hemorrhoids.  The onset was chronic.  The course/duration of symptoms is intermittent.  The character of symptoms is dull.  The degree of symptoms is minimal at this time.  There are exacerbating factors including bowel movement and bearing down.  There are relieving factors including analgesics and sitz baths.  Risk factors consist of none.  Prior episodes: chronic.  Therapy today: none.  Associated symptoms: none, he denies any bleeding.  Additional history: last bm yesterday normal. He also reports tender swollen area to right buttock and he is concerned that he is developing an abscess. He denies drainage, fever, and chills.      Review of patient's allergies indicates:  No Known Allergies  Past Medical History:   Diagnosis Date    Bipolar affective     Depression     HIV (human immunodeficiency virus infection)     Immune deficiency disorder     Schizophrenia      History reviewed. No pertinent surgical history.  History reviewed. No pertinent family history.  Social History     Tobacco Use    Smoking status: Every Day     Current packs/day: 0.50     Types: Cigarettes, Vaping with nicotine    Smokeless tobacco: Never   Substance Use Topics    Alcohol use: Yes    Drug use: Yes     Types: Marijuana, Methamphetamines     Comment: Precription Drugs     Review of Systems   Constitutional:  Negative for fever.   HENT:  Negative for sore throat.    Respiratory:  Negative for shortness of breath.    Cardiovascular:  Negative for chest pain.   Gastrointestinal:  Positive for rectal pain. Negative for nausea.   Genitourinary:  Negative for dysuria.   Musculoskeletal:  Negative for back pain.   Skin:  Positive for wound. Negative for rash.   Neurological:  Negative for  weakness.   Hematological:  Does not bruise/bleed easily.   All other systems reviewed and are negative.      Physical Exam     Initial Vitals [03/24/24 1724]   BP Pulse Resp Temp SpO2   121/64 73 18 97.7 °F (36.5 °C) 99 %      MAP       --         Physical Exam    Nursing note and vitals reviewed.  Constitutional: He appears well-developed and well-nourished.   HENT:   Head: Normocephalic and atraumatic.   Neck: Neck supple.   Normal range of motion.  Cardiovascular:  Normal rate, regular rhythm, normal heart sounds and intact distal pulses.           Pulmonary/Chest: Effort normal and breath sounds normal. He has no decreased breath sounds.   Abdominal: Abdomen is soft and flat. Bowel sounds are normal. There is no abdominal tenderness.   Rectal exam: inflamed external hemorrhoid, leo fobt d/t pain   Musculoskeletal:         General: Normal range of motion.      Cervical back: Normal range of motion and neck supple.     Neurological: He is alert and oriented to person, place, and time. He has normal strength.   Skin: Skin is warm and dry.   Small tender raised area to right buttock consistent with forming abscess, no drainage or fluctuance, will not need I&D at this time   Psychiatric: He has a normal mood and affect.         ED Course   Procedures  Labs Reviewed - No data to display       Imaging Results    None          Medications - No data to display  Medical Decision Making  The patient presents with rectal pain and hemorrhoids.  The onset was chronic.  The course/duration of symptoms is intermittent.  The character of symptoms is dull.  The degree of symptoms is minimal at this time.  There are exacerbating factors including bowel movement and bearing down.  There are relieving factors including analgesics and sitz baths.  Risk factors consist of none.  Prior episodes: chronic.  Therapy today: none.  Associated symptoms: none, he denies any bleeding.  Additional history: last bm yesterday normal. He also  reports tender swollen area to right buttock and he is concerned that he is developing an abscess. He denies drainage, fever, and chills.    Will refer to surgery clinic for further evaluation of external hemorrhoid. Will start him on Bactrim for his skin infection. He will return to ER in 3 days for recheck of abscess. Strict return to ER precautions given.      Additional MDM:   Differential Diagnosis:   At this time differential diagnosis is but not limited to hemorrhoid, abscess, cellulitis                                     Clinical Impression:  Final diagnoses:  [L08.9] Soft tissue infection (Primary)  [K64.4] Inflamed external hemorrhoid          ED Disposition Condition    Discharge Stable          ED Prescriptions       Medication Sig Dispense Start Date End Date Auth. Provider    sulfamethoxazole-trimethoprim 800-160mg (BACTRIM DS) 800-160 mg Tab Take 1 tablet by mouth 2 (two) times daily. for 10 days 20 tablet 3/24/2024 4/3/2024 Alessandro Russell ACNP    docusate sodium (COLACE) 100 MG capsule Take 1 capsule (100 mg total) by mouth 2 (two) times daily. for 10 days 20 capsule 3/24/2024 4/3/2024 Alessandro Russell ACNP    hydrocortisone (ANUSOL-HC) 2.5 % rectal cream Place rectally 2 (two) times daily as needed for Hemorrhoids (pain or itching). 28 g 3/24/2024 -- Alessandro Russell ACNP          Follow-up Information       Follow up With Specialties Details Why Contact Info    Shavon Rodriguez NP Family Medicine In 3 days  [address] [place] [id number] [phone]      referral sent to surgery clinic        [facility] Emergency Medicine In 3 days If symptoms worsen, For wound re-check [address] [facility] [address] [phone]             Alessandro Russell ACNP  03/24/24 8022

## 2024-04-26 ENCOUNTER — HOSPITAL ENCOUNTER (EMERGENCY)
Facility: HOSPITAL | Age: 34
Discharge: PSYCHIATRIC OR SUBSTANCE USE FACILITY | End: 2024-04-27
Attending: STUDENT IN AN ORGANIZED HEALTH CARE EDUCATION/TRAINING PROGRAM
Payer: MEDICAID

## 2024-04-26 DIAGNOSIS — Z00.8 MEDICAL CLEARANCE FOR PSYCHIATRIC ADMISSION: Primary | ICD-10-CM

## 2024-04-26 DIAGNOSIS — R45.851 SUICIDAL IDEATIONS: ICD-10-CM

## 2024-04-26 DIAGNOSIS — F29 PSYCHOSIS, UNSPECIFIED PSYCHOSIS TYPE: ICD-10-CM

## 2024-04-26 LAB
ALBUMIN SERPL-MCNC: 4.6 G/DL (ref 3.5–5)
ALBUMIN/GLOB SERPL: 1 RATIO (ref 1.1–2)
ALP SERPL-CCNC: 107 UNIT/L (ref 40–150)
ALT SERPL-CCNC: 18 UNIT/L (ref 0–55)
AMPHET UR QL SCN: POSITIVE
APAP SERPL-MCNC: <3 UG/ML (ref 10–30)
APPEARANCE UR: CLEAR
AST SERPL-CCNC: 21 UNIT/L (ref 5–34)
BACTERIA #/AREA URNS AUTO: ABNORMAL /HPF
BARBITURATE SCN PRESENT UR: NEGATIVE
BASOPHILS # BLD AUTO: 0.03 X10(3)/MCL
BASOPHILS NFR BLD AUTO: 0.3 %
BENZODIAZ UR QL SCN: NEGATIVE
BILIRUB SERPL-MCNC: 0.5 MG/DL
BILIRUB UR QL STRIP.AUTO: NEGATIVE
BUN SERPL-MCNC: 15.6 MG/DL (ref 8.9–20.6)
CALCIUM SERPL-MCNC: 9.7 MG/DL (ref 8.4–10.2)
CANNABINOIDS UR QL SCN: POSITIVE
CHLORIDE SERPL-SCNC: 101 MMOL/L (ref 98–107)
CO2 SERPL-SCNC: 25 MMOL/L (ref 22–29)
COCAINE UR QL SCN: NEGATIVE
COLOR UR AUTO: YELLOW
CREAT SERPL-MCNC: 1.27 MG/DL (ref 0.73–1.18)
EOSINOPHIL # BLD AUTO: 0.08 X10(3)/MCL (ref 0–0.9)
EOSINOPHIL NFR BLD AUTO: 0.8 %
ERYTHROCYTE [DISTWIDTH] IN BLOOD BY AUTOMATED COUNT: 13 % (ref 11.5–17)
ETHANOL SERPL-MCNC: <10 MG/DL
FENTANYL UR QL SCN: NEGATIVE
GFR SERPLBLD CREATININE-BSD FMLA CKD-EPI: >60 MLS/MIN/1.73/M2
GLOBULIN SER-MCNC: 4.4 GM/DL (ref 2.4–3.5)
GLUCOSE SERPL-MCNC: 95 MG/DL (ref 74–100)
GLUCOSE UR QL STRIP.AUTO: NORMAL
HCT VFR BLD AUTO: 50.4 % (ref 42–52)
HGB BLD-MCNC: 17.1 G/DL (ref 14–18)
IMM GRANULOCYTES # BLD AUTO: 0.02 X10(3)/MCL (ref 0–0.04)
IMM GRANULOCYTES NFR BLD AUTO: 0.2 %
KETONES UR QL STRIP.AUTO: NEGATIVE
LEUKOCYTE ESTERASE UR QL STRIP.AUTO: NEGATIVE
LYMPHOCYTES # BLD AUTO: 2.57 X10(3)/MCL (ref 0.6–4.6)
LYMPHOCYTES NFR BLD AUTO: 26.7 %
MCH RBC QN AUTO: 32.9 PG (ref 27–31)
MCHC RBC AUTO-ENTMCNC: 33.9 G/DL (ref 33–36)
MCV RBC AUTO: 97.1 FL (ref 80–94)
MDMA UR QL SCN: NEGATIVE
MONOCYTES # BLD AUTO: 0.88 X10(3)/MCL (ref 0.1–1.3)
MONOCYTES NFR BLD AUTO: 9.1 %
MUCOUS THREADS URNS QL MICRO: ABNORMAL /LPF
NEUTROPHILS # BLD AUTO: 6.06 X10(3)/MCL (ref 2.1–9.2)
NEUTROPHILS NFR BLD AUTO: 62.9 %
NITRITE UR QL STRIP.AUTO: NEGATIVE
NRBC BLD AUTO-RTO: 0 %
OPIATES UR QL SCN: NEGATIVE
PCP UR QL: NEGATIVE
PH UR STRIP.AUTO: 7.5 [PH]
PH UR: 7.5 [PH] (ref 3–11)
PLATELET # BLD AUTO: 266 X10(3)/MCL (ref 130–400)
PMV BLD AUTO: 10.2 FL (ref 7.4–10.4)
POTASSIUM SERPL-SCNC: 4.1 MMOL/L (ref 3.5–5.1)
PROT SERPL-MCNC: 9 GM/DL (ref 6.4–8.3)
PROT UR QL STRIP.AUTO: ABNORMAL
RBC # BLD AUTO: 5.19 X10(6)/MCL (ref 4.7–6.1)
RBC #/AREA URNS AUTO: ABNORMAL /HPF
RBC UR QL AUTO: NEGATIVE
SARS-COV-2 RDRP RESP QL NAA+PROBE: NEGATIVE
SODIUM SERPL-SCNC: 137 MMOL/L (ref 136–145)
SP GR UR STRIP.AUTO: 1.03 (ref 1–1.03)
SPECIFIC GRAVITY, URINE AUTO (.000) (OHS): 1.03 (ref 1–1.03)
SPERM URNS QL MICRO: ABNORMAL /HPF
SQUAMOUS #/AREA URNS LPF: ABNORMAL /HPF
TSH SERPL-ACNC: 1.06 UIU/ML (ref 0.35–4.94)
UROBILINOGEN UR STRIP-ACNC: NORMAL
WBC # SPEC AUTO: 9.64 X10(3)/MCL (ref 4.5–11.5)
WBC #/AREA URNS AUTO: ABNORMAL /HPF

## 2024-04-26 PROCEDURE — 80307 DRUG TEST PRSMV CHEM ANLYZR: CPT | Performed by: NURSE PRACTITIONER

## 2024-04-26 PROCEDURE — 25000003 PHARM REV CODE 250: Performed by: STUDENT IN AN ORGANIZED HEALTH CARE EDUCATION/TRAINING PROGRAM

## 2024-04-26 PROCEDURE — 82077 ASSAY SPEC XCP UR&BREATH IA: CPT | Performed by: NURSE PRACTITIONER

## 2024-04-26 PROCEDURE — 80053 COMPREHEN METABOLIC PANEL: CPT | Performed by: NURSE PRACTITIONER

## 2024-04-26 PROCEDURE — 80143 DRUG ASSAY ACETAMINOPHEN: CPT | Performed by: STUDENT IN AN ORGANIZED HEALTH CARE EDUCATION/TRAINING PROGRAM

## 2024-04-26 PROCEDURE — 99285 EMERGENCY DEPT VISIT HI MDM: CPT

## 2024-04-26 PROCEDURE — 87635 SARS-COV-2 COVID-19 AMP PRB: CPT | Performed by: STUDENT IN AN ORGANIZED HEALTH CARE EDUCATION/TRAINING PROGRAM

## 2024-04-26 PROCEDURE — 81001 URINALYSIS AUTO W/SCOPE: CPT | Performed by: STUDENT IN AN ORGANIZED HEALTH CARE EDUCATION/TRAINING PROGRAM

## 2024-04-26 PROCEDURE — 84443 ASSAY THYROID STIM HORMONE: CPT | Performed by: STUDENT IN AN ORGANIZED HEALTH CARE EDUCATION/TRAINING PROGRAM

## 2024-04-26 PROCEDURE — 85025 COMPLETE CBC W/AUTO DIFF WBC: CPT | Performed by: NURSE PRACTITIONER

## 2024-04-26 RX ORDER — OLANZAPINE 5 MG/1
5 TABLET, ORALLY DISINTEGRATING ORAL EVERY 8 HOURS PRN
Status: DISCONTINUED | OUTPATIENT
Start: 2024-04-26 | End: 2024-04-27 | Stop reason: HOSPADM

## 2024-04-26 RX ORDER — LORAZEPAM 1 MG/1
2 TABLET ORAL
Status: COMPLETED | OUTPATIENT
Start: 2024-04-26 | End: 2024-04-26

## 2024-04-26 RX ADMIN — OLANZAPINE 5 MG: 5 TABLET, ORALLY DISINTEGRATING ORAL at 03:04

## 2024-04-26 RX ADMIN — LORAZEPAM 2 MG: 1 TABLET ORAL at 07:04

## 2024-04-26 NOTE — FIRST PROVIDER EVALUATION
Medical screening examination initiated.  I have conducted a focused provider triage encounter, findings are as follows:    Brief history of present illness:  Patient is rambling in triage.     There were no vitals filed for this visit.    Pertinent physical exam:  Awake, alert, ambulatory    Brief workup plan:  Labs, Exam    Preliminary workup initiated; this workup will be continued and followed by the physician or advanced practice provider that is assigned to the patient when roomed.

## 2024-04-26 NOTE — ED NOTES
"Assumed care of pt. At this time.. pt. Arrives via POV reports needs medical clearance  because "people thinking I'm crazy staying in my studio rapping all day".. reports "Im just tired of rapping I just can't stop". Pt. Reports auditory hallucinations. Denies SI/HI/paranoia.. noted tangential speech upon arrival. Reports adherent to medications   "

## 2024-04-27 VITALS
SYSTOLIC BLOOD PRESSURE: 143 MMHG | RESPIRATION RATE: 18 BRPM | WEIGHT: 160 LBS | HEIGHT: 71 IN | DIASTOLIC BLOOD PRESSURE: 89 MMHG | BODY MASS INDEX: 22.4 KG/M2 | TEMPERATURE: 99 F | OXYGEN SATURATION: 97 % | HEART RATE: 105 BPM

## 2024-04-27 NOTE — ED PROVIDER NOTES
"Encounter Date: 4/26/2024       History     Chief Complaint   Patient presents with    Psychiatric Evaluation     Patient reports here to be medically cleared for placement. Reports that he raps to himself and "his people think that he is crazy" Denies SI/HI. Denies auditory or visual hallucinations. Patient rambling in triage. Reports compliant with psych meds.      Chon Parham is a 33 y.o. male with a past medical history of depression, bipolar, schizophrenia who presents to the ED for depression, pressured speech, suicidal ideations.         Review of patient's allergies indicates:  No Known Allergies  Past Medical History:   Diagnosis Date    Bipolar affective     Depression     HIV (human immunodeficiency virus infection)     Immune deficiency disorder     Schizophrenia      No past surgical history on file.  No family history on file.  Social History     Tobacco Use    Smoking status: Every Day     Current packs/day: 0.50     Types: Cigarettes, Vaping with nicotine    Smokeless tobacco: Never   Substance Use Topics    Alcohol use: Yes    Drug use: Yes     Types: Marijuana, Methamphetamines     Comment: Precription Drugs     Review of Systems   Unable to perform ROS: Psychiatric disorder       Physical Exam     Initial Vitals [04/26/24 1252]   BP Pulse Resp Temp SpO2   (!) 154/78 110 18 98.9 °F (37.2 °C) 98 %      MAP       --         Physical Exam    Nursing note and vitals reviewed.  Constitutional: He appears well-developed.   Eyes: EOM are normal. Pupils are equal, round, and reactive to light.   Cardiovascular:  Normal rate and regular rhythm.           No murmur heard.  Pulmonary/Chest: Breath sounds normal. No respiratory distress. He has no wheezes. He has no rales.   Abdominal: Abdomen is soft. He exhibits no distension. There is no abdominal tenderness.     Skin: Skin is warm. Capillary refill takes less than 2 seconds. No rash noted.   Psychiatric:   Tangential, pressured speech, poor insight, " positive SI         ED Course   Procedures  Labs Reviewed   COMPREHENSIVE METABOLIC PANEL - Abnormal; Notable for the following components:       Result Value    Creatinine 1.27 (*)     Protein Total 9.0 (*)     Globulin 4.4 (*)     Albumin/Globulin Ratio 1.0 (*)     All other components within normal limits   CBC WITH DIFFERENTIAL - Abnormal; Notable for the following components:    MCV 97.1 (*)     MCH 32.9 (*)     All other components within normal limits   ACETAMINOPHEN LEVEL - Abnormal; Notable for the following components:    Acetaminophen Level <3.0 (*)     All other components within normal limits   ALCOHOL,MEDICAL (ETHANOL) - Normal   TSH - Normal   CBC W/ AUTO DIFFERENTIAL    Narrative:     The following orders were created for panel order CBC Auto Differential.  Procedure                               Abnormality         Status                     ---------                               -----------         ------                     CBC with Differential[[id number]]       Abnormal            Final result                 Please view results for these tests on the individual orders.   DRUG SCREEN, URINE (BEAKER)   URINALYSIS, REFLEX TO URINE CULTURE   SARS CORONAVIRUS 2 ANTIGEN POCT, MANUAL READ          Imaging Results    None          Medications   OLANZapine zydis disintegrating tablet 5 mg (5 mg Oral Given 4/26/24 1519)   LORazepam tablet 2 mg (2 mg Oral Given 4/26/24 1900)     Medical Decision Making  Problems Addressed:  Medical clearance for psychiatric admission: acute illness or injury  Psychosis, unspecified psychosis type: acute illness or injury  Suicidal ideations: acute illness or injury    Amount and/or Complexity of Data Reviewed  Labs: ordered.    Risk  Prescription drug management.      Differential diagnosis (includes but is not limited to):   Acute psychosis, SI, HI, AVH, medication noncompliance, polysubstance abuse    MDM Narrative  33-year-old male presents for evaluation of suicide  ideations, noncompliance with medications, pressured speech.  Patient appears gravely disabled and does represent a risk for self-harm.  Pec initiated, labs are pending for medical clearance.    Update:  Labs reviewed.  Patient is medically cleared for psych transfer.    Dispo: Psych    My independent radiology interpretation:   Point of care US (independently performed and interpreted):   Decision rules/clinical scoring:     Sepsis Perfusion Assessment:     Amount and/or Complexity of Data Reviewed  Independent historian: none   Summary of history:   External data reviewed: notes from previous ED visits and notes from clinic visits  Summary of data reviewed: Prior records reviewed  Risk and benefits of testing: discussed   Labs: ordered and reviewed  Discussion of management or test interpretation with external provider(s): none   Summary of discussion:     Risk  Diagnosis or treatment significantly impacted by social determinants of health: psychiatric illness    Critical Care      Data Reviewed/Counseling: I have personally reviewed the patient's vital signs, nursing notes, and other relevant tests, information, and imaging. I had a detailed discussion regarding the historical points, exam findings, and any diagnostic results supporting the discharge diagnosis. I personally performed the history, PE, MDM and procedures as documented above and agree with the scribe's documentation.    Portions of this note were dictated using voice recognition software. Although it was reviewed for accuracy, some inherent voice recognition errors may have occurred and may be present in this document.             ED Course as of 04/26/24 1908 Fri Apr 26, 2024   1516 PEC 1515 []      ED Course User Index  [] Juan Miguel Smallwood MD                           Clinical Impression:  Final diagnoses:  [Z00.8] Medical clearance for psychiatric admission (Primary)  [F29] Psychosis, unspecified psychosis type  [R45.851] Suicidal  Juan Miguel Ricks MD  04/26/24 0141

## 2024-07-15 ENCOUNTER — HOSPITAL ENCOUNTER (EMERGENCY)
Facility: HOSPITAL | Age: 34
Discharge: HOME | End: 2024-07-15
Attending: STUDENT IN AN ORGANIZED HEALTH CARE EDUCATION/TRAINING PROGRAM
Payer: MEDICAID

## 2024-07-15 VITALS
RESPIRATION RATE: 18 BRPM | BODY MASS INDEX: 25.2 KG/M2 | HEIGHT: 71 IN | DIASTOLIC BLOOD PRESSURE: 72 MMHG | TEMPERATURE: 99 F | WEIGHT: 180 LBS | SYSTOLIC BLOOD PRESSURE: 116 MMHG | OXYGEN SATURATION: 97 % | HEART RATE: 92 BPM

## 2024-07-15 DIAGNOSIS — L02.91 ABSCESS: Primary | ICD-10-CM

## 2024-07-15 PROCEDURE — 99282 EMERGENCY DEPT VISIT SF MDM: CPT

## 2024-07-15 RX ORDER — SULFAMETHOXAZOLE AND TRIMETHOPRIM 800; 160 MG/1; MG/1
1 TABLET ORAL 2 TIMES DAILY
Qty: 14 TABLET | Refills: 0 | Status: SHIPPED | OUTPATIENT
Start: 2024-07-15 | End: 2024-07-22

## 2024-07-15 RX ORDER — KETOROLAC TROMETHAMINE 10 MG/1
10 TABLET, FILM COATED ORAL EVERY 6 HOURS
Qty: 20 TABLET | Refills: 0 | Status: SHIPPED | OUTPATIENT
Start: 2024-07-15 | End: 2024-07-20

## 2024-07-15 NOTE — ED PROVIDER NOTES
Encounter Date: 7/15/2024       History     Chief Complaint   Patient presents with    Abscess     Pt c/o abscess to arm after IV drug use 2 weeks ago. Denies fevers, chills     34 Year old male presents to ED for evaluation of left arm pain and swelling.  Patient reports that he was had swelling to area after injecting himself with a needle 2 weeks ago.  Patient denies any fever.  Denies any drainage.    The history is provided by the patient. No  was used.     Review of patient's allergies indicates:  No Known Allergies  Past Medical History:   Diagnosis Date    Bipolar affective     Depression     HIV (human immunodeficiency virus infection)     Immune deficiency disorder     Schizophrenia      No past surgical history on file.  No family history on file.  Social History     Tobacco Use    Smoking status: Every Day     Current packs/day: 0.50     Types: Cigarettes, Vaping with nicotine    Smokeless tobacco: Never   Substance Use Topics    Alcohol use: Yes    Drug use: Yes     Types: Marijuana, Methamphetamines     Comment: Precription Drugs     Review of Systems   Constitutional:  Negative for chills and fever.   HENT:  Negative for sore throat.    Respiratory:  Negative for shortness of breath.    Cardiovascular:  Negative for chest pain.   Gastrointestinal:  Negative for abdominal pain, nausea and vomiting.   Genitourinary:  Negative for dysuria.   Musculoskeletal:  Negative for back pain.   Skin:  Positive for wound. Negative for rash.   Neurological:  Negative for weakness.   Hematological:  Does not bruise/bleed easily.       Physical Exam     Initial Vitals [07/15/24 1839]   BP Pulse Resp Temp SpO2   116/72 92 18 98.6 °F (37 °C) 97 %      MAP       --         Physical Exam    Nursing note and vitals reviewed.  Constitutional: He appears well-developed and well-nourished. He is cooperative.   HENT:   Head: Normocephalic and atraumatic.   Right Ear: Tympanic membrane and external ear  normal.   Left Ear: Tympanic membrane and external ear normal.   Mouth/Throat: Uvula is midline, oropharynx is clear and moist and mucous membranes are normal. No trismus in the jaw. No uvula swelling.   Eyes: Conjunctivae are normal. Pupils are equal, round, and reactive to light.   Neck: Neck supple.   Normal range of motion.  Cardiovascular:  Normal rate, regular rhythm and normal heart sounds.           Pulmonary/Chest: Breath sounds normal. No respiratory distress. He has no wheezes. He has no rhonchi. He has no rales.   Abdominal: Abdomen is soft. Bowel sounds are normal. There is no abdominal tenderness. There is no rebound and no guarding.   Musculoskeletal:         General: Normal range of motion.      Cervical back: Normal range of motion and neck supple.     Neurological: He is alert and oriented to person, place, and time. He has normal strength. No cranial nerve deficit or sensory deficit. GCS score is 15. GCS eye subscore is 4. GCS verbal subscore is 5. GCS motor subscore is 6.   Skin: Skin is warm and dry. Capillary refill takes less than 2 seconds.        1 cm circular area noted at left forearm.  No drainage or erythema noted.   Psychiatric: He has a normal mood and affect.         ED Course   Procedures  Labs Reviewed - No data to display       Imaging Results    None          Medications - No data to display  Medical Decision Making  34 Year old male presents to ED for evaluation of left arm pain and swelling.  Patient reports that he was had swelling to area after injecting himself with a needle 2 weeks ago.  Patient denies any fever.  Denies any drainage.    Differential diagnosis includes but is not limited to abscess, superficial thrombosis    Amount and/or Complexity of Data Reviewed  Discussion of management or test interpretation with external provider(s): Patient presents to ED for evaluation of possible abscess to his left forearm for the last 2 weeks.  Patient reports that he has  swelling and pain to area after injecting himself with drugs 2 weeks ago.  Reports that the pain comes and goes.  Denies any drainage.  Denies any fever.  Will place on antibiotics to cover for possible infection.  Return ED precautions given.  Patient verbalizes understanding and agrees with plan of care.    Risk  Prescription drug management.                                      Clinical Impression:  Final diagnoses:  [L02.91] Abscess (Primary)          ED Disposition Condition    Discharge Stable          ED Prescriptions       Medication Sig Dispense Start Date End Date Auth. Provider    sulfamethoxazole-trimethoprim 800-160mg (BACTRIM DS) 800-160 mg Tab Take 1 tablet by mouth 2 (two) times daily. for 7 days 14 tablet 7/15/2024 7/22/2024 Berenice Mitchell PA    ketorolac (TORADOL) 10 mg tablet Take 1 tablet (10 mg total) by mouth every 6 (six) hours. for 5 days 20 tablet 7/15/2024 7/20/2024 Berenice Mitchell PA          Follow-up Information       Follow up With Specialties Details Why Contact Info    Shavon Rodriguez NP Family Medicine   [address] [place] [id number] [phone]               Berenice Mitchell PA  07/15/24 3385

## 2024-08-30 ENCOUNTER — HOSPITAL ENCOUNTER (EMERGENCY)
Facility: HOSPITAL | Age: 34
Discharge: HOME | End: 2024-08-30
Attending: EMERGENCY MEDICINE
Payer: MEDICAID

## 2024-08-30 VITALS
WEIGHT: 194 LBS | OXYGEN SATURATION: 99 % | HEIGHT: 71 IN | HEART RATE: 84 BPM | RESPIRATION RATE: 16 BRPM | DIASTOLIC BLOOD PRESSURE: 78 MMHG | SYSTOLIC BLOOD PRESSURE: 121 MMHG | BODY MASS INDEX: 27.16 KG/M2 | TEMPERATURE: 98 F

## 2024-08-30 DIAGNOSIS — K64.4 INFLAMED EXTERNAL HEMORRHOID: ICD-10-CM

## 2024-08-30 DIAGNOSIS — L02.412 ABSCESS OF LEFT AXILLA: Primary | ICD-10-CM

## 2024-08-30 PROCEDURE — 99284 EMERGENCY DEPT VISIT MOD MDM: CPT

## 2024-08-30 RX ORDER — DOCUSATE SODIUM 100 MG/1
100 CAPSULE, LIQUID FILLED ORAL 2 TIMES DAILY
Qty: 20 CAPSULE | Refills: 0 | Status: SHIPPED | OUTPATIENT
Start: 2024-08-30 | End: 2024-09-09

## 2024-08-30 RX ORDER — HYDROCORTISONE 25 MG/G
CREAM TOPICAL 2 TIMES DAILY PRN
Qty: 28 G | Refills: 0 | Status: SHIPPED | OUTPATIENT
Start: 2024-08-30

## 2024-08-30 RX ORDER — SULFAMETHOXAZOLE AND TRIMETHOPRIM 800; 160 MG/1; MG/1
1 TABLET ORAL 2 TIMES DAILY
Qty: 20 TABLET | Refills: 0 | Status: SHIPPED | OUTPATIENT
Start: 2024-08-30 | End: 2024-09-09

## 2024-08-30 RX ORDER — DICLOFENAC SODIUM 75 MG/1
75 TABLET, DELAYED RELEASE ORAL 2 TIMES DAILY PRN
Qty: 20 TABLET | Refills: 0 | Status: SHIPPED | OUTPATIENT
Start: 2024-08-30

## 2024-08-30 NOTE — ED PROVIDER NOTES
Encounter Date: 8/30/2024       History     Chief Complaint   Patient presents with    Abscess     Co abscess under lt arm and painful hemorrhoid     The patient presents with abscess.  The onset was 3 days ago.  The course/duration of symptoms is constant.  Location: left axilla. The character of symptoms is pain, redness, swelling without drainage.  The degree of symptoms is minimal.  Risk factors consist of none.  Prior episodes: occasional.  Therapy today: none.  Associated symptoms: none, denies fever and denies chills. The patient also presents with rectal pain and hemorrhoids.  The onset was 2-3 days ago.  The course/duration of symptoms is constant.  The character of symptoms is dull.  The degree of symptoms is moderate.  There are exacerbating factors including bowel movement and bearing down.  There are relieving factors including analgesics and sitz baths.  Risk factors consist of none.  Prior episodes: occasional.  Therapy today: none.  Associated symptoms: none.  Additional history: last bm was this morning and normal.            Review of patient's allergies indicates:  Not on File  Past Medical History:   Diagnosis Date    Bipolar affective     Depression     HIV (human immunodeficiency virus infection)     Immune deficiency disorder     Schizophrenia      History reviewed. No pertinent surgical history.  No family history on file.  Social History     Tobacco Use    Smoking status: Every Day     Current packs/day: 0.50     Types: Cigarettes, Vaping with nicotine    Smokeless tobacco: Never   Substance Use Topics    Alcohol use: Yes    Drug use: Yes     Types: Marijuana, Methamphetamines     Comment: Precription Drugs     Review of Systems   Constitutional:  Negative for fever.   HENT:  Negative for sore throat.    Respiratory:  Negative for shortness of breath.    Cardiovascular:  Negative for chest pain.   Gastrointestinal:  Positive for rectal pain. Negative for blood in stool and nausea.    Genitourinary:  Negative for dysuria.   Musculoskeletal:  Negative for back pain.   Skin:  Positive for wound. Negative for rash.   Neurological:  Negative for weakness.   Hematological:  Does not bruise/bleed easily.   All other systems reviewed and are negative.      Physical Exam     Initial Vitals [08/30/24 1153]   BP Pulse Resp Temp SpO2   121/78 84 16 98.1 °F (36.7 °C) 99 %      MAP       --         Physical Exam    Nursing note and vitals reviewed.  Constitutional: He appears well-developed and well-nourished.   HENT:   Head: Normocephalic and atraumatic.   Neck: Neck supple.   Normal range of motion.  Cardiovascular:  Normal rate, regular rhythm, normal heart sounds and intact distal pulses.           Pulmonary/Chest: Effort normal and breath sounds normal. He has no decreased breath sounds.   Abdominal: Abdomen is soft and flat. Bowel sounds are normal. There is no abdominal tenderness.   Genitourinary:    Genitourinary Comments: Rectal exam: inflamed external hemorrhoid, unable to assess fobt due to pain      Musculoskeletal:         General: Normal range of motion.      Cervical back: Normal range of motion and neck supple.     Neurological: He is alert and oriented to person, place, and time. He has normal strength.   Skin: Skin is warm and dry.   Small raised tender area to left axilla, no erythema/drainage/fluctuance, will not need  I&D at this time   Psychiatric: He has a normal mood and affect.         ED Course   Procedures  Labs Reviewed - No data to display       Imaging Results    None          Medications - No data to display  Medical Decision Making  The patient presents with abscess.  The onset was 3 days ago.  The course/duration of symptoms is constant.  Location: left axilla. The character of symptoms is pain, redness, swelling without drainage.  The degree of symptoms is minimal.  Risk factors consist of none.  Prior episodes: occasional.  Therapy today: none.  Associated symptoms: none,  denies fever and denies chills. The patient also presents with rectal pain and hemorrhoids.  The onset was 2-3 days ago.  The course/duration of symptoms is constant.  The character of symptoms is dull.  The degree of symptoms is moderate.  There are exacerbating factors including bowel movement and bearing down.  There are relieving factors including analgesics and sitz baths.  Risk factors consist of none.  Prior episodes: occasional.  Therapy today: none.  Associated symptoms: none.  Additional history: last bm was this morning and normal.        Advised to return to ER in 3 days for a recheck of abscess. Will refer to surgery clinic for further evaluation of hemorrhoid and he will f/u with his pcp.12:16 PM DISPOSITION: The patient is resting comfortably in no acute distress.  He is hemodynamically stable and is without objective evidence for acute process requiring urgent intervention or hospitalization. I provided counseling to patient with regard to condition, the treatment plan, specific conditions for return, and the importance of follow up. Detailed written and verbal instructions provided to patient and he expressed a verbal understanding of the discharge instructions and overall management plan. Reiterated the importance of medication administration and safety and advised patient to follow up with primary care provider in 3-5 days or sooner if needed.  Answered questions at this time. The patient is stable for discharge.         Additional MDM:   Differential Diagnosis:   At this time differential diagnosis is but not limited to abscess, soft tissue infection, cellulitis  At this time differential diagnosis is but not limited to inflamed hemorrhoid, engorged hemorrhoid, anal fissure, minda-rectal abscess              ED Course as of 08/30/24 1219   Fri Aug 30, 2024   1215 Given strict ED return precautions. I have spoken with the patient and/or caregivers. I have explained the patient's condition,  diagnoses and treatment plan based on the information available to me at this time. I have answered the patient's and/or caregiver's questions and addressed any concerns. The patient and/or caregivers have as good an understanding of the patient's diagnosis, condition and treatment plan as can be expected at this point. The vital signs have been stable. The patient's condition is stable and appropriate for discharge from the emergency department.      The patient will pursue further outpatient evaluation with the primary care physician or other designated or consulting physician as outlined in the discharge instructions. The patient and/or caregivers are agreeable to this plan of care and follow-up instructions have been explained in detail. The patient and/or caregivers have received these instructions in written format and have expressed an understanding of the discharge instructions. The patient and/or caregivers are aware that any significant change in condition or worsening of symptoms should prompt an immediate return to this or the closest emergency department or a call to 911.      [RB]      ED Course User Index  [RB] Alessandro Russell ACNP                           Clinical Impression:  Final diagnoses:  [L02.412] Abscess of left axilla (Primary)  [K64.4] Inflamed external hemorrhoid          ED Disposition Condition    Discharge Stable          ED Prescriptions       Medication Sig Dispense Start Date End Date Auth. Provider    sulfamethoxazole-trimethoprim 800-160mg (BACTRIM DS) 800-160 mg Tab Take 1 tablet by mouth 2 (two) times daily. for 10 days 20 tablet 8/30/2024 9/9/2024 Alessandro Russell ACNP    diclofenac (VOLTAREN) 75 MG EC tablet Take 1 tablet (75 mg total) by mouth 2 (two) times daily as needed (pain). 20 tablet 8/30/2024 -- Alessandro Russell ACNP    docusate sodium (COLACE) 100 MG capsule Take 1 capsule (100 mg total) by mouth 2 (two) times daily. for 10 days 20 capsule 8/30/2024 9/9/2024 Alessandro Russell  ACNP    hydrocortisone (ANUSOL-HC) 2.5 % rectal cream Place rectally 2 (two) times daily as needed for Hemorrhoids (pain or itching). 28 g 8/30/2024 -- Alessandro Russell ACNP          Follow-up Information       Follow up With Specialties Details Why Contact Info    Shavon Rodriguez NP Family Medicine In 3 days  [address] [place] [id number] [phone]      [facility] Emergency Medicine  If symptoms worsen [address] [facility] [address] [phone]             Alessandro Russell ACNP  08/30/24 1979

## 2024-09-12 ENCOUNTER — HOSPITAL ENCOUNTER (EMERGENCY)
Facility: HOSPITAL | Age: 34
Discharge: HOME | End: 2024-09-12
Attending: EMERGENCY MEDICINE
Payer: MEDICAID

## 2024-09-12 VITALS
HEART RATE: 95 BPM | SYSTOLIC BLOOD PRESSURE: 133 MMHG | DIASTOLIC BLOOD PRESSURE: 64 MMHG | TEMPERATURE: 98 F | RESPIRATION RATE: 18 BRPM | WEIGHT: 189.63 LBS | OXYGEN SATURATION: 98 % | BODY MASS INDEX: 26.44 KG/M2

## 2024-09-12 DIAGNOSIS — K64.9 HEMORRHOIDS, UNSPECIFIED HEMORRHOID TYPE: Primary | ICD-10-CM

## 2024-09-12 PROCEDURE — 99284 EMERGENCY DEPT VISIT MOD MDM: CPT

## 2024-09-12 RX ORDER — HYDROCORTISONE 25 MG/G
CREAM TOPICAL 2 TIMES DAILY PRN
Qty: 28 G | Refills: 0 | Status: SHIPPED | OUTPATIENT
Start: 2024-09-12 | End: 2024-10-12

## 2024-09-12 NOTE — ED PROVIDER NOTES
Encounter Date: 9/12/2024       History     Chief Complaint   Patient presents with    Hemorrhoids     CO HEMORRHOID PAIN AND BLEEDING W BM X 3 MONTHS.       Chon Rousseau is a 35 y/o male with PMH of bipolar disorder, depression, HIV, schizophrenia presenting with a complaint of chronic rectal pain from hemorrhoids for months. He endorses bright red blood with bowel movements if his stools are hard. He is out of his medications. He has no showed to three clinic appointments to address his hemrroid comlaint due to the fear of the procedure initially offered. His pain is present mainly with bowel movements, it is worse if his stool is hard. He denies weakness, syncope, shortness of breath, abdominal pain, dizziness, fatigue, fever, weight changes, appetite changes, dysuria. No other complaints    No show x 3  7/9, 6/18, 4/18      The history is provided by the patient.     Review of patient's allergies indicates:  No Known Allergies  Past Medical History:   Diagnosis Date    Bipolar affective     Depression     HIV (human immunodeficiency virus infection)     Immune deficiency disorder     Schizophrenia      History reviewed. No pertinent surgical history.  No family history on file.  Social History     Tobacco Use    Smoking status: Every Day     Current packs/day: 0.50     Types: Cigarettes, Vaping with nicotine    Smokeless tobacco: Never   Substance Use Topics    Alcohol use: Yes    Drug use: Yes     Types: Marijuana, Methamphetamines     Comment: Precription Drugs     Review of Systems   Constitutional: Negative.    HENT: Negative.     Eyes: Negative.    Respiratory: Negative.     Cardiovascular: Negative.    Gastrointestinal:  Positive for anal bleeding, blood in stool, constipation and rectal pain. Negative for abdominal distention, abdominal pain, diarrhea, nausea and vomiting.   Genitourinary: Negative.    Musculoskeletal: Negative.    Skin: Negative.    Neurological: Negative.        Physical Exam      Initial Vitals [09/12/24 0846]   BP Pulse Resp Temp SpO2   133/64 95 18 97.9 °F (36.6 °C) 98 %      MAP       --         Physical Exam    Nursing note and vitals reviewed.  Constitutional: He appears well-developed and well-nourished. He is cooperative.  Non-toxic appearance. He does not have a sickly appearance. He does not appear ill. No distress.   HENT:   Head: Normocephalic and atraumatic.   Right Ear: Hearing, tympanic membrane and external ear normal.   Left Ear: Hearing, tympanic membrane and external ear normal.   Nose: Nose normal.   Mouth/Throat: Uvula is midline, oropharynx is clear and moist and mucous membranes are normal.   Eyes: Conjunctivae and EOM are normal. Pupils are equal, round, and reactive to light. No scleral icterus.   Neck: Trachea normal and phonation normal. Neck supple.   Normal range of motion.  Cardiovascular:  Normal rate, regular rhythm, normal heart sounds, intact distal pulses and normal pulses.           Pulmonary/Chest: Effort normal and breath sounds normal. No accessory muscle usage. No apnea, no tachypnea and no bradypnea. No respiratory distress. He has no decreased breath sounds. He has no wheezes. He has no rhonchi. He has no rales. He exhibits no tenderness.   Normal effort, symmetrical chest rise and fall, no accessory muscle use. Bilateral clear breath sounds in all fields anterior and posterior.      Abdominal: Abdomen is soft. Bowel sounds are normal. He exhibits no distension. There is no abdominal tenderness.   Abdomen is soft, nontender, no peritoneal signs. Tolerating PO fluids.      No right CVA tenderness.  No left CVA tenderness. There is no rebound, no guarding, no tenderness at McBurney's point and negative Clark's sign. negative psoas sign and negative Rovsing's sign  Genitourinary:    Genitourinary Comments: Declined exam     Musculoskeletal:         General: Normal range of motion.      Cervical back: Normal range of motion and neck supple.      Neurological: He is alert and oriented to person, place, and time. He has normal strength. Gait normal. GCS score is 15. GCS eye subscore is 4. GCS verbal subscore is 5. GCS motor subscore is 6.   Skin: Skin is warm and dry. Capillary refill takes less than 2 seconds. No rash noted.   Psychiatric: He has a normal mood and affect. His speech is normal and behavior is normal. Judgment and thought content normal. Cognition and memory are normal.         ED Course   Procedures  Labs Reviewed - No data to display       Imaging Results    None          Medications - No data to display  Medical Decision Making  Anal fissure, hemorrhoid, proctitis among others    Declined rectal exam    Patient has been a no show three times in the clinic to address his complaint of hemorrhoids, he states he is scared of the pain of the procedure. Explained he will be given numbing medication, the pain is temporary but it will relieve his suffering long term. Patient educated to use prescription medication to decrease inflammation to help with pain. Make appointment with surgeon to assess hemorrhoids. If you fail to make the scheduled appointment, we will not continue to treat your complaint of hemorrhoids in the ER nor will you be given any further clinic appointments.     Patient is nontoxic appearing, no acute distress, stable vital signs.   The patient is resting comfortably in no acute distress.  hemodynamically stable and is without objective evidence for acute process requiring urgent intervention or hospitalization. I provided counseling to patient with regard to condition, the treatment plan, specific conditions for return, and the importance of follow up. Detailed written and verbal instructions provided to patient and he expressed a verbal understanding of the discharge instructions and overall management plan. Reiterated the importance of medication administration and safety and advised patient to follow up with primary care  provider in 3-5 days or sooner if needed. Answered questions at this time. The patient is stable for discharge.       Risk  Prescription drug management.                                      Clinical Impression:  Final diagnoses:  [K64.9] Hemorrhoids, unspecified hemorrhoid type (Primary)          ED Disposition Condition    Discharge Stable          ED Prescriptions       Medication Sig Dispense Start Date End Date Auth. Provider    hydrocortisone (ANUSOL-HC) 2.5 % rectal cream Place rectally 2 (two) times daily as needed for Hemorrhoids (pain or itching). 28 g 9/12/2024 10/12/2024 Karyn Holman FNP    LIDOcaine-hydrocortisone-aloe 2-2 % Kit Place 1 Application rectally 2 (two) times a day. 60 each 9/12/2024 -- Karyn Holman FNP          Follow-up Information       Follow up With Specialties Details Why Contact Info    Shavon Rodriguez NP Family Medicine In 3 days  [address] [place] [id number] [phone]      [facility] Emergency Medicine  If symptoms worsen [address] [facility] [address] [phone]             Karyn Holman FNP  09/15/24 3442

## 2024-09-12 NOTE — DISCHARGE INSTRUCTIONS
Make appointment with surgeon to assess hemorrhoids. If you fail to make the scheduled appointment, we will not continue to treat your complaint of hemorrhoids in the ER nor will you be given any further clinic appointments.

## 2024-09-13 ENCOUNTER — HOSPITAL ENCOUNTER (EMERGENCY)
Facility: HOSPITAL | Age: 34
Discharge: PSYCHIATRIC OR SUBSTANCE USE FACILITY | End: 2024-09-14
Attending: INTERNAL MEDICINE
Payer: MEDICAID

## 2024-09-13 DIAGNOSIS — N17.9 AKI (ACUTE KIDNEY INJURY): ICD-10-CM

## 2024-09-13 DIAGNOSIS — F31.2 BIPOLAR AFFECTIVE DISORDER, CURRENTLY MANIC, SEVERE, WITH PSYCHOTIC FEATURES: Primary | ICD-10-CM

## 2024-09-13 PROCEDURE — 99285 EMERGENCY DEPT VISIT HI MDM: CPT | Mod: 25

## 2024-09-13 PROCEDURE — 96360 HYDRATION IV INFUSION INIT: CPT

## 2024-09-13 PROCEDURE — 84443 ASSAY THYROID STIM HORMONE: CPT | Performed by: INTERNAL MEDICINE

## 2024-09-13 PROCEDURE — 85025 COMPLETE CBC W/AUTO DIFF WBC: CPT | Performed by: INTERNAL MEDICINE

## 2024-09-13 PROCEDURE — 82077 ASSAY SPEC XCP UR&BREATH IA: CPT | Performed by: INTERNAL MEDICINE

## 2024-09-13 PROCEDURE — 80053 COMPREHEN METABOLIC PANEL: CPT | Performed by: INTERNAL MEDICINE

## 2024-09-13 PROCEDURE — 96361 HYDRATE IV INFUSION ADD-ON: CPT

## 2024-09-13 RX ORDER — ZIPRASIDONE MESYLATE 20 MG/ML
10 INJECTION, POWDER, LYOPHILIZED, FOR SOLUTION INTRAMUSCULAR ONCE
Status: COMPLETED | OUTPATIENT
Start: 2024-09-14 | End: 2024-09-14

## 2024-09-13 RX ORDER — IBUPROFEN 200 MG
1 TABLET ORAL DAILY
Status: DISCONTINUED | OUTPATIENT
Start: 2024-09-14 | End: 2024-09-14 | Stop reason: HOSPADM

## 2024-09-14 VITALS
SYSTOLIC BLOOD PRESSURE: 151 MMHG | DIASTOLIC BLOOD PRESSURE: 88 MMHG | RESPIRATION RATE: 16 BRPM | BODY MASS INDEX: 25.82 KG/M2 | WEIGHT: 184.44 LBS | TEMPERATURE: 99 F | HEIGHT: 71 IN | OXYGEN SATURATION: 97 % | HEART RATE: 97 BPM

## 2024-09-14 LAB
ALBUMIN SERPL-MCNC: 4.8 G/DL (ref 3.5–5)
ALBUMIN/GLOB SERPL: 1.2 RATIO (ref 1.1–2)
ALP SERPL-CCNC: 90 UNIT/L (ref 40–150)
ALT SERPL-CCNC: 71 UNIT/L (ref 0–55)
AMPHET UR QL SCN: POSITIVE
ANION GAP SERPL CALC-SCNC: 17 MEQ/L
ANION GAP SERPL CALC-SCNC: 9 MEQ/L
AST SERPL-CCNC: 67 UNIT/L (ref 5–34)
BACTERIA #/AREA URNS AUTO: ABNORMAL /HPF
BARBITURATE SCN PRESENT UR: NEGATIVE
BASOPHILS # BLD AUTO: 0.05 X10(3)/MCL
BASOPHILS NFR BLD AUTO: 0.3 %
BENZODIAZ UR QL SCN: NEGATIVE
BILIRUB SERPL-MCNC: 0.8 MG/DL
BILIRUB UR QL STRIP.AUTO: NEGATIVE
BUN SERPL-MCNC: 32.5 MG/DL (ref 8.9–20.6)
BUN SERPL-MCNC: 34 MG/DL (ref 8.9–20.6)
CALCIUM SERPL-MCNC: 8.8 MG/DL (ref 8.4–10.2)
CALCIUM SERPL-MCNC: 9.9 MG/DL (ref 8.4–10.2)
CANNABINOIDS UR QL SCN: POSITIVE
CHLORIDE SERPL-SCNC: 100 MMOL/L (ref 98–107)
CHLORIDE SERPL-SCNC: 105 MMOL/L (ref 98–107)
CLARITY UR: ABNORMAL
CO2 SERPL-SCNC: 21 MMOL/L (ref 22–29)
CO2 SERPL-SCNC: 23 MMOL/L (ref 22–29)
COCAINE UR QL SCN: NEGATIVE
COLOR UR AUTO: YELLOW
CREAT SERPL-MCNC: 1.78 MG/DL (ref 0.73–1.18)
CREAT SERPL-MCNC: 2.33 MG/DL (ref 0.73–1.18)
CREAT/UREA NIT SERPL: 15
CREAT/UREA NIT SERPL: 18
EOSINOPHIL # BLD AUTO: 0.04 X10(3)/MCL (ref 0–0.9)
EOSINOPHIL NFR BLD AUTO: 0.3 %
ERYTHROCYTE [DISTWIDTH] IN BLOOD BY AUTOMATED COUNT: 13 % (ref 11.5–17)
ETHANOL SERPL-MCNC: <10 MG/DL
FENTANYL UR QL SCN: POSITIVE
GFR SERPLBLD CREATININE-BSD FMLA CKD-EPI: 36 ML/MIN/1.73/M2
GFR SERPLBLD CREATININE-BSD FMLA CKD-EPI: 50 ML/MIN/1.73/M2
GLOBULIN SER-MCNC: 4.1 GM/DL (ref 2.4–3.5)
GLUCOSE SERPL-MCNC: 87 MG/DL (ref 74–100)
GLUCOSE SERPL-MCNC: 90 MG/DL (ref 74–100)
GLUCOSE UR QL STRIP: NORMAL
HCT VFR BLD AUTO: 43.9 % (ref 42–52)
HGB BLD-MCNC: 15.4 G/DL (ref 14–18)
HGB UR QL STRIP: NEGATIVE
HOLD SPECIMEN: NORMAL
HOLD SPECIMEN: NORMAL
HYALINE CASTS #/AREA URNS LPF: ABNORMAL /LPF
IMM GRANULOCYTES # BLD AUTO: 0.05 X10(3)/MCL (ref 0–0.04)
IMM GRANULOCYTES NFR BLD AUTO: 0.3 %
KETONES UR QL STRIP: ABNORMAL
LEUKOCYTE ESTERASE UR QL STRIP: NEGATIVE
LYMPHOCYTES # BLD AUTO: 2.25 X10(3)/MCL (ref 0.6–4.6)
LYMPHOCYTES NFR BLD AUTO: 15.5 %
MCH RBC QN AUTO: 34 PG (ref 27–31)
MCHC RBC AUTO-ENTMCNC: 35.1 G/DL (ref 33–36)
MCV RBC AUTO: 96.9 FL (ref 80–94)
MDMA UR QL SCN: NEGATIVE
MONOCYTES # BLD AUTO: 0.91 X10(3)/MCL (ref 0.1–1.3)
MONOCYTES NFR BLD AUTO: 6.3 %
MUCOUS THREADS URNS QL MICRO: ABNORMAL /LPF
NEUTROPHILS # BLD AUTO: 11.21 X10(3)/MCL (ref 2.1–9.2)
NEUTROPHILS NFR BLD AUTO: 77.3 %
NITRITE UR QL STRIP: NEGATIVE
NRBC BLD AUTO-RTO: 0 %
OPIATES UR QL SCN: NEGATIVE
PCP UR QL: NEGATIVE
PH UR STRIP: 5.5 [PH]
PH UR: 5.5 [PH] (ref 3–11)
PLATELET # BLD AUTO: 296 X10(3)/MCL (ref 130–400)
PMV BLD AUTO: 10 FL (ref 7.4–10.4)
POTASSIUM SERPL-SCNC: 4.4 MMOL/L (ref 3.5–5.1)
POTASSIUM SERPL-SCNC: 4.6 MMOL/L (ref 3.5–5.1)
PROT SERPL-MCNC: 8.9 GM/DL (ref 6.4–8.3)
PROT UR QL STRIP: ABNORMAL
RBC # BLD AUTO: 4.53 X10(6)/MCL (ref 4.7–6.1)
RBC #/AREA URNS AUTO: ABNORMAL /HPF
SODIUM SERPL-SCNC: 137 MMOL/L (ref 136–145)
SODIUM SERPL-SCNC: 138 MMOL/L (ref 136–145)
SP GR UR STRIP.AUTO: 1.03 (ref 1–1.03)
SPECIFIC GRAVITY, URINE AUTO (.000) (OHS): 1.03 (ref 1–1.03)
SQUAMOUS #/AREA URNS LPF: ABNORMAL /HPF
TSH SERPL-ACNC: 0.86 UIU/ML (ref 0.35–4.94)
UROBILINOGEN UR STRIP-ACNC: ABNORMAL
WBC # BLD AUTO: 14.51 X10(3)/MCL (ref 4.5–11.5)
WBC #/AREA URNS AUTO: ABNORMAL /HPF
WBC CASTS #/AREA URNS HPF: ABNORMAL /LPF

## 2024-09-14 PROCEDURE — 80307 DRUG TEST PRSMV CHEM ANLYZR: CPT | Performed by: INTERNAL MEDICINE

## 2024-09-14 PROCEDURE — 96372 THER/PROPH/DIAG INJ SC/IM: CPT | Performed by: INTERNAL MEDICINE

## 2024-09-14 PROCEDURE — 80048 BASIC METABOLIC PNL TOTAL CA: CPT | Performed by: INTERNAL MEDICINE

## 2024-09-14 PROCEDURE — 63600175 PHARM REV CODE 636 W HCPCS: Performed by: INTERNAL MEDICINE

## 2024-09-14 PROCEDURE — 81001 URINALYSIS AUTO W/SCOPE: CPT | Mod: XB | Performed by: INTERNAL MEDICINE

## 2024-09-14 RX ORDER — SODIUM CHLORIDE, SODIUM LACTATE, POTASSIUM CHLORIDE, CALCIUM CHLORIDE 600; 310; 30; 20 MG/100ML; MG/100ML; MG/100ML; MG/100ML
1000 INJECTION, SOLUTION INTRAVENOUS
Status: COMPLETED | OUTPATIENT
Start: 2024-09-14 | End: 2024-09-14

## 2024-09-14 RX ADMIN — SODIUM CHLORIDE, POTASSIUM CHLORIDE, SODIUM LACTATE AND CALCIUM CHLORIDE 1000 ML: 600; 310; 30; 20 INJECTION, SOLUTION INTRAVENOUS at 01:09

## 2024-09-14 RX ADMIN — ZIPRASIDONE MESYLATE 10 MG: 20 INJECTION, POWDER, LYOPHILIZED, FOR SOLUTION INTRAMUSCULAR at 12:09

## 2024-09-14 RX ADMIN — SODIUM CHLORIDE, POTASSIUM CHLORIDE, SODIUM LACTATE AND CALCIUM CHLORIDE 1000 ML: 600; 310; 30; 20 INJECTION, SOLUTION INTRAVENOUS at 02:09

## 2024-09-14 NOTE — ED PROVIDER NOTES
"Encounter Date: 9/13/2024       History     Chief Complaint   Patient presents with    Addiction Problem     States took "the party drug and I need that pill or shot to make me sleep".  States "I'm falling down the rabbit hole".  States is SI and does not trust self.      Suicidal     Presents with suicidal ideations. States relapsed in drug abuse after going to a concert with a friend. States taking his medications but suicidal thoughts are worse.    The history is provided by the patient.     Review of patient's allergies indicates:  No Known Allergies  Past Medical History:   Diagnosis Date    Bipolar affective     Depression     HIV (human immunodeficiency virus infection)     Immune deficiency disorder     Schizophrenia      History reviewed. No pertinent surgical history.  No family history on file.  Social History     Tobacco Use    Smoking status: Every Day     Current packs/day: 0.50     Types: Cigarettes, Vaping with nicotine    Smokeless tobacco: Never   Substance Use Topics    Alcohol use: Yes    Drug use: Yes     Types: Marijuana, Methamphetamines     Comment: Precription Drugs     Review of Systems   Unable to perform ROS: Psychiatric disorder       Physical Exam     Initial Vitals [09/13/24 2334]   BP Pulse Resp Temp SpO2   (!) 159/90 104 17 99 °F (37.2 °C) 98 %      MAP       --         Physical Exam    Nursing note and vitals reviewed.  Constitutional: He appears well-developed and well-nourished. No distress.   HENT:   Head: Normocephalic and atraumatic.   Mouth/Throat: Oropharynx is clear and moist.   Eyes: Conjunctivae and EOM are normal. Pupils are equal, round, and reactive to light.   Neck: Neck supple. No thyromegaly present. No JVD present.   Normal range of motion.  Cardiovascular:  Normal rate, regular rhythm, normal heart sounds and intact distal pulses.           Pulmonary/Chest: Breath sounds normal. No respiratory distress.   Abdominal: Abdomen is soft. Bowel sounds are normal. He " exhibits no distension. There is no abdominal tenderness. There is no rebound and no guarding.   Musculoskeletal:         General: No edema. Normal range of motion.      Cervical back: Normal range of motion and neck supple.     Neurological: He is alert and oriented to person, place, and time. He has normal strength. GCS score is 15. GCS eye subscore is 4. GCS verbal subscore is 5. GCS motor subscore is 6.   Skin: Skin is warm and dry. No rash noted.   Psychiatric: His affect is blunt. His speech is rapid and/or pressured and tangential. He is hyperactive. He is not actively hallucinating. Thought content is delusional. Cognition and memory are impaired. He expresses inappropriate judgment. He expresses suicidal ideation. He is attentive.         ED Course   Procedures  Labs Reviewed   COMPREHENSIVE METABOLIC PANEL - Abnormal       Result Value    Sodium 138      Potassium 4.4      Chloride 100      CO2 21 (*)     Glucose 90      Blood Urea Nitrogen 34.0 (*)     Creatinine 2.33 (*)     Calcium 9.9      Protein Total 8.9 (*)     Albumin 4.8      Globulin 4.1 (*)     Albumin/Globulin Ratio 1.2      Bilirubin Total 0.8      ALP 90      ALT 71 (*)     AST 67 (*)     eGFR 36      Anion Gap 17.0      BUN/Creatinine Ratio 15     URINALYSIS, REFLEX TO URINE CULTURE - Abnormal    Color, UA Yellow      Appearance, UA Turbid (*)     Specific Gravity, UA 1.028      pH, UA 5.5      Protein, UA 2+ (*)     Glucose, UA Normal      Ketones, UA 1+ (*)     Blood, UA Negative      Bilirubin, UA Negative      Urobilinogen, UA 1+ (*)     Nitrites, UA Negative      Leukocyte Esterase, UA Negative      RBC, UA 0-5      WBC, UA 0-5      Bacteria, UA Trace (*)     Squamous Epithelial Cells, UA Occ (*)     Mucous, UA Occ (*)     Hyaline Casts, UA 6-10 (*)     WBC Casts, UA 0-2 (*)    DRUG SCREEN, URINE (BEAKER) - Abnormal    Amphetamines, Urine Positive (*)     Barbiturates, Urine Negative      Benzodiazepine, Urine Negative       Cannabinoids, Urine Positive (*)     Cocaine, Urine Negative      Fentanyl, Urine Positive (*)     MDMA, Urine Negative      Opiates, Urine Negative      Phencyclidine, Urine Negative      pH, Urine 5.5      Specific Gravity, Urine Auto 1.028      Narrative:     Cut off concentrations:    Amphetamines - 1000 ng/ml  Barbiturates - 200 ng/ml  Benzodiazepine - 200 ng/ml  Cannabinoids (THC) - 50 ng/ml  Cocaine - 300 ng/ml  Fentanyl - 1.0 ng/ml  MDMA - 500 ng/ml  Opiates - 300 ng/ml   Phencyclidine (PCP) - 25 ng/ml    Specimen submitted for drug analysis and tested for pH and specific gravity in order to evaluate sample integrity. Suspect tampering if specific gravity is <1.003 and/or pH is not within the range of 4.5 - 8.0  False negatives may result form substances such as bleach added to urine.  False positives may result for the presence of a substance with similar chemical structure to the drug or its metabolite.    This test provides only a PRELIMINARY analytical test result. A more specific alternate chemical method must be used in order to obtain a confirmed analytical result. Gas chromatography/mass spectrometry (GC/MS) is the preferred confirmatory method. Other chemical confirmation methods are available. Clinical consideration and professional judgement should be applied to any drug of abuse test result, particularly when preliminary positive results are used.    Positive results will be confirmed only at the physicians request. Unconfirmed screening results are to be used only for medical purposes (treatment).        CBC WITH DIFFERENTIAL - Abnormal    WBC 14.51 (*)     RBC 4.53 (*)     Hgb 15.4      Hct 43.9      MCV 96.9 (*)     MCH 34.0 (*)     MCHC 35.1      RDW 13.0      Platelet 296      MPV 10.0      Neut % 77.3      Lymph % 15.5      Mono % 6.3      Eos % 0.3      Basophil % 0.3      Lymph # 2.25      Neut # 11.21 (*)     Mono # 0.91      Eos # 0.04      Baso # 0.05      IG# 0.05 (*)     IG% 0.3       NRBC% 0.0     BASIC METABOLIC PANEL - Abnormal    Sodium 137      Potassium 4.6      Chloride 105      CO2 23      Glucose 87      Blood Urea Nitrogen 32.5 (*)     Creatinine 1.78 (*)     BUN/Creatinine Ratio 18      Calcium 8.8      Anion Gap 9.0      eGFR 50     TSH - Normal    TSH 0.860     ALCOHOL,MEDICAL (ETHANOL) - Normal    Ethanol Level <10.0     CBC W/ AUTO DIFFERENTIAL    Narrative:     The following orders were created for panel order CBC auto differential.  Procedure                               Abnormality         Status                     ---------                               -----------         ------                     CBC with Differential[[id number]]       Abnormal            Final result                 Please view results for these tests on the individual orders.   LIGHT BLUE TOP HOLD    Extra Tube Hold for add-ons.     EXTRA TUBES    Narrative:     The following orders were created for panel order EXTRA TUBES.  Procedure                               Abnormality         Status                     ---------                               -----------         ------                     Light Blue Top Hold[[id number]]                             Final result               Gold Top Hold[[id number]]                                                                Please view results for these tests on the individual orders.   GOLD TOP HOLD   EXTRA TUBES    Narrative:     The following orders were created for panel order EXTRA TUBES.  Procedure                               Abnormality         Status                     ---------                               -----------         ------                     Lavender Top Hold[[id number]]                               In process                 Gold Top Hold[[id number]]                                                                Please view results for these tests on the individual orders.   LAVENDER TOP HOLD   GOLD TOP HOLD          Imaging Results     None          Medications   nicotine 14 mg/24 hr 1 patch (1 patch Transdermal Not Given 9/14/24 0000)   ziprasidone injection 10 mg (10 mg Intramuscular Given 9/14/24 0020)   lactated ringers bolus 1,000 mL (0 mLs Intravenous Stopped 9/14/24 0211)   lactated ringers bolus 1,000 mL (0 mLs Intravenous Stopped 9/14/24 0211)   lactated ringers infusion (1,000 mLs Intravenous New Bag 9/14/24 0217)     Medical Decision Making  Amount and/or Complexity of Data Reviewed  Labs: ordered. Decision-making details documented in ED Course.  ECG/medicine tests: ordered and independent interpretation performed. Decision-making details documented in ED Course.  Discussion of management or test interpretation with external provider(s): Case discussed with our psychiatric nurse for transfer. Information will be faxed to local psychiatric institutions for placement. MD will be available for report if needed after nurse report. Patient medically cleared and stable at this time for transfer process.       Risk  OTC drugs.  Prescription drug management.      Additional MDM:   Differential Diagnosis:   Schizophrenia exacerbation, bipolar psychosis, drug induced psychosis, thyrotoxicosis, renal failure, liver failure, toxydrome, among others                  Medically cleared for psychiatry placement: 9/14/2024 12:10 AM                   Clinical Impression:  Final diagnoses:  [F31.2] Bipolar affective disorder, currently manic, severe, with psychotic features (Primary)  [N17.9] EBONY (acute kidney injury)          ED Disposition Condition    Transfer to [facility]          ED Prescriptions    None       Follow-up Information    None          Christian Abdi MD  09/14/24 0010       Christian Abdi MD  09/14/24 0534

## 2024-11-10 ENCOUNTER — HOSPITAL ENCOUNTER (EMERGENCY)
Facility: HOSPITAL | Age: 34
Discharge: HOME | End: 2024-11-10
Attending: EMERGENCY MEDICINE
Payer: MEDICAID

## 2024-11-10 VITALS
TEMPERATURE: 99 F | OXYGEN SATURATION: 98 % | SYSTOLIC BLOOD PRESSURE: 143 MMHG | HEIGHT: 71 IN | HEART RATE: 83 BPM | WEIGHT: 189.13 LBS | DIASTOLIC BLOOD PRESSURE: 78 MMHG | BODY MASS INDEX: 26.48 KG/M2 | RESPIRATION RATE: 17 BRPM

## 2024-11-10 DIAGNOSIS — F20.9 SCHIZOPHRENIA, UNSPECIFIED TYPE: ICD-10-CM

## 2024-11-10 DIAGNOSIS — Z76.0 ENCOUNTER FOR MEDICATION REFILL: Primary | ICD-10-CM

## 2024-11-10 PROCEDURE — 99281 EMR DPT VST MAYX REQ PHY/QHP: CPT

## 2024-11-10 RX ORDER — OLANZAPINE 10 MG/1
10 TABLET ORAL DAILY
Qty: 30 TABLET | Refills: 1 | Status: SHIPPED | OUTPATIENT
Start: 2024-11-10 | End: 2024-11-10

## 2024-11-10 RX ORDER — BUPROPION HYDROCHLORIDE 150 MG/1
150 TABLET ORAL DAILY
Qty: 30 TABLET | Refills: 1 | Status: SHIPPED | OUTPATIENT
Start: 2024-11-10

## 2024-11-10 RX ORDER — OLANZAPINE 10 MG/1
10 TABLET ORAL DAILY
Qty: 30 TABLET | Refills: 1 | Status: SHIPPED | OUTPATIENT
Start: 2024-11-10

## 2024-11-10 RX ORDER — RISPERIDONE 1 MG/1
1 TABLET ORAL 2 TIMES DAILY
Qty: 60 TABLET | Refills: 1 | Status: SHIPPED | OUTPATIENT
Start: 2024-11-10

## 2024-11-10 RX ORDER — BUPROPION HYDROCHLORIDE 150 MG/1
150 TABLET ORAL DAILY
Qty: 30 TABLET | Refills: 1 | Status: SHIPPED | OUTPATIENT
Start: 2024-11-10 | End: 2024-11-10

## 2024-11-11 NOTE — ED PROVIDER NOTES
Encounter Date: 11/10/2024       History     Chief Complaint   Patient presents with    Medication Refill     Needs mental health med refill.  Wellbutrin XL 150mg, Xyprexa 10 mg, Risperidal 1mg     Chronic stable conditions including HIV, depression, schizophrenia, bipolar disorder.  He reports that he is generally doing as well as ever and is working, taking his medications, keeping his appointments generally.  He did miss one appointment with mental health and has a rescheduled visit this week, based on this he did run out of 3 of his medicines, Wellbutrin, Zyprexa, and Risperdal.  He would like to get these refills in advance of his follow up appointment to avoid any decompensation.  He denies all problems in his bright, pleasant, happy, and without any complaints    The history is provided by the patient and medical records. No  was used.     Review of patient's allergies indicates:  No Known Allergies  Past Medical History:   Diagnosis Date    Bipolar affective     Depression     HIV (human immunodeficiency virus infection)     Immune deficiency disorder     Schizophrenia      History reviewed. No pertinent surgical history.  No family history on file.  Social History     Tobacco Use    Smoking status: Every Day     Types: Vaping with nicotine    Smokeless tobacco: Never   Substance Use Topics    Alcohol use: Yes    Drug use: Yes     Types: Marijuana     Comment: former meth     Review of Systems   Psychiatric/Behavioral:  Negative for agitation, behavioral problems, confusion, dysphoric mood, hallucinations, self-injury, sleep disturbance and suicidal ideas. The patient is not nervous/anxious.        Physical Exam     Initial Vitals [11/10/24 1952]   BP Pulse Resp Temp SpO2   (!) 143/78 83 17 98.6 °F (37 °C) 98 %      MAP       --         Physical Exam    Nursing note and vitals reviewed.  Constitutional: He appears well-developed and well-nourished. No distress.   Pulmonary/Chest: No  respiratory distress.     Neurological: He is alert and oriented to person, place, and time. He has normal strength.   Psychiatric: He has a normal mood and affect. His behavior is normal. Judgment and thought content normal.   Bright/ pleasant/ normal screening exam         ED Course   Procedures  Labs Reviewed - No data to display       Imaging Results    None          Medications - No data to display  Medical Decision Making  Refill for meds; missed appt, has re-scheduled appt coming up soon, doing well, no acute complaints or findings.     Problems Addressed:  Encounter for medication refill: acute illness or injury  Schizophrenia, unspecified type: chronic illness or injury    Risk  Prescription drug management.      Additional MDM:   Differential Diagnosis:   Medication refill/ medication non-compliance/ missed appointment/ exacerbation of psychiatric illness                                    Clinical Impression:  Final diagnoses:  [Z76.0] Encounter for medication refill (Primary)  [F20.9] Schizophrenia, unspecified type          ED Disposition Condition    Discharge Stable          ED Prescriptions       Medication Sig Dispense Start Date End Date Auth. Provider    buPROPion (WELLBUTRIN XL) 150 MG TB24 tablet Take 1 tablet (150 mg total) by mouth once daily. 30 tablet 11/10/2024 -- Cesar Kern MD    OLANZapine (ZYPREXA) 10 MG tablet Take 1 tablet (10 mg total) by mouth once daily. 30 tablet 11/10/2024 -- Cesar Kern MD    risperiDONE (RISPERDAL) 1 MG tablet Take 1 tablet (1 mg total) by mouth 2 (two) times daily. 60 tablet 11/10/2024 -- Cesar Kern MD          Follow-up Information       Follow up With Specialties Details Why Contact Info    [facility] Emergency Medicine  As needed [address] [facility] [id number] [phone]    Shavon Rodriguez NP Family Medicine  As needed [address] [place] [id number]  [phone]                Cesar Kern MD  11/10/24 2009

## 2025-02-03 DIAGNOSIS — Z21 HIV INFECTION, UNSPECIFIED SYMPTOM STATUS: Primary | Chronic | ICD-10-CM

## 2025-02-23 NOTE — PLAN OF CARE
Problem: Patient Care Overview  Goal: Plan of Care Review  Outcome: Ongoing (interventions implemented as appropriate)  Respirations even and unlabored, no distress noted on assessment, pain and nausea, no shortness of breath, open wounds to penis and left groin no drainage, refused bed alarm and FLAVIO hose       The procedure was performed independently

## 2025-03-01 ENCOUNTER — HOSPITAL ENCOUNTER (EMERGENCY)
Facility: HOSPITAL | Age: 35
Discharge: HOME | End: 2025-03-01
Attending: EMERGENCY MEDICINE
Payer: MEDICAID

## 2025-03-01 VITALS
OXYGEN SATURATION: 98 % | RESPIRATION RATE: 24 BRPM | WEIGHT: 185 LBS | DIASTOLIC BLOOD PRESSURE: 65 MMHG | TEMPERATURE: 99 F | SYSTOLIC BLOOD PRESSURE: 116 MMHG | HEIGHT: 71 IN | HEART RATE: 115 BPM | BODY MASS INDEX: 25.9 KG/M2

## 2025-03-01 DIAGNOSIS — K64.4 EXTERNAL HEMORRHOIDS WITHOUT COMPLICATION: Primary | ICD-10-CM

## 2025-03-01 DIAGNOSIS — Z21 HIV INFECTION, UNSPECIFIED SYMPTOM STATUS: Chronic | ICD-10-CM

## 2025-03-01 DIAGNOSIS — F19.10 POLYSUBSTANCE ABUSE: ICD-10-CM

## 2025-03-01 DIAGNOSIS — Z76.0 MEDICATION REFILL: ICD-10-CM

## 2025-03-01 DIAGNOSIS — R00.0 TACHYCARDIA: ICD-10-CM

## 2025-03-01 LAB
ACCEPTIBLE SP GR UR QL: 1.02 (ref 1–1.03)
ALBUMIN SERPL-MCNC: 4.1 G/DL (ref 3.5–5)
ALBUMIN/GLOB SERPL: 1 RATIO (ref 1.1–2)
ALP SERPL-CCNC: 69 UNIT/L (ref 40–150)
ALT SERPL-CCNC: 29 UNIT/L (ref 0–55)
AMPHET UR QL SCN: POSITIVE
ANION GAP SERPL CALC-SCNC: 10 MEQ/L
AST SERPL-CCNC: 33 UNIT/L (ref 5–34)
BACTERIA #/AREA URNS AUTO: ABNORMAL /HPF
BARBITURATE SCN PRESENT UR: NEGATIVE
BASOPHILS # BLD AUTO: 0.02 X10(3)/MCL
BASOPHILS NFR BLD AUTO: 0.2 %
BENZODIAZ UR QL SCN: NEGATIVE
BILIRUB SERPL-MCNC: 0.4 MG/DL
BILIRUB UR QL STRIP.AUTO: NEGATIVE
BUN SERPL-MCNC: 16.5 MG/DL (ref 8.9–20.6)
CALCIUM SERPL-MCNC: 9.1 MG/DL (ref 8.4–10.2)
CANNABINOIDS UR QL SCN: POSITIVE
CHLORIDE SERPL-SCNC: 104 MMOL/L (ref 98–107)
CLARITY UR: CLEAR
CO2 SERPL-SCNC: 24 MMOL/L (ref 22–29)
COCAINE UR QL SCN: NEGATIVE
COLOR UR AUTO: ABNORMAL
CREAT SERPL-MCNC: 1.19 MG/DL (ref 0.72–1.25)
CREAT/UREA NIT SERPL: 14
EOSINOPHIL # BLD AUTO: 0.08 X10(3)/MCL (ref 0–0.9)
EOSINOPHIL NFR BLD AUTO: 0.8 %
ERYTHROCYTE [DISTWIDTH] IN BLOOD BY AUTOMATED COUNT: 11.9 % (ref 11.5–17)
FENTANYL UR QL SCN: POSITIVE
GFR SERPLBLD CREATININE-BSD FMLA CKD-EPI: >60 ML/MIN/1.73/M2
GLOBULIN SER-MCNC: 4.1 GM/DL (ref 2.4–3.5)
GLUCOSE SERPL-MCNC: 130 MG/DL (ref 74–100)
GLUCOSE UR QL STRIP: NORMAL
HCT VFR BLD AUTO: 41.4 % (ref 42–52)
HGB BLD-MCNC: 14.5 G/DL (ref 14–18)
HGB UR QL STRIP: NEGATIVE
HOLD SPECIMEN: NORMAL
HYALINE CASTS #/AREA URNS LPF: ABNORMAL /LPF
IMM GRANULOCYTES # BLD AUTO: 0.03 X10(3)/MCL (ref 0–0.04)
IMM GRANULOCYTES NFR BLD AUTO: 0.3 %
KETONES UR QL STRIP: NEGATIVE
LEUKOCYTE ESTERASE UR QL STRIP: NEGATIVE
LYMPHOCYTES # BLD AUTO: 1.67 X10(3)/MCL (ref 0.6–4.6)
LYMPHOCYTES NFR BLD AUTO: 17.1 %
MCH RBC QN AUTO: 34.4 PG (ref 27–31)
MCHC RBC AUTO-ENTMCNC: 35 G/DL (ref 33–36)
MCV RBC AUTO: 98.1 FL (ref 80–94)
MDMA UR QL SCN: NEGATIVE
MONOCYTES # BLD AUTO: 0.66 X10(3)/MCL (ref 0.1–1.3)
MONOCYTES NFR BLD AUTO: 6.8 %
MUCOUS THREADS URNS QL MICRO: ABNORMAL /LPF
NEUTROPHILS # BLD AUTO: 7.31 X10(3)/MCL (ref 2.1–9.2)
NEUTROPHILS NFR BLD AUTO: 74.8 %
NITRITE UR QL STRIP: NEGATIVE
NRBC BLD AUTO-RTO: 0 %
OPIATES UR QL SCN: NEGATIVE
PCP UR QL: NEGATIVE
PH UR STRIP: 6.5 [PH]
PH UR: 6.5 [PH] (ref 3–11)
PLATELET # BLD AUTO: 250 X10(3)/MCL (ref 130–400)
PMV BLD AUTO: 9.9 FL (ref 7.4–10.4)
POTASSIUM SERPL-SCNC: 3.9 MMOL/L (ref 3.5–5.1)
PROT SERPL-MCNC: 8.2 GM/DL (ref 6.4–8.3)
PROT UR QL STRIP: ABNORMAL
RBC # BLD AUTO: 4.22 X10(6)/MCL (ref 4.7–6.1)
RBC #/AREA URNS AUTO: ABNORMAL /HPF
SODIUM SERPL-SCNC: 138 MMOL/L (ref 136–145)
SP GR UR STRIP.AUTO: 1.02 (ref 1–1.03)
SQUAMOUS #/AREA URNS LPF: ABNORMAL /HPF
TROPONIN I SERPL-MCNC: <0.01 NG/ML (ref 0–0.04)
UROBILINOGEN UR STRIP-ACNC: NORMAL
WBC # BLD AUTO: 9.77 X10(3)/MCL (ref 4.5–11.5)
WBC #/AREA URNS AUTO: ABNORMAL /HPF

## 2025-03-01 PROCEDURE — 96360 HYDRATION IV INFUSION INIT: CPT

## 2025-03-01 PROCEDURE — 81001 URINALYSIS AUTO W/SCOPE: CPT | Performed by: NURSE PRACTITIONER

## 2025-03-01 PROCEDURE — 80053 COMPREHEN METABOLIC PANEL: CPT | Performed by: NURSE PRACTITIONER

## 2025-03-01 PROCEDURE — 25000003 PHARM REV CODE 250: Performed by: NURSE PRACTITIONER

## 2025-03-01 PROCEDURE — 85025 COMPLETE CBC W/AUTO DIFF WBC: CPT | Performed by: NURSE PRACTITIONER

## 2025-03-01 PROCEDURE — 80307 DRUG TEST PRSMV CHEM ANLYZR: CPT | Performed by: NURSE PRACTITIONER

## 2025-03-01 PROCEDURE — 96361 HYDRATE IV INFUSION ADD-ON: CPT

## 2025-03-01 PROCEDURE — 93005 ELECTROCARDIOGRAM TRACING: CPT

## 2025-03-01 PROCEDURE — 99285 EMERGENCY DEPT VISIT HI MDM: CPT | Mod: 25

## 2025-03-01 PROCEDURE — 36415 COLL VENOUS BLD VENIPUNCTURE: CPT | Performed by: NURSE PRACTITIONER

## 2025-03-01 PROCEDURE — 84484 ASSAY OF TROPONIN QUANT: CPT | Performed by: NURSE PRACTITIONER

## 2025-03-01 RX ORDER — DOCUSATE SODIUM 100 MG/1
100 CAPSULE, LIQUID FILLED ORAL 2 TIMES DAILY
Qty: 20 CAPSULE | Refills: 0 | Status: SHIPPED | OUTPATIENT
Start: 2025-03-01 | End: 2025-03-11

## 2025-03-01 RX ORDER — HYDROCORTISONE 25 MG/G
CREAM TOPICAL 2 TIMES DAILY PRN
Qty: 28 G | Refills: 0 | Status: SHIPPED | OUTPATIENT
Start: 2025-03-01

## 2025-03-01 RX ADMIN — SODIUM CHLORIDE 1000 ML: 9 INJECTION, SOLUTION INTRAVENOUS at 12:03

## 2025-03-01 RX ADMIN — SODIUM CHLORIDE 1000 ML: 9 INJECTION, SOLUTION INTRAVENOUS at 11:03

## 2025-03-01 NOTE — ED PROVIDER NOTES
Encounter Date: 3/1/2025       History     Chief Complaint   Patient presents with    Hemorrhoids     C/o hemorroids with intermittent bleeding x2 years. Also requesting refill on Biktarvy. Tachycardiac 130's. Admits to using marianna and meth yesterday. No distress.      The patient presents with rectal pain and hemorrhoids.  The onset was 5 days ago.  The course/duration of symptoms is constant.  The character of symptoms is dull.  The degree of symptoms is moderate.  There are exacerbating factors including bowel movement and bearing down.  There are relieving factors including analgesics and sitz baths.  Risk factors consist of none.  Prior episodes: chronic recurrent.  Therapy today: none.  Associated symptoms: none.  Additional history: last bm was this morning and normal. He is tachycardic in triage. He reports that he was doing drugs last night and has not slept. He denies chest pain and shortness of breath.           Review of patient's allergies indicates:  No Known Allergies  Past Medical History:   Diagnosis Date    Bipolar affective     Depression     HIV (human immunodeficiency virus infection)     Immune deficiency disorder     Schizophrenia      No past surgical history on file.  No family history on file.  Social History[1]  Review of Systems   Constitutional:  Negative for fever.   HENT:  Negative for sore throat.    Respiratory:  Negative for shortness of breath.    Cardiovascular:  Negative for chest pain.   Gastrointestinal:  Positive for rectal pain. Negative for constipation and nausea.   Genitourinary:  Negative for dysuria.   Musculoskeletal:  Negative for back pain.   Skin:  Negative for rash.   Neurological:  Negative for weakness.   Hematological:  Does not bruise/bleed easily.   All other systems reviewed and are negative.      Physical Exam     Initial Vitals [03/01/25 1016]   BP Pulse Resp Temp SpO2   137/70 (!) 135 20 98.4 °F (36.9 °C) 98 %      MAP       --         Physical  Exam    Nursing note and vitals reviewed.  Constitutional: He appears well-developed and well-nourished.   HENT:   Head: Normocephalic and atraumatic.   Neck: Neck supple.   Normal range of motion.  Cardiovascular:  Normal rate, regular rhythm, normal heart sounds and intact distal pulses.           Pulmonary/Chest: Effort normal and breath sounds normal. He has no decreased breath sounds.   Abdominal: Abdomen is soft and flat. Bowel sounds are normal. There is no abdominal tenderness.   Genitourinary:    Genitourinary Comments: Rectal exam: inflamed external hemorrhoid, unable to assess fobt due to pain      Musculoskeletal:         General: Normal range of motion.      Cervical back: Normal range of motion and neck supple.     Neurological: He is alert and oriented to person, place, and time. He has normal strength.   Skin: Skin is warm and dry.   Psychiatric: He has a normal mood and affect.         ED Course   Procedures  Labs Reviewed   COMPREHENSIVE METABOLIC PANEL - Abnormal       Result Value    Sodium 138      Potassium 3.9      Chloride 104      CO2 24      Glucose 130 (*)     Blood Urea Nitrogen 16.5      Creatinine 1.19      Calcium 9.1      Protein Total 8.2      Albumin 4.1      Globulin 4.1 (*)     Albumin/Globulin Ratio 1.0 (*)     Bilirubin Total 0.4      ALP 69      ALT 29      AST 33      eGFR >60      Anion Gap 10.0      BUN/Creatinine Ratio 14     URINALYSIS, REFLEX TO URINE CULTURE - Abnormal    Color, UA Light-Yellow      Appearance, UA Clear      Specific Gravity, UA 1.025      pH, UA 6.5      Protein, UA Trace (*)     Glucose, UA Normal      Ketones, UA Negative      Blood, UA Negative      Bilirubin, UA Negative      Urobilinogen, UA Normal      Nitrites, UA Negative      Leukocyte Esterase, UA Negative      RBC, UA 0-5      WBC, UA 0-5      Bacteria, UA None Seen      Squamous Epithelial Cells, UA None Seen      Mucous, UA Trace (*)     Hyaline Casts, UA None Seen     DRUG SCREEN, URINE  (BEAKER) - Abnormal    Amphetamines, Urine Positive (*)     Barbiturates, Urine Negative      Benzodiazepine, Urine Negative      Cannabinoids, Urine Positive (*)     Cocaine, Urine Negative      Fentanyl, Urine Positive (*)     MDMA, Urine Negative      Opiates, Urine Negative      Phencyclidine, Urine Negative      pH, Urine 6.5      Specific Gravity, Urine Auto 1.025      Narrative:     Cut off concentrations:    Amphetamines - 1000 ng/ml  Barbiturates - 200 ng/ml  Benzodiazepine - 200 ng/ml  Cannabinoids (THC) - 50 ng/ml  Cocaine - 300 ng/ml  Fentanyl - 1.0 ng/ml  MDMA - 500 ng/ml  Opiates - 300 ng/ml   Phencyclidine (PCP) - 25 ng/ml    Specimen submitted for drug analysis and tested for pH and specific gravity in order to evaluate sample integrity. Suspect tampering if specific gravity is <1.003 and/or pH is not within the range of 4.5 - 8.0  False negatives may result form substances such as bleach added to urine.  False positives may result for the presence of a substance with similar chemical structure to the drug or its metabolite.    This test provides only a PRELIMINARY analytical test result. A more specific alternate chemical method must be used in order to obtain a confirmed analytical result. Gas chromatography/mass spectrometry (GC/MS) is the preferred confirmatory method. Other chemical confirmation methods are available. Clinical consideration and professional judgement should be applied to any drug of abuse test result, particularly when preliminary positive results are used.    Positive results will be confirmed only at the physicians request. Unconfirmed screening results are to be used only for medical purposes (treatment).        CBC WITH DIFFERENTIAL - Abnormal    WBC 9.77      RBC 4.22 (*)     Hgb 14.5      Hct 41.4 (*)     MCV 98.1 (*)     MCH 34.4 (*)     MCHC 35.0      RDW 11.9      Platelet 250      MPV 9.9      Neut % 74.8      Lymph % 17.1      Mono % 6.8      Eos % 0.8      Basophil %  0.2      Imm Grans % 0.3      Neut # 7.31      Lymph # 1.67      Mono # 0.66      Eos # 0.08      Baso # 0.02      Imm Gran # 0.03      NRBC% 0.0     TROPONIN I - Normal    Troponin-I <0.010     CBC W/ AUTO DIFFERENTIAL    Narrative:     The following orders were created for panel order CBC auto differential.  Procedure                               Abnormality         Status                     ---------                               -----------         ------                     CBC with Differential[[id number]]       Abnormal            Final result                 Please view results for these tests on the individual orders.   EXTRA TUBES    Narrative:     The following orders were created for panel order EXTRA TUBES.  Procedure                               Abnormality         Status                     ---------                               -----------         ------                     Light Blue Top Hold[[id number]]                             Final result               Lavender Top Hold[[id number]]                               Final result               Gold Top Hold[[id number]]                                   Final result               Gold Top Hold[[id number]]                                   Final result               Pink Top Hold[[id number]]                                   Final result                 Please view results for these tests on the individual orders.   LIGHT BLUE TOP HOLD    Extra Tube Hold for add-ons.     LAVENDER TOP HOLD    Extra Tube Hold for add-ons.     GOLD TOP HOLD    Extra Tube Hold for add-ons.     GOLD TOP HOLD    Extra Tube Hold for add-ons.     PINK TOP HOLD    Extra Tube Hold for add-ons.       EKG Readings: (Independently Interpreted)   Initial Reading: No STEMI. Rhythm: Sinus Tachycardia. Heart Rate: 126. Ectopy: No Ectopy. Conduction: Normal. Axis: Normal.   Reviewed by Dr Kern (ER staff)     ECG Results              EKG 12-lead (In process)        Collection  Time Result Time QRS Duration [facility] QTC Calculation    03/01/25 10:20:42 03/01/25 10:45:18 82 451                     In process by Interface, Lab In [facility] (03/01/25 10:45:24)                   Narrative:    Test Reason : R00.0,    Vent. Rate : 126 BPM     Atrial Rate : 126 BPM     P-R Int : 124 ms          QRS Dur :  82 ms      QT Int : 312 ms       P-R-T Axes :  64  34  39 degrees    QTcB Int : 451 ms    Sinus tachycardia  Otherwise normal ECG  When compared with ECG of 24-Nov-2023 17:44,  Vent. rate has increased by  76 bpm  T wave inversion no longer evident in Anterior leads    Referred By: AAAREFERRAL SELF           Confirmed By:                                   Imaging Results              X-Ray Chest AP Portable (Final result)  Result time 03/01/25 11:50:03      Final result by Cesar Mariee MD (03/01/25 11:50:03)                   Impression:      No acute findings.      Electronically signed by: Cesar Mariee  Date:    03/01/2025  Time:    11:50               Narrative:    EXAMINATION:  XR CHEST AP PORTABLE    CLINICAL HISTORY:  Tachycardia, unspecified    COMPARISON:  22 February 2023    FINDINGS:  Frontal view of the chest was obtained.                                       Medications   sodium chloride 0.9% bolus 1,000 mL 1,000 mL (0 mLs Intravenous Stopped 3/1/25 1201)   sodium chloride 0.9% bolus 1,000 mL 1,000 mL (0 mLs Intravenous Stopped 3/1/25 1315)     Medical Decision Making  The patient presents with rectal pain and hemorrhoids.  The onset was 5 days ago.  The course/duration of symptoms is constant.  The character of symptoms is dull.  The degree of symptoms is moderate.  There are exacerbating factors including bowel movement and bearing down.  There are relieving factors including analgesics and sitz baths.  Risk factors consist of none.  Prior episodes: chronic recurrent.  Therapy today: none.  Associated symptoms: none.  Additional history: last bm was this morning and normal. He is  tachycardic in triage. He reports that he was doing drugs last night and has not slept. He denies chest pain and shortness of breath.       Multiple drugs in urine including amphetamine, fentanyl, and marijuana with expected tachycardia.  after ivf. He denies any problem except for hemorrhoids. He has missed a surgery clinic appointment in the past for furthur evaluation. Will refer him back to surgery clinic. Counseled on polysubstance abuse. He will also f/u with his pcp.1:55 PM DISPOSITION: The patient is resting comfortably in no acute distress.  He is hemodynamically stable and is without objective evidence for acute process requiring urgent intervention or hospitalization. I provided counseling to patient with regard to condition, the treatment plan, specific conditions for return, and the importance of follow up. Detailed written and verbal instructions provided to patient and he expressed a verbal understanding of the discharge instructions and overall management plan. Reiterated the importance of medication administration and safety and advised patient to follow up with primary care provider in 3-5 days or sooner if needed.  Answered questions at this time. The patient is stable for discharge.     He also requested 2 day supply of Biktarvy until he can  a prescription waiting for him on Monday. 2 day rx sent to his pharmacy.    Amount and/or Complexity of Data Reviewed  Labs: ordered. Decision-making details documented in ED Course.  Radiology: ordered.    Risk  OTC drugs.  Prescription drug management.      Additional MDM:   Differential Diagnosis:   At this time differential diagnosis is but not limited to inflamed hemorrhoid, engorged hemorrhoid, anal fissure, minda-rectal abscess              ED Course as of 03/01/25 1401   Sat Mar 01, 2025   1352 Fentanyl, Urine(!): Positive [RB]   1352 Cannabinoids, Urine(!): Positive [RB]   1352 Amphetamines, Urine(!): Positive [RB]   1352 Given strict ED  return precautions. I have spoken with the patient and/or caregivers. I have explained the patient's condition, diagnoses and treatment plan based on the information available to me at this time. I have answered the patient's and/or caregiver's questions and addressed any concerns. The patient and/or caregivers have as good an understanding of the patient's diagnosis, condition and treatment plan as can be expected at this point. The vital signs have been stable. The patient's condition is stable and appropriate for discharge from the emergency department.      The patient will pursue further outpatient evaluation with the primary care physician or other designated or consulting physician as outlined in the discharge instructions. The patient and/or caregivers are agreeable to this plan of care and follow-up instructions have been explained in detail. The patient and/or caregivers have received these instructions in written format and have expressed an understanding of the discharge instructions. The patient and/or caregivers are aware that any significant change in condition or worsening of symptoms should prompt an immediate return to this or the closest emergency department or a call to 911.      [RB]      ED Course User Index  [RB] Alessandro Russell ACNP                           Clinical Impression:  Final diagnoses:  [R00.0] Tachycardia  [K64.4] External hemorrhoids without complication (Primary)  [F19.10] Polysubstance abuse  [Z76.0] Medication refill          ED Disposition Condition    Discharge Stable          ED Prescriptions       Medication Sig Dispense Start Date End Date Auth. Provider    hydrocortisone (ANUSOL-HC) 2.5 % rectal cream Place rectally 2 (two) times daily as needed for Hemorrhoids (pain or itching). 28 g 3/1/2025 -- Alessandro Russell ACNP    docusate sodium (COLACE) 100 MG capsule Take 1 capsule (100 mg total) by mouth 2 (two) times daily. for 10 days 20 capsule 3/1/2025 3/11/2025 Alessandro Russell  DEUCE    miimodtkr-ilejhoct-pbevqje ala (BIKTARVY) -25 mg (25 kg or greater)  (Status: Discontinued) Take 1 tablet by mouth once daily. for 2 days 2 tablet 3/1/2025 3/1/2025 Alessandro Russell ACNP    mmcscnewg-fuoyxyik-kenjcou ala (BIKTARVY) -25 mg (25 kg or greater) Take 1 tablet by mouth once daily. for 2 days 2 tablet 3/1/2025 3/3/2025 Alessandro Russell ACNP          Follow-up Information       Follow up With Specialties Details Why Contact Info    Shavon Rodriguez NP Family Medicine In 3 days  [address] [place] [id number]  [phone]      referral sent to surgery clinic        [facility] Emergency Medicine  If symptoms worsen [address] [facility] [address] [phone]                 [1]   Social History  Tobacco Use    Smoking status: Former     Types: Vaping with nicotine    Smokeless tobacco: Never   Substance Use Topics    Alcohol use: Yes    Drug use: Yes     Types: Marijuana     Comment: former meth        Alessandro Russell ACNP  03/01/25 9260

## 2025-03-05 LAB
OHS QRS DURATION: 82 MS
OHS QTC CALCULATION: 451 MS

## 2025-04-11 ENCOUNTER — HOSPITAL ENCOUNTER (EMERGENCY)
Facility: HOSPITAL | Age: 35
Discharge: HOME | End: 2025-04-11
Attending: EMERGENCY MEDICINE
Payer: MEDICAID

## 2025-04-11 VITALS
HEIGHT: 71 IN | RESPIRATION RATE: 18 BRPM | HEART RATE: 83 BPM | SYSTOLIC BLOOD PRESSURE: 98 MMHG | WEIGHT: 180 LBS | OXYGEN SATURATION: 100 % | TEMPERATURE: 98 F | BODY MASS INDEX: 25.2 KG/M2 | DIASTOLIC BLOOD PRESSURE: 58 MMHG

## 2025-04-11 DIAGNOSIS — R07.9 CHEST PAIN: ICD-10-CM

## 2025-04-11 DIAGNOSIS — J06.9 VIRAL URI WITH COUGH: Primary | ICD-10-CM

## 2025-04-11 LAB
FLUAV AG UPPER RESP QL IA.RAPID: NOT DETECTED
FLUBV AG UPPER RESP QL IA.RAPID: NOT DETECTED
SARS-COV-2 RNA RESP QL NAA+PROBE: NOT DETECTED
TROPONIN I SERPL-MCNC: <0.01 NG/ML (ref 0–0.04)

## 2025-04-11 PROCEDURE — 93010 ELECTROCARDIOGRAM REPORT: CPT | Mod: ,,, | Performed by: INTERNAL MEDICINE

## 2025-04-11 PROCEDURE — 99284 EMERGENCY DEPT VISIT MOD MDM: CPT | Mod: 25

## 2025-04-11 PROCEDURE — 93005 ELECTROCARDIOGRAM TRACING: CPT

## 2025-04-11 PROCEDURE — 25000003 PHARM REV CODE 250: Performed by: PHYSICIAN ASSISTANT

## 2025-04-11 PROCEDURE — 0240U COVID/FLU A&B PCR: CPT | Performed by: PHYSICIAN ASSISTANT

## 2025-04-11 PROCEDURE — 84484 ASSAY OF TROPONIN QUANT: CPT | Performed by: PHYSICIAN ASSISTANT

## 2025-04-11 RX ORDER — CETIRIZINE HYDROCHLORIDE 10 MG/1
10 TABLET ORAL DAILY
Qty: 30 TABLET | Refills: 0 | Status: SHIPPED | OUTPATIENT
Start: 2025-04-11 | End: 2025-05-11

## 2025-04-11 RX ORDER — IBUPROFEN 600 MG/1
600 TABLET ORAL EVERY 6 HOURS PRN
Qty: 20 TABLET | Refills: 0 | Status: SHIPPED | OUTPATIENT
Start: 2025-04-11

## 2025-04-11 RX ORDER — IBUPROFEN 600 MG/1
600 TABLET ORAL
Status: COMPLETED | OUTPATIENT
Start: 2025-04-11 | End: 2025-04-11

## 2025-04-11 RX ORDER — GUAIFENESIN AND DEXTROMETHORPHAN HYDROBROMIDE 10; 100 MG/5ML; MG/5ML
5 SYRUP ORAL EVERY 6 HOURS
Qty: 118 ML | Refills: 0 | Status: SHIPPED | OUTPATIENT
Start: 2025-04-11 | End: 2025-04-21

## 2025-04-11 RX ADMIN — IBUPROFEN 600 MG: 600 TABLET, FILM COATED ORAL at 06:04

## 2025-04-11 NOTE — FIRST PROVIDER EVALUATION
Medical screening examination initiated.  I have conducted a focused provider triage encounter, findings are as follows:    Brief history of present illness:  33 yo male presents to ED for evaluation of cough and congestion for the past several days. Complains of fatigue and chest tightness.     There were no vitals filed for this visit.    Pertinent physical exam:  Patient is awake and alert and oriented.  Ambulatory to triage.  In no acute distress.      Brief workup plan: swab, ibuprofen    Preliminary workup initiated; this workup will be continued and followed by the physician or advanced practice provider that is assigned to the patient when roomed.

## 2025-04-11 NOTE — ED PROVIDER NOTES
Encounter Date: 4/11/2025       History     Chief Complaint   Patient presents with    COVID-19 Concerns     Ambulatory to triage. Reports cold in his chest, cough, congestion, chest tightness, fatigue. Denies sore throat. Reports he did cocaine at a party 3 days ago and it started after that. GCS 15. Denies known sick contacts.      35 yo male presents to ED for evaluation of cough and congestion for the past several days. Complains of fatigue and chest tightness.  Patient reports that has chest tightness started after doing cocaine 2 days ago.    The history is provided by the patient. No  was used.     Review of patient's allergies indicates:  No Known Allergies  Past Medical History:   Diagnosis Date    Bipolar affective     Depression     HIV (human immunodeficiency virus infection)     Immune deficiency disorder     Schizophrenia      No past surgical history on file.  No family history on file.  Social History[1]  Review of Systems   Constitutional:  Positive for fatigue. Negative for fever.   HENT:  Positive for congestion. Negative for ear pain, rhinorrhea and sore throat.    Respiratory:  Positive for cough. Negative for shortness of breath and wheezing.    Cardiovascular:  Positive for chest pain.   Gastrointestinal:  Negative for abdominal pain, nausea and vomiting.   Musculoskeletal:  Positive for myalgias.   Neurological:  Negative for headaches.   All other systems reviewed and are negative.      Physical Exam     Initial Vitals [04/11/25 1427]   BP Pulse Resp Temp SpO2   116/67 94 16 98.1 °F (36.7 °C) 96 %      MAP       --         Physical Exam    Nursing note and vitals reviewed.  Constitutional: He appears well-developed and well-nourished. He is cooperative.   HENT:   Head: Normocephalic and atraumatic.   Right Ear: Tympanic membrane and external ear normal.   Left Ear: Tympanic membrane and external ear normal. Mouth/Throat: Uvula is midline, oropharynx is clear and moist and  mucous membranes are normal. No trismus in the jaw. No uvula swelling.   Eyes: Conjunctivae are normal. Pupils are equal, round, and reactive to light.   Neck: Neck supple.   Normal range of motion.  Cardiovascular:  Normal rate, regular rhythm and normal heart sounds.           Pulmonary/Chest: Breath sounds normal. No respiratory distress. He has no wheezes. He has no rhonchi. He has no rales.   Abdominal: Abdomen is soft. Bowel sounds are normal. There is no abdominal tenderness.   Musculoskeletal:         General: Normal range of motion.      Cervical back: Normal range of motion and neck supple.     Neurological: He is alert and oriented to person, place, and time.   Skin: Skin is warm and dry. Capillary refill takes less than 2 seconds.   Psychiatric: He has a normal mood and affect.         ED Course   Procedures  Labs Reviewed   COVID/FLU A&B PCR - Normal       Result Value    Influenza A PCR Not Detected      Influenza B PCR Not Detected      SARS-CoV-2 PCR Not Detected      Narrative:     The Xpert Xpress SARS-CoV-2/FLU/RSV plus is a rapid, multiplexed real-time PCR test intended for the simultaneous qualitative detection and differentiation of SARS-CoV-2, Influenza A, Influenza B, and respiratory syncytial virus (RSV) viral RNA in either nasopharyngeal swab or nasal swab specimens.         TROPONIN I - Normal    Troponin-I <0.010       EKG Readings: (Independently Interpreted)   Initial Reading: No STEMI. Rhythm: Normal Sinus Rhythm. Heart Rate: 86. Ectopy: No Ectopy. Conduction: Normal. ST Segments: Normal ST Segments. T Waves: Normal. Clinical Impression: Normal Sinus Rhythm       Imaging Results    None          Medications   ibuprofen tablet 600 mg (600 mg Oral Given 4/11/25 1800)     Medical Decision Making   35 yo male presents to ED for evaluation of cough and congestion for the past several days. Complains of fatigue and chest tightness.  Patient reports that has chest tightness started after  doing cocaine 2 days ago.    Differential diagnosis includes but isn't limited to Viral syndrome, COVID, flu, strep, sinusitis, bronchitis, cocaine use, CAD, STEMI/NSTEMI      Amount and/or Complexity of Data Reviewed  Labs:  Decision-making details documented in ED Course.  Discussion of management or test interpretation with external provider(s): Patient is afebrile and in no acute distress presents to ED for evaluation of cough congestion and body aches over the last 3-4 days.  Patient concerned for COVID.  COVID and flu negative.  Patient states he has chest tightness that started after doing cocaine 2 days ago.  EKG showing normal sinus rhythm.  Troponin negative.  Will discharge home with short course of symptomatic care.    Risk  OTC drugs.  Prescription drug management.               ED Course as of 04/11/25 1828 Fri Apr 11, 2025 1827 Influenza A, Molecular: Not Detected [SL]   1827 Influenza B, Molecular: Not Detected [SL]   1827 SARS-CoV2 (COVID-19) Qualitative PCR: Not Detected [SL]   1827 Troponin I: <0.010 [SL]      ED Course User Index  [SL] Berenice Mitchell PA                           Clinical Impression:  Final diagnoses:  [R07.9] Chest pain  [J06.9] Viral URI with cough (Primary)          ED Disposition Condition    Discharge Stable          ED Prescriptions       Medication Sig Dispense Start Date End Date Auth. Provider    dextromethorphan-guaiFENesin  mg/5 ml (ROBITUSSIN-DM)  mg/5 mL liquid Take 5 mLs by mouth every 6 (six) hours. for 10 days 118 mL 4/11/2025 4/21/2025 Berenice Mitchell PA    ibuprofen (ADVIL,MOTRIN) 600 MG tablet Take 1 tablet (600 mg total) by mouth every 6 (six) hours as needed for Pain. 20 tablet 4/11/2025 -- Berenice Mitchell PA    cetirizine (ZYRTEC) 10 MG tablet Take 1 tablet (10 mg total) by mouth once daily. 30 tablet 4/11/2025 5/11/2025 Berenice Mitchell PA          Follow-up Information       Follow up With Specialties Details Why Contact Info    Shavon Rodriguez NP Family  Medicine   [address] [place] [id number]  [phone]                 Berenice Mitchell PA  04/11/25 1827       [1]   Social History  Tobacco Use    Smoking status: Former     Types: Vaping with nicotine    Smokeless tobacco: Never   Substance Use Topics    Alcohol use: Yes    Drug use: Yes     Types: Marijuana     Comment: former meth        Berenice Mitchell PA  04/11/25 1828

## 2025-04-11 NOTE — DISCHARGE INSTRUCTIONS
Hydrate with plenty of water.Use allergy medication daily. May use  cough syrup.  Use Tylenol and ibuprofen as needed for pain and swelling.

## 2025-04-12 LAB
OHS QRS DURATION: 88 MS
OHS QTC CALCULATION: 428 MS

## 2025-06-09 NOTE — PLAN OF CARE
Chon refused to attend TR groups despite encouragement only coming out of his room for food and snacks, does not interact with peers not staff, and attends his ADL's.    Chon attended treatment team, has no insight, reported disinterest in rehab and focused on discharge, and not progressing with treatment goals.   Eric score = 17  skin WDL

## 2025-06-25 ENCOUNTER — HOSPITAL ENCOUNTER (EMERGENCY)
Facility: HOSPITAL | Age: 35
Discharge: PSYCHIATRIC OR SUBSTANCE USE FACILITY | End: 2025-06-25
Attending: STUDENT IN AN ORGANIZED HEALTH CARE EDUCATION/TRAINING PROGRAM
Payer: MEDICAID

## 2025-06-25 ENCOUNTER — HOSPITAL ENCOUNTER (EMERGENCY)
Facility: HOSPITAL | Age: 35
Discharge: HOME | End: 2025-06-25
Attending: STUDENT IN AN ORGANIZED HEALTH CARE EDUCATION/TRAINING PROGRAM
Payer: MEDICAID

## 2025-06-25 VITALS
HEART RATE: 99 BPM | WEIGHT: 178 LBS | HEIGHT: 71 IN | DIASTOLIC BLOOD PRESSURE: 81 MMHG | BODY MASS INDEX: 24.92 KG/M2 | SYSTOLIC BLOOD PRESSURE: 153 MMHG | RESPIRATION RATE: 16 BRPM | OXYGEN SATURATION: 100 % | TEMPERATURE: 99 F

## 2025-06-25 VITALS
HEART RATE: 88 BPM | BODY MASS INDEX: 23.85 KG/M2 | OXYGEN SATURATION: 99 % | TEMPERATURE: 98 F | DIASTOLIC BLOOD PRESSURE: 84 MMHG | SYSTOLIC BLOOD PRESSURE: 158 MMHG | RESPIRATION RATE: 20 BRPM | WEIGHT: 171 LBS

## 2025-06-25 DIAGNOSIS — R44.1 VISUAL HALLUCINATION: ICD-10-CM

## 2025-06-25 DIAGNOSIS — F19.10 POLYSUBSTANCE ABUSE: Primary | ICD-10-CM

## 2025-06-25 DIAGNOSIS — R44.0 AUDITORY HALLUCINATION: ICD-10-CM

## 2025-06-25 DIAGNOSIS — Z00.8 MEDICAL CLEARANCE FOR PSYCHIATRIC ADMISSION: ICD-10-CM

## 2025-06-25 DIAGNOSIS — K64.4 EXTERNAL HEMORRHOID: Primary | ICD-10-CM

## 2025-06-25 LAB
ACCEPTIBLE SP GR UR QL: 1.02 (ref 1–1.03)
ALBUMIN SERPL-MCNC: 4.1 G/DL (ref 3.5–5)
ALBUMIN/GLOB SERPL: 1 RATIO (ref 1.1–2)
ALP SERPL-CCNC: 69 UNIT/L (ref 40–150)
ALT SERPL-CCNC: 9 UNIT/L (ref 0–55)
AMPHET UR QL SCN: POSITIVE
ANION GAP SERPL CALC-SCNC: 9 MEQ/L
APAP SERPL-MCNC: <3 UG/ML (ref 10–30)
AST SERPL-CCNC: 14 UNIT/L (ref 11–45)
BACTERIA #/AREA URNS AUTO: ABNORMAL /HPF
BARBITURATE SCN PRESENT UR: NEGATIVE
BASOPHILS # BLD AUTO: 0.02 X10(3)/MCL
BASOPHILS NFR BLD AUTO: 0.3 %
BENZODIAZ UR QL SCN: NEGATIVE
BILIRUB SERPL-MCNC: 0.4 MG/DL
BILIRUB UR QL STRIP.AUTO: NEGATIVE
BUN SERPL-MCNC: 11 MG/DL (ref 8.9–20.6)
CALCIUM SERPL-MCNC: 8.6 MG/DL (ref 8.4–10.2)
CANNABINOIDS UR QL SCN: POSITIVE
CHLORIDE SERPL-SCNC: 106 MMOL/L (ref 98–107)
CLARITY UR: CLEAR
CO2 SERPL-SCNC: 23 MMOL/L (ref 22–29)
COCAINE UR QL SCN: NEGATIVE
COLOR UR AUTO: ABNORMAL
CREAT SERPL-MCNC: 1.16 MG/DL (ref 0.72–1.25)
CREAT/UREA NIT SERPL: 9
EOSINOPHIL # BLD AUTO: 0.01 X10(3)/MCL (ref 0–0.9)
EOSINOPHIL NFR BLD AUTO: 0.1 %
ERYTHROCYTE [DISTWIDTH] IN BLOOD BY AUTOMATED COUNT: 12.5 % (ref 11.5–17)
ETHANOL SERPL-MCNC: <10 MG/DL
FENTANYL UR QL SCN: NEGATIVE
GFR SERPLBLD CREATININE-BSD FMLA CKD-EPI: >60 ML/MIN/1.73/M2
GLOBULIN SER-MCNC: 4 GM/DL (ref 2.4–3.5)
GLUCOSE SERPL-MCNC: 151 MG/DL (ref 74–100)
GLUCOSE UR QL STRIP: NORMAL
HCT VFR BLD AUTO: 43.1 % (ref 42–52)
HGB BLD-MCNC: 14.7 G/DL (ref 14–18)
HGB UR QL STRIP: NEGATIVE
IMM GRANULOCYTES # BLD AUTO: 0.02 X10(3)/MCL (ref 0–0.04)
IMM GRANULOCYTES NFR BLD AUTO: 0.3 %
KETONES UR QL STRIP: NEGATIVE
LEUKOCYTE ESTERASE UR QL STRIP: NEGATIVE
LYMPHOCYTES # BLD AUTO: 0.81 X10(3)/MCL (ref 0.6–4.6)
LYMPHOCYTES NFR BLD AUTO: 11.1 %
MCH RBC QN AUTO: 33.3 PG (ref 27–31)
MCHC RBC AUTO-ENTMCNC: 34.1 G/DL (ref 33–36)
MCV RBC AUTO: 97.7 FL (ref 80–94)
MDMA UR QL SCN: NEGATIVE
MONOCYTES # BLD AUTO: 0.42 X10(3)/MCL (ref 0.1–1.3)
MONOCYTES NFR BLD AUTO: 5.8 %
MUCOUS THREADS URNS QL MICRO: ABNORMAL /LPF
NEUTROPHILS # BLD AUTO: 6 X10(3)/MCL (ref 2.1–9.2)
NEUTROPHILS NFR BLD AUTO: 82.4 %
NITRITE UR QL STRIP: NEGATIVE
NRBC BLD AUTO-RTO: 0 %
OPIATES UR QL SCN: NEGATIVE
PCP UR QL: NEGATIVE
PH UR STRIP: 7 [PH]
PH UR: 7 [PH] (ref 3–11)
PLATELET # BLD AUTO: 234 X10(3)/MCL (ref 130–400)
PMV BLD AUTO: 9.9 FL (ref 7.4–10.4)
POTASSIUM SERPL-SCNC: 3.5 MMOL/L (ref 3.5–5.1)
PROT SERPL-MCNC: 8.1 GM/DL (ref 6.4–8.3)
PROT UR QL STRIP: NEGATIVE
RBC # BLD AUTO: 4.41 X10(6)/MCL (ref 4.7–6.1)
RBC #/AREA URNS AUTO: ABNORMAL /HPF
SODIUM SERPL-SCNC: 138 MMOL/L (ref 136–145)
SP GR UR STRIP.AUTO: 1.02 (ref 1–1.03)
SQUAMOUS #/AREA URNS LPF: ABNORMAL /HPF
UROBILINOGEN UR STRIP-ACNC: NORMAL
WBC # BLD AUTO: 7.28 X10(3)/MCL (ref 4.5–11.5)
WBC #/AREA URNS AUTO: ABNORMAL /HPF

## 2025-06-25 PROCEDURE — 82077 ASSAY SPEC XCP UR&BREATH IA: CPT | Performed by: EMERGENCY MEDICINE

## 2025-06-25 PROCEDURE — 99285 EMERGENCY DEPT VISIT HI MDM: CPT

## 2025-06-25 PROCEDURE — 80307 DRUG TEST PRSMV CHEM ANLYZR: CPT | Performed by: EMERGENCY MEDICINE

## 2025-06-25 PROCEDURE — 99283 EMERGENCY DEPT VISIT LOW MDM: CPT | Mod: 27

## 2025-06-25 PROCEDURE — 25000003 PHARM REV CODE 250: Performed by: STUDENT IN AN ORGANIZED HEALTH CARE EDUCATION/TRAINING PROGRAM

## 2025-06-25 PROCEDURE — 80143 DRUG ASSAY ACETAMINOPHEN: CPT | Performed by: STUDENT IN AN ORGANIZED HEALTH CARE EDUCATION/TRAINING PROGRAM

## 2025-06-25 PROCEDURE — 85025 COMPLETE CBC W/AUTO DIFF WBC: CPT | Performed by: EMERGENCY MEDICINE

## 2025-06-25 PROCEDURE — 81001 URINALYSIS AUTO W/SCOPE: CPT | Performed by: EMERGENCY MEDICINE

## 2025-06-25 PROCEDURE — 80053 COMPREHEN METABOLIC PANEL: CPT | Performed by: EMERGENCY MEDICINE

## 2025-06-25 RX ORDER — DIPHENHYDRAMINE HYDROCHLORIDE 50 MG/ML
50 INJECTION, SOLUTION INTRAMUSCULAR; INTRAVENOUS EVERY 4 HOURS PRN
Status: DISCONTINUED | OUTPATIENT
Start: 2025-06-25 | End: 2025-06-25 | Stop reason: HOSPADM

## 2025-06-25 RX ORDER — ALUMINUM HYDROXIDE, MAGNESIUM HYDROXIDE, AND SIMETHICONE 1200; 120; 1200 MG/30ML; MG/30ML; MG/30ML
30 SUSPENSION ORAL ONCE
Status: COMPLETED | OUTPATIENT
Start: 2025-06-25 | End: 2025-06-25

## 2025-06-25 RX ORDER — DIPHENHYDRAMINE HCL 25 MG
50 CAPSULE ORAL EVERY 4 HOURS PRN
Status: DISCONTINUED | OUTPATIENT
Start: 2025-06-25 | End: 2025-06-25 | Stop reason: HOSPADM

## 2025-06-25 RX ORDER — LIDOCAINE HYDROCHLORIDE 20 MG/ML
15 SOLUTION OROPHARYNGEAL ONCE
Status: COMPLETED | OUTPATIENT
Start: 2025-06-25 | End: 2025-06-25

## 2025-06-25 RX ORDER — HALOPERIDOL 5 MG/1
5 TABLET ORAL EVERY 4 HOURS PRN
Status: DISCONTINUED | OUTPATIENT
Start: 2025-06-25 | End: 2025-06-25 | Stop reason: HOSPADM

## 2025-06-25 RX ORDER — LORAZEPAM 1 MG/1
2 TABLET ORAL EVERY 4 HOURS PRN
Status: DISCONTINUED | OUTPATIENT
Start: 2025-06-25 | End: 2025-06-25 | Stop reason: HOSPADM

## 2025-06-25 RX ORDER — HALOPERIDOL LACTATE 5 MG/ML
5 INJECTION, SOLUTION INTRAMUSCULAR EVERY 4 HOURS PRN
Status: DISCONTINUED | OUTPATIENT
Start: 2025-06-25 | End: 2025-06-25 | Stop reason: HOSPADM

## 2025-06-25 RX ADMIN — ALUMINUM HYDROXIDE, MAGNESIUM HYDROXIDE, AND DIMETHICONE 30 ML: 200; 20; 200 SUSPENSION ORAL at 11:06

## 2025-06-25 RX ADMIN — LIDOCAINE HYDROCHLORIDE 15 ML: 20 SOLUTION ORAL at 11:06

## 2025-06-25 RX ADMIN — LORAZEPAM 2 MG: 1 TABLET ORAL at 08:06

## 2025-06-25 NOTE — ED PROVIDER NOTES
Encounter Date: 6/25/2025       History     Chief Complaint   Patient presents with    Hemorrhoids     Presents with a hemorrhoid x 2 months, occasionally uncomfortable w/ occasional scant blood when wiping.      That has a 35-year-old male who presents emergency department chief complaint of hemorrhoid.  Patient reports he has been having symptoms for 2 months.  Complains that has some discomfort near the anus as well as scant blood when wiping.  Denies any chest pain shortness breath fever chills diarrhea or constipation.  Reports he was prescribed hydrocortisone cream at outside hospital 2 months ago not helping much.    The history is provided by the patient.     Review of patient's allergies indicates:  No Known Allergies  Past Medical History:   Diagnosis Date    Bipolar affective     Depression     HIV (human immunodeficiency virus infection)     Immune deficiency disorder     Schizophrenia      No past surgical history on file.  No family history on file.  Social History[1]  Review of Systems   Constitutional:  Negative for chills and fever.   Respiratory:  Negative for chest tightness.    Cardiovascular:  Negative for chest pain.   Gastrointestinal:  Positive for anal bleeding and rectal pain. Negative for blood in stool.       Physical Exam     Initial Vitals [06/25/25 0653]   BP Pulse Resp Temp SpO2   (!) 153/81 99 16 98.5 °F (36.9 °C) 100 %      MAP       --         Physical Exam    Nursing note and vitals reviewed.  Constitutional: He appears well-developed and well-nourished. He is not diaphoretic. No distress.   HENT:   Head: Normocephalic and atraumatic.   Right Ear: External ear normal.   Left Ear: External ear normal.   Nose: Nose normal.   Eyes: EOM are normal. Pupils are equal, round, and reactive to light. Right eye exhibits no discharge. Left eye exhibits no discharge.   Cardiovascular:  Normal rate, regular rhythm and normal heart sounds.     Exam reveals no gallop and no friction rub.        No murmur heard.  Pulmonary/Chest: Effort normal and breath sounds normal. No respiratory distress. He has no wheezes. He has no rhonchi. He has no rales. He exhibits no tenderness.   Abdominal: Abdomen is soft. Bowel sounds are normal. He exhibits no distension and no mass. There is no abdominal tenderness. There is no rebound and no guarding.   Genitourinary:    Genitourinary Comments: That has what appears to be a hemorrhoid, no evidence of thrombosis near the anus to the left.  No active bleeding.  No signs or symptoms of infection no erythema edema or drainage.    Chaperoned by JOSE Rocha     Musculoskeletal:         General: No edema. Normal range of motion.     Neurological: He is alert and oriented to person, place, and time. No cranial nerve deficit or sensory deficit.   Skin: Skin is warm and dry. Capillary refill takes less than 2 seconds.         ED Course   Procedures  Labs Reviewed - No data to display       Imaging Results    None          Medications - No data to display  Medical Decision Making  Differential diagnosis to include but not limited to external hemorrhoid, internal hemorrhoid, hernia, constipation, diarrhea.      Patient is awake alert well-appearing.  Hemodynamically stable.  Only complains of hemorrhoid for 2 months, chart review does reveal that has complain about that has for at least 2 years.  Reports he is very nervous to get any surgery or other fixation.  States 2 months ago he was prescribed hydrocortisone cream but that has not helping.  That has come to emergency department further evaluation management complains of discomfort near the rectum as well as that has scant blood when wiping.  Denies any chest pain shortness breath fever chills.               ED Course as of 06/25/25 0714   Wed Jun 25, 2025   0700 Chart review reveals patient is that has seen at outside hospital 03/01/2025 for hemorrhoids x2 years.  That has a presented to emergency department 04/11/2025  concerned about possible COVID-19 exposure and cocaine use. [MM]      ED Course User Index  [MM] Jean Kowalski MD                           Clinical Impression:  Final diagnoses:  [K64.4] External hemorrhoid (Primary)          ED Disposition Condition    Discharge Stable          ED Prescriptions       Medication Sig Dispense Start Date End Date Auth. Provider    CMPD hydrocortisone 2%- LIDOcaine 3% suppository (RECTAL ROCKET) Place 1 suppository rectally every evening. Insert 1 suppository 3/4 of the way onto rectum. Wet for easier application. Repeat for 3 nights. 3 Application 6/25/2025 -- Jean Kowalski MD          Follow-up Information       Follow up With Specialties Details Why Contact Info    Shavon Rodriguez NP Family Medicine Go to   [address] [place] [address]  [phone]      Aubrey Deleon MD Gastroenterology   [address] [place] [address].  [place] [address]  [phone]      Juan Miguel Jensen MD Colon and Rectal Surgery   [address] [facility]  [address]  [place] [address]  [phone]      [facility] - General Surgery General Surgery   [address] [facility] [id number]    [facility] - Emergency Dept Emergency Medicine Go to  If symptoms worsen [facility] [facility] [id number] [phone]                   [1]   Social History  Tobacco Use    Smoking status: Former     Types: Vaping with nicotine    Smokeless tobacco: Never   Substance Use Topics    Alcohol use: Yes    Drug use: Yes     Types: Marijuana     Comment: former meth        Jean Kowalski MD  06/25/25 7971

## 2025-06-25 NOTE — ED PROVIDER NOTES
Encounter Date: 6/25/2025    SCRIBE #1 NOTE: I, Priscila Singh, am scribing for, and in the presence of,  Jean Kowalski MD. I have scribed the following portions of the note - Other sections scribed: HPI, ROS, PE.       History     Chief Complaint   Patient presents with    auditory hallucinations     Auditory hallucinations x 1 week, SI since yesterday. Denies HI. Denies plan. Out of psych meds x 1 week,Hx Depression    Suicidal     Patient is a 35 year old male with a history of bipolar affective, depression, HIV, and schizophrenia presenting to the ED with auditory and visual hallucinations for 1 week. Patient reports he is out of his psych medications. Patient reports his auditory hallucinations are telling him to take more marianna to hurt himself. Patient reports seeing shadow figures.    The history is provided by the patient and medical records.     Review of patient's allergies indicates:  No Known Allergies  Past Medical History:   Diagnosis Date    Bipolar affective     Depression     HIV (human immunodeficiency virus infection)     Immune deficiency disorder     Schizophrenia      No past surgical history on file.  No family history on file.  Social History[1]  Review of Systems   Psychiatric/Behavioral:  Positive for hallucinations.        Physical Exam     Initial Vitals [06/25/25 0754]   BP Pulse Resp Temp SpO2   (!) 165/75 99 18 98.5 °F (36.9 °C) 98 %      MAP       --         Physical Exam    Constitutional: He appears well-developed and well-nourished. He is not diaphoretic.   HENT:   Head: Normocephalic and atraumatic.   Right Ear: External ear normal.   Left Ear: External ear normal.   Nose: Nose normal.   Eyes: EOM are normal. Pupils are equal, round, and reactive to light. Right eye exhibits no discharge. Left eye exhibits no discharge.   Cardiovascular:  Normal rate, regular rhythm and normal heart sounds.     Exam reveals no gallop and no friction rub.       No murmur heard.  Pulmonary/Chest:  Effort normal and breath sounds normal. No respiratory distress. He has no wheezes. He has no rhonchi. He has no rales. He exhibits no tenderness.   Abdominal: Abdomen is soft. Bowel sounds are normal. He exhibits no distension and no mass. There is no abdominal tenderness. There is no rebound and no guarding.   Musculoskeletal:         General: No edema. Normal range of motion.     Neurological: He is alert and oriented to person, place, and time. No cranial nerve deficit or sensory deficit.   Skin: Skin is warm and dry. Capillary refill takes less than 2 seconds.         ED Course   Procedures  Labs Reviewed   COMPREHENSIVE METABOLIC PANEL - Abnormal       Result Value    Sodium 138      Potassium 3.5      Chloride 106      CO2 23      Glucose 151 (*)     Blood Urea Nitrogen 11.0      Creatinine 1.16      Calcium 8.6      Protein Total 8.1      Albumin 4.1      Globulin 4.0 (*)     Albumin/Globulin Ratio 1.0 (*)     Bilirubin Total 0.4      ALP 69      ALT 9      AST 14      eGFR >60      Anion Gap 9.0      BUN/Creatinine Ratio 9     DRUG SCREEN, URINE (BEAKER) - Abnormal    Amphetamines, Urine Positive (*)     Barbiturates, Urine Negative      Benzodiazepine, Urine Negative      Cannabinoids, Urine Positive (*)     Cocaine, Urine Negative      Fentanyl, Urine Negative      MDMA, Urine Negative      Opiates, Urine Negative      Phencyclidine, Urine Negative      pH, Urine 7.0      Specific Gravity, Urine Auto 1.024      Narrative:     Cut off concentrations:    Amphetamines - 1000 ng/ml  Barbiturates - 200 ng/ml  Benzodiazepine - 200 ng/ml  Cannabinoids (THC) - 50 ng/ml  Cocaine - 300 ng/ml  Fentanyl - 1.0 ng/ml  MDMA - 500 ng/ml  Opiates - 300 ng/ml   Phencyclidine (PCP) - 25 ng/ml    Specimen submitted for drug analysis and tested for pH and specific gravity in order to evaluate sample integrity. Suspect tampering if specific gravity is <1.003 and/or pH is not within the range of 4.5 - 8.0  False negatives may  result form substances such as bleach added to urine.  False positives may result for the presence of a substance with similar chemical structure to the drug or its metabolite.    This test provides only a PRELIMINARY analytical test result. A more specific alternate chemical method must be used in order to obtain a confirmed analytical result. Gas chromatography/mass spectrometry (GC/MS) is the preferred confirmatory method. Other chemical confirmation methods are available. Clinical consideration and professional judgement should be applied to any drug of abuse test result, particularly when preliminary positive results are used.    Positive results will be confirmed only at the physicians request. Unconfirmed screening results are to be used only for medical purposes (treatment).        URINALYSIS, REFLEX TO URINE CULTURE - Abnormal    Color, UA Light-Yellow      Appearance, UA Clear      Specific Gravity, UA 1.024      pH, UA 7.0      Protein, UA Negative      Glucose, UA Normal      Ketones, UA Negative      Blood, UA Negative      Bilirubin, UA Negative      Urobilinogen, UA Normal      Nitrites, UA Negative      Leukocyte Esterase, UA Negative      RBC, UA None Seen      WBC, UA 0-5      Bacteria, UA None Seen      Squamous Epithelial Cells, UA None Seen      Mucous, UA Trace (*)    CBC WITH DIFFERENTIAL - Abnormal    WBC 7.28      RBC 4.41 (*)     Hgb 14.7      Hct 43.1      MCV 97.7 (*)     MCH 33.3 (*)     MCHC 34.1      RDW 12.5      Platelet 234      MPV 9.9      Neut % 82.4      Lymph % 11.1      Mono % 5.8      Eos % 0.1      Basophil % 0.3      Imm Grans % 0.3      Neut # 6.00      Lymph # 0.81      Mono # 0.42      Eos # 0.01      Baso # 0.02      Imm Gran # 0.02      NRBC% 0.0     ACETAMINOPHEN LEVEL - Abnormal    Acetaminophen Level <3.0 (*)    ALCOHOL,MEDICAL (ETHANOL) - Normal    Ethanol Level <10.0     CBC W/ AUTO DIFFERENTIAL    Narrative:     The following orders were created for panel order  CBC auto differential.  Procedure                               Abnormality         Status                     ---------                               -----------         ------                     CBC with Differential[[id number]]       Abnormal            Final result                 Please view results for these tests on the individual orders.   SARS CORONAVIRUS 2 ANTIGEN POCT, MANUAL READ          Imaging Results    None          Medications   haloperidoL tablet 5 mg (has no administration in time range)   LORazepam tablet 2 mg (2 mg Oral Given 6/25/25 0855)   diphenhydrAMINE capsule 50 mg (has no administration in time range)   haloperidol lactate injection 5 mg (has no administration in time range)   diphenhydrAMINE injection 50 mg (has no administration in time range)     Medical Decision Making  The differential diagnosis includes, but is not limited to, suicidal ideations, homicidal ideations, auditory or visual hallucinations, drug/etoh intoxication, bipolar disorder, schizophrenia, schizoaffective, delusional disorder, major depressive disorder, PTSD, substance induced mood disorder, violent behavior, suicidal attempt, non-psychiatric medical illness, malingering      Patient presents with a auditory, visual hallucinations.  Reports he is seeing shadow figures.  That has hearing voices telling him to take more marianna.  And believes with the voices are telling him to harm himself.  History of bipolar, schizophrenia.  Placed under pec for his safety and safety of others.  He otherwise of the being anxious has a unremarkable emergency department stay.  I believe he is suitable for transfer to inpatient psychiatric facility.    Amount and/or Complexity of Data Reviewed  External Data Reviewed: notes.     Details: See ED course  Labs: ordered. Decision-making details documented in ED Course.    Risk  OTC drugs.  Prescription drug management.            Scribe Attestation:   Scribe #1: I performed the above  scribed service and the documentation accurately describes the services I performed. I attest to the accuracy of the note.    Attending Attestation:           Physician Attestation for Scribe:  Physician Attestation Statement for Scribe #1: I, Jean Kowalski MD, reviewed documentation, as scribed by Priscila Singh in my presence, and it is both accurate and complete.             ED Course as of 06/25/25 0951   Wed Jun 25, 2025   0800 Chart review reveals history of bipolar on Depakote. [MM]   0830 WBC: 7.28 [MM]   0831 Hemoglobin: 14.7 [MM]   0831 Hematocrit: 43.1  Patient was seen here earlier today for hemorrhoids.  States he did not tell us about the AVH due to being embarrassed. [MM]   0854 Acetaminophen Level(!): <3.0 [MM]   0854 Comprehensive metabolic panel(!)  That has significant electrolyte abnormality or renal dysfunction [MM]   0854 CBC auto differential(!)  No leukocytosis or anemia [MM]   0926 Urinalysis, Reflex to Urine Culture Urine, Clean Catch(!)  No evidence of UTI [MM]   0950 Amphetamines, Urine(!): Positive [MM]   0950 Cannabinoids, Urine(!): Positive [MM]   0950 Urinalysis, Reflex to Urine Culture Urine, Clean Catch(!)  No convincing evidence of urinary tract infection [MM]      ED Course User Index  [MM] Jean Kowalski MD       Medically cleared for psychiatry placement: 6/25/2025  9:51 AM                   Clinical Impression:  Final diagnoses:  [F19.10] Polysubstance abuse (Primary)  [Z00.8] Medical clearance for psychiatric admission  [R44.0] Auditory hallucination  [R44.1] Visual hallucination          ED Disposition Condition    Transfer to Psych Facility Stable          ED Prescriptions    None       Follow-up Information       Follow up With Specialties Details Why Contact [facility]    [address] [id number] [phone]                     [1]   Social History  Tobacco Use    Smoking status: Former     Types: Vaping with nicotine     Smokeless tobacco: Never   Substance Use Topics    Alcohol use: Yes    Drug use: Yes     Types: Marijuana     Comment: former meth        Jean Kowalski MD  06/25/25 0919

## 2025-06-25 NOTE — ED NOTES
"Assumed care, patient escorted to room by security under [employer] protocol. Changed patient into paper psych scrubs, obtained personal items, electronic wand used to assess for weapons, 1:1 initiated and maintained. Patient endorses SI with intent to take a lot of "ZANE"; endorses using 2 ZANE pills prior to arrival and weed. Aaox4, gcs 15. Talking to self, anxious, cooperative. Ambulates with steady gait, skin intact. Pmhx of schizophrenia, HIV, substance abuse.   "

## 2025-06-25 NOTE — DISCHARGE INSTRUCTIONS

## 2025-08-29 ENCOUNTER — HOSPITAL ENCOUNTER (EMERGENCY)
Facility: HOSPITAL | Age: 35
Discharge: PSYCHIATRIC OR SUBSTANCE USE FACILITY | End: 2025-08-29
Attending: EMERGENCY MEDICINE
Payer: COMMERCIAL

## 2025-08-29 VITALS
WEIGHT: 170 LBS | BODY MASS INDEX: 23.71 KG/M2 | OXYGEN SATURATION: 100 % | RESPIRATION RATE: 18 BRPM | DIASTOLIC BLOOD PRESSURE: 59 MMHG | HEART RATE: 101 BPM | TEMPERATURE: 98 F | SYSTOLIC BLOOD PRESSURE: 130 MMHG

## 2025-08-29 DIAGNOSIS — Z00.8 MEDICAL CLEARANCE FOR PSYCHIATRIC ADMISSION: Primary | ICD-10-CM

## 2025-08-29 LAB
ALBUMIN SERPL-MCNC: 4.1 G/DL (ref 3.5–5)
ALBUMIN/GLOB SERPL: 1.1 RATIO (ref 1.1–2)
ALP SERPL-CCNC: 66 UNIT/L (ref 40–150)
ALT SERPL-CCNC: 16 UNIT/L (ref 0–55)
ANION GAP SERPL CALC-SCNC: 9 MEQ/L
APAP SERPL-MCNC: 4 UG/ML (ref 10–30)
AST SERPL-CCNC: 27 UNIT/L (ref 11–45)
BASOPHILS # BLD AUTO: 0.03 X10(3)/MCL
BASOPHILS NFR BLD AUTO: 0.4 %
BILIRUB SERPL-MCNC: 0.8 MG/DL
BUN SERPL-MCNC: 17.4 MG/DL (ref 8.9–20.6)
CALCIUM SERPL-MCNC: 9.4 MG/DL (ref 8.4–10.2)
CHLORIDE SERPL-SCNC: 106 MMOL/L (ref 98–107)
CO2 SERPL-SCNC: 26 MMOL/L (ref 22–29)
CREAT SERPL-MCNC: 1.24 MG/DL (ref 0.72–1.25)
CREAT/UREA NIT SERPL: 14
EOSINOPHIL # BLD AUTO: 0.08 X10(3)/MCL (ref 0–0.9)
EOSINOPHIL NFR BLD AUTO: 1.1 %
ERYTHROCYTE [DISTWIDTH] IN BLOOD BY AUTOMATED COUNT: 11.6 % (ref 11.5–17)
ETHANOL SERPL-MCNC: <10 MG/DL
GFR SERPLBLD CREATININE-BSD FMLA CKD-EPI: >60 ML/MIN/1.73/M2
GLOBULIN SER-MCNC: 3.8 GM/DL (ref 2.4–3.5)
GLUCOSE SERPL-MCNC: 121 MG/DL (ref 74–100)
HCT VFR BLD AUTO: 39.3 % (ref 42–52)
HGB BLD-MCNC: 13.9 G/DL (ref 14–18)
IMM GRANULOCYTES # BLD AUTO: 0.01 X10(3)/MCL (ref 0–0.04)
IMM GRANULOCYTES NFR BLD AUTO: 0.1 %
LYMPHOCYTES # BLD AUTO: 1.52 X10(3)/MCL (ref 0.6–4.6)
LYMPHOCYTES NFR BLD AUTO: 21.5 %
MCH RBC QN AUTO: 34.2 PG (ref 27–31)
MCHC RBC AUTO-ENTMCNC: 35.4 G/DL (ref 33–36)
MCV RBC AUTO: 96.8 FL (ref 80–94)
MONOCYTES # BLD AUTO: 0.55 X10(3)/MCL (ref 0.1–1.3)
MONOCYTES NFR BLD AUTO: 7.8 %
NEUTROPHILS # BLD AUTO: 4.88 X10(3)/MCL (ref 2.1–9.2)
NEUTROPHILS NFR BLD AUTO: 69.1 %
NRBC BLD AUTO-RTO: 0 %
PLATELET # BLD AUTO: 241 X10(3)/MCL (ref 130–400)
PMV BLD AUTO: 9.8 FL (ref 7.4–10.4)
POTASSIUM SERPL-SCNC: 3.1 MMOL/L (ref 3.5–5.1)
PROT SERPL-MCNC: 7.9 GM/DL (ref 6.4–8.3)
RBC # BLD AUTO: 4.06 X10(6)/MCL (ref 4.7–6.1)
SALICYLATES SERPL-MCNC: <5 MG/DL (ref 15–30)
SODIUM SERPL-SCNC: 141 MMOL/L (ref 136–145)
T4 FREE SERPL-MCNC: 1.09 NG/DL (ref 0.7–1.48)
TSH SERPL-ACNC: 0.31 UIU/ML (ref 0.35–4.94)
WBC # BLD AUTO: 7.07 X10(3)/MCL (ref 4.5–11.5)

## 2025-08-29 PROCEDURE — 82077 ASSAY SPEC XCP UR&BREATH IA: CPT | Performed by: EMERGENCY MEDICINE

## 2025-08-29 PROCEDURE — 63600175 PHARM REV CODE 636 W HCPCS: Performed by: EMERGENCY MEDICINE

## 2025-08-29 PROCEDURE — 85025 COMPLETE CBC W/AUTO DIFF WBC: CPT | Performed by: EMERGENCY MEDICINE

## 2025-08-29 PROCEDURE — 99285 EMERGENCY DEPT VISIT HI MDM: CPT | Mod: 25

## 2025-08-29 PROCEDURE — 84443 ASSAY THYROID STIM HORMONE: CPT | Performed by: EMERGENCY MEDICINE

## 2025-08-29 PROCEDURE — 96372 THER/PROPH/DIAG INJ SC/IM: CPT | Performed by: EMERGENCY MEDICINE

## 2025-08-29 PROCEDURE — 80179 DRUG ASSAY SALICYLATE: CPT | Performed by: EMERGENCY MEDICINE

## 2025-08-29 PROCEDURE — 80143 DRUG ASSAY ACETAMINOPHEN: CPT | Performed by: EMERGENCY MEDICINE

## 2025-08-29 PROCEDURE — 84439 ASSAY OF FREE THYROXINE: CPT | Performed by: EMERGENCY MEDICINE

## 2025-08-29 PROCEDURE — 80053 COMPREHEN METABOLIC PANEL: CPT | Performed by: EMERGENCY MEDICINE

## 2025-08-29 RX ORDER — DIPHENHYDRAMINE HYDROCHLORIDE 50 MG/ML
25 INJECTION, SOLUTION INTRAMUSCULAR; INTRAVENOUS
Status: COMPLETED | OUTPATIENT
Start: 2025-08-29 | End: 2025-08-29

## 2025-08-29 RX ORDER — ZIPRASIDONE MESYLATE 20 MG/ML
20 INJECTION, POWDER, LYOPHILIZED, FOR SOLUTION INTRAMUSCULAR
Status: COMPLETED | OUTPATIENT
Start: 2025-08-29 | End: 2025-08-29

## 2025-08-29 RX ADMIN — ZIPRASIDONE MESYLATE 20 MG: 20 INJECTION, POWDER, LYOPHILIZED, FOR SOLUTION INTRAMUSCULAR at 01:08

## 2025-08-29 RX ADMIN — DIPHENHYDRAMINE HYDROCHLORIDE 25 MG: 50 INJECTION INTRAMUSCULAR; INTRAVENOUS at 01:08
